# Patient Record
Sex: FEMALE | Race: WHITE | Employment: OTHER | URBAN - METROPOLITAN AREA
[De-identification: names, ages, dates, MRNs, and addresses within clinical notes are randomized per-mention and may not be internally consistent; named-entity substitution may affect disease eponyms.]

---

## 2016-09-01 PROCEDURE — G9678 ONCOLOGY CARE MODEL SERVICE: HCPCS | Performed by: INTERNAL MEDICINE

## 2016-10-01 PROCEDURE — G9678 ONCOLOGY CARE MODEL SERVICE: HCPCS | Performed by: INTERNAL MEDICINE

## 2016-11-01 PROCEDURE — G9678 ONCOLOGY CARE MODEL SERVICE: HCPCS | Performed by: INTERNAL MEDICINE

## 2016-12-01 PROCEDURE — G9678 ONCOLOGY CARE MODEL SERVICE: HCPCS | Performed by: INTERNAL MEDICINE

## 2017-01-16 ENCOUNTER — SNF/IP PROF CHARGE ONLY (OUTPATIENT)
Dept: HEMATOLOGY/ONCOLOGY | Facility: HOSPITAL | Age: 82
End: 2017-01-16

## 2017-01-16 DIAGNOSIS — C50.912 MALIGNANT NEOPLASM OF LEFT FEMALE BREAST, UNSPECIFIED SITE OF BREAST: Primary | ICD-10-CM

## 2017-01-19 ENCOUNTER — TELEPHONE (OUTPATIENT)
Dept: CARDIOLOGY CLINIC | Facility: CLINIC | Age: 82
End: 2017-01-19

## 2017-01-19 ENCOUNTER — ANTI-COAG VISIT (OUTPATIENT)
Dept: INTERNAL MEDICINE CLINIC | Facility: CLINIC | Age: 82
End: 2017-01-19

## 2017-01-19 DIAGNOSIS — I48.91 ATRIAL FIBRILLATION, UNSPECIFIED TYPE (HCC): Primary | ICD-10-CM

## 2017-01-19 LAB — INR: 1.8 (ref 2–3)

## 2017-01-19 PROCEDURE — 36416 COLLJ CAPILLARY BLOOD SPEC: CPT

## 2017-01-19 PROCEDURE — 85610 PROTHROMBIN TIME: CPT

## 2017-01-19 RX ORDER — METOPROLOL SUCCINATE 200 MG/1
TABLET, EXTENDED RELEASE ORAL
Qty: 90 TABLET | Refills: 1 | Status: SHIPPED | OUTPATIENT
Start: 2017-01-19 | End: 2017-05-25

## 2017-01-19 NOTE — TELEPHONE ENCOUNTER
Last seen 11/22/2016  Advised to be seen in 6 months   Future Appointments  Date Time Provider Jose Conklin   2/2/2017 1:30 PM [facility]     rx filled

## 2017-01-19 NOTE — TELEPHONE ENCOUNTER
Metorpolol Succ 200mg, take 1 tab by mouth every day, qty 90    Current outpatient prescriptions:   •  Metoprolol Succinate  MG Oral Tablet 24 Hr, Take 1 tablet by mouth  every day, Disp: 90 tablet, Rfl: 3

## 2017-02-02 ENCOUNTER — ANTI-COAG VISIT (OUTPATIENT)
Dept: INTERNAL MEDICINE CLINIC | Facility: CLINIC | Age: 82
End: 2017-02-02

## 2017-02-02 DIAGNOSIS — I48.91 ATRIAL FIBRILLATION, UNSPECIFIED TYPE (HCC): Primary | ICD-10-CM

## 2017-02-02 LAB — INR: 2 (ref 2–3)

## 2017-02-02 PROCEDURE — 85610 PROTHROMBIN TIME: CPT

## 2017-02-02 PROCEDURE — G0463 HOSPITAL OUTPT CLINIC VISIT: HCPCS

## 2017-02-02 PROCEDURE — 36416 COLLJ CAPILLARY BLOOD SPEC: CPT

## 2017-03-01 ENCOUNTER — ANTI-COAG VISIT (OUTPATIENT)
Dept: INTERNAL MEDICINE CLINIC | Facility: CLINIC | Age: 82
End: 2017-03-01

## 2017-03-01 ENCOUNTER — TELEPHONE (OUTPATIENT)
Dept: CARDIOLOGY CLINIC | Facility: CLINIC | Age: 82
End: 2017-03-01

## 2017-03-01 DIAGNOSIS — I48.91 ATRIAL FIBRILLATION, UNSPECIFIED TYPE (HCC): Primary | ICD-10-CM

## 2017-03-01 LAB — INR: 2.2 (ref 2–3)

## 2017-03-01 PROCEDURE — 85610 PROTHROMBIN TIME: CPT | Performed by: INTERNAL MEDICINE

## 2017-03-01 PROCEDURE — 36416 COLLJ CAPILLARY BLOOD SPEC: CPT | Performed by: INTERNAL MEDICINE

## 2017-03-01 RX ORDER — WARFARIN SODIUM 2.5 MG/1
TABLET ORAL
Qty: 90 TABLET | Refills: 1 | Status: SHIPPED | OUTPATIENT
Start: 2017-03-01 | End: 2017-08-11 | Stop reason: SDUPTHER

## 2017-03-01 NOTE — TELEPHONE ENCOUNTER
Warfarin 2.5mg tabs, take 1 tab by mouth nightly, qty 90    Current outpatient prescriptions:   •  Warfarin Sodium 2.5 MG Oral Tab, Take 1 tablet (2.5 mg total) by mouth nightly.  Take 1 tablet (2.5 mg) po as directed by the [facility] Clinic., Disp: 90 table

## 2017-03-30 ENCOUNTER — ANTI-COAG VISIT (OUTPATIENT)
Dept: INTERNAL MEDICINE CLINIC | Facility: CLINIC | Age: 82
End: 2017-03-30

## 2017-03-30 DIAGNOSIS — I48.91 ATRIAL FIBRILLATION, UNSPECIFIED TYPE (HCC): Primary | ICD-10-CM

## 2017-03-30 LAB — INR: 2.1 (ref 2–3)

## 2017-03-30 PROCEDURE — 36416 COLLJ CAPILLARY BLOOD SPEC: CPT | Performed by: INTERNAL MEDICINE

## 2017-03-30 PROCEDURE — 85610 PROTHROMBIN TIME: CPT | Performed by: INTERNAL MEDICINE

## 2017-04-27 ENCOUNTER — ANTI-COAG VISIT (OUTPATIENT)
Dept: INTERNAL MEDICINE CLINIC | Facility: CLINIC | Age: 82
End: 2017-04-27

## 2017-04-27 DIAGNOSIS — I48.91 ATRIAL FIBRILLATION, UNSPECIFIED TYPE (HCC): Primary | ICD-10-CM

## 2017-04-27 PROCEDURE — G0463 HOSPITAL OUTPT CLINIC VISIT: HCPCS

## 2017-04-27 PROCEDURE — 36416 COLLJ CAPILLARY BLOOD SPEC: CPT

## 2017-04-27 PROCEDURE — 85610 PROTHROMBIN TIME: CPT

## 2017-05-25 ENCOUNTER — OFFICE VISIT (OUTPATIENT)
Dept: CARDIOLOGY CLINIC | Facility: CLINIC | Age: 82
End: 2017-05-25

## 2017-05-25 ENCOUNTER — ANTI-COAG VISIT (OUTPATIENT)
Dept: INTERNAL MEDICINE CLINIC | Facility: CLINIC | Age: 82
End: 2017-05-25

## 2017-05-25 VITALS
BODY MASS INDEX: 22 KG/M2 | RESPIRATION RATE: 18 BRPM | DIASTOLIC BLOOD PRESSURE: 68 MMHG | HEART RATE: 56 BPM | SYSTOLIC BLOOD PRESSURE: 102 MMHG | WEIGHT: 145 LBS

## 2017-05-25 DIAGNOSIS — I48.20 CHRONIC ATRIAL FIBRILLATION (HCC): Primary | ICD-10-CM

## 2017-05-25 DIAGNOSIS — E78.00 HYPERCHOLESTEROLEMIA: ICD-10-CM

## 2017-05-25 DIAGNOSIS — R06.02 SHORTNESS OF BREATH: ICD-10-CM

## 2017-05-25 DIAGNOSIS — I48.91 ATRIAL FIBRILLATION, UNSPECIFIED TYPE (HCC): Primary | ICD-10-CM

## 2017-05-25 PROCEDURE — G0463 HOSPITAL OUTPT CLINIC VISIT: HCPCS | Performed by: INTERNAL MEDICINE

## 2017-05-25 PROCEDURE — 99214 OFFICE O/P EST MOD 30 MIN: CPT | Performed by: INTERNAL MEDICINE

## 2017-05-25 PROCEDURE — 36416 COLLJ CAPILLARY BLOOD SPEC: CPT

## 2017-05-25 PROCEDURE — 85610 PROTHROMBIN TIME: CPT

## 2017-05-25 RX ORDER — LISINOPRIL 40 MG/1
40 TABLET ORAL DAILY
Qty: 90 TABLET | Refills: 3 | Status: SHIPPED | OUTPATIENT
Start: 2017-05-25 | End: 2017-06-22

## 2017-05-25 RX ORDER — ATORVASTATIN CALCIUM 80 MG/1
80 TABLET, FILM COATED ORAL NIGHTLY
Qty: 90 TABLET | Refills: 3 | Status: SHIPPED | OUTPATIENT
Start: 2017-05-25 | End: 2017-05-26

## 2017-05-25 RX ORDER — METOPROLOL SUCCINATE 200 MG/1
TABLET, EXTENDED RELEASE ORAL
Qty: 90 TABLET | Refills: 3 | Status: SHIPPED | OUTPATIENT
Start: 2017-05-25 | End: 2017-05-26

## 2017-05-25 NOTE — PROGRESS NOTES
Xiang Antonio is a 80year old female. Patient presents with:  Atrial Fibrillation  Hypertension  Hyperlipidemia  Dyspnea: on exertion    HPI:   This is a pleasant 80year-old known to us with chronic atrial fibrillation hypertension and elevated choles [facility]) 2002     Per NG: cardioversion attempt   • Mitral regurgitation 2002   • High cholesterol    • Breast cancer ([facility] [address] 11/2011     Per NG: lumpectomy and radiation 11/2011 LT      Social History:    Smoking Status: Never Smoker                      Alcoh last cholesterol was at goal.  No echo is needed at this time she will call if changes  The patient indicates understanding of these issues and agrees to the plan.   The patient is asked to return in 6 months             Madai Troncoso MD  5/25/2017  1:2

## 2017-05-26 ENCOUNTER — TELEPHONE (OUTPATIENT)
Dept: CARDIOLOGY CLINIC | Facility: CLINIC | Age: 82
End: 2017-05-26

## 2017-05-26 RX ORDER — ATORVASTATIN CALCIUM 80 MG/1
80 TABLET, FILM COATED ORAL NIGHTLY
Qty: 90 TABLET | Refills: 3 | Status: SHIPPED | OUTPATIENT
Start: 2017-05-26 | End: 2017-06-12

## 2017-05-26 RX ORDER — METOPROLOL SUCCINATE 200 MG/1
TABLET, EXTENDED RELEASE ORAL
Qty: 90 TABLET | Refills: 3 | Status: SHIPPED | OUTPATIENT
Start: 2017-05-26 | End: 2018-03-30

## 2017-05-26 NOTE — TELEPHONE ENCOUNTER
Pt states she was in yesterday and requested to send following meds to [facility], Pt states all meds need to go to [facility] now for 90 day suppy not [place]. Requesting to change.      Metoprolol Succinate  MG Oral Tablet 24 Hr    atorvastatin (LIPITOR) 8

## 2017-06-12 ENCOUNTER — OFFICE VISIT (OUTPATIENT)
Dept: NEPHROLOGY | Facility: CLINIC | Age: 82
End: 2017-06-12

## 2017-06-12 VITALS
TEMPERATURE: 99 F | WEIGHT: 145.81 LBS | SYSTOLIC BLOOD PRESSURE: 112 MMHG | HEART RATE: 52 BPM | DIASTOLIC BLOOD PRESSURE: 63 MMHG | BODY MASS INDEX: 22.1 KG/M2 | HEIGHT: 68 IN

## 2017-06-12 DIAGNOSIS — E78.00 HYPERCHOLESTEROLEMIA: Primary | ICD-10-CM

## 2017-06-12 DIAGNOSIS — I50.9 CHRONIC CONGESTIVE HEART FAILURE, UNSPECIFIED CONGESTIVE HEART FAILURE TYPE: ICD-10-CM

## 2017-06-12 DIAGNOSIS — N30.00 ACUTE CYSTITIS WITHOUT HEMATURIA: ICD-10-CM

## 2017-06-12 PROCEDURE — G0463 HOSPITAL OUTPT CLINIC VISIT: HCPCS | Performed by: INTERNAL MEDICINE

## 2017-06-12 PROCEDURE — 99215 OFFICE O/P EST HI 40 MIN: CPT | Performed by: INTERNAL MEDICINE

## 2017-06-13 NOTE — PROGRESS NOTES
[facility]  Nephrology Daily Progress Note    Edgar Ndiaye  [id number]  80year old      HPI:   Edgar Ndiaye is a 80year old female.   41-year-old female with a history of chronic atrial fibrillation, moderate to severe mitral reg inspection lungs are clear to auscultation bilaterally normal respiratory effort  Cardiovascular: Irregular irregular rhythm. 2/6 systolic ejection murmur.   Abdomen: soft non-tender non-distended no organomegaly noted no masses  Musculoskeletal: full ROM

## 2017-06-14 ENCOUNTER — LAB ENCOUNTER (OUTPATIENT)
Dept: LAB | Age: 82
End: 2017-06-14
Attending: INTERNAL MEDICINE
Payer: MEDICARE

## 2017-06-14 DIAGNOSIS — E78.00 HYPERCHOLESTEROLEMIA: ICD-10-CM

## 2017-06-14 DIAGNOSIS — I50.9 CHRONIC CONGESTIVE HEART FAILURE, UNSPECIFIED CONGESTIVE HEART FAILURE TYPE: ICD-10-CM

## 2017-06-14 DIAGNOSIS — N30.00 ACUTE CYSTITIS WITHOUT HEMATURIA: ICD-10-CM

## 2017-06-14 PROCEDURE — 36415 COLL VENOUS BLD VENIPUNCTURE: CPT

## 2017-06-14 PROCEDURE — 81001 URINALYSIS AUTO W/SCOPE: CPT

## 2017-06-14 PROCEDURE — 80061 LIPID PANEL: CPT

## 2017-06-14 PROCEDURE — 80053 COMPREHEN METABOLIC PANEL: CPT

## 2017-06-14 PROCEDURE — 87086 URINE CULTURE/COLONY COUNT: CPT

## 2017-06-14 PROCEDURE — 87186 SC STD MICRODIL/AGAR DIL: CPT

## 2017-06-14 PROCEDURE — 87088 URINE BACTERIA CULTURE: CPT

## 2017-06-14 PROCEDURE — 85025 COMPLETE CBC W/AUTO DIFF WBC: CPT

## 2017-06-16 ENCOUNTER — TELEPHONE (OUTPATIENT)
Dept: NEPHROLOGY | Facility: CLINIC | Age: 82
End: 2017-06-16

## 2017-06-16 DIAGNOSIS — N28.9 ABNORMAL KIDNEY FUNCTION: Primary | ICD-10-CM

## 2017-06-16 RX ORDER — AMOXICILLIN 500 MG/1
500 CAPSULE ORAL 2 TIMES DAILY
Qty: 10 CAPSULE | Refills: 0 | Status: SHIPPED | OUTPATIENT
Start: 2017-06-16 | End: 2017-11-16 | Stop reason: ALTCHOICE

## 2017-06-16 NOTE — TELEPHONE ENCOUNTER
Labs are okay except kidney function was abnormal.  Make sure she is drinking enough fluids and avoid any use of nonsteroidals. Do a kidney ultrasound and repeat a renal panel in 2 weeks. Urine culture also was positive.   Start amoxicillin 500 mg twice d

## 2017-06-16 NOTE — TELEPHONE ENCOUNTER
Patient contacted. Results message from Dr. Garcia Camera read. Patient is aware to drink plenty of fluids, and do kidney ultrasound. She will repeat Renal panel in 2 weeks. She is aware of UTI and will start Amoxicillin 500mg 2 times every day for 5 days.  Presc

## 2017-06-22 ENCOUNTER — TELEPHONE (OUTPATIENT)
Dept: CARDIOLOGY CLINIC | Facility: CLINIC | Age: 82
End: 2017-06-22

## 2017-06-22 ENCOUNTER — ANTI-COAG VISIT (OUTPATIENT)
Dept: INTERNAL MEDICINE CLINIC | Facility: CLINIC | Age: 82
End: 2017-06-22

## 2017-06-22 DIAGNOSIS — I48.91 ATRIAL FIBRILLATION, UNSPECIFIED TYPE (HCC): Primary | ICD-10-CM

## 2017-06-22 PROCEDURE — 85610 PROTHROMBIN TIME: CPT

## 2017-06-22 PROCEDURE — 36416 COLLJ CAPILLARY BLOOD SPEC: CPT

## 2017-06-22 PROCEDURE — G0463 HOSPITAL OUTPT CLINIC VISIT: HCPCS

## 2017-06-22 RX ORDER — LISINOPRIL 40 MG/1
40 TABLET ORAL DAILY
Qty: 90 TABLET | Refills: 1 | Status: SHIPPED | OUTPATIENT
Start: 2017-06-22 | End: 2017-08-24

## 2017-06-22 RX ORDER — SPIRONOLACTONE 25 MG/1
12.5 TABLET ORAL DAILY
Qty: 45 TABLET | Refills: 1 | Status: SHIPPED | OUTPATIENT
Start: 2017-06-22 | End: 2017-10-04

## 2017-06-22 RX ORDER — HYDROCHLOROTHIAZIDE 25 MG/1
12.5 TABLET ORAL DAILY
Qty: 45 TABLET | Refills: 1 | Status: SHIPPED | OUTPATIENT
Start: 2017-06-22 | End: 2017-10-04

## 2017-06-22 NOTE — TELEPHONE ENCOUNTER
Lisinporil 40mg tabs; Spironolactone 25mg tabs; Hydrochlorothiazide 25mg---pt requesting all rx be sent to [facility]    Current outpatient prescriptions:   •  lisinopril 40 MG Oral Tab, Take 1 tablet (40 mg total) by mouth daily. , Disp: 90 tablet, Rfl: 3  •

## 2017-06-23 ENCOUNTER — HOSPITAL ENCOUNTER (OUTPATIENT)
Dept: ULTRASOUND IMAGING | Facility: HOSPITAL | Age: 82
Discharge: HOME | End: 2017-06-23
Attending: INTERNAL MEDICINE
Payer: MEDICARE

## 2017-06-23 DIAGNOSIS — N28.9 ABNORMAL KIDNEY FUNCTION: ICD-10-CM

## 2017-06-23 PROCEDURE — 76770 US EXAM ABDO BACK WALL COMP: CPT | Performed by: INTERNAL MEDICINE

## 2017-06-25 ENCOUNTER — TELEPHONE (OUTPATIENT)
Dept: NEPHROLOGY | Facility: CLINIC | Age: 82
End: 2017-06-25

## 2017-06-26 NOTE — TELEPHONE ENCOUNTER
Spoke with [place]. Let her know per [facility] kidney US was ok and she should repeat labs as ordered in early July.

## 2017-06-30 ENCOUNTER — LAB ENCOUNTER (OUTPATIENT)
Dept: LAB | Age: 82
End: 2017-06-30
Attending: INTERNAL MEDICINE
Payer: MEDICARE

## 2017-06-30 DIAGNOSIS — I48.20 CHRONIC ATRIAL FIBRILLATION (HCC): ICD-10-CM

## 2017-06-30 DIAGNOSIS — R06.02 SHORTNESS OF BREATH: ICD-10-CM

## 2017-06-30 DIAGNOSIS — N28.9 ABNORMAL KIDNEY FUNCTION: ICD-10-CM

## 2017-06-30 DIAGNOSIS — E78.00 HYPERCHOLESTEROLEMIA: ICD-10-CM

## 2017-06-30 LAB
ALBUMIN SERPL BCP-MCNC: 3.3 G/DL (ref 3.5–4.8)
ALBUMIN/GLOB SERPL: 1.1 {RATIO} (ref 1–2)
ALP SERPL-CCNC: 96 U/L (ref 32–100)
ALT SERPL-CCNC: 25 U/L (ref 14–54)
ANION GAP SERPL CALC-SCNC: 9 MMOL/L (ref 0–18)
AST SERPL-CCNC: 24 U/L (ref 15–41)
BASOPHILS # BLD: 0.1 K/UL (ref 0–0.2)
BASOPHILS NFR BLD: 1 %
BILIRUB SERPL-MCNC: 0.9 MG/DL (ref 0.3–1.2)
BUN SERPL-MCNC: 17 MG/DL (ref 8–20)
BUN/CREAT SERPL: 14.7 (ref 10–20)
CALCIUM SERPL-MCNC: 9.5 MG/DL (ref 8.5–10.5)
CHLORIDE SERPL-SCNC: 106 MMOL/L (ref 95–110)
CHOLEST SERPL-MCNC: 132 MG/DL (ref 110–200)
CO2 SERPL-SCNC: 25 MMOL/L (ref 22–32)
CREAT SERPL-MCNC: 1.16 MG/DL (ref 0.5–1.5)
EOSINOPHIL # BLD: 0.2 K/UL (ref 0–0.7)
EOSINOPHIL NFR BLD: 2 %
ERYTHROCYTE [DISTWIDTH] IN BLOOD BY AUTOMATED COUNT: 15.1 % (ref 11–15)
GLOBULIN PLAS-MCNC: 3 G/DL (ref 2.5–3.7)
GLUCOSE SERPL-MCNC: 93 MG/DL (ref 70–99)
HCT VFR BLD AUTO: 35.4 % (ref 35–48)
HDLC SERPL-MCNC: 43 MG/DL
HGB BLD-MCNC: 11.9 G/DL (ref 12–16)
LDLC SERPL CALC-MCNC: 77 MG/DL (ref 0–99)
LYMPHOCYTES # BLD: 1.8 K/UL (ref 1–4)
LYMPHOCYTES NFR BLD: 24 %
MCH RBC QN AUTO: 31.2 PG (ref 27–32)
MCHC RBC AUTO-ENTMCNC: 33.5 G/DL (ref 32–37)
MCV RBC AUTO: 93.2 FL (ref 80–100)
MONOCYTES # BLD: 0.6 K/UL (ref 0–1)
MONOCYTES NFR BLD: 8 %
NEUTROPHILS # BLD AUTO: 4.8 K/UL (ref 1.8–7.7)
NEUTROPHILS NFR BLD: 65 %
NONHDLC SERPL-MCNC: 89 MG/DL
OSMOLALITY UR CALC.SUM OF ELEC: 291 MOSM/KG (ref 275–295)
PHOSPHATE SERPL-MCNC: 3.9 MG/DL (ref 2.4–4.7)
PLATELET # BLD AUTO: 167 K/UL (ref 140–400)
PMV BLD AUTO: 9.6 FL (ref 7.4–10.3)
POTASSIUM SERPL-SCNC: 4 MMOL/L (ref 3.3–5.1)
PROT SERPL-MCNC: 6.3 G/DL (ref 5.9–8.4)
RBC # BLD AUTO: 3.8 M/UL (ref 3.7–5.4)
SODIUM SERPL-SCNC: 140 MMOL/L (ref 136–144)
TRIGL SERPL-MCNC: 58 MG/DL (ref 1–149)
WBC # BLD AUTO: 7.4 K/UL (ref 4–11)

## 2017-06-30 PROCEDURE — 84100 ASSAY OF PHOSPHORUS: CPT

## 2017-06-30 PROCEDURE — 80053 COMPREHEN METABOLIC PANEL: CPT

## 2017-06-30 PROCEDURE — 36415 COLL VENOUS BLD VENIPUNCTURE: CPT

## 2017-06-30 PROCEDURE — 80061 LIPID PANEL: CPT

## 2017-06-30 PROCEDURE — 85025 COMPLETE CBC W/AUTO DIFF WBC: CPT

## 2017-07-01 ENCOUNTER — TELEPHONE (OUTPATIENT)
Dept: NEPHROLOGY | Facility: CLINIC | Age: 82
End: 2017-07-01

## 2017-07-01 NOTE — TELEPHONE ENCOUNTER
Kidney function was a little better.   If doing well CPM.  Please see me in December for six-month follow-up

## 2017-07-03 NOTE — TELEPHONE ENCOUNTER
Contacted pt and notified her of [facility]'s message below. She stated understanding and that her UTI symptoms are gone. She will call in November to schedule an appt for December for her 6 month follow up.

## 2017-07-20 ENCOUNTER — ANTI-COAG VISIT (OUTPATIENT)
Dept: INTERNAL MEDICINE CLINIC | Facility: CLINIC | Age: 82
End: 2017-07-20

## 2017-07-20 DIAGNOSIS — I48.91 ATRIAL FIBRILLATION, UNSPECIFIED TYPE (HCC): ICD-10-CM

## 2017-07-20 LAB — INR: 1.9 (ref 2–3)

## 2017-07-20 PROCEDURE — 36416 COLLJ CAPILLARY BLOOD SPEC: CPT

## 2017-07-20 PROCEDURE — G0463 HOSPITAL OUTPT CLINIC VISIT: HCPCS

## 2017-07-20 PROCEDURE — 85610 PROTHROMBIN TIME: CPT

## 2017-08-10 ENCOUNTER — ANTI-COAG VISIT (OUTPATIENT)
Dept: INTERNAL MEDICINE CLINIC | Facility: CLINIC | Age: 82
End: 2017-08-10

## 2017-08-10 DIAGNOSIS — I48.91 ATRIAL FIBRILLATION, UNSPECIFIED TYPE (HCC): ICD-10-CM

## 2017-08-10 LAB — INR: 2.7 (ref 2–3)

## 2017-08-10 PROCEDURE — 85610 PROTHROMBIN TIME: CPT

## 2017-08-10 PROCEDURE — 36416 COLLJ CAPILLARY BLOOD SPEC: CPT

## 2017-08-11 RX ORDER — WARFARIN SODIUM 2.5 MG/1
TABLET ORAL
Qty: 90 TABLET | Refills: 1 | Status: SHIPPED | COMMUNITY
Start: 2017-08-11 | End: 2017-08-24 | Stop reason: SDUPTHER

## 2017-08-24 ENCOUNTER — TELEPHONE (OUTPATIENT)
Dept: CARDIOLOGY CLINIC | Facility: CLINIC | Age: 82
End: 2017-08-24

## 2017-08-24 RX ORDER — LISINOPRIL 40 MG/1
40 TABLET ORAL DAILY
Qty: 90 TABLET | Refills: 0 | Status: SHIPPED | OUTPATIENT
Start: 2017-08-24 | End: 2018-03-22

## 2017-08-24 NOTE — TELEPHONE ENCOUNTER
Lisinopril 40mg tabs, take 1 tab by mouth daily, qty 90    Current Outpatient Prescriptions:   •  lisinopril 40 MG Oral Tab, Take 1 tablet (40 mg total) by mouth daily. , Disp: 90 tablet, Rfl: 1

## 2017-08-24 NOTE — TELEPHONE ENCOUNTER
Warfarin 2.5mg tabs, take 1 tab daily by mouth in the evening as directed by the coumadin clinic, qty 90    Current Outpatient Prescriptions:   •  Warfarin Sodium 2.5 MG Oral Tab, TAKE AS DIRECTED BY ANTICOAGULATION CLINIC:  1 tab PO in Evening., Disp: 90

## 2017-08-30 ENCOUNTER — TELEPHONE (OUTPATIENT)
Dept: INTERNAL MEDICINE CLINIC | Facility: CLINIC | Age: 82
End: 2017-08-30

## 2017-08-30 DIAGNOSIS — I48.20 CHRONIC ATRIAL FIBRILLATION (HCC): Primary | ICD-10-CM

## 2017-09-07 ENCOUNTER — ANTI-COAG VISIT (OUTPATIENT)
Dept: INTERNAL MEDICINE CLINIC | Facility: CLINIC | Age: 82
End: 2017-09-07

## 2017-09-07 DIAGNOSIS — I48.20 CHRONIC ATRIAL FIBRILLATION (HCC): ICD-10-CM

## 2017-09-07 DIAGNOSIS — I48.91 ATRIAL FIBRILLATION, UNSPECIFIED TYPE (HCC): ICD-10-CM

## 2017-09-07 LAB — INR: 2.4 (ref 2–3)

## 2017-09-07 PROCEDURE — 85610 PROTHROMBIN TIME: CPT

## 2017-09-07 PROCEDURE — 36416 COLLJ CAPILLARY BLOOD SPEC: CPT

## 2017-10-04 ENCOUNTER — TELEPHONE (OUTPATIENT)
Dept: CARDIOLOGY CLINIC | Facility: CLINIC | Age: 82
End: 2017-10-04

## 2017-10-04 RX ORDER — SPIRONOLACTONE 25 MG/1
12.5 TABLET ORAL DAILY
Qty: 45 TABLET | Refills: 1 | Status: SHIPPED | OUTPATIENT
Start: 2017-10-04 | End: 2018-06-01

## 2017-10-04 RX ORDER — HYDROCHLOROTHIAZIDE 25 MG/1
12.5 TABLET ORAL DAILY
Qty: 45 TABLET | Refills: 1 | Status: SHIPPED | OUTPATIENT
Start: 2017-10-04 | End: 2018-06-01

## 2017-10-04 NOTE — TELEPHONE ENCOUNTER
Spironolactone tabs; Hydrochlorothiazide 25mg tabs    Current Outpatient Prescriptions:   •  hydrochlorothiazide 25 MG Oral Tab, Take 0.5 tablets (12.5 mg total) by mouth daily. , Disp: 45 tablet, Rfl: 1  •  spironolactone (ALDACTONE) 25 MG Oral Tab, Take 0

## 2017-10-05 ENCOUNTER — ANTI-COAG VISIT (OUTPATIENT)
Dept: INTERNAL MEDICINE CLINIC | Facility: CLINIC | Age: 82
End: 2017-10-05

## 2017-10-05 DIAGNOSIS — I48.91 ATRIAL FIBRILLATION, UNSPECIFIED TYPE (HCC): ICD-10-CM

## 2017-10-05 DIAGNOSIS — I48.20 CHRONIC ATRIAL FIBRILLATION (HCC): ICD-10-CM

## 2017-10-05 PROCEDURE — 85610 PROTHROMBIN TIME: CPT

## 2017-10-05 PROCEDURE — 36416 COLLJ CAPILLARY BLOOD SPEC: CPT

## 2017-10-23 ENCOUNTER — TELEPHONE (OUTPATIENT)
Dept: NEPHROLOGY | Facility: CLINIC | Age: 82
End: 2017-10-23

## 2017-10-23 ENCOUNTER — APPOINTMENT (OUTPATIENT)
Dept: LAB | Age: 82
End: 2017-10-23
Attending: INTERNAL MEDICINE
Payer: MEDICARE

## 2017-10-23 DIAGNOSIS — R35.0 URINARY FREQUENCY: Primary | ICD-10-CM

## 2017-10-23 DIAGNOSIS — R35.0 URINARY FREQUENCY: ICD-10-CM

## 2017-10-23 PROCEDURE — 87186 SC STD MICRODIL/AGAR DIL: CPT

## 2017-10-23 PROCEDURE — 81001 URINALYSIS AUTO W/SCOPE: CPT

## 2017-10-23 PROCEDURE — 87086 URINE CULTURE/COLONY COUNT: CPT

## 2017-10-23 PROCEDURE — 87088 URINE BACTERIA CULTURE: CPT

## 2017-10-23 NOTE — TELEPHONE ENCOUNTER
Patient contacted. Advised to do lab work ordered by Dr. Shasta Hough. Advised when results are received we will call her. She will go to the lab this afternoon. Aware no fasting is needed. Lab orders entered in system.

## 2017-10-23 NOTE — TELEPHONE ENCOUNTER
Pt indicates she is urinating often and suspects she may have a UTI, indicates she had the same issue back in June, requesting to speak to RN to see if she needs to be seen, pls call at:[phone],thanks.

## 2017-10-25 ENCOUNTER — TELEPHONE (OUTPATIENT)
Dept: NEPHROLOGY | Facility: CLINIC | Age: 82
End: 2017-10-25

## 2017-10-25 RX ORDER — NITROFURANTOIN 25; 75 MG/1; MG/1
100 CAPSULE ORAL 2 TIMES DAILY
Qty: 14 CAPSULE | Refills: 0 | Status: SHIPPED | OUTPATIENT
Start: 2017-10-25 | End: 2017-11-01

## 2017-10-25 NOTE — TELEPHONE ENCOUNTER
Spoke to pt. Notified her that her urine culture is positive and [facility] has prescribed her an antibiotic to take twice daily for 7 days. Advised her to call office if her symptoms are not resolved when she finishes antibiotic. She stated understanding.

## 2017-11-03 ENCOUNTER — ANTI-COAG VISIT (OUTPATIENT)
Dept: INTERNAL MEDICINE CLINIC | Facility: CLINIC | Age: 82
End: 2017-11-03

## 2017-11-03 DIAGNOSIS — I48.20 CHRONIC ATRIAL FIBRILLATION (HCC): ICD-10-CM

## 2017-11-03 DIAGNOSIS — I48.91 ATRIAL FIBRILLATION, UNSPECIFIED TYPE (HCC): ICD-10-CM

## 2017-11-03 PROCEDURE — 85610 PROTHROMBIN TIME: CPT

## 2017-11-03 PROCEDURE — 36416 COLLJ CAPILLARY BLOOD SPEC: CPT

## 2017-11-14 ENCOUNTER — LAB ENCOUNTER (OUTPATIENT)
Dept: LAB | Age: 82
End: 2017-11-14
Attending: INTERNAL MEDICINE
Payer: MEDICARE

## 2017-11-14 DIAGNOSIS — C50.912 MALIGNANT NEOPLASM OF LEFT FEMALE BREAST (HCC): ICD-10-CM

## 2017-11-14 PROCEDURE — 85025 COMPLETE CBC W/AUTO DIFF WBC: CPT

## 2017-11-14 PROCEDURE — 80053 COMPREHEN METABOLIC PANEL: CPT

## 2017-11-14 PROCEDURE — 36415 COLL VENOUS BLD VENIPUNCTURE: CPT

## 2017-11-16 ENCOUNTER — OFFICE VISIT (OUTPATIENT)
Dept: HEMATOLOGY/ONCOLOGY | Facility: HOSPITAL | Age: 82
End: 2017-11-16
Attending: INTERNAL MEDICINE
Payer: MEDICARE

## 2017-11-16 VITALS
SYSTOLIC BLOOD PRESSURE: 152 MMHG | TEMPERATURE: 97 F | HEART RATE: 50 BPM | RESPIRATION RATE: 16 BRPM | HEIGHT: 68 IN | DIASTOLIC BLOOD PRESSURE: 63 MMHG | BODY MASS INDEX: 22.73 KG/M2 | WEIGHT: 150 LBS

## 2017-11-16 DIAGNOSIS — Z12.31 SCREENING MAMMOGRAM, ENCOUNTER FOR: ICD-10-CM

## 2017-11-16 DIAGNOSIS — C50.912 MALIGNANT NEOPLASM OF LEFT BREAST IN FEMALE, ESTROGEN RECEPTOR POSITIVE, UNSPECIFIED SITE OF BREAST (HCC): Primary | ICD-10-CM

## 2017-11-16 DIAGNOSIS — Z17.0 MALIGNANT NEOPLASM OF LEFT BREAST IN FEMALE, ESTROGEN RECEPTOR POSITIVE, UNSPECIFIED SITE OF BREAST (HCC): Primary | ICD-10-CM

## 2017-11-16 PROBLEM — Z79.811 ENCOUNTER FOR MONITORING ANASTROZOLE THERAPY: Status: ACTIVE | Noted: 2017-11-16

## 2017-11-16 PROBLEM — Z51.81 ENCOUNTER FOR MONITORING ANASTROZOLE THERAPY: Status: ACTIVE | Noted: 2017-11-16

## 2017-11-16 PROCEDURE — G0463 HOSPITAL OUTPT CLINIC VISIT: HCPCS | Performed by: INTERNAL MEDICINE

## 2017-11-16 PROCEDURE — 99214 OFFICE O/P EST MOD 30 MIN: CPT | Performed by: INTERNAL MEDICINE

## 2017-11-16 NOTE — PROGRESS NOTES
HPI   North Humberto is a 80year old female here for f/u of Malignant neoplasm of left breast in female, estrogen receptor positive, unspecified site of breast (hcc)  (primary encounter diagnosis)     She completed the anastrozole for 5 yrs in March of Psychiatric/Behavioral: Positive for sleep disturbance. The patient is not nervous/anxious. Negative depression. Current Outpatient Prescriptions:  hydrochlorothiazide 25 MG Oral Tab Take 0.5 tablets (12.5 mg total) by mouth daily.  Poly Cr Per NG: Pregnacy 40 week 7 lb(s) 14 oz Male  :       Comment: Per NG:  -breech  OUTCOME IS 40 week 9 lb(s)                Female  2007: OTHER SURGICAL HISTORY Left      Comment: Per NG: Opacified Capsule  --YAG, posterior                capsu present. Cardiovascular: Normal rate and normal heart sounds. Exam reveals no friction rub. No murmur heard. Pulmonary/Chest: Effort normal and breath sounds normal. No respiratory distress. She exhibits no tenderness.  Right breast exhibits inverted encounter.       Results From Past 48 Hours:    Recent Results (from the past 48 hour(s))  -COMP METABOLIC PANEL (14)   Collection Time: 11/14/17 11:12 AM   Result Value Ref Range   Glucose 104 (H) 70 - 99 mg/dL   Sodium 138 136 - 144 mmol/L   Potassium 4.1 32 - 100 U/L   Bilirubin, Total 0.7 0.3 - 1.2 mg/dL   Total Protein 6.4 5.9 - 8.4 g/dL   Albumin 3.5 3.5 - 4.8 g/dL   Globulin 2.9 2.5 - 3.7 g/dL   A/G Ratio 1.2 1.0 - 2.0   Anion Gap 9 0 - 18 mmol/L   BUN/CREA Ratio 20.7 (H) 10.0 - 20.0   Calculated Osmol

## 2017-11-28 ENCOUNTER — TELEPHONE (OUTPATIENT)
Dept: CARDIOLOGY CLINIC | Facility: CLINIC | Age: 82
End: 2017-11-28

## 2017-11-28 ENCOUNTER — OFFICE VISIT (OUTPATIENT)
Dept: CARDIOLOGY CLINIC | Facility: CLINIC | Age: 82
End: 2017-11-28

## 2017-11-28 ENCOUNTER — ANTI-COAG VISIT (OUTPATIENT)
Dept: INTERNAL MEDICINE CLINIC | Facility: CLINIC | Age: 82
End: 2017-11-28

## 2017-11-28 VITALS
RESPIRATION RATE: 22 BRPM | WEIGHT: 148 LBS | SYSTOLIC BLOOD PRESSURE: 120 MMHG | DIASTOLIC BLOOD PRESSURE: 70 MMHG | HEART RATE: 72 BPM | HEIGHT: 68 IN | BODY MASS INDEX: 22.43 KG/M2

## 2017-11-28 DIAGNOSIS — I48.20 CHRONIC ATRIAL FIBRILLATION (HCC): ICD-10-CM

## 2017-11-28 DIAGNOSIS — R06.02 SHORTNESS OF BREATH: ICD-10-CM

## 2017-11-28 DIAGNOSIS — E78.00 HYPERCHOLESTEROLEMIA: ICD-10-CM

## 2017-11-28 DIAGNOSIS — I48.20 CHRONIC ATRIAL FIBRILLATION (HCC): Primary | ICD-10-CM

## 2017-11-28 DIAGNOSIS — I48.91 ATRIAL FIBRILLATION, UNSPECIFIED TYPE (HCC): ICD-10-CM

## 2017-11-28 PROCEDURE — G0463 HOSPITAL OUTPT CLINIC VISIT: HCPCS | Performed by: INTERNAL MEDICINE

## 2017-11-28 PROCEDURE — 36416 COLLJ CAPILLARY BLOOD SPEC: CPT

## 2017-11-28 PROCEDURE — 85610 PROTHROMBIN TIME: CPT

## 2017-11-28 PROCEDURE — 99214 OFFICE O/P EST MOD 30 MIN: CPT | Performed by: INTERNAL MEDICINE

## 2017-11-28 PROCEDURE — G0463 HOSPITAL OUTPT CLINIC VISIT: HCPCS

## 2017-11-28 NOTE — TELEPHONE ENCOUNTER
Hi Dr. Olya Casanova,    Pt had INR 3.7 today, she was on warfarin 17.5 mg weekly dose, I decreased her dose to 15 mg weekly dose, which is 14.3 % decreased, return in 2 weeks.

## 2017-11-28 NOTE — PROGRESS NOTES
Xiang Antonio is a 80year old female. Patient presents with:  Atrial Fibrillation  Hypertension  Hyperlipidemia  Dyspnea: on exertion  Fatigue    HPI:   This is a pleasant 80year-old with chronic atrial fibrillation hypertension elevated cholesterol a Alcohol use:  Yes                REVIEW OF SYSTEMS:   GENERAL HEALTH: feels well otherwise  SKIN: denies any unusual skin lesions or rashes  RESPIRATORY: denies shortness of breath with exertion  CARDIOVASCULA

## 2017-12-06 ENCOUNTER — OFFICE VISIT (OUTPATIENT)
Dept: NEPHROLOGY | Facility: CLINIC | Age: 82
End: 2017-12-06

## 2017-12-06 VITALS
HEIGHT: 64.5 IN | BODY MASS INDEX: 25.07 KG/M2 | HEART RATE: 59 BPM | SYSTOLIC BLOOD PRESSURE: 150 MMHG | WEIGHT: 148.63 LBS | DIASTOLIC BLOOD PRESSURE: 66 MMHG

## 2017-12-06 DIAGNOSIS — N18.30 CKD (CHRONIC KIDNEY DISEASE) STAGE 3, GFR 30-59 ML/MIN (HCC): ICD-10-CM

## 2017-12-06 DIAGNOSIS — R06.02 SHORTNESS OF BREATH: Primary | ICD-10-CM

## 2017-12-06 DIAGNOSIS — I48.20 CHRONIC ATRIAL FIBRILLATION (HCC): ICD-10-CM

## 2017-12-06 PROCEDURE — 99214 OFFICE O/P EST MOD 30 MIN: CPT | Performed by: INTERNAL MEDICINE

## 2017-12-06 PROCEDURE — G0463 HOSPITAL OUTPT CLINIC VISIT: HCPCS | Performed by: INTERNAL MEDICINE

## 2017-12-07 NOTE — PATIENT INSTRUCTIONS
Try and check your blood pressures daily and call me in 1 week. Repeat your kidney blood test in 1 week.

## 2017-12-07 NOTE — PROGRESS NOTES
[facility], [facility]  Nephrology Daily Progress Note    Shayla Cesar  [id number]  80year old  Patient presents with:  UTI: follow up      HPI:   Shayla Cesar is a 80year old female.   44-year-old female with a history of chronic atrial fibrillation, noted  Neck/Thyroid: neck is supple without adenopathy  Lymphatic: no abnormal cervical, supraclavicular or axillary adenopathy is noted  Respiratory: normal to inspection lungs are clear to auscultation bilaterally normal respiratory effort  Cardiovascula Tab, Take 1 tablet (80 mg total) by mouth nightly., Disp: 90 tablet, Rfl: 3  •  Calcium 500 MG Oral Tab, Take 500 mg by mouth 2 (two) times daily.   , Disp: , Rfl:     Allergies:  No Known Allergies         ASSESSMENT/PLAN:   Assessment   Shortness of breat

## 2017-12-14 ENCOUNTER — APPOINTMENT (OUTPATIENT)
Dept: LAB | Age: 82
End: 2017-12-14
Attending: INTERNAL MEDICINE
Payer: MEDICARE

## 2017-12-14 ENCOUNTER — ANTI-COAG VISIT (OUTPATIENT)
Dept: INTERNAL MEDICINE CLINIC | Facility: CLINIC | Age: 82
End: 2017-12-14

## 2017-12-14 ENCOUNTER — TELEPHONE (OUTPATIENT)
Dept: INTERNAL MEDICINE CLINIC | Facility: CLINIC | Age: 82
End: 2017-12-14

## 2017-12-14 DIAGNOSIS — I48.91 ATRIAL FIBRILLATION, UNSPECIFIED TYPE (HCC): ICD-10-CM

## 2017-12-14 DIAGNOSIS — I48.20 CHRONIC ATRIAL FIBRILLATION (HCC): ICD-10-CM

## 2017-12-14 DIAGNOSIS — N18.30 CKD (CHRONIC KIDNEY DISEASE) STAGE 3, GFR 30-59 ML/MIN (HCC): ICD-10-CM

## 2017-12-14 PROCEDURE — 80069 RENAL FUNCTION PANEL: CPT

## 2017-12-14 PROCEDURE — 85610 PROTHROMBIN TIME: CPT

## 2017-12-14 PROCEDURE — G0463 HOSPITAL OUTPT CLINIC VISIT: HCPCS

## 2017-12-14 PROCEDURE — 36416 COLLJ CAPILLARY BLOOD SPEC: CPT

## 2017-12-14 PROCEDURE — 36415 COLL VENOUS BLD VENIPUNCTURE: CPT

## 2017-12-15 ENCOUNTER — TELEPHONE (OUTPATIENT)
Dept: NEPHROLOGY | Facility: CLINIC | Age: 82
End: 2017-12-15

## 2017-12-15 NOTE — TELEPHONE ENCOUNTER
Patient contacted. Results message from Dr. Jorge Alberto Garcia read. She states battery on her blood pressure monitor  and she is replacing it this weekend so has no readings but will call next week Wednesday with blood pressure readings.

## 2017-12-15 NOTE — TELEPHONE ENCOUNTER
Kidney function is better. Continue to drink plenty of fluids. Does she have any blood pressure readings? ?

## 2017-12-19 ENCOUNTER — TELEPHONE (OUTPATIENT)
Dept: NEPHROLOGY | Facility: CLINIC | Age: 82
End: 2017-12-19

## 2017-12-19 NOTE — TELEPHONE ENCOUNTER
Patient contacted and Dr. Charly Bates blood pressure advice relayed that all blood pressures look good and patient is aware to continue present management (no changes in medications)

## 2017-12-19 NOTE — TELEPHONE ENCOUNTER
BP Readings - Left Arm    Date  AM BP  PM BP     12/16/2017 126/63  128/60  12/17/2017 108/53  131/56  12/18/2017 126/60  120/57   12/19/2017 116/58  -    Pls call. Thank you.

## 2017-12-28 ENCOUNTER — ANTI-COAG VISIT (OUTPATIENT)
Dept: INTERNAL MEDICINE CLINIC | Facility: CLINIC | Age: 82
End: 2017-12-28

## 2017-12-28 DIAGNOSIS — I48.20 CHRONIC ATRIAL FIBRILLATION (HCC): Primary | ICD-10-CM

## 2017-12-28 DIAGNOSIS — I48.91 ATRIAL FIBRILLATION, UNSPECIFIED TYPE (HCC): ICD-10-CM

## 2017-12-28 PROCEDURE — 36416 COLLJ CAPILLARY BLOOD SPEC: CPT

## 2017-12-28 PROCEDURE — 85610 PROTHROMBIN TIME: CPT

## 2018-01-04 ENCOUNTER — HOSPITAL ENCOUNTER (OUTPATIENT)
Dept: MAMMOGRAPHY | Age: 83
Discharge: HOME | End: 2018-01-04
Attending: INTERNAL MEDICINE
Payer: MEDICARE

## 2018-01-04 DIAGNOSIS — Z12.31 SCREENING MAMMOGRAM, ENCOUNTER FOR: ICD-10-CM

## 2018-01-04 PROCEDURE — 77067 SCR MAMMO BI INCL CAD: CPT | Performed by: INTERNAL MEDICINE

## 2018-01-25 ENCOUNTER — ANTI-COAG VISIT (OUTPATIENT)
Dept: INTERNAL MEDICINE CLINIC | Facility: CLINIC | Age: 83
End: 2018-01-25

## 2018-01-25 DIAGNOSIS — I48.91 ATRIAL FIBRILLATION, UNSPECIFIED TYPE (HCC): ICD-10-CM

## 2018-01-25 DIAGNOSIS — I48.20 CHRONIC ATRIAL FIBRILLATION (HCC): ICD-10-CM

## 2018-01-25 LAB — INR: 2.1 (ref 2–3)

## 2018-01-25 PROCEDURE — 85610 PROTHROMBIN TIME: CPT

## 2018-01-25 PROCEDURE — 36416 COLLJ CAPILLARY BLOOD SPEC: CPT

## 2018-02-22 ENCOUNTER — ANTI-COAG VISIT (OUTPATIENT)
Dept: INTERNAL MEDICINE CLINIC | Facility: CLINIC | Age: 83
End: 2018-02-22

## 2018-02-22 DIAGNOSIS — I48.20 CHRONIC ATRIAL FIBRILLATION (HCC): ICD-10-CM

## 2018-02-22 DIAGNOSIS — I48.91 ATRIAL FIBRILLATION, UNSPECIFIED TYPE (HCC): ICD-10-CM

## 2018-02-22 LAB — INR: 2.5 (ref 2–3)

## 2018-02-22 PROCEDURE — 36416 COLLJ CAPILLARY BLOOD SPEC: CPT

## 2018-02-22 PROCEDURE — 85610 PROTHROMBIN TIME: CPT

## 2018-02-22 RX ORDER — WARFARIN SODIUM 2.5 MG/1
TABLET ORAL
Qty: 90 TABLET | Refills: 1 | Status: SHIPPED | OUTPATIENT
Start: 2018-02-22 | End: 2018-08-07

## 2018-03-22 ENCOUNTER — ANTI-COAG VISIT (OUTPATIENT)
Dept: INTERNAL MEDICINE CLINIC | Facility: CLINIC | Age: 83
End: 2018-03-22

## 2018-03-22 DIAGNOSIS — I48.91 ATRIAL FIBRILLATION, UNSPECIFIED TYPE (HCC): ICD-10-CM

## 2018-03-22 DIAGNOSIS — I48.20 CHRONIC ATRIAL FIBRILLATION (HCC): ICD-10-CM

## 2018-03-22 LAB — INR: 2.3 (ref 2–3)

## 2018-03-22 PROCEDURE — 36416 COLLJ CAPILLARY BLOOD SPEC: CPT

## 2018-03-22 PROCEDURE — 85610 PROTHROMBIN TIME: CPT

## 2018-03-25 RX ORDER — LISINOPRIL 40 MG/1
40 TABLET ORAL DAILY
Qty: 90 TABLET | Refills: 0 | Status: SHIPPED | OUTPATIENT
Start: 2018-03-25 | End: 2018-05-18

## 2018-03-30 RX ORDER — ATORVASTATIN CALCIUM 80 MG/1
TABLET, FILM COATED ORAL
Qty: 90 TABLET | Refills: 0 | Status: SHIPPED | OUTPATIENT
Start: 2018-03-30 | End: 2018-05-24

## 2018-03-30 RX ORDER — METOPROLOL SUCCINATE 200 MG/1
TABLET, EXTENDED RELEASE ORAL
Qty: 90 TABLET | Refills: 0 | Status: SHIPPED | OUTPATIENT
Start: 2018-03-30 | End: 2018-06-28

## 2018-03-30 NOTE — TELEPHONE ENCOUNTER
Telephone order obtained from [facility] for refill.     Hypertensive Medications  Protocol Criteria:  · Appointment scheduled in the past 6 months or in the next 3 months  · BMP or CMP in the past 12 months  · Creatinine result < 2  Recent Outpatient Visits 12 months  · ALT result < 80  · LDL result <130   Recent Outpatient Visits            3 months ago Shortness of breath    Kelly Santoro MD    Office Visit    4 months ago Chronic atrial fibrillation [facility])    El

## 2018-04-19 ENCOUNTER — ANTI-COAG VISIT (OUTPATIENT)
Dept: INTERNAL MEDICINE CLINIC | Facility: CLINIC | Age: 83
End: 2018-04-19

## 2018-04-19 DIAGNOSIS — I48.91 ATRIAL FIBRILLATION, UNSPECIFIED TYPE (HCC): ICD-10-CM

## 2018-04-19 DIAGNOSIS — I48.20 CHRONIC ATRIAL FIBRILLATION (HCC): ICD-10-CM

## 2018-04-19 PROCEDURE — 36416 COLLJ CAPILLARY BLOOD SPEC: CPT

## 2018-04-19 PROCEDURE — 85610 PROTHROMBIN TIME: CPT

## 2018-05-10 ENCOUNTER — OFFICE VISIT (OUTPATIENT)
Dept: OTOLARYNGOLOGY | Facility: CLINIC | Age: 83
End: 2018-05-10

## 2018-05-10 VITALS
TEMPERATURE: 98 F | BODY MASS INDEX: 22.73 KG/M2 | DIASTOLIC BLOOD PRESSURE: 66 MMHG | WEIGHT: 150 LBS | HEIGHT: 68 IN | SYSTOLIC BLOOD PRESSURE: 130 MMHG

## 2018-05-10 DIAGNOSIS — H61.23 BILATERAL IMPACTED CERUMEN: Primary | ICD-10-CM

## 2018-05-10 PROCEDURE — 69210 REMOVE IMPACTED EAR WAX UNI: CPT | Performed by: OTOLARYNGOLOGY

## 2018-05-10 PROCEDURE — 99202 OFFICE O/P NEW SF 15 MIN: CPT | Performed by: OTOLARYNGOLOGY

## 2018-05-10 NOTE — PROGRESS NOTES
Luis Enrique Zheng is a 80year old female.   Patient presents with:  Ear Problem: Bilateral ear cleaning, no pain      HISTORY OF PRESENT ILLNESS    Patient presents for cerumen removal. No other complaints or concerns at this time    Social History    Marcela Soliz week 3 lb(s) 15 oz Female   [id number]:       Comment: pregnacy  195:       Comment:  Per NG: Pregnacy 40 week 7 lb(s) 14 oz Male  :       Comment: Per NG:  -breech  OUTCOME IS 40 week 9 lb(s)                Female  2007: OTHER SURGICA PROCEDURE:    Removal of cerumen impaction   The cerumen impaction was completely removed using microscopy. Removal was completed by using acurette and/or suction. Comments: Return to clinic as needed.   Avoid q-tips, water precautions and use over

## 2018-05-18 DIAGNOSIS — E78.00 PURE HYPERCHOLESTEROLEMIA: Primary | ICD-10-CM

## 2018-05-18 RX ORDER — LISINOPRIL 40 MG/1
TABLET ORAL
Qty: 90 TABLET | Refills: 0 | Status: SHIPPED | OUTPATIENT
Start: 2018-05-18 | End: 2018-08-07

## 2018-05-18 NOTE — TELEPHONE ENCOUNTER
LM on vm Orders entered in system. Instructed to fast 12 hours for lab work and call to schedule an appointment with Dr. Yeni Sweet.

## 2018-05-18 NOTE — TELEPHONE ENCOUNTER
Patient passes protocol, but please review warning below and advise. Script pended.       Drug-Drug: spironolactone and lisinopril   Hyperkalemia, possibly with cardiac arrhythmias or arrest, may occur with the combination of potassium-sparing diuretics and GFRAA 51 (L) 12/14/2017   CA 9.3 12/14/2017   ALKPHOS 78 05/17/2016   AST 27 11/14/2017   ALT 20 11/14/2017   BILT 0.7 11/14/2017   TP 6.4 11/14/2017   ALB 3.4 (L) 12/14/2017    12/14/2017   K 3.9 12/14/2017    12/14/2017   CO2 28 12/14/2017

## 2018-05-21 NOTE — TELEPHONE ENCOUNTER
Patient returned call. She has an appointment already scheduled with Dr. Crissy Rizo for 6/13/18. Will go to the lab this week (either today or Tuesday).  She will be leaving for [place] next week so would like to be notified before that if she needs any med

## 2018-05-22 ENCOUNTER — LAB ENCOUNTER (OUTPATIENT)
Dept: LAB | Age: 83
End: 2018-05-22
Attending: NURSE PRACTITIONER
Payer: MEDICARE

## 2018-05-22 DIAGNOSIS — E78.00 PURE HYPERCHOLESTEROLEMIA: ICD-10-CM

## 2018-05-22 PROCEDURE — 36415 COLL VENOUS BLD VENIPUNCTURE: CPT

## 2018-05-22 PROCEDURE — 85025 COMPLETE CBC W/AUTO DIFF WBC: CPT

## 2018-05-22 PROCEDURE — 80053 COMPREHEN METABOLIC PANEL: CPT

## 2018-05-22 PROCEDURE — 80061 LIPID PANEL: CPT

## 2018-05-24 ENCOUNTER — ANTI-COAG VISIT (OUTPATIENT)
Dept: INTERNAL MEDICINE CLINIC | Facility: CLINIC | Age: 83
End: 2018-05-24

## 2018-05-24 ENCOUNTER — OFFICE VISIT (OUTPATIENT)
Dept: CARDIOLOGY CLINIC | Facility: CLINIC | Age: 83
End: 2018-05-24

## 2018-05-24 VITALS — WEIGHT: 148 LBS | BODY MASS INDEX: 23 KG/M2 | SYSTOLIC BLOOD PRESSURE: 106 MMHG | DIASTOLIC BLOOD PRESSURE: 60 MMHG

## 2018-05-24 DIAGNOSIS — I48.91 ATRIAL FIBRILLATION, UNSPECIFIED TYPE (HCC): ICD-10-CM

## 2018-05-24 DIAGNOSIS — I48.20 CHRONIC ATRIAL FIBRILLATION (HCC): Primary | ICD-10-CM

## 2018-05-24 DIAGNOSIS — R06.02 SHORTNESS OF BREATH: ICD-10-CM

## 2018-05-24 DIAGNOSIS — I48.20 CHRONIC ATRIAL FIBRILLATION (HCC): ICD-10-CM

## 2018-05-24 DIAGNOSIS — E78.00 HYPERCHOLESTEROLEMIA: ICD-10-CM

## 2018-05-24 PROCEDURE — G0463 HOSPITAL OUTPT CLINIC VISIT: HCPCS | Performed by: INTERNAL MEDICINE

## 2018-05-24 PROCEDURE — 36416 COLLJ CAPILLARY BLOOD SPEC: CPT

## 2018-05-24 PROCEDURE — 85610 PROTHROMBIN TIME: CPT

## 2018-05-24 PROCEDURE — 99214 OFFICE O/P EST MOD 30 MIN: CPT | Performed by: INTERNAL MEDICINE

## 2018-05-24 NOTE — PROGRESS NOTES
Shayla Cesar is a 80year old female. Patient presents with: Follow - Up: Shortness of breath with activity    HPI:   Hypertension elevated cholesterol and elevated pulmonary pressures who presents for follow-up.   This is a pleasant 70-year-old with c Smokeless tobacco: Never Used                      Alcohol use:  Yes                REVIEW OF SYSTEMS:   GENERAL HEALTH: feels well otherwise  SKIN: denies any unusual skin lesions or rashes  RESPIRATORY

## 2018-05-24 NOTE — PATIENT INSTRUCTIONS
Schedule echocardiogram when you get back from [address] same medicines and call if changes

## 2018-06-04 RX ORDER — SPIRONOLACTONE 25 MG/1
TABLET ORAL
Qty: 45 TABLET | Refills: 3 | Status: SHIPPED | OUTPATIENT
Start: 2018-06-04 | End: 2019-07-12

## 2018-06-04 RX ORDER — HYDROCHLOROTHIAZIDE 25 MG/1
TABLET ORAL
Qty: 45 TABLET | Refills: 3 | Status: SHIPPED | OUTPATIENT
Start: 2018-06-04 | End: 2019-07-12

## 2018-06-08 ENCOUNTER — HOSPITAL ENCOUNTER (OUTPATIENT)
Dept: CARDIOLOGY CLINIC | Age: 83
Discharge: HOME | End: 2018-06-08
Attending: INTERNAL MEDICINE
Payer: MEDICARE

## 2018-06-08 DIAGNOSIS — I48.20 CHRONIC ATRIAL FIBRILLATION (HCC): ICD-10-CM

## 2018-06-08 DIAGNOSIS — R06.02 SHORTNESS OF BREATH: ICD-10-CM

## 2018-06-08 PROCEDURE — 93306 TTE W/DOPPLER COMPLETE: CPT | Performed by: INTERNAL MEDICINE

## 2018-06-13 ENCOUNTER — OFFICE VISIT (OUTPATIENT)
Dept: NEPHROLOGY | Facility: CLINIC | Age: 83
End: 2018-06-13

## 2018-06-13 VITALS
DIASTOLIC BLOOD PRESSURE: 67 MMHG | SYSTOLIC BLOOD PRESSURE: 135 MMHG | BODY MASS INDEX: 24.79 KG/M2 | HEART RATE: 51 BPM | WEIGHT: 147 LBS | HEIGHT: 64.5 IN

## 2018-06-13 DIAGNOSIS — I48.20 CHRONIC ATRIAL FIBRILLATION (HCC): Primary | ICD-10-CM

## 2018-06-13 DIAGNOSIS — E78.00 HYPERCHOLESTEROLEMIA: ICD-10-CM

## 2018-06-13 DIAGNOSIS — N18.30 CKD (CHRONIC KIDNEY DISEASE) STAGE 3, GFR 30-59 ML/MIN (HCC): ICD-10-CM

## 2018-06-13 PROCEDURE — 99214 OFFICE O/P EST MOD 30 MIN: CPT | Performed by: INTERNAL MEDICINE

## 2018-06-13 PROCEDURE — G0463 HOSPITAL OUTPT CLINIC VISIT: HCPCS | Performed by: INTERNAL MEDICINE

## 2018-06-14 NOTE — PROGRESS NOTES
[facility]  Nephrology Daily Progress Note    Anurag Arredondo  [id number]  80year old  Patient presents with:  Chronic Kidney Disease      HPI:   Anurag Arredondo is a 80year old female.   69-year-old female with a history of chronic atrial fibril noted  Neck/Thyroid: neck is supple without adenopathy  Lymphatic: no abnormal cervical, supraclavicular or axillary adenopathy is noted  Respiratory: normal to inspection lungs are clear to auscultation bilaterally normal respiratory effort  Cardiovascula tablet, Rfl: 3  •  Calcium 500 MG Oral Tab, Take 500 mg by mouth 2 (two) times daily.   , Disp: , Rfl:     Allergies:  No Known Allergies         ASSESSMENT/PLAN:   Assessment   Chronic atrial fibrillation (hcc)  (primary encounter diagnosis)  Ckd (chronic

## 2018-06-21 ENCOUNTER — ANTI-COAG VISIT (OUTPATIENT)
Dept: INTERNAL MEDICINE CLINIC | Facility: CLINIC | Age: 83
End: 2018-06-21

## 2018-06-21 DIAGNOSIS — I48.20 CHRONIC ATRIAL FIBRILLATION (HCC): ICD-10-CM

## 2018-06-21 DIAGNOSIS — I48.91 ATRIAL FIBRILLATION, UNSPECIFIED TYPE (HCC): ICD-10-CM

## 2018-06-21 PROCEDURE — 85610 PROTHROMBIN TIME: CPT

## 2018-06-21 PROCEDURE — 36416 COLLJ CAPILLARY BLOOD SPEC: CPT

## 2018-06-29 RX ORDER — METOPROLOL SUCCINATE 200 MG/1
TABLET, EXTENDED RELEASE ORAL
Qty: 90 TABLET | Refills: 1 | Status: SHIPPED | OUTPATIENT
Start: 2018-06-29 | End: 2019-01-17

## 2018-06-29 RX ORDER — ATORVASTATIN CALCIUM 80 MG/1
TABLET, FILM COATED ORAL
Qty: 90 TABLET | Refills: 1 | Status: SHIPPED | OUTPATIENT
Start: 2018-06-29 | End: 2019-01-17

## 2018-06-29 NOTE — TELEPHONE ENCOUNTER
Rx request for Metoprolol Succinate  mg PASSED Hypertension Protocol.  Rx filled per protocol    Hypertensive Medications  Protocol Criteria:  · Appointment scheduled with Cardiology in the past 12 months or in the next 3 months  · BMP or CMP in the p Criteria:  · Appointment scheduled with Cardiology in the past 12 months or in the next 3 months  · ALT & LDL on file in the past 12 months  · ALT result less than 80  · LDL result less than 130   Recent Outpatient Visits            2 weeks ago Chronic atr

## 2018-07-16 ENCOUNTER — ANTI-COAG VISIT (OUTPATIENT)
Dept: INTERNAL MEDICINE CLINIC | Facility: CLINIC | Age: 83
End: 2018-07-16

## 2018-07-16 DIAGNOSIS — I48.20 CHRONIC ATRIAL FIBRILLATION (HCC): ICD-10-CM

## 2018-07-16 DIAGNOSIS — I48.91 ATRIAL FIBRILLATION, UNSPECIFIED TYPE (HCC): ICD-10-CM

## 2018-07-16 LAB — INR: 3.9 (ref 2–3)

## 2018-07-30 ENCOUNTER — TELEPHONE (OUTPATIENT)
Dept: NEPHROLOGY | Facility: CLINIC | Age: 83
End: 2018-07-30

## 2018-07-30 RX ORDER — AZITHROMYCIN 250 MG/1
TABLET, FILM COATED ORAL
Qty: 1 PACKAGE | Refills: 0 | Status: SHIPPED | OUTPATIENT
Start: 2018-07-30 | End: 2018-11-13 | Stop reason: ALTCHOICE

## 2018-07-30 NOTE — TELEPHONE ENCOUNTER
Pt states since last Wednesday she has been experiencing a sore throat , lathargic and tired. Pt would like some medication.  States she would not buy anything over the counter because she is on a blood thinner please call thankyou

## 2018-07-30 NOTE — TELEPHONE ENCOUNTER
Patient contacted. Advised of antibiotic sent to [facility]. Patient is aware to be seen if not better.

## 2018-08-02 ENCOUNTER — LAB ENCOUNTER (OUTPATIENT)
Dept: LAB | Age: 83
End: 2018-08-02
Attending: INTERNAL MEDICINE
Payer: MEDICARE

## 2018-08-02 DIAGNOSIS — I48.20 CHRONIC ATRIAL FIBRILLATION (HCC): ICD-10-CM

## 2018-08-02 LAB
INR BLD: 2.4 (ref 0.9–1.2)
PROTHROMBIN TIME: 25.6 SECONDS (ref 11.8–14.5)

## 2018-08-02 PROCEDURE — 36415 COLL VENOUS BLD VENIPUNCTURE: CPT

## 2018-08-02 PROCEDURE — 85610 PROTHROMBIN TIME: CPT

## 2018-08-03 ENCOUNTER — TELEPHONE (OUTPATIENT)
Dept: INTERNAL MEDICINE CLINIC | Facility: CLINIC | Age: 83
End: 2018-08-03

## 2018-08-03 ENCOUNTER — ANTI-COAG VISIT (OUTPATIENT)
Dept: INTERNAL MEDICINE CLINIC | Facility: CLINIC | Age: 83
End: 2018-08-03

## 2018-08-03 DIAGNOSIS — I48.91 ATRIAL FIBRILLATION, UNSPECIFIED TYPE (HCC): ICD-10-CM

## 2018-08-03 DIAGNOSIS — I48.20 CHRONIC ATRIAL FIBRILLATION (HCC): ICD-10-CM

## 2018-08-03 NOTE — TELEPHONE ENCOUNTER
Patient had INR draw completed 08/02/2018 and has not received call with instructions.      Patient has not taken any medication      Please advise

## 2018-08-08 RX ORDER — WARFARIN SODIUM 2.5 MG/1
TABLET ORAL
Qty: 90 TABLET | Refills: 0 | Status: SHIPPED | OUTPATIENT
Start: 2018-08-08 | End: 2019-03-07

## 2018-08-08 RX ORDER — LISINOPRIL 40 MG/1
TABLET ORAL
Qty: 90 TABLET | Refills: 1 | Status: SHIPPED | OUTPATIENT
Start: 2018-08-08 | End: 2019-02-11

## 2018-08-08 NOTE — TELEPHONE ENCOUNTER
LOV 6/13/18. Last coumadin Clinic appointment was on 8/3/18. Refills pended and routed to Dr. Michelle Mcclelland to approve.

## 2018-08-23 ENCOUNTER — ANTI-COAG VISIT (OUTPATIENT)
Dept: INTERNAL MEDICINE CLINIC | Facility: CLINIC | Age: 83
End: 2018-08-23
Payer: MEDICARE

## 2018-08-23 DIAGNOSIS — I48.20 CHRONIC ATRIAL FIBRILLATION (HCC): ICD-10-CM

## 2018-08-23 DIAGNOSIS — I48.91 ATRIAL FIBRILLATION, UNSPECIFIED TYPE (HCC): ICD-10-CM

## 2018-08-23 LAB — INR: 3.3 (ref 2–3)

## 2018-08-23 PROCEDURE — 36416 COLLJ CAPILLARY BLOOD SPEC: CPT

## 2018-08-23 PROCEDURE — 85610 PROTHROMBIN TIME: CPT

## 2018-09-06 ENCOUNTER — OFFICE VISIT (OUTPATIENT)
Dept: OPHTHALMOLOGY | Facility: CLINIC | Age: 83
End: 2018-09-06
Payer: MEDICARE

## 2018-09-06 DIAGNOSIS — H26.491 AFTER CATARACT OF RIGHT EYE NOT OBSCURING VISION: ICD-10-CM

## 2018-09-06 DIAGNOSIS — H04.129 DRY EYE: ICD-10-CM

## 2018-09-06 DIAGNOSIS — H43.393 VITREOUS FLOATERS OF BOTH EYES: Primary | ICD-10-CM

## 2018-09-06 DIAGNOSIS — Z96.1 PSEUDOPHAKIA OF BOTH EYES: ICD-10-CM

## 2018-09-06 PROCEDURE — 92014 COMPRE OPH EXAM EST PT 1/>: CPT | Performed by: OPHTHALMOLOGY

## 2018-09-06 NOTE — ASSESSMENT & PLAN NOTE
Use any brand of over the counter artificial tears up to 4 times a day in both eyes as needed. Information on Systane given.

## 2018-09-06 NOTE — PATIENT INSTRUCTIONS
Vitreous floaters of both eyes  No treatment. Pseudophakia of both eyes  No treatment. After cataract of right eye not obscuring vision  Discussed posterior capsule opacity with patient. Could have YAG laser in the right eye in the near future.   D

## 2018-09-06 NOTE — ASSESSMENT & PLAN NOTE
Discussed posterior capsule opacity with patient. Could have YAG laser in the right eye in the near future. Discussed risks, benefits, treatments and alternatives. Information given and reviewed with patient.    Patient will call if she wants to schedule

## 2018-09-06 NOTE — PROGRESS NOTES
Katrina Marcelo is a 80year old female. HPI:     HPI     Pt denies any blurred vision at distance and is happy with her OTC reading glasses. Pt is not using any eye drops but would like to know what OTC tears she can start using as needed.  ( Informat Smokeless tobacco: Never Used                      Alcohol use:  Yes               Medications:    Current Outpatient Prescriptions:  Warfarin Sodium 2.5 MG Oral Tab Take 1/2 tablet (1.25 mg) 3 days and 1 tablet ( YAG     Vitreous Vitreous floaters Vitreous floaters          Fundus Exam       Right Left    Disc Good rim, Peripapillary atrophy Good rim, Temporal crescent    C/D Ratio 0.45 0.45    Macula Normal Normal    Vessels Normal Normal    Periphery Normal Ada Fresh

## 2018-09-18 ENCOUNTER — TELEPHONE (OUTPATIENT)
Dept: INTERNAL MEDICINE CLINIC | Facility: CLINIC | Age: 83
End: 2018-09-18

## 2018-09-18 DIAGNOSIS — I48.20 CHRONIC ATRIAL FIBRILLATION (HCC): Primary | ICD-10-CM

## 2018-09-19 NOTE — TELEPHONE ENCOUNTER
Judy Rizo is on vacation this week. We will route him thie encounter but he wont sign the order until he is back on 9/24/18.

## 2018-09-20 ENCOUNTER — ANTI-COAG VISIT (OUTPATIENT)
Dept: INTERNAL MEDICINE CLINIC | Facility: CLINIC | Age: 83
End: 2018-09-20
Payer: MEDICARE

## 2018-09-20 DIAGNOSIS — I48.20 CHRONIC ATRIAL FIBRILLATION (HCC): ICD-10-CM

## 2018-09-20 DIAGNOSIS — I48.91 ATRIAL FIBRILLATION, UNSPECIFIED TYPE (HCC): ICD-10-CM

## 2018-09-20 LAB — INR: 2.4 (ref 2–3)

## 2018-09-20 PROCEDURE — 85610 PROTHROMBIN TIME: CPT

## 2018-09-20 PROCEDURE — 36416 COLLJ CAPILLARY BLOOD SPEC: CPT

## 2018-10-18 ENCOUNTER — ANTI-COAG VISIT (OUTPATIENT)
Dept: INTERNAL MEDICINE CLINIC | Facility: CLINIC | Age: 83
End: 2018-10-18
Payer: MEDICARE

## 2018-10-18 DIAGNOSIS — I48.20 CHRONIC ATRIAL FIBRILLATION (HCC): ICD-10-CM

## 2018-10-18 DIAGNOSIS — I48.91 ATRIAL FIBRILLATION, UNSPECIFIED TYPE (HCC): ICD-10-CM

## 2018-10-18 PROCEDURE — 36416 COLLJ CAPILLARY BLOOD SPEC: CPT

## 2018-10-18 PROCEDURE — 85610 PROTHROMBIN TIME: CPT

## 2018-11-13 ENCOUNTER — ANTI-COAG VISIT (OUTPATIENT)
Dept: INTERNAL MEDICINE CLINIC | Facility: CLINIC | Age: 83
End: 2018-11-13
Payer: MEDICARE

## 2018-11-13 ENCOUNTER — OFFICE VISIT (OUTPATIENT)
Dept: CARDIOLOGY CLINIC | Facility: CLINIC | Age: 83
End: 2018-11-13
Payer: MEDICARE

## 2018-11-13 VITALS
RESPIRATION RATE: 18 BRPM | SYSTOLIC BLOOD PRESSURE: 132 MMHG | HEIGHT: 64.5 IN | DIASTOLIC BLOOD PRESSURE: 62 MMHG | WEIGHT: 141.81 LBS | HEART RATE: 54 BPM | BODY MASS INDEX: 23.91 KG/M2

## 2018-11-13 DIAGNOSIS — I48.20 CHRONIC ATRIAL FIBRILLATION (HCC): ICD-10-CM

## 2018-11-13 DIAGNOSIS — Z51.81 ENCOUNTER FOR THERAPEUTIC DRUG MONITORING: Primary | ICD-10-CM

## 2018-11-13 DIAGNOSIS — I48.91 ATRIAL FIBRILLATION, UNSPECIFIED TYPE (HCC): ICD-10-CM

## 2018-11-13 DIAGNOSIS — I48.20 CHRONIC ATRIAL FIBRILLATION (HCC): Primary | ICD-10-CM

## 2018-11-13 DIAGNOSIS — E78.00 HYPERCHOLESTEROLEMIA: ICD-10-CM

## 2018-11-13 DIAGNOSIS — R06.02 SHORTNESS OF BREATH: ICD-10-CM

## 2018-11-13 DIAGNOSIS — Z79.01 LONG TERM (CURRENT) USE OF ANTICOAGULANTS: ICD-10-CM

## 2018-11-13 PROCEDURE — 99214 OFFICE O/P EST MOD 30 MIN: CPT | Performed by: INTERNAL MEDICINE

## 2018-11-13 PROCEDURE — 36416 COLLJ CAPILLARY BLOOD SPEC: CPT

## 2018-11-13 PROCEDURE — G0463 HOSPITAL OUTPT CLINIC VISIT: HCPCS | Performed by: INTERNAL MEDICINE

## 2018-11-13 PROCEDURE — 85610 PROTHROMBIN TIME: CPT

## 2018-11-13 NOTE — PROGRESS NOTES
Coleman Foreman is a 80year old female. Patient presents with:  Atrial Fibrillation: 6 month f/u, SOB on exersion relieved on rest.  Hypercholesterolemia    HPI:   This is a pleasant 80year-old known to us with elevated cholesterol atrial fibrillation, History:  Social History    Tobacco Use      Smoking status: Never Smoker      Smokeless tobacco: Never Used    Alcohol use: Yes    Drug use: No       REVIEW OF SYSTEMS:   GENERAL HEALTH: feels well otherwise  SKIN: denies any unusual skin lesions or rashe understanding of these issues and agrees to the plan. The patient is asked to return in 6 months.              Selena Strickland MD  11/13/2018  11:32 AM

## 2018-11-13 NOTE — PATIENT INSTRUCTIONS
Monitor and record blood pressure for 1 week and call with result   -Call Dr. Lona Vasquez at [phone] for appointment to assess elevated pulmonary pressures

## 2018-11-20 ENCOUNTER — OFFICE VISIT (OUTPATIENT)
Dept: HEMATOLOGY/ONCOLOGY | Facility: HOSPITAL | Age: 83
End: 2018-11-20
Attending: INTERNAL MEDICINE
Payer: MEDICARE

## 2018-11-20 VITALS
BODY MASS INDEX: 23.95 KG/M2 | HEIGHT: 64.5 IN | WEIGHT: 142 LBS | TEMPERATURE: 98 F | SYSTOLIC BLOOD PRESSURE: 136 MMHG | HEART RATE: 52 BPM | RESPIRATION RATE: 18 BRPM | DIASTOLIC BLOOD PRESSURE: 53 MMHG

## 2018-11-20 DIAGNOSIS — Z17.0 MALIGNANT NEOPLASM OF LEFT BREAST IN FEMALE, ESTROGEN RECEPTOR POSITIVE, UNSPECIFIED SITE OF BREAST (HCC): Primary | ICD-10-CM

## 2018-11-20 DIAGNOSIS — C50.912 MALIGNANT NEOPLASM OF LEFT BREAST IN FEMALE, ESTROGEN RECEPTOR POSITIVE, UNSPECIFIED SITE OF BREAST (HCC): Primary | ICD-10-CM

## 2018-11-20 PROCEDURE — 99213 OFFICE O/P EST LOW 20 MIN: CPT | Performed by: INTERNAL MEDICINE

## 2018-11-20 NOTE — PROGRESS NOTES
HPI     [place] H[place]to is a 80year old female here for f/u of Malignant neoplasm of left breast in female, estrogen receptor positive, unspecified site of breast (hcc)  (primary encounter diagnosis)     She completed the anastrozole for 5 yrs in March TABLET BY  MOUTH DAILY Disp: 45 tablet Rfl: 3   Calcium 500 MG Oral Tab Take 500 mg by mouth 2 (two) times daily.    Disp:  Rfl:      Allergies:   No Known Allergies    Past Medical History:   Diagnosis Date   • Atrial fibrillation ([facility] 2002    Per NG: car Never Used    Substance and Sexual Activity      Alcohol use: Yes      Drug use: No      Sexual activity: Not on file    Other Topics      Concerns:         Service: Not Asked        Blood Transfusions: Not Asked        Caffeine Concern:  Yes no skin change and no tenderness. Abdominal: Soft. Bowel sounds are normal. There is no tenderness. Musculoskeletal: She exhibits no edema. Lymphadenopathy:     She has no cervical adenopathy. She has no axillary adenopathy.         Right: No supr

## 2018-11-23 ENCOUNTER — TELEPHONE (OUTPATIENT)
Dept: CARDIOLOGY CLINIC | Facility: CLINIC | Age: 83
End: 2018-11-23

## 2018-11-23 NOTE — TELEPHONE ENCOUNTER
Pt states she was not given any instructions n pt decided to take BP readings twice a day.      11/14  Morning 101/47  Later in day 130/64    11/15  Morning 108/46  Later in the day 119/66    11/16  Morning 111/55  Later in the day 128/65    11/17   Morning

## 2018-11-23 NOTE — TELEPHONE ENCOUNTER
Returned call. Acknowledged BP readings and will review with Dr. Salina Hernandes in office Tuesday. Dr. Oscar Krueger is booked until the end of January but since Dr. Salina Hernandes referred her, she is able to see him on Tuesday.

## 2018-11-23 NOTE — TELEPHONE ENCOUNTER
Pt called back and states she gave the wrong date she is going to see dr Alexis Galindo on Wednesday not Tuesday

## 2018-11-27 ENCOUNTER — ANTI-COAG VISIT (OUTPATIENT)
Dept: INTERNAL MEDICINE CLINIC | Facility: CLINIC | Age: 83
End: 2018-11-27
Payer: MEDICARE

## 2018-11-27 DIAGNOSIS — I48.91 ATRIAL FIBRILLATION, UNSPECIFIED TYPE (HCC): ICD-10-CM

## 2018-11-27 DIAGNOSIS — I48.20 CHRONIC ATRIAL FIBRILLATION (HCC): ICD-10-CM

## 2018-11-27 DIAGNOSIS — Z79.01 LONG TERM (CURRENT) USE OF ANTICOAGULANTS: ICD-10-CM

## 2018-11-27 DIAGNOSIS — Z51.81 ENCOUNTER FOR THERAPEUTIC DRUG MONITORING: ICD-10-CM

## 2018-11-27 PROCEDURE — 85610 PROTHROMBIN TIME: CPT

## 2018-11-27 PROCEDURE — 36416 COLLJ CAPILLARY BLOOD SPEC: CPT
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
## 2018-12-27 ENCOUNTER — ANTI-COAG VISIT (OUTPATIENT)
Dept: INTERNAL MEDICINE CLINIC | Facility: CLINIC | Age: 83
End: 2018-12-27
Payer: MEDICARE

## 2018-12-27 DIAGNOSIS — I48.20 CHRONIC ATRIAL FIBRILLATION (HCC): ICD-10-CM

## 2018-12-27 DIAGNOSIS — Z79.01 LONG TERM (CURRENT) USE OF ANTICOAGULANTS: ICD-10-CM

## 2018-12-27 DIAGNOSIS — I48.91 ATRIAL FIBRILLATION, UNSPECIFIED TYPE (HCC): ICD-10-CM

## 2018-12-27 DIAGNOSIS — Z51.81 ENCOUNTER FOR THERAPEUTIC DRUG MONITORING: ICD-10-CM

## 2018-12-27 PROCEDURE — 36416 COLLJ CAPILLARY BLOOD SPEC: CPT

## 2018-12-27 PROCEDURE — 85610 PROTHROMBIN TIME: CPT

## 2019-01-01 ENCOUNTER — EXTERNAL RECORD (OUTPATIENT)
Dept: HEALTH INFORMATION MANAGEMENT | Facility: OTHER | Age: 84
End: 2019-01-01

## 2019-01-18 RX ORDER — METOPROLOL SUCCINATE 200 MG/1
TABLET, EXTENDED RELEASE ORAL
Qty: 90 TABLET | Refills: 1 | Status: SHIPPED | OUTPATIENT
Start: 2019-01-18 | End: 2019-08-19

## 2019-01-18 RX ORDER — ATORVASTATIN CALCIUM 80 MG/1
TABLET, FILM COATED ORAL
Qty: 90 TABLET | Refills: 1 | Status: SHIPPED | OUTPATIENT
Start: 2019-01-18 | End: 2019-07-16 | Stop reason: ALTCHOICE

## 2019-01-18 NOTE — TELEPHONE ENCOUNTER
Metoprolol and atorvastatin refill. Verified medication dose, Meets protocol , refill order sent.   Hypertensive Medications  Protocol Criteria:  · Appointment scheduled with Cardiology in the past 12 months or in the next 3 months  · BMP or CMP in the past Criteria:  · Appointment scheduled with Cardiology in the past 12 months or in the next 3 months  · ALT & LDL on file in the past 12 months  · ALT result less than 80  · LDL result less than 130   Recent Outpatient Visits            1 month ago Malignant n

## 2019-01-23 ENCOUNTER — ANTI-COAG VISIT (OUTPATIENT)
Dept: INTERNAL MEDICINE CLINIC | Facility: CLINIC | Age: 84
End: 2019-01-23
Payer: MEDICARE

## 2019-01-23 ENCOUNTER — MYAURORA ACCOUNT LINK (OUTPATIENT)
Dept: OTHER | Age: 84
End: 2019-01-23

## 2019-01-23 ENCOUNTER — PRIOR ORIGINAL RECORDS (OUTPATIENT)
Dept: OTHER | Age: 84
End: 2019-01-23

## 2019-01-23 DIAGNOSIS — I48.91 ATRIAL FIBRILLATION, UNSPECIFIED TYPE (HCC): ICD-10-CM

## 2019-01-23 DIAGNOSIS — I48.20 CHRONIC ATRIAL FIBRILLATION (HCC): ICD-10-CM

## 2019-01-23 DIAGNOSIS — Z51.81 ENCOUNTER FOR THERAPEUTIC DRUG MONITORING: ICD-10-CM

## 2019-01-23 DIAGNOSIS — Z79.01 LONG TERM (CURRENT) USE OF ANTICOAGULANTS: ICD-10-CM

## 2019-01-23 LAB — INR: 3.5 (ref 0.8–1.2)

## 2019-01-23 PROCEDURE — 85610 PROTHROMBIN TIME: CPT

## 2019-01-23 PROCEDURE — 36416 COLLJ CAPILLARY BLOOD SPEC: CPT

## 2019-01-31 ENCOUNTER — ORDER TRANSCRIPTION (OUTPATIENT)
Dept: ADMINISTRATIVE | Facility: HOSPITAL | Age: 84
End: 2019-01-31

## 2019-01-31 ENCOUNTER — PRIOR ORIGINAL RECORDS (OUTPATIENT)
Dept: OTHER | Age: 84
End: 2019-01-31

## 2019-01-31 DIAGNOSIS — I27.20 PULMONARY HYPERTENSION (HCC): Primary | ICD-10-CM

## 2019-02-07 ENCOUNTER — ANTI-COAG VISIT (OUTPATIENT)
Dept: INTERNAL MEDICINE CLINIC | Facility: CLINIC | Age: 84
End: 2019-02-07
Payer: MEDICARE

## 2019-02-07 ENCOUNTER — PRIOR ORIGINAL RECORDS (OUTPATIENT)
Dept: OTHER | Age: 84
End: 2019-02-07

## 2019-02-07 ENCOUNTER — LAB ENCOUNTER (OUTPATIENT)
Dept: LAB | Age: 84
End: 2019-02-07
Attending: INTERNAL MEDICINE
Payer: MEDICARE

## 2019-02-07 DIAGNOSIS — I27.0 PRIMARY PULMONARY HYPERTENSION (HCC): ICD-10-CM

## 2019-02-07 DIAGNOSIS — I48.20 CHRONIC ATRIAL FIBRILLATION (HCC): ICD-10-CM

## 2019-02-07 DIAGNOSIS — D64.9 ANEMIA, UNSPECIFIED TYPE: ICD-10-CM

## 2019-02-07 DIAGNOSIS — Z79.01 LONG TERM (CURRENT) USE OF ANTICOAGULANTS: ICD-10-CM

## 2019-02-07 DIAGNOSIS — Z51.81 ENCOUNTER FOR THERAPEUTIC DRUG MONITORING: ICD-10-CM

## 2019-02-07 DIAGNOSIS — I48.91 ATRIAL FIBRILLATION, UNSPECIFIED TYPE (HCC): ICD-10-CM

## 2019-02-07 LAB
ALBUMIN SERPL BCP-MCNC: 3.4 G/DL (ref 3.5–4.8)
ALBUMIN/GLOB SERPL: 1.1 {RATIO} (ref 1–2)
ALP SERPL-CCNC: 114 U/L (ref 32–100)
ALT SERPL-CCNC: 21 U/L (ref 14–54)
ANION GAP SERPL CALC-SCNC: 9 MMOL/L (ref 0–18)
AST SERPL-CCNC: 32 U/L (ref 15–41)
BASOPHILS # BLD AUTO: 0.05 X10(3) UL (ref 0–0.2)
BASOPHILS NFR BLD AUTO: 0.8 %
BILIRUB SERPL-MCNC: 0.8 MG/DL (ref 0.3–1.2)
BUN SERPL-MCNC: 23 MG/DL (ref 8–20)
BUN/CREAT SERPL: 19 (ref 10–20)
CALCIUM SERPL-MCNC: 9.1 MG/DL (ref 8.5–10.5)
CHLORIDE SERPL-SCNC: 106 MMOL/L (ref 95–110)
CO2 SERPL-SCNC: 23 MMOL/L (ref 22–32)
CREAT SERPL-MCNC: 1.21 MG/DL (ref 0.5–1.5)
DEPRECATED RDW RBC AUTO: 54.3 FL (ref 35.1–46.3)
EOSINOPHIL # BLD AUTO: 0.11 X10(3) UL (ref 0–0.7)
EOSINOPHIL NFR BLD AUTO: 1.7 %
ERYTHROCYTE [DISTWIDTH] IN BLOOD BY AUTOMATED COUNT: 15.3 % (ref 11–15)
FOLATE SERPL-MCNC: 15.4 NG/ML
GLOBULIN PLAS-MCNC: 3.2 G/DL (ref 2.5–3.7)
GLUCOSE SERPL-MCNC: 90 MG/DL (ref 70–99)
HCT VFR BLD AUTO: 37.7 % (ref 35–48)
HGB BLD-MCNC: 12.1 G/DL (ref 12–16)
IMM GRANULOCYTES # BLD AUTO: 0.02 X10(3) UL (ref 0–1)
IMM GRANULOCYTES NFR BLD: 0.3 %
INR: 2 (ref 2–3)
IRON SATN MFR SERPL: 29 % (ref 15–50)
IRON SERPL-MCNC: 80 MCG/DL (ref 28–170)
LYMPHOCYTES # BLD AUTO: 2.08 X10(3) UL (ref 1–4)
LYMPHOCYTES NFR BLD AUTO: 32.3 %
MCH RBC QN AUTO: 30.8 PG (ref 26–34)
MCHC RBC AUTO-ENTMCNC: 32.1 G/DL (ref 31–37)
MCV RBC AUTO: 95.9 FL (ref 80–100)
MONOCYTES # BLD AUTO: 0.61 X10(3) UL (ref 0.1–1)
MONOCYTES NFR BLD AUTO: 9.5 %
NEUTROPHILS # BLD AUTO: 3.57 X10 (3) UL (ref 1.5–7.7)
NEUTROPHILS # BLD AUTO: 3.57 X10(3) UL (ref 1.5–7.7)
NEUTROPHILS NFR BLD AUTO: 55.4 %
OSMOLALITY UR CALC.SUM OF ELEC: 289 MOSM/KG (ref 275–295)
PATIENT FASTING: NO
PLATELET # BLD AUTO: 186 10(3)UL (ref 150–450)
POTASSIUM SERPL-SCNC: 4.2 MMOL/L (ref 3.3–5.1)
PROT SERPL-MCNC: 6.6 G/DL (ref 5.9–8.4)
RBC # BLD AUTO: 3.93 X10(6)UL (ref 3.8–5.3)
RHEUMATOID FACT SER QL: <5 IU/ML
SODIUM SERPL-SCNC: 138 MMOL/L (ref 136–144)
TIBC SERPL-MCNC: 273 MCG/DL (ref 228–428)
TRANSFERRIN SERPL-MCNC: 207 MG/DL (ref 192–382)
VIT B12 SERPL-MCNC: 253 PG/ML (ref 181–914)
WBC # BLD AUTO: 6.4 X10(3) UL (ref 4–11)

## 2019-02-07 PROCEDURE — 84466 ASSAY OF TRANSFERRIN: CPT

## 2019-02-07 PROCEDURE — 36415 COLL VENOUS BLD VENIPUNCTURE: CPT

## 2019-02-07 PROCEDURE — 86039 ANTINUCLEAR ANTIBODIES (ANA): CPT

## 2019-02-07 PROCEDURE — 86235 NUCLEAR ANTIGEN ANTIBODY: CPT

## 2019-02-07 PROCEDURE — 83540 ASSAY OF IRON: CPT

## 2019-02-07 PROCEDURE — 86225 DNA ANTIBODY NATIVE: CPT

## 2019-02-07 PROCEDURE — 36416 COLLJ CAPILLARY BLOOD SPEC: CPT

## 2019-02-07 PROCEDURE — 86431 RHEUMATOID FACTOR QUANT: CPT

## 2019-02-07 PROCEDURE — 80053 COMPREHEN METABOLIC PANEL: CPT

## 2019-02-07 PROCEDURE — 82607 VITAMIN B-12: CPT

## 2019-02-07 PROCEDURE — 85025 COMPLETE CBC W/AUTO DIFF WBC: CPT

## 2019-02-07 PROCEDURE — 85610 PROTHROMBIN TIME: CPT

## 2019-02-07 PROCEDURE — 86038 ANTINUCLEAR ANTIBODIES: CPT

## 2019-02-07 PROCEDURE — 82746 ASSAY OF FOLIC ACID SERUM: CPT

## 2019-02-08 LAB
ALBUMIN: 3.4 G/DL
ALKALINE PHOSPHATATE(ALK PHOS): 114 IU/L
ANA NUCLEOLAR TITR SER IF: 320 {TITER}
BILIRUBIN TOTAL: 0.8 MG/DL
BUN: 23 MG/DL
CALCIUM: 9.1 MG/DL
CHLORIDE: 106 MEQ/L
CREATININE, SERUM: 1.21 MG/DL
DSDNA AB TITR SER: <10 {TITER}
GLOBULIN: 3.2 G/DL
GLUCOSE: 90 MG/DL
HEMATOCRIT: 37.7 %
HEMOGLOBIN: 12.1 G/DL
NUCLEAR IGG TITR SER IF: POSITIVE {TITER}
PLATELETS: 186 K/UL
POTASSIUM, SERUM: 4.2 MEQ/L
PROTEIN, TOTAL: 6.6 G/DL
RED BLOOD COUNT: 3.93 X 10-6/U
SGOT (AST): 32 IU/L
SGPT (ALT): 21 IU/L
SODIUM: 138 MEQ/L
WHITE BLOOD COUNT: 6.4 X 10-3/U

## 2019-02-11 DIAGNOSIS — E78.00 PURE HYPERCHOLESTEROLEMIA: Primary | ICD-10-CM

## 2019-02-12 LAB
ENA SM IGG SER QL: NEGATIVE
ENA SM+RNP AB SER QL: NEGATIVE
ENA SS-A AB SER QL IA: NEGATIVE
ENA SS-B AB SER QL IA: NEGATIVE

## 2019-02-12 RX ORDER — LISINOPRIL 40 MG/1
TABLET ORAL
Qty: 90 TABLET | Refills: 0 | Status: SHIPPED | OUTPATIENT
Start: 2019-02-12 | End: 2019-04-02

## 2019-02-12 NOTE — TELEPHONE ENCOUNTER
Lisinopril refilled. Orders entered in [address] Patient contacted. Advised of lab orders. Instructed to fast 12 hours.  Appointment with Dr. Jacqueline Weiner booked for Tuesday 2/19/19 at 2:40 pm.

## 2019-02-14 ENCOUNTER — APPOINTMENT (OUTPATIENT)
Dept: LAB | Age: 84
End: 2019-02-14
Attending: INTERNAL MEDICINE
Payer: MEDICARE

## 2019-02-14 DIAGNOSIS — E78.00 PURE HYPERCHOLESTEROLEMIA: ICD-10-CM

## 2019-02-14 LAB
BILIRUB UR QL: NEGATIVE
CHOLEST SMN-MCNC: 131 MG/DL (ref ?–200)
COLOR UR: YELLOW
GLUCOSE UR-MCNC: NEGATIVE MG/DL
HDLC SERPL-MCNC: 45 MG/DL (ref 40–59)
KETONES UR-MCNC: NEGATIVE MG/DL
LDLC SERPL CALC-MCNC: 72 MG/DL (ref ?–100)
NITRITE UR QL STRIP.AUTO: POSITIVE
NONHDLC SERPL-MCNC: 86 MG/DL (ref ?–130)
PH UR: 5 [PH] (ref 5–8)
PROT UR-MCNC: 30 MG/DL
RBC #/AREA URNS AUTO: 5 /HPF
SP GR UR STRIP: 1.02 (ref 1–1.03)
TRIGL SERPL-MCNC: 69 MG/DL (ref 30–149)
UROBILINOGEN UR STRIP-ACNC: <2
VIT C UR-MCNC: NEGATIVE MG/DL
WBC #/AREA URNS AUTO: 367 /HPF

## 2019-02-14 PROCEDURE — 80061 LIPID PANEL: CPT

## 2019-02-14 PROCEDURE — 81001 URINALYSIS AUTO W/SCOPE: CPT

## 2019-02-14 PROCEDURE — 36415 COLL VENOUS BLD VENIPUNCTURE: CPT

## 2019-02-16 ENCOUNTER — TELEPHONE (OUTPATIENT)
Dept: NEPHROLOGY | Facility: CLINIC | Age: 84
End: 2019-02-16

## 2019-02-16 DIAGNOSIS — R82.90 ABNORMAL URINALYSIS: ICD-10-CM

## 2019-02-16 DIAGNOSIS — R31.9 HEMATURIA, UNSPECIFIED TYPE: Primary | ICD-10-CM

## 2019-02-16 NOTE — TELEPHONE ENCOUNTER
Cholesterol good but urinalysis consistent with UTI. Any symptoms? .  Do urine culture and sensitivity. Follow-up as scheduled.

## 2019-02-18 NOTE — TELEPHONE ENCOUNTER
Contacted pt. Notified her of ONEIL's results message below from 2/16/19. She states the only symptoms she has been having is getting up about 3 times in the middle of the night to urinate. Denies pain. She cannot go to lab today but will go tomorrow prior to appt with ONEIL. Also, she is scheduled for a cardiac cath on 2/26/19.

## 2019-02-19 ENCOUNTER — APPOINTMENT (OUTPATIENT)
Dept: LAB | Age: 84
End: 2019-02-19
Attending: INTERNAL MEDICINE
Payer: MEDICARE

## 2019-02-19 ENCOUNTER — OFFICE VISIT (OUTPATIENT)
Dept: NEPHROLOGY | Facility: CLINIC | Age: 84
End: 2019-02-19
Payer: MEDICARE

## 2019-02-19 VITALS
HEART RATE: 59 BPM | SYSTOLIC BLOOD PRESSURE: 141 MMHG | BODY MASS INDEX: 23.76 KG/M2 | WEIGHT: 139.19 LBS | DIASTOLIC BLOOD PRESSURE: 73 MMHG | HEIGHT: 64 IN

## 2019-02-19 DIAGNOSIS — R82.90 ABNORMAL URINALYSIS: ICD-10-CM

## 2019-02-19 DIAGNOSIS — I10 ESSENTIAL HYPERTENSION: ICD-10-CM

## 2019-02-19 DIAGNOSIS — N18.30 CKD (CHRONIC KIDNEY DISEASE) STAGE 3, GFR 30-59 ML/MIN (HCC): Primary | ICD-10-CM

## 2019-02-19 DIAGNOSIS — R31.9 HEMATURIA, UNSPECIFIED TYPE: ICD-10-CM

## 2019-02-19 DIAGNOSIS — I48.20 CHRONIC ATRIAL FIBRILLATION (HCC): ICD-10-CM

## 2019-02-19 PROCEDURE — 99214 OFFICE O/P EST MOD 30 MIN: CPT | Performed by: INTERNAL MEDICINE

## 2019-02-19 PROCEDURE — G0463 HOSPITAL OUTPT CLINIC VISIT: HCPCS | Performed by: INTERNAL MEDICINE

## 2019-02-19 PROCEDURE — 87186 SC STD MICRODIL/AGAR DIL: CPT

## 2019-02-19 PROCEDURE — 87086 URINE CULTURE/COLONY COUNT: CPT

## 2019-02-19 PROCEDURE — 87088 URINE BACTERIA CULTURE: CPT

## 2019-02-21 ENCOUNTER — TELEPHONE (OUTPATIENT)
Dept: NEPHROLOGY | Facility: CLINIC | Age: 84
End: 2019-02-21

## 2019-02-21 RX ORDER — AMOXICILLIN 500 MG/1
500 TABLET, FILM COATED ORAL 2 TIMES DAILY
Qty: 14 TABLET | Refills: 0 | Status: SHIPPED | OUTPATIENT
Start: 2019-02-21 | End: 2019-02-28

## 2019-02-21 NOTE — TELEPHONE ENCOUNTER
Contacted pt. Notified her of [facility]'s results message below from 2/21/19. Advised to start amoxicillin 500 mg twice daily for 7 days. Rx sent. Patient stated understanding.

## 2019-02-22 ENCOUNTER — HOSPITAL ENCOUNTER (OUTPATIENT)
Dept: NUCLEAR MEDICINE | Facility: HOSPITAL | Age: 84
Discharge: HOME | End: 2019-02-22
Attending: INTERNAL MEDICINE
Payer: MEDICARE

## 2019-02-22 ENCOUNTER — HOSPITAL ENCOUNTER (OUTPATIENT)
Dept: GENERAL RADIOLOGY | Facility: HOSPITAL | Age: 84
Discharge: HOME | End: 2019-02-22
Attending: INTERNAL MEDICINE
Payer: MEDICARE

## 2019-02-22 ENCOUNTER — HOSPITAL ENCOUNTER (OUTPATIENT)
Dept: RESPIRATORY THERAPY | Facility: HOSPITAL | Age: 84
Discharge: HOME | End: 2019-02-22
Attending: INTERNAL MEDICINE
Payer: MEDICARE

## 2019-02-22 DIAGNOSIS — I27.20 PULMONARY HYPERTENSION (HCC): ICD-10-CM

## 2019-02-22 DIAGNOSIS — I27.0 PRIMARY PULMONARY HYPERTENSION (HCC): ICD-10-CM

## 2019-02-22 PROCEDURE — 94726 PLETHYSMOGRAPHY LUNG VOLUMES: CPT

## 2019-02-22 PROCEDURE — 94729 DIFFUSING CAPACITY: CPT

## 2019-02-22 PROCEDURE — 71046 X-RAY EXAM CHEST 2 VIEWS: CPT | Performed by: INTERNAL MEDICINE

## 2019-02-22 PROCEDURE — 78582 LUNG VENTILAT&PERFUS IMAGING: CPT | Performed by: INTERNAL MEDICINE

## 2019-02-22 PROCEDURE — 94060 EVALUATION OF WHEEZING: CPT

## 2019-02-22 NOTE — PROCEDURES
Pulmonary Function Test Results     Impression: Possible mild obstructive ventilatory defect based on flow-volume loop and positive bronchodilator response. There is air trapping and hyperinflation (% of predicted, % of predicted).  The DLCO

## 2019-02-26 ENCOUNTER — HOSPITAL ENCOUNTER (OUTPATIENT)
Dept: INTERVENTIONAL RADIOLOGY/VASCULAR | Facility: HOSPITAL | Age: 84
Discharge: HOME | End: 2019-02-26
Attending: INTERNAL MEDICINE | Admitting: INTERNAL MEDICINE
Payer: MEDICARE

## 2019-02-26 VITALS
WEIGHT: 140 LBS | HEART RATE: 62 BPM | RESPIRATION RATE: 12 BRPM | BODY MASS INDEX: 24 KG/M2 | DIASTOLIC BLOOD PRESSURE: 86 MMHG | OXYGEN SATURATION: 96 % | SYSTOLIC BLOOD PRESSURE: 146 MMHG

## 2019-02-26 DIAGNOSIS — I27.20 PULMONARY HTN (HCC): ICD-10-CM

## 2019-02-26 LAB
ANION GAP SERPL CALC-SCNC: 8 MMOL/L (ref 0–18)
BUN BLD-MCNC: 29 MG/DL (ref 7–18)
BUN/CREAT SERPL: 23.2 (ref 10–20)
CALCIUM BLD-MCNC: 8.9 MG/DL (ref 8.5–10.1)
CHLORIDE SERPL-SCNC: 111 MMOL/L (ref 98–107)
CO2 SERPL-SCNC: 22 MMOL/L (ref 21–32)
CREAT BLD-MCNC: 1.25 MG/DL (ref 0.55–1.02)
DEPRECATED RDW RBC AUTO: 54.8 FL (ref 35.1–46.3)
ERYTHROCYTE [DISTWIDTH] IN BLOOD BY AUTOMATED COUNT: 15.9 % (ref 11–15)
GLUCOSE BLD-MCNC: 100 MG/DL (ref 70–99)
HCT VFR BLD AUTO: 36.9 % (ref 35–48)
HGB BLD-MCNC: 12.2 G/DL (ref 12–16)
INR BLD: 2.4 (ref 0.9–1.2)
MCH RBC QN AUTO: 31.3 PG (ref 26–34)
MCHC RBC AUTO-ENTMCNC: 33.1 G/DL (ref 31–37)
MCV RBC AUTO: 94.6 FL (ref 80–100)
OSMOLALITY SERPL CALC.SUM OF ELEC: 298 MOSM/KG (ref 275–295)
PLATELET # BLD AUTO: 230 10(3)UL (ref 150–450)
POTASSIUM SERPL-SCNC: 4.3 MMOL/L (ref 3.5–5.1)
PROTHROMBIN TIME: 25.3 SECONDS (ref 11.8–14.5)
RBC # BLD AUTO: 3.9 X10(6)UL (ref 3.8–5.3)
SODIUM SERPL-SCNC: 141 MMOL/L (ref 136–145)
WBC # BLD AUTO: 6.7 X10(3) UL (ref 4–11)

## 2019-02-26 PROCEDURE — 85610 PROTHROMBIN TIME: CPT | Performed by: INTERNAL MEDICINE

## 2019-02-26 PROCEDURE — 93451 RIGHT HEART CATH: CPT

## 2019-02-26 PROCEDURE — 36415 COLL VENOUS BLD VENIPUNCTURE: CPT

## 2019-02-26 PROCEDURE — 93463 DRUG ADMIN & HEMODYNMIC MEAS: CPT

## 2019-02-26 PROCEDURE — 96365 THER/PROPH/DIAG IV INF INIT: CPT

## 2019-02-26 PROCEDURE — 85027 COMPLETE CBC AUTOMATED: CPT | Performed by: INTERNAL MEDICINE

## 2019-02-26 PROCEDURE — 4A023N6 MEASUREMENT OF CARDIAC SAMPLING AND PRESSURE, RIGHT HEART, PERCUTANEOUS APPROACH: ICD-10-PCS | Performed by: INTERNAL MEDICINE

## 2019-02-26 PROCEDURE — 80048 BASIC METABOLIC PNL TOTAL CA: CPT | Performed by: INTERNAL MEDICINE

## 2019-02-26 RX ORDER — LIDOCAINE HYDROCHLORIDE 20 MG/ML
INJECTION, SOLUTION EPIDURAL; INFILTRATION; INTRACAUDAL; PERINEURAL
Status: COMPLETED
Start: 2019-02-26 | End: 2019-02-26

## 2019-02-26 RX ORDER — SODIUM CHLORIDE 9 MG/ML
INJECTION, SOLUTION INTRAVENOUS CONTINUOUS
Status: DISCONTINUED | OUTPATIENT
Start: 2019-02-26 | End: 2019-02-26

## 2019-02-26 RX ORDER — SODIUM CHLORIDE 9 MG/ML
INJECTION, SOLUTION INTRAVENOUS
Status: DISCONTINUED
Start: 2019-02-26 | End: 2019-02-26

## 2019-02-26 NOTE — PROGRESS NOTES
[facility]  [id number]  2/26/2019    Pt is able to sit up and ambulate without difficulty. Pt tolerated fluids/food. Pt's vital signs are stable. Procedural site remains dry and intact with no signs and symptoms of bleeding noted.  Instructions provi

## 2019-02-26 NOTE — PROCEDURES
Kimberly Machuca  2/7/1929    Procedure:  1) Right heart Catheterization          2) Vasodilator Challenge    Reason for Procedure:  JIMENEZ, NYHA class III    Procedure Summary:  1. Risks and Benefits were explained to patient.  Informed Consent was obtained Little change  2. Borderline cardiomegaly. 3. Atherosclerosis. 4. Scarring/atelectasis. 5. Hyperinflation. Impression:   1. Stable RH filling pressure  2. Normal LH filling pressure  3.  Severe Pulmonary arterial hypertension, with markedly elevated

## 2019-02-26 NOTE — H&P
Katrina Marcelo  2/7/1929      HPI:   80year old female with h/o of AF, HTN, radiation for breast cancer, with worsening SOB. Found by echo to have high elevated RVSP, suggested of severe PHTN,. Here for RHC evaluation.            Past Medical History: Stress: Not on file    Relationships      Social connections:        Talks on phone: Not on file        Gets together: Not on file        Attends Church service: Not on file        Active member of club or organization: Not on file        Attends meetin 140 lb (63.5 kg)  02/19/19 : 139 lb 3.2 oz (63.1 kg)  11/20/18 : 142 lb (64.4 kg)  11/13/18 : 141 lb 12.8 oz (64.3 kg)  06/13/18 : 147 lb (66.7 kg)  05/24/18 : 148 lb (67.1 kg)      Gen: NAD    Neuro: Alert and oriented x 3  HEENT: No JVD  CV: RRR s1, s2, [facility]  Heart Failure Transplant / Pulmonary HTN

## 2019-02-27 ENCOUNTER — ANTI-COAG VISIT (OUTPATIENT)
Dept: INTERNAL MEDICINE CLINIC | Facility: CLINIC | Age: 84
End: 2019-02-27

## 2019-02-27 ENCOUNTER — PRIOR ORIGINAL RECORDS (OUTPATIENT)
Dept: OTHER | Age: 84
End: 2019-02-27

## 2019-02-27 ENCOUNTER — TELEPHONE (OUTPATIENT)
Dept: INTERNAL MEDICINE CLINIC | Facility: CLINIC | Age: 84
End: 2019-02-27

## 2019-02-27 DIAGNOSIS — I48.91 ATRIAL FIBRILLATION, UNSPECIFIED TYPE (HCC): ICD-10-CM

## 2019-02-27 DIAGNOSIS — I48.20 CHRONIC ATRIAL FIBRILLATION (HCC): ICD-10-CM

## 2019-02-27 NOTE — TELEPHONE ENCOUNTER
Pt called said she has cardiac procedure and she was advised to let coumadin clinic know and she has been on antibiotic for UTI.      Want to know if Shaun Regan going to reschedule the coumadin appt    Please advise [phone] is home #    Pt said she is at

## 2019-02-28 ENCOUNTER — PRIOR ORIGINAL RECORDS (OUTPATIENT)
Dept: OTHER | Age: 84
End: 2019-02-28

## 2019-02-28 VITALS
HEART RATE: 73 BPM | HEIGHT: 65 IN | OXYGEN SATURATION: 96 % | DIASTOLIC BLOOD PRESSURE: 72 MMHG | SYSTOLIC BLOOD PRESSURE: 142 MMHG | WEIGHT: 138 LBS | BODY MASS INDEX: 22.99 KG/M2

## 2019-03-01 ENCOUNTER — PRIOR ORIGINAL RECORDS (OUTPATIENT)
Dept: OTHER | Age: 84
End: 2019-03-01

## 2019-03-05 RX ORDER — LISINOPRIL 40 MG/1
40 TABLET ORAL DAILY
COMMUNITY
Start: 2019-01-23 | End: 2020-11-02 | Stop reason: SDUPTHER

## 2019-03-05 RX ORDER — WARFARIN SODIUM 2.5 MG/1
2.5 TABLET ORAL DAILY
COMMUNITY
Start: 2019-01-23

## 2019-03-05 RX ORDER — ATORVASTATIN CALCIUM 80 MG/1
80 TABLET, FILM COATED ORAL DAILY
COMMUNITY
Start: 2019-01-23 | End: 2019-09-11 | Stop reason: ALTCHOICE

## 2019-03-05 RX ORDER — METOPROLOL SUCCINATE 200 MG/1
1 TABLET, EXTENDED RELEASE ORAL DAILY
COMMUNITY
Start: 2019-01-23 | End: 2020-06-01 | Stop reason: DRUGHIGH

## 2019-03-05 RX ORDER — HYDROCHLOROTHIAZIDE 25 MG/1
TABLET ORAL
COMMUNITY
Start: 2019-01-23 | End: 2019-07-10 | Stop reason: DRUGHIGH

## 2019-03-05 RX ORDER — SPIRONOLACTONE 25 MG/1
12.5 TABLET ORAL DAILY
COMMUNITY
Start: 2019-01-23 | End: 2019-11-22 | Stop reason: SDUPTHER

## 2019-03-05 RX ORDER — SILDENAFIL CITRATE 20 MG/1
TABLET ORAL
COMMUNITY
End: 2019-03-18 | Stop reason: SDUPTHER

## 2019-03-05 RX ORDER — CALCIUM CARBONATE 500(1250)
1 TABLET ORAL 2 TIMES DAILY
COMMUNITY
Start: 2019-01-23 | End: 2020-09-14 | Stop reason: CLARIF

## 2019-03-06 ENCOUNTER — TELEPHONE (OUTPATIENT)
Dept: CARDIOLOGY CLINIC | Facility: CLINIC | Age: 84
End: 2019-03-06

## 2019-03-06 NOTE — TELEPHONE ENCOUNTER
Current Outpatient Medications:   •  Warfarin Sodium 2.5 MG Oral Tab, Take 1/2 tablet (1.25 mg) 3 days and 1 tablet (2.5 mg ) 4 days, Disp: 90 tablet, Rfl: 0

## 2019-03-07 RX ORDER — WARFARIN SODIUM 2.5 MG/1
TABLET ORAL
Qty: 66 TABLET | Refills: 1 | Status: SHIPPED | OUTPATIENT
Start: 2019-03-07 | End: 2019-06-05

## 2019-03-07 NOTE — TELEPHONE ENCOUNTER
LOV 2/19/19. RTC 6 months. Last coumadin clinic visit 2/27/19. Currently taking 2.5 mg 4 days/week and 1.25 mg 3 days/week.  90 day supply pended and routed to [address] for approval.

## 2019-03-08 NOTE — TELEPHONE ENCOUNTER
Prescription was sent yesterday 3/7/19 to [facility]. It might not be in their system yet. We were told it can take 24-48 hours for them to get the e- fax.  Spoke to pharmacy and they confirmed that prescription for Warfarin was shipped out today 3/8/19 to pat

## 2019-03-11 ENCOUNTER — OFFICE VISIT (OUTPATIENT)
Dept: CARDIOLOGY | Age: 84
End: 2019-03-11

## 2019-03-11 ENCOUNTER — HOSPITAL ENCOUNTER (OUTPATIENT)
Dept: CT IMAGING | Facility: HOSPITAL | Age: 84
Discharge: HOME | End: 2019-03-11
Attending: INTERNAL MEDICINE
Payer: MEDICARE

## 2019-03-11 VITALS
HEIGHT: 65 IN | SYSTOLIC BLOOD PRESSURE: 122 MMHG | BODY MASS INDEX: 22.82 KG/M2 | WEIGHT: 137 LBS | RESPIRATION RATE: 20 BRPM | DIASTOLIC BLOOD PRESSURE: 70 MMHG

## 2019-03-11 DIAGNOSIS — I10 ESSENTIAL HYPERTENSION: ICD-10-CM

## 2019-03-11 DIAGNOSIS — I48.20 CHRONIC ATRIAL FIBRILLATION (CMD): ICD-10-CM

## 2019-03-11 DIAGNOSIS — I27.20 PULMONARY HYPERTENSION (CMD): Primary | ICD-10-CM

## 2019-03-11 DIAGNOSIS — I27.0 PRIMARY PULMONARY HYPERTENSION (HCC): ICD-10-CM

## 2019-03-11 PROCEDURE — 99215 OFFICE O/P EST HI 40 MIN: CPT | Performed by: INTERNAL MEDICINE

## 2019-03-11 PROCEDURE — 71250 CT THORAX DX C-: CPT | Performed by: INTERNAL MEDICINE

## 2019-03-11 SDOH — HEALTH STABILITY: MENTAL HEALTH: HOW OFTEN DO YOU HAVE A DRINK CONTAINING ALCOHOL?: NEVER

## 2019-03-11 ASSESSMENT — ENCOUNTER SYMPTOMS
WEIGHT LOSS: 1
DIARRHEA: 1

## 2019-03-11 ASSESSMENT — NEW YORK HEART ASSOCIATION (NYHA) CLASSIFICATION: NYHA FUNCTIONAL CLASS: II

## 2019-03-13 ENCOUNTER — TELEPHONE (OUTPATIENT)
Dept: NEPHROLOGY | Facility: CLINIC | Age: 84
End: 2019-03-13

## 2019-03-13 DIAGNOSIS — N39.0 URINARY TRACT INFECTION WITHOUT HEMATURIA, SITE UNSPECIFIED: Primary | ICD-10-CM

## 2019-03-13 NOTE — TELEPHONE ENCOUNTER
Pt states that she is still having some urgency and pressure to urinate and thinks UTI is not cleared up. Pt finished antibiotic. She is waking up in middle of night again due to symptoms. Please call.

## 2019-03-14 ENCOUNTER — APPOINTMENT (OUTPATIENT)
Dept: LAB | Age: 84
End: 2019-03-14
Attending: INTERNAL MEDICINE
Payer: MEDICARE

## 2019-03-14 DIAGNOSIS — N39.0 URINARY TRACT INFECTION WITHOUT HEMATURIA, SITE UNSPECIFIED: ICD-10-CM

## 2019-03-14 LAB
BILIRUB UR QL: NEGATIVE
COLOR UR: YELLOW
GLUCOSE UR-MCNC: NEGATIVE MG/DL
NITRITE UR QL STRIP.AUTO: NEGATIVE
PH UR: 5 [PH] (ref 5–8)
PROT UR-MCNC: NEGATIVE MG/DL
RBC #/AREA URNS AUTO: 7 /HPF
SP GR UR STRIP: 1.02 (ref 1–1.03)
UROBILINOGEN UR STRIP-ACNC: <2
VIT C UR-MCNC: NEGATIVE MG/DL
WBC #/AREA URNS AUTO: 422 /HPF

## 2019-03-14 PROCEDURE — 87186 SC STD MICRODIL/AGAR DIL: CPT

## 2019-03-14 PROCEDURE — 87088 URINE BACTERIA CULTURE: CPT

## 2019-03-14 PROCEDURE — 87086 URINE CULTURE/COLONY COUNT: CPT

## 2019-03-14 PROCEDURE — 81001 URINALYSIS AUTO W/SCOPE: CPT

## 2019-03-15 ENCOUNTER — TELEPHONE (OUTPATIENT)
Dept: NEPHROLOGY | Facility: CLINIC | Age: 84
End: 2019-03-15

## 2019-03-15 RX ORDER — NITROFURANTOIN 25; 75 MG/1; MG/1
100 CAPSULE ORAL 2 TIMES DAILY
Qty: 14 CAPSULE | Refills: 0 | Status: ON HOLD | OUTPATIENT
Start: 2019-03-15 | End: 2019-06-29

## 2019-03-15 NOTE — TELEPHONE ENCOUNTER
Urine culture is positive. Start Macrobid 100 mg twice daily times 7 days. Final sensitivities are still pending. Will call on Monday if we need to switch antibiotics.

## 2019-03-15 NOTE — TELEPHONE ENCOUNTER
Patient contacted. Results message from Dr. Jorge A Khan read to patient. She is aware to start antibiotic Macrobid and if that changes when sensitivities are back we will call her.

## 2019-03-16 ENCOUNTER — TELEPHONE (OUTPATIENT)
Dept: NEPHROLOGY | Facility: CLINIC | Age: 84
End: 2019-03-16

## 2019-03-16 NOTE — TELEPHONE ENCOUNTER
Urine culture was positive and antibiotic we prescribed should take care of it. Let me know if symptoms do not resolve.

## 2019-03-18 ENCOUNTER — TELEPHONE (OUTPATIENT)
Dept: CARDIOLOGY | Age: 84
End: 2019-03-18

## 2019-03-18 RX ORDER — SILDENAFIL CITRATE 20 MG/1
20 TABLET ORAL 3 TIMES DAILY
Qty: 90 TABLET | Refills: 3 | Status: SHIPPED | OUTPATIENT
Start: 2019-03-18 | End: 2019-06-21 | Stop reason: SDUPTHER

## 2019-03-18 NOTE — TELEPHONE ENCOUNTER
Patient contacted. Results message from Dr. Russel Mcdowell read to patient. She states her symptoms are improving but she knows to contact Dr. Russel Mcdowell if her symptoms do not completely resolve.

## 2019-03-27 ENCOUNTER — ANTI-COAG VISIT (OUTPATIENT)
Dept: INTERNAL MEDICINE CLINIC | Facility: CLINIC | Age: 84
End: 2019-03-27
Payer: MEDICARE

## 2019-03-27 DIAGNOSIS — I48.91 ATRIAL FIBRILLATION, UNSPECIFIED TYPE (HCC): ICD-10-CM

## 2019-03-27 DIAGNOSIS — I48.20 CHRONIC ATRIAL FIBRILLATION (HCC): ICD-10-CM

## 2019-03-27 DIAGNOSIS — Z79.01 LONG TERM (CURRENT) USE OF ANTICOAGULANTS: ICD-10-CM

## 2019-03-27 DIAGNOSIS — Z51.81 ENCOUNTER FOR THERAPEUTIC DRUG MONITORING: ICD-10-CM

## 2019-03-27 LAB — INR: 2.2 (ref 2–3)

## 2019-03-27 PROCEDURE — 85610 PROTHROMBIN TIME: CPT

## 2019-03-27 PROCEDURE — 36416 COLLJ CAPILLARY BLOOD SPEC: CPT

## 2019-04-02 RX ORDER — LISINOPRIL 40 MG/1
TABLET ORAL
Qty: 90 TABLET | Refills: 0 | Status: SHIPPED | OUTPATIENT
Start: 2019-04-02 | End: 2019-07-12

## 2019-04-02 NOTE — TELEPHONE ENCOUNTER
Lisinopril 40 mg rx request. Please review and sign off if appropriate. Thank you.     LOV: 2/19/19  Last Refill:2/12/19

## 2019-04-09 ENCOUNTER — APPOINTMENT (OUTPATIENT)
Dept: LAB | Age: 84
End: 2019-04-09
Attending: INTERNAL MEDICINE
Payer: MEDICARE

## 2019-04-09 ENCOUNTER — TELEPHONE (OUTPATIENT)
Dept: NEPHROLOGY | Facility: CLINIC | Age: 84
End: 2019-04-09

## 2019-04-09 DIAGNOSIS — R39.15 URINARY URGENCY: ICD-10-CM

## 2019-04-09 DIAGNOSIS — R35.0 URINARY FREQUENCY: ICD-10-CM

## 2019-04-09 DIAGNOSIS — R35.0 URINARY FREQUENCY: Primary | ICD-10-CM

## 2019-04-09 PROCEDURE — 81001 URINALYSIS AUTO W/SCOPE: CPT

## 2019-04-09 PROCEDURE — 87086 URINE CULTURE/COLONY COUNT: CPT

## 2019-04-09 PROCEDURE — 87186 SC STD MICRODIL/AGAR DIL: CPT

## 2019-04-09 PROCEDURE — 87088 URINE BACTERIA CULTURE: CPT

## 2019-04-09 NOTE — TELEPHONE ENCOUNTER
Contacted pt. States she has recently been treated for UTI. Recalls being on amoxicillin for the first round of antibiotics and then was put on nitrofurantoin. She finished nitrofurantoin about 2 weeks ago. She was feeling better but wasn't 100%.  Now for t

## 2019-04-10 ENCOUNTER — TELEPHONE (OUTPATIENT)
Dept: NEPHROLOGY | Facility: CLINIC | Age: 84
End: 2019-04-10

## 2019-04-10 NOTE — TELEPHONE ENCOUNTER
Urine culture is positive but I do not have the sensitivities yet. If symptomatic can start Macrobid 100 mg twice daily x10 days.   Will call if sensitivities show that this bacteria is resistant to

## 2019-04-10 NOTE — TELEPHONE ENCOUNTER
Dr. Garcia Camera, sppoke to pt, pt states is only having urgency that has improved. Pt is declining macrobid to be sent to pharmacy, states would like to see if any sensitivities are shown and will then consider taking an abx.  Denies any hematuria, dysuria, fev

## 2019-04-11 RX ORDER — NITROFURANTOIN 25; 75 MG/1; MG/1
100 CAPSULE ORAL 2 TIMES DAILY
Qty: 20 CAPSULE | Refills: 0 | Status: ON HOLD | OUTPATIENT
Start: 2019-04-11 | End: 2019-06-29

## 2019-04-19 RX ORDER — SPIRONOLACTONE 25 MG/1
TABLET ORAL
Qty: 45 TABLET | Refills: 0 | OUTPATIENT
Start: 2019-04-19

## 2019-04-19 RX ORDER — HYDROCHLOROTHIAZIDE 25 MG/1
TABLET ORAL
Qty: 45 TABLET | Refills: 0 | OUTPATIENT
Start: 2019-04-19

## 2019-04-24 ENCOUNTER — ANTI-COAG VISIT (OUTPATIENT)
Dept: INTERNAL MEDICINE CLINIC | Facility: CLINIC | Age: 84
End: 2019-04-24
Payer: MEDICARE

## 2019-04-24 DIAGNOSIS — Z79.01 LONG TERM (CURRENT) USE OF ANTICOAGULANTS: ICD-10-CM

## 2019-04-24 DIAGNOSIS — I48.91 ATRIAL FIBRILLATION, UNSPECIFIED TYPE (HCC): ICD-10-CM

## 2019-04-24 DIAGNOSIS — Z51.81 ENCOUNTER FOR THERAPEUTIC DRUG MONITORING: ICD-10-CM

## 2019-04-24 DIAGNOSIS — I48.20 CHRONIC ATRIAL FIBRILLATION (HCC): ICD-10-CM

## 2019-04-24 PROCEDURE — 85610 PROTHROMBIN TIME: CPT

## 2019-04-24 PROCEDURE — 36416 COLLJ CAPILLARY BLOOD SPEC: CPT

## 2019-04-26 ENCOUNTER — APPOINTMENT (OUTPATIENT)
Dept: CARDIOLOGY | Age: 84
End: 2019-04-26

## 2019-05-07 ENCOUNTER — OFFICE VISIT (OUTPATIENT)
Dept: CARDIOLOGY CLINIC | Facility: CLINIC | Age: 84
End: 2019-05-07
Payer: MEDICARE

## 2019-05-07 VITALS
BODY MASS INDEX: 24 KG/M2 | HEART RATE: 62 BPM | SYSTOLIC BLOOD PRESSURE: 144 MMHG | OXYGEN SATURATION: 95 % | DIASTOLIC BLOOD PRESSURE: 62 MMHG | WEIGHT: 142 LBS | RESPIRATION RATE: 20 BRPM

## 2019-05-07 DIAGNOSIS — R06.02 SHORTNESS OF BREATH: ICD-10-CM

## 2019-05-07 DIAGNOSIS — E78.00 HYPERCHOLESTEROLEMIA: ICD-10-CM

## 2019-05-07 DIAGNOSIS — I27.20 PULMONARY HYPERTENSION (HCC): Primary | ICD-10-CM

## 2019-05-07 DIAGNOSIS — I48.20 CHRONIC ATRIAL FIBRILLATION (HCC): ICD-10-CM

## 2019-05-07 PROCEDURE — 99214 OFFICE O/P EST MOD 30 MIN: CPT | Performed by: INTERNAL MEDICINE

## 2019-05-07 PROCEDURE — G0463 HOSPITAL OUTPT CLINIC VISIT: HCPCS | Performed by: INTERNAL MEDICINE

## 2019-05-07 RX ORDER — SILDENAFIL CITRATE 20 MG/1
20 TABLET ORAL 3 TIMES DAILY
COMMUNITY
End: 2020-08-17

## 2019-05-07 NOTE — PATIENT INSTRUCTIONS
When seeing Dr. Aster Holm let him know that you feel your heart races at times to see if he thinks you should get an event monitor

## 2019-05-07 NOTE — PROGRESS NOTES
[address] [place] [address] NOTE    Corie Adorno is a 80year old female. Patient presents with: Follow - Up: 6 month; Right heart cath: 2/26/19  Atrial Fibrillation  Hypercholesterolemia  Dyspnea:  On exertion   Palpitations: Occasionally    HPI:   This MOUTH DAILY Disp: 45 tablet Rfl: 3   Calcium 500 MG Oral Tab Take 500 mg by mouth 2 (two) times daily.    Disp:  Rfl:       Past Medical History:   Diagnosis Date   • Atrial fibrillation ([facility] 2002    Per NG: cardioversion attempt   • Breast cancer ([facility] 11 rate and rhythm,nl Y3,V3, 2/6 systolic ejection murmur  GI: good BS's,no masses, HSM or tenderness  EXTREMITIES: no cyanosis, clubbing or edema  NEURO: no focal deficits  PSYCH:alert and oriented x3      Assessment   ASSESSMENT AND PLAN:     Problem List I

## 2019-05-08 ENCOUNTER — TELEPHONE (OUTPATIENT)
Dept: CARDIOLOGY | Age: 84
End: 2019-05-08

## 2019-05-08 ENCOUNTER — OFFICE VISIT (OUTPATIENT)
Dept: CARDIOLOGY | Age: 84
End: 2019-05-08

## 2019-05-08 VITALS
BODY MASS INDEX: 23.66 KG/M2 | DIASTOLIC BLOOD PRESSURE: 60 MMHG | HEIGHT: 65 IN | OXYGEN SATURATION: 97 % | HEART RATE: 59 BPM | WEIGHT: 142 LBS | SYSTOLIC BLOOD PRESSURE: 120 MMHG | RESPIRATION RATE: 20 BRPM

## 2019-05-08 DIAGNOSIS — I10 ESSENTIAL HYPERTENSION: ICD-10-CM

## 2019-05-08 DIAGNOSIS — I27.20 PULMONARY HYPERTENSION (CMD): Primary | ICD-10-CM

## 2019-05-08 DIAGNOSIS — I48.20 CHRONIC ATRIAL FIBRILLATION (CMD): ICD-10-CM

## 2019-05-08 DIAGNOSIS — E78.00 HYPERCHOLESTEREMIA: ICD-10-CM

## 2019-05-08 DIAGNOSIS — R00.2 PALPITATIONS: ICD-10-CM

## 2019-05-08 DIAGNOSIS — I34.0 NON-RHEUMATIC MITRAL REGURGITATION: ICD-10-CM

## 2019-05-08 DIAGNOSIS — E55.9 VITAMIN D DEFICIENCY: ICD-10-CM

## 2019-05-08 PROCEDURE — 99215 OFFICE O/P EST HI 40 MIN: CPT | Performed by: INTERNAL MEDICINE

## 2019-05-08 SDOH — HEALTH STABILITY: MENTAL HEALTH: HOW OFTEN DO YOU HAVE A DRINK CONTAINING ALCOHOL?: NEVER

## 2019-05-08 ASSESSMENT — ENCOUNTER SYMPTOMS
COUGH: 0
SUSPICIOUS LESIONS: 0
WEIGHT GAIN: 0
BRUISES/BLEEDS EASILY: 0
HEMATOCHEZIA: 0
WEIGHT LOSS: 0
HEMOPTYSIS: 0
CHILLS: 0
FEVER: 0
ALLERGIC/IMMUNOLOGIC COMMENTS: NO NEW FOOD ALLERGIES

## 2019-05-08 ASSESSMENT — PATIENT HEALTH QUESTIONNAIRE - PHQ9
SUM OF ALL RESPONSES TO PHQ9 QUESTIONS 1 AND 2: 0
2. FEELING DOWN, DEPRESSED OR HOPELESS: NOT AT ALL
SUM OF ALL RESPONSES TO PHQ9 QUESTIONS 1 AND 2: 0
1. LITTLE INTEREST OR PLEASURE IN DOING THINGS: NOT AT ALL

## 2019-05-08 ASSESSMENT — NEW YORK HEART ASSOCIATION (NYHA) CLASSIFICATION: NYHA FUNCTIONAL CLASS: II

## 2019-05-13 ENCOUNTER — TELEPHONE (OUTPATIENT)
Dept: CARDIOLOGY | Age: 84
End: 2019-05-13

## 2019-05-14 DIAGNOSIS — R94.39 ABNORMAL STRESS TEST: ICD-10-CM

## 2019-05-16 ENCOUNTER — ANCILLARY PROCEDURE (OUTPATIENT)
Dept: CARDIOLOGY | Age: 84
End: 2019-05-16
Attending: INTERNAL MEDICINE

## 2019-05-16 DIAGNOSIS — R00.2 PALPITATIONS: ICD-10-CM

## 2019-05-16 DIAGNOSIS — I48.20 CHRONIC ATRIAL FIBRILLATION (CMD): ICD-10-CM

## 2019-05-16 DIAGNOSIS — I27.20 PULMONARY HYPERTENSION (CMD): ICD-10-CM

## 2019-05-16 DIAGNOSIS — I10 ESSENTIAL HYPERTENSION: ICD-10-CM

## 2019-05-21 ENCOUNTER — TELEPHONE (OUTPATIENT)
Dept: CARDIOLOGY | Age: 84
End: 2019-05-21

## 2019-05-21 ENCOUNTER — HOSPITAL ENCOUNTER (OUTPATIENT)
Dept: CV DIAGNOSTICS | Age: 84
Discharge: HOME | End: 2019-05-21
Attending: INTERNAL MEDICINE
Payer: MEDICARE

## 2019-05-21 ENCOUNTER — LAB ENCOUNTER (OUTPATIENT)
Dept: LAB | Age: 84
End: 2019-05-21
Attending: INTERNAL MEDICINE
Payer: MEDICARE

## 2019-05-21 DIAGNOSIS — I48.20 CHRONIC ATRIAL FIBRILLATION (HCC): ICD-10-CM

## 2019-05-21 DIAGNOSIS — I48.91 ATRIAL FIBRILLATION, UNSPECIFIED TYPE (HCC): ICD-10-CM

## 2019-05-21 DIAGNOSIS — I10 ESSENTIAL HYPERTENSION: ICD-10-CM

## 2019-05-21 DIAGNOSIS — I27.20 PULMONARY HTN (HCC): ICD-10-CM

## 2019-05-21 DIAGNOSIS — Z51.81 ENCOUNTER FOR THERAPEUTIC DRUG MONITORING: ICD-10-CM

## 2019-05-21 DIAGNOSIS — I10 HTN (HYPERTENSION): ICD-10-CM

## 2019-05-21 DIAGNOSIS — Z79.01 LONG TERM (CURRENT) USE OF ANTICOAGULANTS: ICD-10-CM

## 2019-05-21 DIAGNOSIS — I27.20 PULMONARY HYPERTENSION (HCC): ICD-10-CM

## 2019-05-21 DIAGNOSIS — E55.9 VITAMIN D DEFICIENCY: Primary | ICD-10-CM

## 2019-05-21 LAB — TSH SERPL-ACNC: 1.61 M[IU]/L

## 2019-05-21 PROCEDURE — 93306 TTE W/DOPPLER COMPLETE: CPT | Performed by: INTERNAL MEDICINE

## 2019-05-21 PROCEDURE — 84443 ASSAY THYROID STIM HORMONE: CPT

## 2019-05-21 PROCEDURE — 85610 PROTHROMBIN TIME: CPT

## 2019-05-21 PROCEDURE — 93227 XTRNL ECG REC<48 HR R&I: CPT | Performed by: INTERNAL MEDICINE

## 2019-05-21 PROCEDURE — 36415 COLL VENOUS BLD VENIPUNCTURE: CPT

## 2019-05-21 PROCEDURE — 82306 VITAMIN D 25 HYDROXY: CPT

## 2019-05-22 ENCOUNTER — CLINICAL ABSTRACT (OUTPATIENT)
Dept: CARDIOLOGY | Age: 84
End: 2019-05-22

## 2019-05-22 ENCOUNTER — TELEPHONE (OUTPATIENT)
Dept: INTERNAL MEDICINE CLINIC | Facility: CLINIC | Age: 84
End: 2019-05-22

## 2019-05-22 ENCOUNTER — ANTI-COAG VISIT (OUTPATIENT)
Dept: INTERNAL MEDICINE CLINIC | Facility: CLINIC | Age: 84
End: 2019-05-22

## 2019-05-22 DIAGNOSIS — I48.20 CHRONIC ATRIAL FIBRILLATION (HCC): ICD-10-CM

## 2019-05-22 DIAGNOSIS — I48.91 ATRIAL FIBRILLATION, UNSPECIFIED TYPE (HCC): ICD-10-CM

## 2019-05-22 LAB — 25(OH)D3+25(OH)D2 SERPL-MCNC: 36.9 NG/ML

## 2019-05-22 NOTE — TELEPHONE ENCOUNTER
Pt is calling states she had her coumadin drawn yesterday and would like to know if results are in?  Please advise

## 2019-05-23 ENCOUNTER — CLINICAL ABSTRACT (OUTPATIENT)
Dept: CARDIOLOGY | Age: 84
End: 2019-05-23

## 2019-05-25 ENCOUNTER — TELEPHONE (OUTPATIENT)
Dept: NEPHROLOGY | Facility: CLINIC | Age: 84
End: 2019-05-25

## 2019-05-31 ENCOUNTER — TELEPHONE (OUTPATIENT)
Dept: CARDIOLOGY | Age: 84
End: 2019-05-31

## 2019-06-03 ENCOUNTER — TELEPHONE (OUTPATIENT)
Dept: CARDIOLOGY | Age: 84
End: 2019-06-03

## 2019-06-05 ENCOUNTER — TELEPHONE (OUTPATIENT)
Dept: INTERNAL MEDICINE CLINIC | Facility: CLINIC | Age: 84
End: 2019-06-05

## 2019-06-05 ENCOUNTER — ANTI-COAG VISIT (OUTPATIENT)
Dept: INTERNAL MEDICINE CLINIC | Facility: CLINIC | Age: 84
End: 2019-06-05
Payer: MEDICARE

## 2019-06-05 DIAGNOSIS — I48.91 ATRIAL FIBRILLATION, UNSPECIFIED TYPE (HCC): ICD-10-CM

## 2019-06-05 DIAGNOSIS — Z51.81 ENCOUNTER FOR THERAPEUTIC DRUG MONITORING: ICD-10-CM

## 2019-06-05 DIAGNOSIS — Z79.01 LONG TERM (CURRENT) USE OF ANTICOAGULANTS: ICD-10-CM

## 2019-06-05 DIAGNOSIS — I48.20 CHRONIC ATRIAL FIBRILLATION (HCC): ICD-10-CM

## 2019-06-05 PROCEDURE — 36416 COLLJ CAPILLARY BLOOD SPEC: CPT

## 2019-06-05 PROCEDURE — 85610 PROTHROMBIN TIME: CPT

## 2019-06-05 RX ORDER — WARFARIN SODIUM 2.5 MG/1
TABLET ORAL
Qty: 90 TABLET | Refills: 1 | Status: SHIPPED | OUTPATIENT
Start: 2019-06-05 | End: 2019-11-12

## 2019-06-05 NOTE — TELEPHONE ENCOUNTER
Warfarin change. Pt is using 2.5 mg tab, new dose is 1/2 tab on Tuesdays and 1 tablet the other 6 days. Pt will be needing a refill. [facility] is not able to re-order at this time.   mejia

## 2019-06-10 ENCOUNTER — TELEPHONE (OUTPATIENT)
Dept: CARDIOLOGY | Age: 84
End: 2019-06-10

## 2019-06-19 ENCOUNTER — ANTI-COAG VISIT (OUTPATIENT)
Dept: INTERNAL MEDICINE CLINIC | Facility: CLINIC | Age: 84
End: 2019-06-19
Payer: MEDICARE

## 2019-06-19 DIAGNOSIS — I48.20 CHRONIC ATRIAL FIBRILLATION (HCC): ICD-10-CM

## 2019-06-19 DIAGNOSIS — Z51.81 ENCOUNTER FOR THERAPEUTIC DRUG MONITORING: ICD-10-CM

## 2019-06-19 DIAGNOSIS — I48.91 ATRIAL FIBRILLATION, UNSPECIFIED TYPE (HCC): ICD-10-CM

## 2019-06-19 DIAGNOSIS — Z79.01 LONG TERM (CURRENT) USE OF ANTICOAGULANTS: ICD-10-CM

## 2019-06-19 PROCEDURE — 36416 COLLJ CAPILLARY BLOOD SPEC: CPT

## 2019-06-19 PROCEDURE — 85610 PROTHROMBIN TIME: CPT

## 2019-06-21 ENCOUNTER — TELEPHONE (OUTPATIENT)
Dept: CARDIOLOGY | Age: 84
End: 2019-06-21

## 2019-06-21 DIAGNOSIS — I27.20 PULMONARY HYPERTENSION (CMD): Primary | ICD-10-CM

## 2019-06-21 RX ORDER — SILDENAFIL CITRATE 20 MG/1
20 TABLET ORAL 3 TIMES DAILY
Qty: 90 TABLET | Refills: 11 | Status: SHIPPED | OUTPATIENT
Start: 2019-06-21 | End: 2020-02-24 | Stop reason: SDUPTHER

## 2019-06-29 ENCOUNTER — HOSPITAL ENCOUNTER (INPATIENT)
Facility: HOSPITAL | Age: 84
LOS: 1 days | Discharge: HOME | DRG: 291 | End: 2019-06-30
Attending: EMERGENCY MEDICINE | Admitting: HOSPITALIST
Payer: MEDICARE

## 2019-06-29 ENCOUNTER — APPOINTMENT (OUTPATIENT)
Dept: GENERAL RADIOLOGY | Facility: HOSPITAL | Age: 84
DRG: 291 | End: 2019-06-29
Attending: EMERGENCY MEDICINE
Payer: MEDICARE

## 2019-06-29 DIAGNOSIS — I50.9 CONGESTIVE HEART FAILURE, UNSPECIFIED HF CHRONICITY, UNSPECIFIED HEART FAILURE TYPE (HCC): Primary | ICD-10-CM

## 2019-06-29 LAB
ALBUMIN SERPL-MCNC: 3.2 G/DL (ref 3.4–5)
ALBUMIN/GLOB SERPL: 0.8 {RATIO} (ref 1–2)
ALP LIVER SERPL-CCNC: 99 U/L (ref 55–142)
ALT SERPL-CCNC: 10 U/L (ref 13–56)
ANION GAP SERPL CALC-SCNC: 10 MMOL/L (ref 0–18)
ANION GAP SERPL CALC-SCNC: 9 MMOL/L (ref 0–18)
AST SERPL-CCNC: 21 U/L (ref 15–37)
BASOPHILS # BLD AUTO: 0.05 X10(3) UL (ref 0–0.2)
BASOPHILS NFR BLD AUTO: 0.9 %
BILIRUB SERPL-MCNC: 1.3 MG/DL (ref 0.1–2)
BUN BLD-MCNC: 38 MG/DL (ref 7–18)
BUN BLD-MCNC: 39 MG/DL (ref 7–18)
BUN/CREAT SERPL: 29.2 (ref 10–20)
BUN/CREAT SERPL: 29.5 (ref 10–20)
CALCIUM BLD-MCNC: 9 MG/DL (ref 8.5–10.1)
CALCIUM BLD-MCNC: 9.2 MG/DL (ref 8.5–10.1)
CHLORIDE SERPL-SCNC: 108 MMOL/L (ref 98–112)
CHLORIDE SERPL-SCNC: 108 MMOL/L (ref 98–112)
CO2 SERPL-SCNC: 23 MMOL/L (ref 21–32)
CO2 SERPL-SCNC: 23 MMOL/L (ref 21–32)
CREAT BLD-MCNC: 1.3 MG/DL (ref 0.55–1.02)
CREAT BLD-MCNC: 1.32 MG/DL (ref 0.55–1.02)
D DIMER PPP FEU-MCNC: 0.72 UG/ML FEU (ref ?–0.9)
DEPRECATED RDW RBC AUTO: 50.4 FL (ref 35.1–46.3)
EOSINOPHIL # BLD AUTO: 0.04 X10(3) UL (ref 0–0.7)
EOSINOPHIL NFR BLD AUTO: 0.7 %
ERYTHROCYTE [DISTWIDTH] IN BLOOD BY AUTOMATED COUNT: 14.6 % (ref 11–15)
GLOBULIN PLAS-MCNC: 3.8 G/DL (ref 2.8–4.4)
GLUCOSE BLD-MCNC: 84 MG/DL (ref 70–99)
GLUCOSE BLD-MCNC: 84 MG/DL (ref 70–99)
HCT VFR BLD AUTO: 39.3 % (ref 35–48)
HGB BLD-MCNC: 13.1 G/DL (ref 12–16)
IMM GRANULOCYTES # BLD AUTO: 0.01 X10(3) UL (ref 0–1)
IMM GRANULOCYTES NFR BLD: 0.2 %
INR BLD: 1.75 (ref 0.9–1.2)
LYMPHOCYTES # BLD AUTO: 1.33 X10(3) UL (ref 1–4)
LYMPHOCYTES NFR BLD AUTO: 23.5 %
M PROTEIN MFR SERPL ELPH: 7 G/DL (ref 6.4–8.2)
MCH RBC QN AUTO: 31.2 PG (ref 26–34)
MCHC RBC AUTO-ENTMCNC: 33.3 G/DL (ref 31–37)
MCV RBC AUTO: 93.6 FL (ref 80–100)
MONOCYTES # BLD AUTO: 0.49 X10(3) UL (ref 0.1–1)
MONOCYTES NFR BLD AUTO: 8.7 %
NEUTROPHILS # BLD AUTO: 3.73 X10 (3) UL (ref 1.5–7.7)
NEUTROPHILS # BLD AUTO: 3.73 X10(3) UL (ref 1.5–7.7)
NEUTROPHILS NFR BLD AUTO: 66 %
NT-PROBNP SERPL-MCNC: ABNORMAL PG/ML (ref ?–450)
OSMOLALITY SERPL CALC.SUM OF ELEC: 298 MOSM/KG (ref 275–295)
OSMOLALITY SERPL CALC.SUM OF ELEC: 301 MOSM/KG (ref 275–295)
PLATELET # BLD AUTO: 194 10(3)UL (ref 150–450)
POTASSIUM SERPL-SCNC: 4.1 MMOL/L (ref 3.5–5.1)
POTASSIUM SERPL-SCNC: 4.1 MMOL/L (ref 3.5–5.1)
PROTHROMBIN TIME: 20.5 SECONDS (ref 11.8–14.5)
RBC # BLD AUTO: 4.2 X10(6)UL (ref 3.8–5.3)
SODIUM SERPL-SCNC: 140 MMOL/L (ref 136–145)
SODIUM SERPL-SCNC: 141 MMOL/L (ref 136–145)
TROPONIN I SERPL-MCNC: <0.045 NG/ML (ref ?–0.04)
WBC # BLD AUTO: 5.7 X10(3) UL (ref 4–11)

## 2019-06-29 PROCEDURE — 99222 1ST HOSP IP/OBS MODERATE 55: CPT | Performed by: HOSPITALIST

## 2019-06-29 PROCEDURE — 71045 X-RAY EXAM CHEST 1 VIEW: CPT | Performed by: EMERGENCY MEDICINE

## 2019-06-29 RX ORDER — ATORVASTATIN CALCIUM 40 MG/1
80 TABLET, FILM COATED ORAL NIGHTLY
Status: DISCONTINUED | OUTPATIENT
Start: 2019-06-29 | End: 2019-06-30

## 2019-06-29 RX ORDER — WARFARIN SODIUM 2.5 MG/1
2.5 TABLET ORAL NIGHTLY
Status: DISCONTINUED | OUTPATIENT
Start: 2019-06-30 | End: 2019-06-30

## 2019-06-29 RX ORDER — SODIUM CHLORIDE 0.9 % (FLUSH) 0.9 %
10 SYRINGE (ML) INJECTION AS NEEDED
Status: DISCONTINUED | OUTPATIENT
Start: 2019-06-29 | End: 2019-06-30

## 2019-06-29 RX ORDER — WARFARIN SODIUM 2.5 MG/1
2.5 TABLET ORAL NIGHTLY
Status: DISCONTINUED | OUTPATIENT
Start: 2019-06-29 | End: 2019-06-29

## 2019-06-29 RX ORDER — HYDROCHLOROTHIAZIDE 25 MG/1
25 TABLET ORAL DAILY
Status: DISCONTINUED | OUTPATIENT
Start: 2019-06-30 | End: 2019-06-29

## 2019-06-29 RX ORDER — AMBRISENTAN 5 MG/1
5 TABLET, FILM COATED ORAL DAILY
Status: ON HOLD | COMMUNITY
End: 2019-06-30

## 2019-06-29 RX ORDER — NITROGLYCERIN 0.4 MG/1
0.4 TABLET SUBLINGUAL EVERY 5 MIN PRN
Status: DISCONTINUED | OUTPATIENT
Start: 2019-06-29 | End: 2019-06-30

## 2019-06-29 RX ORDER — FUROSEMIDE 10 MG/ML
40 INJECTION INTRAMUSCULAR; INTRAVENOUS ONCE
Status: COMPLETED | OUTPATIENT
Start: 2019-06-29 | End: 2019-06-29

## 2019-06-29 RX ORDER — METOPROLOL SUCCINATE 100 MG/1
200 TABLET, EXTENDED RELEASE ORAL
Status: DISCONTINUED | OUTPATIENT
Start: 2019-06-30 | End: 2019-06-30

## 2019-06-29 RX ORDER — WARFARIN SODIUM 3 MG/1
1.5 TABLET ORAL ONCE
Status: COMPLETED | OUTPATIENT
Start: 2019-06-29 | End: 2019-06-29

## 2019-06-29 RX ORDER — WARFARIN SODIUM 4 MG/1
4 TABLET ORAL
Status: DISCONTINUED | OUTPATIENT
Start: 2019-06-29 | End: 2019-06-29

## 2019-06-29 NOTE — ED INITIAL ASSESSMENT (HPI)
Patient states has been SOB since Thursday and gotten worst today. Hx of A-fib.  Denies pain or pressure to chest.

## 2019-06-29 NOTE — ED PROVIDER NOTES
Patient Seen in: [facility] AND [facility] Emergency Department    History   Patient presents with:  Dyspnea LEYDI SOB (respiratory)    Stated Complaint: sob    HPI    49-year-old female with history of atrial fibrillation, hypertension, hypercholesterolemia, and Smoker      Smokeless tobacco: Never Used    Alcohol use: Yes    Drug use: No      Review of Systems    Positive for stated complaint: sob  Other systems are as noted in HPI. Constitutional and vital signs reviewed.       All other systems reviewed and neg Pro-Beta Natriuretic Peptide 10,472 (*)     All other components within normal limits   CBC W/ DIFFERENTIAL - Abnormal; Notable for the following components:    RDW-SD 50.4 (*)     All other components within normal limits   TROPONIN I - Normal   D-DIMER -

## 2019-06-30 VITALS
HEART RATE: 66 BPM | OXYGEN SATURATION: 94 % | DIASTOLIC BLOOD PRESSURE: 76 MMHG | SYSTOLIC BLOOD PRESSURE: 107 MMHG | RESPIRATION RATE: 18 BRPM | BODY MASS INDEX: 22 KG/M2 | WEIGHT: 129.5 LBS | TEMPERATURE: 98 F

## 2019-06-30 LAB
ANION GAP SERPL CALC-SCNC: 6 MMOL/L (ref 0–18)
BUN BLD-MCNC: 37 MG/DL (ref 7–18)
BUN/CREAT SERPL: 26.6 (ref 10–20)
CALCIUM BLD-MCNC: 8.8 MG/DL (ref 8.5–10.1)
CHLORIDE SERPL-SCNC: 106 MMOL/L (ref 98–112)
CHOLEST SMN-MCNC: 160 MG/DL (ref ?–200)
CO2 SERPL-SCNC: 29 MMOL/L (ref 21–32)
CREAT BLD-MCNC: 1.39 MG/DL (ref 0.55–1.02)
DEPRECATED RDW RBC AUTO: 50.3 FL (ref 35.1–46.3)
ERYTHROCYTE [DISTWIDTH] IN BLOOD BY AUTOMATED COUNT: 14.6 % (ref 11–15)
GLUCOSE BLD-MCNC: 80 MG/DL (ref 70–99)
HAV IGM SER QL: 2.1 MG/DL (ref 1.6–2.6)
HCT VFR BLD AUTO: 37.9 % (ref 35–48)
HDLC SERPL-MCNC: 42 MG/DL (ref 40–59)
HGB BLD-MCNC: 12.7 G/DL (ref 12–16)
INR BLD: 2.14 (ref 0.9–1.2)
LDLC SERPL CALC-MCNC: 106 MG/DL (ref ?–100)
MCH RBC QN AUTO: 31.4 PG (ref 26–34)
MCHC RBC AUTO-ENTMCNC: 33.5 G/DL (ref 31–37)
MCV RBC AUTO: 93.6 FL (ref 80–100)
NONHDLC SERPL-MCNC: 118 MG/DL (ref ?–130)
OSMOLALITY SERPL CALC.SUM OF ELEC: 300 MOSM/KG (ref 275–295)
PLATELET # BLD AUTO: 177 10(3)UL (ref 150–450)
POTASSIUM SERPL-SCNC: 4.5 MMOL/L (ref 3.5–5.1)
PROTHROMBIN TIME: 24.1 SECONDS (ref 11.8–14.5)
RBC # BLD AUTO: 4.05 X10(6)UL (ref 3.8–5.3)
SODIUM SERPL-SCNC: 141 MMOL/L (ref 136–145)
TRIGL SERPL-MCNC: 59 MG/DL (ref 30–149)
VLDLC SERPL CALC-MCNC: 12 MG/DL (ref 0–30)
WBC # BLD AUTO: 5.7 X10(3) UL (ref 4–11)

## 2019-06-30 PROCEDURE — 99239 HOSP IP/OBS DSCHRG MGMT >30: CPT | Performed by: HOSPITALIST

## 2019-06-30 PROCEDURE — 99221 1ST HOSP IP/OBS SF/LOW 40: CPT | Performed by: INTERNAL MEDICINE

## 2019-06-30 NOTE — CONSULTS
Neris Ortiz  2/7/1929        HPI:   80year old female with PMH of Afib on coumadin, pulm htn on vadodilator tx, breast CA, HTN, HL presents JIMENEZ. Pt claims her symptoms have been progressively getting worse for the past week.  Of note pt was started o Lifestyle      Physical activity:        Days per week: Not on file        Minutes per session: Not on file      Stress: Not on file    Relationships      Social connections:        Talks on phone: Not on file        Gets together: Not on file        Atten 107/76   BP Location: Left arm  Right arm Right arm   Pulse: 66      Resp: 19  20 18   Temp: 97.4 °F (36.3 °C)  97.6 °F (36.4 °C) 97.8 °F (36.6 °C)   TempSrc: Oral  Oral Oral   SpO2: 92%  94% 94%   Weight:  129 lb 8 oz (58.7 kg)           Intake/Output Sum opacity which could represent atelectasis or pneumonia.   Dictated by (CST): Beverly Restrepo MD on 6/29/2019 at 17:22     Approved by (CST): Beverly Restrepo MD on 6/29/2019 at 17:24              Patient Active Problem List:     Atrial fibrillation [facility])     Afib

## 2019-06-30 NOTE — H&P
[address] Patient Status:  Inpatient    1929 MRN [id number]   Location [facility] 3W/SW Attending Shon Rossi MD   Hosp Day # 0 PCP Mary Georges MD     Date:  2019  D   1950    Pregnancy - 36 week 3 lb(s) 15 oz Female    •   [id number]    pregnacy   •        Per NG: Pregnacy 40 week 7 lb(s) 14 oz Male   •       Per NG:  -breech  OUTCOME IS 40 week 9 lb(s) Female   • RADIATION LEFT  2011   • YA Readings from Last 3 Encounters:  05/07/19 : 142 lb (64.4 kg)  02/25/19 : 140 lb (63.5 kg)  02/19/19 : 139 lb 3.2 oz (63.1 kg)    Telemetry: Afib, rate controlled  General: Alert and oriented in no apparent distress. HEENT: No focal deficits.   Neck: No JV elevated, CXR showing cardiomegaly and small L pleural effusion  Trop negative x 1  Pt was given IV lasix in ER  Will hold sildenafil and letairis for now, await cards eval  Cont strict IsOs  Daily weights    Other Issues  Afib - rate controlled, cont AC

## 2019-06-30 NOTE — DISCHARGE SUMMARY
[facility]    Discharge Summary    Yasmin Riding Patient Status:  Inpatient    1929 MRN [id number]   Location [facility] 3W/SW Attending Lela Everett MD   Hosp Day # 1 PCP Abdirashid Mcgraw MD     Date of Admission: oriented, normal affect. Skin: Warm and dry.        History of Present Illness:   Per admission HPI  [place] H[place]to is a(n) 80year old female with PMH of Afib on coumadin, pulm htn on vadodilator tx, breast CA, HTN, HL presents with palpitations and DO tablet  Refills:  0     Metoprolol Succinate  MG Tb24  Commonly known as:  TOPROL-XL      TAKE 1 TABLET BY MOUTH  EVERY DAY   Quantity:  90 tablet  Refills:  1     Sildenafil Citrate 20 MG Tabs  Commonly known as:  REVATIO      Take 20 mg by mouth 3

## 2019-06-30 NOTE — PLAN OF CARE
Problem: Patient Centered Care  Goal: Patient preferences are identified and integrated in the patient's plan of care  Description  Interventions:  - What would you like us to know as we care for you?  Lives alone, still drives, [place] with hearing aids  - P obtain 12 lead EKG if indicated  - Evaluate effectiveness of antiarrhythmic and heart rate control medications as ordered  - Initiate emergency measures for life threatening arrhythmias  - Monitor electrolytes and administer replacement therapy as ordered

## 2019-07-01 ENCOUNTER — TELEPHONE (OUTPATIENT)
Dept: NEPHROLOGY | Facility: CLINIC | Age: 84
End: 2019-07-01

## 2019-07-01 ENCOUNTER — PATIENT OUTREACH (OUTPATIENT)
Dept: CASE MANAGEMENT | Age: 84
End: 2019-07-01

## 2019-07-01 NOTE — TELEPHONE ENCOUNTER
S/w Dtr and last INR was 6/3- at 2.14. Pt was in hospital and send home on current coumadin dosing. Confirmed dosing and f/u appt on the 6/17. dtr states understanding.

## 2019-07-01 NOTE — TELEPHONE ENCOUNTER
Pt daughter called in would like to know if Pt should come in for testing Pt was in  Hospital for 1 night and they check blood and changed warfarin dosage because blood was thin and reading was 1.7    Daughter stated concerned does not want blood to get th

## 2019-07-10 ENCOUNTER — OFFICE VISIT (OUTPATIENT)
Dept: CARDIOLOGY | Age: 84
End: 2019-07-10

## 2019-07-10 VITALS
SYSTOLIC BLOOD PRESSURE: 118 MMHG | HEART RATE: 58 BPM | RESPIRATION RATE: 18 BRPM | WEIGHT: 136 LBS | BODY MASS INDEX: 22.66 KG/M2 | HEIGHT: 65 IN | DIASTOLIC BLOOD PRESSURE: 50 MMHG

## 2019-07-10 DIAGNOSIS — I50.812 CHRONIC RIGHT-SIDED HEART FAILURE (CMD): Primary | ICD-10-CM

## 2019-07-10 DIAGNOSIS — I27.0 PRIMARY PULMONARY HYPERTENSION (CMD): ICD-10-CM

## 2019-07-10 PROBLEM — R06.00 DYSPNEA: Status: ACTIVE | Noted: 2019-01-23

## 2019-07-10 PROCEDURE — 99215 OFFICE O/P EST HI 40 MIN: CPT | Performed by: INTERNAL MEDICINE

## 2019-07-10 RX ORDER — TORSEMIDE 10 MG/1
10 TABLET ORAL DAILY
Qty: 90 TABLET | Refills: 3 | Status: SHIPPED | OUTPATIENT
Start: 2019-07-10 | End: 2019-08-05 | Stop reason: DRUGHIGH

## 2019-07-10 RX ORDER — TORSEMIDE 10 MG/1
10 TABLET ORAL DAILY
Qty: 90 TABLET | Refills: 3 | Status: SHIPPED | OUTPATIENT
Start: 2019-07-10 | End: 2019-07-10 | Stop reason: SDUPTHER

## 2019-07-10 SDOH — HEALTH STABILITY: MENTAL HEALTH: HOW OFTEN DO YOU HAVE A DRINK CONTAINING ALCOHOL?: NEVER

## 2019-07-10 ASSESSMENT — ENCOUNTER SYMPTOMS
BRUISES/BLEEDS EASILY: 0
CHILLS: 0
FEVER: 0
ALLERGIC/IMMUNOLOGIC COMMENTS: NO NEW FOOD ALLERGIES
WEIGHT LOSS: 0
HEMOPTYSIS: 0
SUSPICIOUS LESIONS: 0
HEMATOCHEZIA: 0
COUGH: 0
WEIGHT GAIN: 0

## 2019-07-12 ENCOUNTER — TELEPHONE (OUTPATIENT)
Dept: INTERNAL MEDICINE CLINIC | Facility: CLINIC | Age: 84
End: 2019-07-12

## 2019-07-12 RX ORDER — HYDROCHLOROTHIAZIDE 25 MG/1
TABLET ORAL
Qty: 45 TABLET | Refills: 0 | Status: SHIPPED | OUTPATIENT
Start: 2019-07-12 | End: 2019-07-12

## 2019-07-12 RX ORDER — SPIRONOLACTONE 25 MG/1
TABLET ORAL
Qty: 45 TABLET | Refills: 0 | Status: SHIPPED | OUTPATIENT
Start: 2019-07-12 | End: 2019-08-12

## 2019-07-12 RX ORDER — LISINOPRIL 40 MG/1
40 TABLET ORAL
Qty: 90 TABLET | Refills: 0 | Status: SHIPPED | OUTPATIENT
Start: 2019-07-12 | End: 2019-08-12

## 2019-07-12 NOTE — TELEPHONE ENCOUNTER
Pt requesting to remove prescription for medication. Pt states medication was discontinued by Dr. Lennox Grow.         Current Outpatient Medications:   •  hydrochlorothiazide 25 MG Oral Tab, TAKE ONE-HALF TABLET BY  MOUTH DAILY, Disp: 45 tablet, Rfl: 0

## 2019-07-12 NOTE — TELEPHONE ENCOUNTER
LOV 2/19/19. RTC in 6 mos (8/2019) F/U scheduled for 7/16/19. Refills pended and routed to Dr. Jacqueline Weiner.

## 2019-07-12 NOTE — TELEPHONE ENCOUNTER
Current Outpatient Medications:                                   HYDROCHLOROTHIAZIDE 25 MG Oral Tab TAKE ONE-HALF TABLET BY  MOUTH DAILY Disp: 45 tablet Rfl: 3   SPIRONOLACTONE 25 MG Oral Tab TAKE ONE-HALF TABLET BY  MOUTH DAILY Disp: 45 tablet Rfl: 3

## 2019-07-16 ENCOUNTER — OFFICE VISIT (OUTPATIENT)
Dept: NEPHROLOGY | Facility: CLINIC | Age: 84
End: 2019-07-16
Payer: MEDICARE

## 2019-07-16 VITALS
WEIGHT: 131.63 LBS | HEART RATE: 51 BPM | SYSTOLIC BLOOD PRESSURE: 117 MMHG | HEIGHT: 64.5 IN | DIASTOLIC BLOOD PRESSURE: 69 MMHG | BODY MASS INDEX: 22.2 KG/M2

## 2019-07-16 DIAGNOSIS — I48.20 CHRONIC ATRIAL FIBRILLATION (HCC): ICD-10-CM

## 2019-07-16 DIAGNOSIS — I10 ESSENTIAL HYPERTENSION: Primary | ICD-10-CM

## 2019-07-16 DIAGNOSIS — E78.00 HYPERCHOLESTEROLEMIA: ICD-10-CM

## 2019-07-16 DIAGNOSIS — N18.30 CKD (CHRONIC KIDNEY DISEASE) STAGE 3, GFR 30-59 ML/MIN (HCC): ICD-10-CM

## 2019-07-16 PROCEDURE — G0463 HOSPITAL OUTPT CLINIC VISIT: HCPCS | Performed by: INTERNAL MEDICINE

## 2019-07-16 PROCEDURE — 1111F DSCHRG MED/CURRENT MED MERGE: CPT | Performed by: INTERNAL MEDICINE

## 2019-07-16 PROCEDURE — 99214 OFFICE O/P EST MOD 30 MIN: CPT | Performed by: INTERNAL MEDICINE

## 2019-07-16 RX ORDER — ROSUVASTATIN CALCIUM 5 MG/1
5 TABLET, COATED ORAL NIGHTLY
Qty: 30 TABLET | Refills: 5 | Status: SHIPPED | OUTPATIENT
Start: 2019-07-16 | End: 2019-08-12

## 2019-07-16 RX ORDER — TORSEMIDE 10 MG/1
10 TABLET ORAL DAILY
COMMUNITY
Start: 2019-07-10 | End: 2020-07-22

## 2019-07-17 ENCOUNTER — ANTI-COAG VISIT (OUTPATIENT)
Dept: INTERNAL MEDICINE CLINIC | Facility: CLINIC | Age: 84
End: 2019-07-17
Payer: MEDICARE

## 2019-07-17 ENCOUNTER — LAB ENCOUNTER (OUTPATIENT)
Dept: LAB | Age: 84
End: 2019-07-17
Attending: INTERNAL MEDICINE
Payer: MEDICARE

## 2019-07-17 DIAGNOSIS — I48.91 ATRIAL FIBRILLATION, UNSPECIFIED TYPE (HCC): ICD-10-CM

## 2019-07-17 DIAGNOSIS — I48.20 CHRONIC ATRIAL FIBRILLATION (HCC): ICD-10-CM

## 2019-07-17 DIAGNOSIS — Z79.01 LONG TERM (CURRENT) USE OF ANTICOAGULANTS: ICD-10-CM

## 2019-07-17 DIAGNOSIS — I50.812 CHRONIC RIGHT-SIDED HEART FAILURE (HCC): ICD-10-CM

## 2019-07-17 DIAGNOSIS — Z51.81 ENCOUNTER FOR THERAPEUTIC DRUG MONITORING: ICD-10-CM

## 2019-07-17 DIAGNOSIS — I27.0 PRIMARY PULMONARY HYPERTENSION (HCC): Primary | ICD-10-CM

## 2019-07-17 LAB
ANION GAP SERPL CALC-SCNC: 6 MMOL/L (ref 0–18)
ANION GAP SERPL CALC-SCNC: NORMAL MMOL/L
BUN BLD-MCNC: 49 MG/DL (ref 7–18)
BUN SERPL-MCNC: 49 MG/DL
BUN/CREAT SERPL: 27.8 (ref 10–20)
BUN/CREAT SERPL: NORMAL
CALCIUM BLD-MCNC: 9.3 MG/DL (ref 8.5–10.1)
CALCIUM SERPL-MCNC: 9.3 MG/DL
CHLORIDE SERPL-SCNC: 108 MMOL/L
CHLORIDE SERPL-SCNC: 108 MMOL/L (ref 98–112)
CO2 SERPL-SCNC: 28 MMOL/L (ref 21–32)
CO2 SERPL-SCNC: NORMAL MMOL/L
CREAT BLD-MCNC: 1.76 MG/DL (ref 0.55–1.02)
CREAT SERPL-MCNC: 1.76 MG/DL
GLUCOSE BLD-MCNC: 95 MG/DL (ref 70–99)
GLUCOSE SERPL-MCNC: 95 MG/DL
INR: 2.1 (ref 2–3)
LENGTH OF FAST TIME PATIENT: NORMAL H
OSMOLALITY SERPL CALC.SUM OF ELEC: 307 MOSM/KG (ref 275–295)
PATIENT FASTING: NO
POTASSIUM SERPL-SCNC: 4.2 MMOL/L
POTASSIUM SERPL-SCNC: 4.2 MMOL/L (ref 3.5–5.1)
SODIUM SERPL-SCNC: 142 MMOL/L
SODIUM SERPL-SCNC: 142 MMOL/L (ref 136–145)

## 2019-07-17 PROCEDURE — 36416 COLLJ CAPILLARY BLOOD SPEC: CPT

## 2019-07-17 PROCEDURE — 85610 PROTHROMBIN TIME: CPT

## 2019-07-17 PROCEDURE — 36415 COLL VENOUS BLD VENIPUNCTURE: CPT

## 2019-07-17 PROCEDURE — 80048 BASIC METABOLIC PNL TOTAL CA: CPT

## 2019-07-17 NOTE — PROGRESS NOTES
[facility], [facility]  Nephrology Daily Progress Note    Yasmin Riding  [id number]  80year old  Patient presents with:  Hospital F/U      HPI:   Yasmin Riding is a 80year old female.   80-year-old female with a history of chronic atrial fibrillation, mi SpO2 94 94 -   Weight - - 131 lbs 10 oz   Height - - 64.500   BODY MASS INDEX - - 22.24       VITALS: WEIGHT ONLY 5/7/2019 6/30/2019 7/16/2019   Weight 142 lbs 129 lbs 8 oz 131 lbs 10 oz       Constitutional: appears well hydrated alert and responsive no total) by mouth nightly., Disp: 30 tablet, Rfl: 5  •  spironolactone 25 MG Oral Tab, TAKE ONE-HALF TABLET BY  MOUTH DAILY, Disp: 45 tablet, Rfl: 0  •  lisinopril 40 MG Oral Tab, Take 1 tablet (40 mg total) by mouth once daily. , Disp: 90 tablet, Rfl: 0  • try to wear support hose. Does not add salt but tends to eat a lot of prepared foods. As Lipitor was too large will switch to Crestor 5 mg daily. Repeat liver and lipid panel in 1 month. Her creatinine at time of discharge on June 30 was 1.39.   Will be

## 2019-07-18 ENCOUNTER — TELEPHONE (OUTPATIENT)
Dept: CARDIOLOGY | Age: 84
End: 2019-07-18

## 2019-07-22 ENCOUNTER — TELEPHONE (OUTPATIENT)
Dept: CARDIOLOGY | Age: 84
End: 2019-07-22

## 2019-07-23 ENCOUNTER — CLINICAL ABSTRACT (OUTPATIENT)
Dept: CARDIOLOGY | Age: 84
End: 2019-07-23

## 2019-07-29 ENCOUNTER — TELEPHONE (OUTPATIENT)
Dept: CARDIOLOGY | Age: 84
End: 2019-07-29

## 2019-07-31 ENCOUNTER — TELEPHONE (OUTPATIENT)
Dept: CARDIOLOGY | Age: 84
End: 2019-07-31

## 2019-07-31 DIAGNOSIS — I50.812 CHRONIC RIGHT-SIDED HEART FAILURE (CMD): ICD-10-CM

## 2019-07-31 DIAGNOSIS — I27.0 PRIMARY PULMONARY HYPERTENSION (CMD): ICD-10-CM

## 2019-08-05 RX ORDER — TORSEMIDE 10 MG/1
10 TABLET ORAL EVERY OTHER DAY
Qty: 90 TABLET | Refills: 3 | Status: SHIPPED | OUTPATIENT
Start: 2019-08-05 | End: 2020-02-24 | Stop reason: DRUGHIGH

## 2019-08-12 RX ORDER — ROSUVASTATIN CALCIUM 5 MG/1
5 TABLET, COATED ORAL NIGHTLY
Qty: 30 TABLET | Refills: 5 | Status: SHIPPED | OUTPATIENT
Start: 2019-08-12 | End: 2019-11-12

## 2019-08-12 NOTE — TELEPHONE ENCOUNTER
Current Outpatient Medications:  osuvastatin Calcium (CRESTOR) 5 MG Oral Tab Take 1 tablet (5 mg total) by mouth nightly.  Disp: 30 tablet Rfl: 5

## 2019-08-12 NOTE — TELEPHONE ENCOUNTER
LOV 7/16/19. Last lipid panel was done on 6/30/19. Refill pended and routed to Dr. Yeni Sweet to approve.

## 2019-08-14 ENCOUNTER — APPOINTMENT (OUTPATIENT)
Dept: LAB | Age: 84
End: 2019-08-14
Attending: INTERNAL MEDICINE
Payer: MEDICARE

## 2019-08-14 ENCOUNTER — NURSE ONLY (OUTPATIENT)
Dept: NEPHROLOGY | Facility: CLINIC | Age: 84
End: 2019-08-14
Payer: MEDICARE

## 2019-08-14 ENCOUNTER — ANTI-COAG VISIT (OUTPATIENT)
Dept: INTERNAL MEDICINE CLINIC | Facility: CLINIC | Age: 84
End: 2019-08-14
Payer: MEDICARE

## 2019-08-14 DIAGNOSIS — E78.00 HYPERCHOLESTEROLEMIA: ICD-10-CM

## 2019-08-14 DIAGNOSIS — N18.30 CKD (CHRONIC KIDNEY DISEASE) STAGE 3, GFR 30-59 ML/MIN (HCC): ICD-10-CM

## 2019-08-14 DIAGNOSIS — Z79.01 LONG TERM (CURRENT) USE OF ANTICOAGULANTS: ICD-10-CM

## 2019-08-14 DIAGNOSIS — I48.20 CHRONIC ATRIAL FIBRILLATION (HCC): ICD-10-CM

## 2019-08-14 DIAGNOSIS — Z51.81 ENCOUNTER FOR THERAPEUTIC DRUG MONITORING: ICD-10-CM

## 2019-08-14 DIAGNOSIS — I48.91 ATRIAL FIBRILLATION, UNSPECIFIED TYPE (HCC): ICD-10-CM

## 2019-08-14 DIAGNOSIS — Z92.29 IMMUNIZATION SERIES COMPLETE: Primary | ICD-10-CM

## 2019-08-14 LAB
ALBUMIN SERPL-MCNC: 3.4 G/DL (ref 3.4–5)
ALP LIVER SERPL-CCNC: 103 U/L (ref 55–142)
ALT SERPL-CCNC: 13 U/L (ref 13–56)
ANION GAP SERPL CALC-SCNC: 9 MMOL/L (ref 0–18)
ANION GAP SERPL CALC-SCNC: NORMAL MMOL/L
AST SERPL-CCNC: 20 U/L (ref 15–37)
BILIRUB DIRECT SERPL-MCNC: 0.2 MG/DL (ref 0–0.2)
BILIRUB SERPL-MCNC: 0.9 MG/DL (ref 0.1–2)
BUN BLD-MCNC: 33 MG/DL (ref 7–18)
BUN SERPL-MCNC: 33 MG/DL
BUN/CREAT SERPL: 22.9 (ref 10–20)
BUN/CREAT SERPL: NORMAL
CALCIUM BLD-MCNC: 9.4 MG/DL (ref 8.5–10.1)
CALCIUM SERPL-MCNC: 9.4 MG/DL
CHLORIDE SERPL-SCNC: 110 MMOL/L
CHLORIDE SERPL-SCNC: 110 MMOL/L (ref 98–112)
CHOLEST SMN-MCNC: 149 MG/DL (ref ?–200)
CO2 SERPL-SCNC: 25 MMOL/L (ref 21–32)
CO2 SERPL-SCNC: NORMAL MMOL/L
CREAT BLD-MCNC: 1.44 MG/DL (ref 0.55–1.02)
CREAT SERPL-MCNC: 1.44 MG/DL
GLUCOSE BLD-MCNC: 85 MG/DL (ref 70–99)
GLUCOSE SERPL-MCNC: 85 MG/DL
HDLC SERPL-MCNC: 48 MG/DL (ref 40–59)
INR: 2 (ref 2–3)
LDLC SERPL CALC-MCNC: 88 MG/DL (ref ?–100)
LENGTH OF FAST TIME PATIENT: NORMAL H
M PROTEIN MFR SERPL ELPH: 7.2 G/DL (ref 6.4–8.2)
NONHDLC SERPL-MCNC: 101 MG/DL (ref ?–130)
OSMOLALITY SERPL CALC.SUM OF ELEC: 305 MOSM/KG (ref 275–295)
PATIENT FASTING: YES
PATIENT FASTING: YES
POTASSIUM SERPL-SCNC: 4.5 MMOL/L
POTASSIUM SERPL-SCNC: 4.5 MMOL/L (ref 3.5–5.1)
SODIUM SERPL-SCNC: 144 MMOL/L
SODIUM SERPL-SCNC: 144 MMOL/L (ref 136–145)
TRIGL SERPL-MCNC: 64 MG/DL (ref 30–149)
VLDLC SERPL CALC-MCNC: 13 MG/DL (ref 0–30)

## 2019-08-14 PROCEDURE — 80048 BASIC METABOLIC PNL TOTAL CA: CPT

## 2019-08-14 PROCEDURE — 80076 HEPATIC FUNCTION PANEL: CPT

## 2019-08-14 PROCEDURE — 90670 PCV13 VACCINE IM: CPT | Performed by: INTERNAL MEDICINE

## 2019-08-14 PROCEDURE — 80061 LIPID PANEL: CPT

## 2019-08-14 PROCEDURE — 36416 COLLJ CAPILLARY BLOOD SPEC: CPT

## 2019-08-14 PROCEDURE — 36415 COLL VENOUS BLD VENIPUNCTURE: CPT

## 2019-08-14 PROCEDURE — G0009 ADMIN PNEUMOCOCCAL VACCINE: HCPCS | Performed by: INTERNAL MEDICINE

## 2019-08-14 PROCEDURE — 85610 PROTHROMBIN TIME: CPT

## 2019-08-14 RX ORDER — LISINOPRIL 40 MG/1
TABLET ORAL
Qty: 90 TABLET | Refills: 1 | Status: SHIPPED | OUTPATIENT
Start: 2019-08-14 | End: 2020-04-21

## 2019-08-14 RX ORDER — SPIRONOLACTONE 25 MG/1
TABLET ORAL
Qty: 45 TABLET | Refills: 1 | Status: ON HOLD | OUTPATIENT
Start: 2019-08-14 | End: 2020-06-16

## 2019-08-14 RX ORDER — HYDROCHLOROTHIAZIDE 25 MG/1
TABLET ORAL
Qty: 45 TABLET | Refills: 1 | Status: SHIPPED | OUTPATIENT
Start: 2019-08-14 | End: 2019-11-07 | Stop reason: ALTCHOICE

## 2019-08-14 NOTE — TELEPHONE ENCOUNTER
RN pls call pt and verify rx refill for hctz,  per med list pt on torsemide,  thanks. LR Spironolactone and Lisinopril by Coleen Meyers MD, also too soon for refill.     Hypertensive Medications  Protocol Criteria:  · Appointment scheduled in the p 07/17/2019

## 2019-08-14 NOTE — PROGRESS NOTES
See 7/27/19 My Chart message. Patient identified by full name and date of birth. Prevnar 13 0.5 ml given im in left deltoid. Tolerated injection without problems and released after 10 minutes observation in stable condition.

## 2019-08-14 NOTE — TELEPHONE ENCOUNTER
This was sent to IM in error, pt sees Dr. Saira Briggs, sent to nephrology and Dr. Saira Birggs, thank you.

## 2019-08-15 ENCOUNTER — CLINICAL ABSTRACT (OUTPATIENT)
Dept: CARDIOLOGY | Age: 84
End: 2019-08-15

## 2019-08-19 RX ORDER — HYDROCHLOROTHIAZIDE 25 MG/1
TABLET ORAL
Qty: 45 TABLET | Refills: 1 | OUTPATIENT
Start: 2019-08-19

## 2019-08-19 RX ORDER — METOPROLOL SUCCINATE 200 MG/1
200 TABLET, EXTENDED RELEASE ORAL
Qty: 90 TABLET | Refills: 1 | Status: SHIPPED | OUTPATIENT
Start: 2019-08-19 | End: 2019-11-12

## 2019-08-19 NOTE — TELEPHONE ENCOUNTER
Pt states she is now under the care of Dr. Fox Jordan for cardio and pulmonary. Pt states refill for medication is needed but she is no longer under Cheli Mcgill care. Pt states Dr. Fox Jordan does not have access to [facility].  Pt inquiring if Matthias whaely

## 2019-08-19 NOTE — TELEPHONE ENCOUNTER
Spoke with pharmacist, Dequan Melendez, regarding refill request for Hydrochlorothiazide. Per pharmacist please disregard refill as they have received the prescription on 8-14-19.

## 2019-09-04 ENCOUNTER — LAB ENCOUNTER (OUTPATIENT)
Dept: LAB | Age: 84
End: 2019-09-04
Attending: INTERNAL MEDICINE
Payer: MEDICARE

## 2019-09-04 DIAGNOSIS — I27.0 PRIMARY PULMONARY HYPERTENSION (HCC): Primary | ICD-10-CM

## 2019-09-04 DIAGNOSIS — I50.812 CHRONIC RIGHT-SIDED HEART FAILURE (HCC): ICD-10-CM

## 2019-09-04 LAB
ABSOLUTE IMMATURE GRANULOCYTES (OFFPRE24): NORMAL
ALBUMIN SERPL-MCNC: 3.2 G/DL
ALBUMIN SERPL-MCNC: 3.2 G/DL (ref 3.4–5)
ALBUMIN/GLOB SERPL: 0.9 {RATIO} (ref 1–2)
ALBUMIN/GLOB SERPL: NORMAL {RATIO}
ALP LIVER SERPL-CCNC: 94 U/L (ref 55–142)
ALP SERPL-CCNC: 94 U/L
ALT SERPL-CCNC: 15 U/L
ALT SERPL-CCNC: 15 U/L (ref 13–56)
ANION GAP SERPL CALC-SCNC: 8 MMOL/L (ref 0–18)
ANION GAP SERPL CALC-SCNC: NORMAL MMOL/L
AST SERPL-CCNC: 24 U/L
AST SERPL-CCNC: 24 U/L (ref 15–37)
BASO+EOS+MONOS # BLD: NORMAL 10*3/UL
BASO+EOS+MONOS NFR BLD: NORMAL %
BASOPHILS # BLD AUTO: 0.06 X10(3) UL (ref 0–0.2)
BASOPHILS # BLD: NORMAL 10*3/UL
BASOPHILS NFR BLD AUTO: 1 %
BASOPHILS NFR BLD: NORMAL %
BILIRUB SERPL-MCNC: 0.8 MG/DL
BILIRUB SERPL-MCNC: 0.8 MG/DL (ref 0.1–2)
BUN BLD-MCNC: 30 MG/DL (ref 7–18)
BUN SERPL-MCNC: 30 MG/DL
BUN/CREAT SERPL: 21.4 (ref 10–20)
BUN/CREAT SERPL: NORMAL
CALCIUM BLD-MCNC: 9 MG/DL (ref 8.5–10.1)
CALCIUM SERPL-MCNC: 9 MG/DL
CHLORIDE SERPL-SCNC: 111 MMOL/L
CHLORIDE SERPL-SCNC: 111 MMOL/L (ref 98–112)
CO2 SERPL-SCNC: 26 MMOL/L (ref 21–32)
CO2 SERPL-SCNC: NORMAL MMOL/L
CREAT BLD-MCNC: 1.4 MG/DL (ref 0.55–1.02)
CREAT SERPL-MCNC: 1.4 MG/DL
DEPRECATED RDW RBC AUTO: 60.9 FL (ref 35.1–46.3)
DIFFERENTIAL METHOD BLD: NORMAL
EOSINOPHIL # BLD AUTO: 0.06 X10(3) UL (ref 0–0.7)
EOSINOPHIL # BLD: NORMAL 10*3/UL
EOSINOPHIL NFR BLD AUTO: 1 %
EOSINOPHIL NFR BLD: NORMAL %
ERYTHROCYTE [DISTWIDTH] IN BLOOD BY AUTOMATED COUNT: 17.3 % (ref 11–15)
ERYTHROCYTE [DISTWIDTH] IN BLOOD: NORMAL %
GLOBULIN PLAS-MCNC: 3.7 G/DL (ref 2.8–4.4)
GLOBULIN SER-MCNC: 3.7 G/DL
GLUCOSE BLD-MCNC: 111 MG/DL (ref 70–99)
GLUCOSE SERPL-MCNC: 111 MG/DL
HCT VFR BLD AUTO: 37.6 % (ref 35–48)
HCT VFR BLD CALC: 37.6 %
HGB BLD-MCNC: 12.4 G/DL
HGB BLD-MCNC: 12.4 G/DL (ref 12–16)
IMM GRANULOCYTES # BLD AUTO: 0.02 X10(3) UL (ref 0–1)
IMM GRANULOCYTES NFR BLD: 0.3 %
IMMATURE GRANULOCYTES (OFFPRE25): NORMAL
LENGTH OF FAST TIME PATIENT: NORMAL H
LYMPHOCYTES # BLD AUTO: 1.77 X10(3) UL (ref 1–4)
LYMPHOCYTES # BLD: NORMAL 10*3/UL
LYMPHOCYTES NFR BLD AUTO: 28.1 %
LYMPHOCYTES NFR BLD: NORMAL %
M PROTEIN MFR SERPL ELPH: 6.9 G/DL (ref 6.4–8.2)
MCH RBC QN AUTO: 31.7 PG (ref 26–34)
MCH RBC QN AUTO: NORMAL PG
MCHC RBC AUTO-ENTMCNC: 33 G/DL (ref 31–37)
MCHC RBC AUTO-ENTMCNC: NORMAL G/DL
MCV RBC AUTO: 96.2 FL (ref 80–100)
MCV RBC AUTO: NORMAL FL
MONOCYTES # BLD AUTO: 0.51 X10(3) UL (ref 0.1–1)
MONOCYTES # BLD: NORMAL 10*3/UL
MONOCYTES NFR BLD AUTO: 8.1 %
MONOCYTES NFR BLD: NORMAL %
MPV (OFFPRE2): NORMAL
NEUTROPHILS # BLD AUTO: 3.89 X10 (3) UL (ref 1.5–7.7)
NEUTROPHILS # BLD AUTO: 3.89 X10(3) UL (ref 1.5–7.7)
NEUTROPHILS # BLD: NORMAL 10*3/UL
NEUTROPHILS NFR BLD AUTO: 61.5 %
NEUTROPHILS NFR BLD: NORMAL %
NRBC BLD MANUAL-RTO: NORMAL %
NT-PROBNP SERPL-MCNC: ABNORMAL PG/ML (ref ?–450)
NT-PROBNP SERPL-MCNC: NORMAL PG/ML
OSMOLALITY SERPL CALC.SUM OF ELEC: 307 MOSM/KG (ref 275–295)
PATIENT FASTING: NO
PLAT MORPH BLD: NORMAL
PLATELET # BLD AUTO: 179 10(3)UL (ref 150–450)
PLATELET # BLD: 179 10*3/UL
POTASSIUM SERPL-SCNC: 3.8 MMOL/L
POTASSIUM SERPL-SCNC: 3.8 MMOL/L (ref 3.5–5.1)
PROT SERPL-MCNC: 6.9 G/DL
RBC # BLD AUTO: 3.91 X10(6)UL (ref 3.8–5.3)
RBC # BLD: 3.91 10*6/UL
RBC MORPH BLD: NORMAL
SODIUM SERPL-SCNC: 145 MMOL/L
SODIUM SERPL-SCNC: 145 MMOL/L (ref 136–145)
WBC # BLD AUTO: 6.3 X10(3) UL (ref 4–11)
WBC # BLD: 6.3 10*3/UL
WBC MORPH BLD: NORMAL

## 2019-09-04 PROCEDURE — 80053 COMPREHEN METABOLIC PANEL: CPT

## 2019-09-04 PROCEDURE — 83880 ASSAY OF NATRIURETIC PEPTIDE: CPT

## 2019-09-04 PROCEDURE — 85025 COMPLETE CBC W/AUTO DIFF WBC: CPT

## 2019-09-04 PROCEDURE — 36415 COLL VENOUS BLD VENIPUNCTURE: CPT

## 2019-09-05 ENCOUNTER — CLINICAL ABSTRACT (OUTPATIENT)
Dept: CARDIOLOGY | Age: 84
End: 2019-09-05

## 2019-09-06 ENCOUNTER — TELEPHONE (OUTPATIENT)
Dept: CARDIOLOGY | Age: 84
End: 2019-09-06

## 2019-09-11 ENCOUNTER — OFFICE VISIT (OUTPATIENT)
Dept: CARDIOLOGY | Age: 84
End: 2019-09-11

## 2019-09-11 ENCOUNTER — ANTI-COAG VISIT (OUTPATIENT)
Dept: INTERNAL MEDICINE CLINIC | Facility: CLINIC | Age: 84
End: 2019-09-11
Payer: MEDICARE

## 2019-09-11 VITALS
WEIGHT: 138 LBS | SYSTOLIC BLOOD PRESSURE: 132 MMHG | RESPIRATION RATE: 18 BRPM | HEART RATE: 58 BPM | HEIGHT: 65 IN | OXYGEN SATURATION: 96 % | DIASTOLIC BLOOD PRESSURE: 60 MMHG | BODY MASS INDEX: 22.99 KG/M2

## 2019-09-11 DIAGNOSIS — I48.91 ATRIAL FIBRILLATION, UNSPECIFIED TYPE (HCC): ICD-10-CM

## 2019-09-11 DIAGNOSIS — I48.20 CHRONIC ATRIAL FIBRILLATION (HCC): ICD-10-CM

## 2019-09-11 DIAGNOSIS — E78.5 HYPERLIPIDEMIA, UNSPECIFIED HYPERLIPIDEMIA TYPE: ICD-10-CM

## 2019-09-11 DIAGNOSIS — Z51.81 ENCOUNTER FOR THERAPEUTIC DRUG MONITORING: ICD-10-CM

## 2019-09-11 DIAGNOSIS — Z79.01 LONG TERM (CURRENT) USE OF ANTICOAGULANTS: ICD-10-CM

## 2019-09-11 DIAGNOSIS — I50.812 CHRONIC RIGHT-SIDED HEART FAILURE (CMD): ICD-10-CM

## 2019-09-11 DIAGNOSIS — I27.0 PRIMARY PULMONARY HYPERTENSION (CMD): Primary | ICD-10-CM

## 2019-09-11 DIAGNOSIS — E55.9 VITAMIN D DEFICIENCY: ICD-10-CM

## 2019-09-11 DIAGNOSIS — R53.83 FATIGUE, UNSPECIFIED TYPE: ICD-10-CM

## 2019-09-11 LAB — INR: 2.5 (ref 0.8–1.2)

## 2019-09-11 PROCEDURE — 36416 COLLJ CAPILLARY BLOOD SPEC: CPT

## 2019-09-11 PROCEDURE — 85610 PROTHROMBIN TIME: CPT

## 2019-09-11 PROCEDURE — 99213 OFFICE O/P EST LOW 20 MIN: CPT | Performed by: INTERNAL MEDICINE

## 2019-09-11 RX ORDER — ROSUVASTATIN CALCIUM 5 MG/1
5 TABLET, COATED ORAL AT BEDTIME
COMMUNITY
Start: 2019-08-12

## 2019-09-11 ASSESSMENT — ENCOUNTER SYMPTOMS
ALLERGIC/IMMUNOLOGIC COMMENTS: NO NEW FOOD ALLERGIES
BRUISES/BLEEDS EASILY: 0
CHILLS: 0
HEMOPTYSIS: 0
FEVER: 0
WEIGHT LOSS: 0
WEIGHT GAIN: 0
SHORTNESS OF BREATH: 1
HEMATOCHEZIA: 0
COUGH: 0
SUSPICIOUS LESIONS: 0

## 2019-09-11 ASSESSMENT — PATIENT HEALTH QUESTIONNAIRE - PHQ9
1. LITTLE INTEREST OR PLEASURE IN DOING THINGS: NOT AT ALL
SUM OF ALL RESPONSES TO PHQ9 QUESTIONS 1 AND 2: 0
2. FEELING DOWN, DEPRESSED OR HOPELESS: NOT AT ALL
SUM OF ALL RESPONSES TO PHQ9 QUESTIONS 1 AND 2: 0

## 2019-09-12 ENCOUNTER — TELEPHONE (OUTPATIENT)
Dept: CARDIOLOGY | Age: 84
End: 2019-09-12

## 2019-09-12 PROBLEM — Z79.01 LONG TERM (CURRENT) USE OF ANTICOAGULANTS: Status: ACTIVE | Noted: 2019-09-12

## 2019-09-12 PROBLEM — Z51.81 ENCOUNTER FOR THERAPEUTIC DRUG MONITORING: Status: ACTIVE | Noted: 2019-09-12

## 2019-09-18 ENCOUNTER — TELEPHONE (OUTPATIENT)
Dept: CARDIOLOGY | Age: 84
End: 2019-09-18

## 2019-09-19 ENCOUNTER — DOCUMENTATION (OUTPATIENT)
Dept: CARDIOLOGY | Age: 84
End: 2019-09-19

## 2019-09-20 ENCOUNTER — TELEPHONE (OUTPATIENT)
Dept: CARDIOLOGY | Age: 84
End: 2019-09-20

## 2019-10-01 ENCOUNTER — TELEPHONE (OUTPATIENT)
Dept: CARDIOLOGY | Age: 84
End: 2019-10-01

## 2019-10-15 ENCOUNTER — ANTI-COAG VISIT (OUTPATIENT)
Dept: INTERNAL MEDICINE CLINIC | Facility: CLINIC | Age: 84
End: 2019-10-15
Payer: MEDICARE

## 2019-10-15 DIAGNOSIS — I48.20 CHRONIC ATRIAL FIBRILLATION (HCC): ICD-10-CM

## 2019-10-15 DIAGNOSIS — I48.91 ATRIAL FIBRILLATION, UNSPECIFIED TYPE (HCC): ICD-10-CM

## 2019-10-15 DIAGNOSIS — Z79.01 LONG TERM (CURRENT) USE OF ANTICOAGULANTS: ICD-10-CM

## 2019-10-15 DIAGNOSIS — Z51.81 ENCOUNTER FOR THERAPEUTIC DRUG MONITORING: ICD-10-CM

## 2019-10-15 PROCEDURE — 85610 PROTHROMBIN TIME: CPT

## 2019-10-15 PROCEDURE — 36416 COLLJ CAPILLARY BLOOD SPEC: CPT

## 2019-11-07 ENCOUNTER — OFFICE VISIT (OUTPATIENT)
Dept: HEMATOLOGY/ONCOLOGY | Facility: HOSPITAL | Age: 84
End: 2019-11-07
Attending: INTERNAL MEDICINE
Payer: MEDICARE

## 2019-11-07 VITALS
WEIGHT: 136 LBS | DIASTOLIC BLOOD PRESSURE: 60 MMHG | RESPIRATION RATE: 20 BRPM | SYSTOLIC BLOOD PRESSURE: 151 MMHG | BODY MASS INDEX: 22.94 KG/M2 | TEMPERATURE: 98 F | HEART RATE: 55 BPM | OXYGEN SATURATION: 94 % | HEIGHT: 64.5 IN

## 2019-11-07 DIAGNOSIS — Z17.0 MALIGNANT NEOPLASM OF LEFT BREAST IN FEMALE, ESTROGEN RECEPTOR POSITIVE, UNSPECIFIED SITE OF BREAST (HCC): Primary | ICD-10-CM

## 2019-11-07 DIAGNOSIS — C50.912 MALIGNANT NEOPLASM OF LEFT BREAST IN FEMALE, ESTROGEN RECEPTOR POSITIVE, UNSPECIFIED SITE OF BREAST (HCC): Primary | ICD-10-CM

## 2019-11-07 PROCEDURE — 99213 OFFICE O/P EST LOW 20 MIN: CPT | Performed by: INTERNAL MEDICINE

## 2019-11-07 NOTE — PROGRESS NOTES
HPI   [place] H[place]to is a 80year old female here for f/u of Malignant neoplasm of left breast in female, estrogen receptor positive, unspecified site of breast (hcc)  (primary encounter diagnosis)     She completed the anastrozole for 5 yrs in March of Prior to discharge. 1 Syringe 0   • Rosuvastatin Calcium (CRESTOR) 5 MG Oral Tab Take 1 tablet (5 mg total) by mouth nightly. 30 tablet 5   • torsemide 10 MG Oral Tab Take 10 mg by mouth.      • Warfarin Sodium 2.5 MG Oral Tab Take 1/2 tablet (1.25 mg) on T • Prostate Cancer Father    • Heart Disorder Mother         Per NG: Aortic Regurgitation   • Prostate Cancer Brother    • Breast Cancer Paternal Aunt         [de-identified]   • Breast Cancer Self 80   • Diabetes Neg    • Glaucoma Neg    • Macular degeneration Neg placed or performed in visit on 10/15/19   PROTHROMBIN TIME   Result Value Ref Range    INR 2.4 (A) 2.0 - 3.0    Test Strip Lot # [id number] Numeric    Test Strip Expiration Date 8/31/20 Date         Imaging & Referrals:  None   No orders of the defined ty

## 2019-11-12 RX ORDER — WARFARIN SODIUM 2.5 MG/1
TABLET ORAL
Qty: 30 TABLET | Refills: 1 | Status: SHIPPED | OUTPATIENT
Start: 2019-11-12 | End: 2019-12-26

## 2019-11-12 RX ORDER — ROSUVASTATIN CALCIUM 5 MG/1
TABLET, COATED ORAL
Qty: 90 TABLET | Refills: 0 | Status: SHIPPED | OUTPATIENT
Start: 2019-11-12 | End: 2019-12-26

## 2019-11-12 RX ORDER — METOPROLOL SUCCINATE 200 MG/1
TABLET, EXTENDED RELEASE ORAL
Qty: 90 TABLET | Refills: 0 | Status: SHIPPED | OUTPATIENT
Start: 2019-11-12 | End: 2020-04-21

## 2019-11-12 NOTE — TELEPHONE ENCOUNTER
LOV 7/16/19. Last lipid panel was done on 8/14/19. RTC in 6 mos (1/2020) Last Coumadin Clinic visit was done on 10/15/19.  Refills pended and routed to Dr. Shasta Hough for approval.

## 2019-11-14 ENCOUNTER — LAB ENCOUNTER (OUTPATIENT)
Dept: LAB | Age: 84
End: 2019-11-14
Attending: INTERNAL MEDICINE
Payer: MEDICARE

## 2019-11-14 ENCOUNTER — HOSPITAL ENCOUNTER (OUTPATIENT)
Dept: CV DIAGNOSTICS | Age: 84
Discharge: HOME | End: 2019-11-14
Attending: INTERNAL MEDICINE
Payer: MEDICARE

## 2019-11-14 ENCOUNTER — ANTI-COAG VISIT (OUTPATIENT)
Dept: INTERNAL MEDICINE CLINIC | Facility: CLINIC | Age: 84
End: 2019-11-14
Payer: MEDICARE

## 2019-11-14 DIAGNOSIS — Z51.81 ENCOUNTER FOR THERAPEUTIC DRUG MONITORING: ICD-10-CM

## 2019-11-14 DIAGNOSIS — I27.0 PRIMARY PULMONARY HYPERTENSION (HCC): ICD-10-CM

## 2019-11-14 DIAGNOSIS — E78.5 HYPERLIPEMIA: ICD-10-CM

## 2019-11-14 DIAGNOSIS — I48.20 CHRONIC ATRIAL FIBRILLATION (HCC): ICD-10-CM

## 2019-11-14 DIAGNOSIS — I27.0 PRIMARY PULMONARY HYPERTENSION (HCC): Primary | ICD-10-CM

## 2019-11-14 DIAGNOSIS — Z79.01 LONG TERM (CURRENT) USE OF ANTICOAGULANTS: ICD-10-CM

## 2019-11-14 DIAGNOSIS — I48.91 ATRIAL FIBRILLATION, UNSPECIFIED TYPE (HCC): ICD-10-CM

## 2019-11-14 DIAGNOSIS — E55.9 AVITAMINOSIS D: ICD-10-CM

## 2019-11-14 DIAGNOSIS — I50.812 CHRONIC RIGHT-SIDED HEART FAILURE (HCC): ICD-10-CM

## 2019-11-14 LAB
ABSOLUTE IMMATURE GRANULOCYTES (OFFPRE24): NORMAL
ALBUMIN SERPL-MCNC: 3.3 G/DL
ALBUMIN/GLOB SERPL: NORMAL {RATIO}
ALP SERPL-CCNC: 89 U/L
ALT SERPL-CCNC: 15 U/L
ANION GAP SERPL CALC-SCNC: NORMAL MMOL/L
AST SERPL-CCNC: 26 U/L
BASO+EOS+MONOS # BLD: NORMAL 10*3/UL
BASO+EOS+MONOS NFR BLD: NORMAL %
BASOPHILS # BLD: NORMAL 10*3/UL
BASOPHILS NFR BLD: NORMAL %
BILIRUB SERPL-MCNC: 0.7 MG/DL
BUN SERPL-MCNC: 31 MG/DL
BUN/CREAT SERPL: NORMAL
CALCIUM SERPL-MCNC: 9.2 MG/DL
CHLORIDE SERPL-SCNC: 111 MMOL/L
CHOLEST SERPL-MCNC: 139 MG/DL
CHOLEST/HDLC SERPL: NORMAL {RATIO}
CO2 SERPL-SCNC: NORMAL MMOL/L
CREAT SERPL-MCNC: 1.37 MG/DL
DIFFERENTIAL METHOD BLD: NORMAL
EOSINOPHIL # BLD: NORMAL 10*3/UL
EOSINOPHIL NFR BLD: NORMAL %
ERYTHROCYTE [DISTWIDTH] IN BLOOD: NORMAL %
GLOBULIN SER-MCNC: 3.7 G/DL
GLUCOSE SERPL-MCNC: 65 MG/DL
HCT VFR BLD CALC: 38.7 %
HDLC SERPL-MCNC: 47 MG/DL
HGB BLD-MCNC: 12.5 G/DL
IMMATURE GRANULOCYTES (OFFPRE25): NORMAL
LDLC SERPL CALC-MCNC: 79 MG/DL
LENGTH OF FAST TIME PATIENT: NORMAL H
LENGTH OF FAST TIME PATIENT: NORMAL H
LYMPHOCYTES # BLD: NORMAL 10*3/UL
LYMPHOCYTES NFR BLD: NORMAL %
MAGNESIUM SERPL-MCNC: 2.5 MG/DL
MCH RBC QN AUTO: NORMAL PG
MCHC RBC AUTO-ENTMCNC: NORMAL G/DL
MCV RBC AUTO: NORMAL FL
MONOCYTES # BLD: NORMAL 10*3/UL
MONOCYTES NFR BLD: NORMAL %
MPV (OFFPRE2): NORMAL
NEUTROPHILS # BLD: NORMAL 10*3/UL
NEUTROPHILS NFR BLD: NORMAL %
NONHDLC SERPL-MCNC: 92 MG/DL
NRBC BLD MANUAL-RTO: NORMAL %
NT-PROBNP SERPL-MCNC: NORMAL PG/ML
PLAT MORPH BLD: NORMAL
PLATELET # BLD: 152 10*3/UL
POTASSIUM SERPL-SCNC: 3.8 MMOL/L
PROT SERPL-MCNC: 7 G/DL
RBC # BLD: 3.92 10*6/UL
RBC MORPH BLD: NORMAL
SODIUM SERPL-SCNC: 144 MMOL/L
TRIGL SERPL-MCNC: 63 MG/DL
VLDLC SERPL CALC-MCNC: NORMAL MG/DL
WBC # BLD: 5.8 10*3/UL
WBC MORPH BLD: NORMAL

## 2019-11-14 PROCEDURE — 84443 ASSAY THYROID STIM HORMONE: CPT

## 2019-11-14 PROCEDURE — 83880 ASSAY OF NATRIURETIC PEPTIDE: CPT

## 2019-11-14 PROCEDURE — 80061 LIPID PANEL: CPT

## 2019-11-14 PROCEDURE — 83735 ASSAY OF MAGNESIUM: CPT

## 2019-11-14 PROCEDURE — 85025 COMPLETE CBC W/AUTO DIFF WBC: CPT

## 2019-11-14 PROCEDURE — 85610 PROTHROMBIN TIME: CPT

## 2019-11-14 PROCEDURE — 82306 VITAMIN D 25 HYDROXY: CPT

## 2019-11-14 PROCEDURE — 93306 TTE W/DOPPLER COMPLETE: CPT | Performed by: INTERNAL MEDICINE

## 2019-11-14 PROCEDURE — 36416 COLLJ CAPILLARY BLOOD SPEC: CPT

## 2019-11-14 PROCEDURE — 36415 COLL VENOUS BLD VENIPUNCTURE: CPT

## 2019-11-14 PROCEDURE — 80053 COMPREHEN METABOLIC PANEL: CPT

## 2019-11-15 ENCOUNTER — CLINICAL ABSTRACT (OUTPATIENT)
Dept: CARDIOLOGY | Age: 84
End: 2019-11-15

## 2019-11-15 LAB — 25(OH)D3+25(OH)D2 SERPL-MCNC: 31.1 NG/ML

## 2019-11-18 ENCOUNTER — CLINICAL ABSTRACT (OUTPATIENT)
Dept: CARDIOLOGY | Age: 84
End: 2019-11-18

## 2019-11-21 DIAGNOSIS — I27.20 PULMONARY HYPERTENSION (CMD): Primary | ICD-10-CM

## 2019-11-22 ENCOUNTER — OFFICE VISIT (OUTPATIENT)
Dept: CARDIOLOGY | Age: 84
End: 2019-11-22

## 2019-11-22 VITALS
RESPIRATION RATE: 18 BRPM | WEIGHT: 137 LBS | HEART RATE: 58 BPM | HEIGHT: 65 IN | BODY MASS INDEX: 22.82 KG/M2 | SYSTOLIC BLOOD PRESSURE: 146 MMHG | DIASTOLIC BLOOD PRESSURE: 70 MMHG

## 2019-11-22 DIAGNOSIS — I48.91 ATRIAL FIBRILLATION, UNSPECIFIED TYPE (CMD): ICD-10-CM

## 2019-11-22 DIAGNOSIS — I27.20 PULMONARY HYPERTENSION (CMD): Primary | ICD-10-CM

## 2019-11-22 PROCEDURE — 99214 OFFICE O/P EST MOD 30 MIN: CPT | Performed by: INTERNAL MEDICINE

## 2019-11-22 RX ORDER — SPIRONOLACTONE 25 MG/1
12.5 TABLET ORAL DAILY
Qty: 45 TABLET | Refills: 3 | Status: SHIPPED | OUTPATIENT
Start: 2019-11-22 | End: 2020-06-01 | Stop reason: DRUGHIGH

## 2019-11-22 SDOH — HEALTH STABILITY: MENTAL HEALTH: HOW OFTEN DO YOU HAVE A DRINK CONTAINING ALCOHOL?: NEVER

## 2019-11-22 ASSESSMENT — ENCOUNTER SYMPTOMS
WEIGHT LOSS: 0
SUSPICIOUS LESIONS: 0
COUGH: 0
FEVER: 0
HEMATOCHEZIA: 0
CHILLS: 0
HEMOPTYSIS: 0
BRUISES/BLEEDS EASILY: 0
ALLERGIC/IMMUNOLOGIC COMMENTS: NO NEW FOOD ALLERGIES
WEIGHT GAIN: 0

## 2019-11-26 ENCOUNTER — CLINICAL ABSTRACT (OUTPATIENT)
Dept: CARDIOLOGY | Age: 84
End: 2019-11-26

## 2019-12-11 ENCOUNTER — TELEPHONE (OUTPATIENT)
Dept: CARDIOLOGY | Age: 84
End: 2019-12-11

## 2019-12-11 ENCOUNTER — ANTI-COAG VISIT (OUTPATIENT)
Dept: INTERNAL MEDICINE CLINIC | Facility: CLINIC | Age: 84
End: 2019-12-11
Payer: MEDICARE

## 2019-12-11 DIAGNOSIS — I48.91 ATRIAL FIBRILLATION, UNSPECIFIED TYPE (HCC): ICD-10-CM

## 2019-12-11 DIAGNOSIS — I48.20 CHRONIC ATRIAL FIBRILLATION (HCC): ICD-10-CM

## 2019-12-11 DIAGNOSIS — Z79.01 LONG TERM (CURRENT) USE OF ANTICOAGULANTS: ICD-10-CM

## 2019-12-11 DIAGNOSIS — Z51.81 ENCOUNTER FOR THERAPEUTIC DRUG MONITORING: ICD-10-CM

## 2019-12-11 PROCEDURE — 36416 COLLJ CAPILLARY BLOOD SPEC: CPT

## 2019-12-11 PROCEDURE — 85610 PROTHROMBIN TIME: CPT

## 2019-12-16 ENCOUNTER — TELEPHONE (OUTPATIENT)
Dept: CARDIOLOGY | Age: 84
End: 2019-12-16

## 2019-12-26 ENCOUNTER — TELEPHONE (OUTPATIENT)
Dept: ENDOCRINOLOGY CLINIC | Facility: CLINIC | Age: 84
End: 2019-12-26

## 2019-12-26 RX ORDER — ROSUVASTATIN CALCIUM 5 MG/1
TABLET, COATED ORAL
Qty: 90 TABLET | Refills: 1 | Status: SHIPPED | OUTPATIENT
Start: 2019-12-26 | End: 2020-05-21

## 2019-12-26 RX ORDER — WARFARIN SODIUM 2.5 MG/1
TABLET ORAL
Qty: 90 TABLET | Refills: 1 | Status: SHIPPED | OUTPATIENT
Start: 2019-12-26 | End: 2020-08-05

## 2019-12-26 NOTE — TELEPHONE ENCOUNTER
Current Outpatient Medications   Medication Sig Dispense Refill   • Warfarin Sodium 2.5 MG Oral Tab TAKE ONE-HALF TABLET BY  MOUTH ON TUESDAYS AND 1  TABLET 6 DAYS PER WEEK AS  DIRECTED 30 tablet 1   • ROSUVASTATIN CALCIUM 5 MG Oral Tab TAKE 1 TABLET BY MO

## 2020-01-07 ENCOUNTER — OFFICE VISIT (OUTPATIENT)
Dept: RHEUMATOLOGY | Facility: CLINIC | Age: 85
End: 2020-01-07
Payer: MEDICARE

## 2020-01-07 ENCOUNTER — ANTI-COAG VISIT (OUTPATIENT)
Dept: INTERNAL MEDICINE CLINIC | Facility: CLINIC | Age: 85
End: 2020-01-07
Payer: MEDICARE

## 2020-01-07 VITALS
HEIGHT: 64.5 IN | DIASTOLIC BLOOD PRESSURE: 63 MMHG | WEIGHT: 127 LBS | BODY MASS INDEX: 21.42 KG/M2 | SYSTOLIC BLOOD PRESSURE: 148 MMHG | HEART RATE: 56 BPM

## 2020-01-07 DIAGNOSIS — I48.91 ATRIAL FIBRILLATION, UNSPECIFIED TYPE (HCC): ICD-10-CM

## 2020-01-07 DIAGNOSIS — R76.8 POSITIVE ANA (ANTINUCLEAR ANTIBODY): Primary | ICD-10-CM

## 2020-01-07 DIAGNOSIS — I27.21 SEVERE PULMONARY ARTERIAL SYSTOLIC HYPERTENSION (HCC): ICD-10-CM

## 2020-01-07 DIAGNOSIS — Z51.81 ENCOUNTER FOR THERAPEUTIC DRUG MONITORING: ICD-10-CM

## 2020-01-07 DIAGNOSIS — I48.20 CHRONIC ATRIAL FIBRILLATION (HCC): ICD-10-CM

## 2020-01-07 DIAGNOSIS — M79.10 MYALGIA: ICD-10-CM

## 2020-01-07 DIAGNOSIS — Z79.01 LONG TERM (CURRENT) USE OF ANTICOAGULANTS: ICD-10-CM

## 2020-01-07 LAB
INR: 2.7 (ref 2–3)
TEST STRIP EXPIRATION DATE: NORMAL DATE

## 2020-01-07 PROCEDURE — 99204 OFFICE O/P NEW MOD 45 MIN: CPT | Performed by: INTERNAL MEDICINE

## 2020-01-07 PROCEDURE — G0463 HOSPITAL OUTPT CLINIC VISIT: HCPCS | Performed by: INTERNAL MEDICINE

## 2020-01-07 PROCEDURE — 36416 COLLJ CAPILLARY BLOOD SPEC: CPT

## 2020-01-07 PROCEDURE — 85610 PROTHROMBIN TIME: CPT

## 2020-01-07 NOTE — PROGRESS NOTES
Gustavo Serna is a 80year old female who presents for Patient presents with:  Consult  Back Pain  Hip Pain: Pt states theres arthirtis in R hip  . HPI:     I had the pleasure of seeing Gustavo Serna on 1/7/2020 for evaluation.  She wasto  referre Tab TAKE ONE-HALF TABLET BY  MOUTH DAILY 45 tablet 1   • LISINOPRIL 40 MG Oral Tab TAKE 1 TABLET BY MOUTH ONCE DAILY 90 tablet 1   • pneumococcal 13-Gabbie conj vacc (PREVNAR 13) Intramuscular Suspension Inject 0.5 mL into the muscle Prior to discharge.  1 Syr Breast Cancer Self 82   • Diabetes Neg    • Glaucoma Neg    • Macular degeneration Neg       Social History:  Social History    Tobacco Use      Smoking status: Never Smoker      Smokeless tobacco: Never Used    Alcohol use: Yes    Drug use: No     Living strength    Component      Latest Ref Rng & Units 11/14/2019 11/14/2019          11:18 AM 11:18 AM   WBC      4.0 - 11.0 x10(3) uL  5.8   RBC      3.80 - 5.30 x10(6)uL  3.92   Hemoglobin      12.0 - 16.0 g/dL  12.5   Hematocrit      35.0 - 48.0 %  38.7   M Total      <200 mg/dL 139    HDL Cholesterol      40 - 59 mg/dL 47    Triglycerides      30 - 149 mg/dL 63    LDL Cholesterol Calc      <100 mg/dL 79    VLDL      0 - 30 mg/dL 13    NON HDL CHOL      <130 mg/dL 92    Magnesium, Serum      1.6 - 2.6 mg/dL moderately reduced. 5. Right atrium: The atrium was mildly to moderately dilated. 6. Tricuspid valve: Mild-moderate regurgitation. 7. Pulmonic valve: Moderate regurgitation. 8. Pulmonary arteries: Systolic pressure was severely increased.   There has be CANDIS SCREEN WITH REFLEX (S)      Negative Positive (A)   RHEUMATOID FACTOR      <11 IU/mL <5   Anti Double Strand DNA      <10 <10     CANDIS is homogenous pattern.    ASSESSMENT AND PLAN:   Magdalena Bach is a [address] old female who presents for Patient p

## 2020-01-08 ENCOUNTER — LAB ENCOUNTER (OUTPATIENT)
Dept: LAB | Age: 85
End: 2020-01-08
Attending: INTERNAL MEDICINE
Payer: MEDICARE

## 2020-01-08 DIAGNOSIS — R76.8 POSITIVE ANA (ANTINUCLEAR ANTIBODY): ICD-10-CM

## 2020-01-08 DIAGNOSIS — M79.10 MYALGIA: ICD-10-CM

## 2020-01-08 LAB
CK SERPL-CCNC: 40 U/L (ref 26–192)
CRP SERPL-MCNC: 0.34 MG/DL (ref ?–0.3)
ERYTHROCYTE [SEDIMENTATION RATE] IN BLOOD: 43 MM/HR (ref 0–42)

## 2020-01-08 PROCEDURE — 86038 ANTINUCLEAR ANTIBODIES: CPT

## 2020-01-08 PROCEDURE — 82550 ASSAY OF CK (CPK): CPT

## 2020-01-08 PROCEDURE — 86140 C-REACTIVE PROTEIN: CPT

## 2020-01-08 PROCEDURE — 82085 ASSAY OF ALDOLASE: CPT

## 2020-01-08 PROCEDURE — 86235 NUCLEAR ANTIGEN ANTIBODY: CPT

## 2020-01-08 PROCEDURE — 85652 RBC SED RATE AUTOMATED: CPT

## 2020-01-08 PROCEDURE — 36415 COLL VENOUS BLD VENIPUNCTURE: CPT

## 2020-01-08 PROCEDURE — 86039 ANTINUCLEAR ANTIBODIES (ANA): CPT

## 2020-01-08 PROCEDURE — 86225 DNA ANTIBODY NATIVE: CPT

## 2020-01-09 LAB — NUCLEAR IGG TITR SER IF: POSITIVE {TITER}

## 2020-01-10 LAB — DSDNA AB TITR SER: <10 {TITER}

## 2020-01-11 LAB
ANA NUCLEOLAR TITR SER IF: 640 {TITER}
JO-1 (HISTIDY1-TRNA SYNTHETASE) AB, IGG: 1 AU/ML
SCLERODERMA (SCL-70) (ENA) AB, IGG: 3 AU/ML

## 2020-01-12 LAB — ALDOLASE, SERUM: 2.6 U/L

## 2020-01-28 ENCOUNTER — OFFICE VISIT (OUTPATIENT)
Dept: NEPHROLOGY | Facility: CLINIC | Age: 85
End: 2020-01-28
Payer: MEDICARE

## 2020-01-28 VITALS
HEART RATE: 62 BPM | DIASTOLIC BLOOD PRESSURE: 53 MMHG | BODY MASS INDEX: 21.89 KG/M2 | HEIGHT: 64 IN | WEIGHT: 128.19 LBS | SYSTOLIC BLOOD PRESSURE: 153 MMHG

## 2020-01-28 DIAGNOSIS — N18.30 CKD (CHRONIC KIDNEY DISEASE) STAGE 3, GFR 30-59 ML/MIN (HCC): Primary | ICD-10-CM

## 2020-01-28 DIAGNOSIS — I27.20 PULMONARY HYPERTENSION (HCC): ICD-10-CM

## 2020-01-28 DIAGNOSIS — I10 ESSENTIAL HYPERTENSION: ICD-10-CM

## 2020-01-28 PROCEDURE — 99214 OFFICE O/P EST MOD 30 MIN: CPT | Performed by: INTERNAL MEDICINE

## 2020-01-28 PROCEDURE — G0463 HOSPITAL OUTPT CLINIC VISIT: HCPCS | Performed by: INTERNAL MEDICINE

## 2020-01-28 RX ORDER — SELEXIPAG 1600 UG/1
1600 TABLET, COATED ORAL EVERY 12 HOURS
COMMUNITY
Start: 2019-12-16

## 2020-01-28 NOTE — PROGRESS NOTES
[facility], [facility]  Nephrology Daily Progress Note    Gustavo Serna  [id number]  80year old  Patient presents with:  Chronic Kidney Disease      HPI:   Gustavo Serna is a 80year old female.   80-year-old female with a history of chronic atrial fibril hydrated alert and responsive no acute distress noted  Neck/Thyroid: neck is supple without adenopathy  Lymphatic: no abnormal cervical, supraclavicular or axillary adenopathy is noted  Respiratory: normal to inspection lungs are clear to auscultation bila •  Sildenafil Citrate 20 MG Oral Tab, Take 20 mg by mouth 3 (three) times daily. , Disp: , Rfl:   •  Calcium 500 MG Oral Tab, Take 500 mg by mouth 2 (two) times daily.   , Disp: , Rfl:   •  pneumococcal 13-Gabbie conj vacc (PREVNAR 13) Intramuscular Suspensio Encouraged her to increase caloric intake. Watch salt in the frozen dinners. Add Ensure or boost.  Otherwise return in 6 months. Sees cardiology every 3 months. She saw a rheumatology for some low back pain.   Number connective tissue studies were orde

## 2020-01-31 ENCOUNTER — DOCUMENTATION (OUTPATIENT)
Dept: CARDIOLOGY | Age: 85
End: 2020-01-31

## 2020-02-04 ENCOUNTER — ANTI-COAG VISIT (OUTPATIENT)
Dept: INTERNAL MEDICINE CLINIC | Facility: CLINIC | Age: 85
End: 2020-02-04
Payer: MEDICARE

## 2020-02-04 DIAGNOSIS — I48.91 ATRIAL FIBRILLATION, UNSPECIFIED TYPE (HCC): ICD-10-CM

## 2020-02-04 DIAGNOSIS — Z51.81 ENCOUNTER FOR THERAPEUTIC DRUG MONITORING: ICD-10-CM

## 2020-02-04 DIAGNOSIS — I48.20 CHRONIC ATRIAL FIBRILLATION (HCC): ICD-10-CM

## 2020-02-04 DIAGNOSIS — Z79.01 LONG TERM (CURRENT) USE OF ANTICOAGULANTS: ICD-10-CM

## 2020-02-04 LAB
INR: 2.1 (ref 0.8–1.2)
TEST STRIP EXPIRATION DATE: ABNORMAL DATE

## 2020-02-04 PROCEDURE — 85610 PROTHROMBIN TIME: CPT

## 2020-02-04 PROCEDURE — 36416 COLLJ CAPILLARY BLOOD SPEC: CPT

## 2020-02-18 ENCOUNTER — LAB ENCOUNTER (OUTPATIENT)
Dept: LAB | Age: 85
End: 2020-02-18
Attending: INTERNAL MEDICINE
Payer: MEDICARE

## 2020-02-18 DIAGNOSIS — I27.20 PULMONARY HYPERTENSION (HCC): Primary | ICD-10-CM

## 2020-02-18 DIAGNOSIS — I48.91 ATRIAL FIBRILLATION (HCC): ICD-10-CM

## 2020-02-18 LAB
ABSOLUTE IMMATURE GRANULOCYTES (OFFPRE24): NORMAL
ABSOLUTE NRBC (AUTO): NORMAL
ANION GAP SERPL CALC-SCNC: 5 MMOL/L (ref 0–18)
ANION GAP SERPL CALC-SCNC: NORMAL MMOL/L
BASO+EOS+MONOS # BLD: NORMAL 10*3/UL
BASO+EOS+MONOS NFR BLD: NORMAL %
BASOPHILS # BLD AUTO: 0.04 X10(3) UL (ref 0–0.2)
BASOPHILS # BLD: NORMAL 10*3/UL
BASOPHILS NFR BLD AUTO: 0.7 %
BASOPHILS NFR BLD: NORMAL %
BUN BLD-MCNC: 38 MG/DL (ref 7–18)
BUN SERPL-MCNC: 38 MG/DL
BUN/CREAT SERPL: 21.3 (ref 10–20)
BUN/CREAT SERPL: NORMAL
CALCIUM BLD-MCNC: 9 MG/DL (ref 8.5–10.1)
CALCIUM SERPL-MCNC: 9 MG/DL
CALCIUM, CORRECTED: NORMAL
CHLORIDE SERPL-SCNC: 105 MMOL/L
CHLORIDE SERPL-SCNC: 105 MMOL/L (ref 98–112)
CO2 SERPL-SCNC: 28 MMOL/L (ref 21–32)
CO2 SERPL-SCNC: NORMAL MMOL/L
CREAT BLD-MCNC: 1.78 MG/DL (ref 0.55–1.02)
CREAT SERPL-MCNC: 1.78 MG/DL
DEPRECATED RDW RBC AUTO: 52.4 FL (ref 35.1–46.3)
DIFFERENTIAL METHOD BLD: NORMAL
EOSINOPHIL # BLD AUTO: 0.11 X10(3) UL (ref 0–0.7)
EOSINOPHIL # BLD: NORMAL 10*3/UL
EOSINOPHIL NFR BLD AUTO: 1.8 %
EOSINOPHIL NFR BLD: NORMAL %
ERYTHROCYTE [DISTWIDTH] IN BLOOD BY AUTOMATED COUNT: 14.6 % (ref 11–15)
ERYTHROCYTE [DISTWIDTH] IN BLOOD BY AUTOMATED COUNT: NORMAL %
ERYTHROCYTE [DISTWIDTH] IN BLOOD: NORMAL %
GLUCOSE BLD-MCNC: 89 MG/DL (ref 70–99)
GLUCOSE SERPL-MCNC: 89 MG/DL
HCT CALC (HGB X3) (OFFPRE23): NORMAL
HCT VFR BLD AUTO: 38.3 % (ref 35–48)
HCT VFR BLD CALC: 38.3 %
HGB BLD-MCNC: 12.7 G/DL
HGB BLD-MCNC: 12.7 G/DL (ref 12–16)
IMM GRANULOCYTES # BLD AUTO: 0.01 X10(3) UL (ref 0–1)
IMM GRANULOCYTES NFR BLD: 0.2 %
IMMATURE GRANULOCYTES (OFFPRE25): NORMAL
LENGTH OF FAST TIME PATIENT: NORMAL H
LYMPHOCYTES # BLD AUTO: 1.61 X10(3) UL (ref 1–4)
LYMPHOCYTES # BLD: NORMAL 10*3/UL
LYMPHOCYTES NFR BLD AUTO: 26.8 %
LYMPHOCYTES NFR BLD: NORMAL %
MCH RBC QN AUTO: 32.2 PG (ref 26–34)
MCH RBC QN AUTO: NORMAL PG
MCHC RBC AUTO-ENTMCNC: 33.2 G/DL (ref 31–37)
MCHC RBC AUTO-ENTMCNC: NORMAL G/DL
MCV RBC AUTO: 97.2 FL (ref 80–100)
MCV RBC AUTO: NORMAL FL
MONOCYTES # BLD AUTO: 0.51 X10(3) UL (ref 0.1–1)
MONOCYTES # BLD: NORMAL 10*3/UL
MONOCYTES NFR BLD AUTO: 8.5 %
MONOCYTES NFR BLD: NORMAL %
MPV (OFFPRE2): NORMAL
NEUTROPHILS # BLD AUTO: 3.72 X10 (3) UL (ref 1.5–7.7)
NEUTROPHILS # BLD AUTO: 3.72 X10(3) UL (ref 1.5–7.7)
NEUTROPHILS # BLD: NORMAL 10*3/UL
NEUTROPHILS NFR BLD AUTO: 62 %
NEUTROPHILS NFR BLD: NORMAL %
NRBC BLD MANUAL-RTO: NORMAL %
OSMOLALITY SERPL CALC.SUM OF ELEC: 295 MOSM/KG (ref 275–295)
PATIENT FASTING Y/N/NP: NO
PLAT MORPH BLD: NORMAL
PLATELET # BLD AUTO: 182 10(3)UL (ref 150–450)
PLATELET # BLD: 182 10*3/UL
POTASSIUM SERPL-SCNC: 4.4 MMOL/L
POTASSIUM SERPL-SCNC: 4.4 MMOL/L (ref 3.5–5.1)
RBC # BLD AUTO: 3.94 X10(6)UL (ref 3.8–5.3)
RBC # BLD: 3.94 10*6/UL
RBC MORPH BLD: NORMAL
SODIUM SERPL-SCNC: 138 MMOL/L
SODIUM SERPL-SCNC: 138 MMOL/L (ref 136–145)
WBC # BLD AUTO: 6 X10(3) UL (ref 4–11)
WBC # BLD: 6 10*3/UL
WBC MORPH BLD: NORMAL

## 2020-02-18 PROCEDURE — 36415 COLL VENOUS BLD VENIPUNCTURE: CPT

## 2020-02-18 PROCEDURE — 85025 COMPLETE CBC W/AUTO DIFF WBC: CPT

## 2020-02-18 PROCEDURE — 80048 BASIC METABOLIC PNL TOTAL CA: CPT

## 2020-02-19 ENCOUNTER — CLINICAL ABSTRACT (OUTPATIENT)
Dept: CARDIOLOGY | Age: 85
End: 2020-02-19

## 2020-02-21 ENCOUNTER — DOCUMENTATION (OUTPATIENT)
Dept: CARDIOLOGY | Age: 85
End: 2020-02-21

## 2020-02-24 ENCOUNTER — OFFICE VISIT (OUTPATIENT)
Dept: CARDIOLOGY | Age: 85
End: 2020-02-24

## 2020-02-24 ENCOUNTER — TELEPHONE (OUTPATIENT)
Dept: CARDIOLOGY | Age: 85
End: 2020-02-24

## 2020-02-24 VITALS
DIASTOLIC BLOOD PRESSURE: 52 MMHG | WEIGHT: 124 LBS | RESPIRATION RATE: 18 BRPM | HEART RATE: 51 BPM | BODY MASS INDEX: 19.93 KG/M2 | HEIGHT: 66 IN | SYSTOLIC BLOOD PRESSURE: 130 MMHG | OXYGEN SATURATION: 96 %

## 2020-02-24 DIAGNOSIS — I50.812 CHRONIC RIGHT-SIDED HEART FAILURE (CMD): ICD-10-CM

## 2020-02-24 DIAGNOSIS — I27.20 PULMONARY HYPERTENSION (CMD): Primary | ICD-10-CM

## 2020-02-24 DIAGNOSIS — I27.0 PRIMARY PULMONARY HYPERTENSION (CMD): ICD-10-CM

## 2020-02-24 PROCEDURE — 94618 PULMONARY STRESS TESTING: CPT | Performed by: INTERNAL MEDICINE

## 2020-02-24 PROCEDURE — 99214 OFFICE O/P EST MOD 30 MIN: CPT | Performed by: INTERNAL MEDICINE

## 2020-02-24 RX ORDER — TORSEMIDE 10 MG/1
TABLET ORAL
Qty: 90 TABLET | Refills: 3 | Status: SHIPPED | COMMUNITY
Start: 2020-02-24 | End: 2020-07-13

## 2020-02-24 RX ORDER — SILDENAFIL CITRATE 20 MG/1
20 TABLET ORAL 3 TIMES DAILY
Qty: 90 TABLET | Refills: 3 | Status: SHIPPED | OUTPATIENT
Start: 2020-02-24 | End: 2020-09-14 | Stop reason: SDUPTHER

## 2020-02-24 SDOH — HEALTH STABILITY: MENTAL HEALTH: HOW OFTEN DO YOU HAVE A DRINK CONTAINING ALCOHOL?: NEVER

## 2020-02-24 ASSESSMENT — ENCOUNTER SYMPTOMS
ALLERGIC/IMMUNOLOGIC COMMENTS: NO NEW FOOD ALLERGIES
WEIGHT GAIN: 0
FEVER: 0
SUSPICIOUS LESIONS: 0
HEMOPTYSIS: 0
COUGH: 0
BRUISES/BLEEDS EASILY: 0
WEIGHT LOSS: 1
CHILLS: 0
HEMATOCHEZIA: 0
DIARRHEA: 1

## 2020-02-24 ASSESSMENT — PATIENT HEALTH QUESTIONNAIRE - PHQ9
2. FEELING DOWN, DEPRESSED OR HOPELESS: NOT AT ALL
1. LITTLE INTEREST OR PLEASURE IN DOING THINGS: NOT AT ALL
SUM OF ALL RESPONSES TO PHQ9 QUESTIONS 1 AND 2: 0
SUM OF ALL RESPONSES TO PHQ9 QUESTIONS 1 AND 2: 0

## 2020-03-04 ENCOUNTER — ANTI-COAG VISIT (OUTPATIENT)
Dept: INTERNAL MEDICINE CLINIC | Facility: CLINIC | Age: 85
End: 2020-03-04
Payer: MEDICARE

## 2020-03-04 DIAGNOSIS — Z79.01 LONG TERM (CURRENT) USE OF ANTICOAGULANTS: ICD-10-CM

## 2020-03-04 DIAGNOSIS — I48.20 CHRONIC ATRIAL FIBRILLATION (HCC): ICD-10-CM

## 2020-03-04 DIAGNOSIS — I48.91 ATRIAL FIBRILLATION, UNSPECIFIED TYPE (HCC): ICD-10-CM

## 2020-03-04 DIAGNOSIS — Z51.81 ENCOUNTER FOR THERAPEUTIC DRUG MONITORING: ICD-10-CM

## 2020-03-04 LAB
INR: 2.2 (ref 0.8–1.2)
TEST STRIP EXPIRATION DATE: ABNORMAL DATE

## 2020-03-04 PROCEDURE — 36416 COLLJ CAPILLARY BLOOD SPEC: CPT

## 2020-03-04 PROCEDURE — 85610 PROTHROMBIN TIME: CPT

## 2020-03-20 ENCOUNTER — APPOINTMENT (OUTPATIENT)
Dept: CARDIOLOGY | Age: 85
End: 2020-03-20

## 2020-03-23 ENCOUNTER — TELEPHONE (OUTPATIENT)
Dept: CARDIOLOGY | Age: 85
End: 2020-03-23

## 2020-03-31 ENCOUNTER — TELEPHONE (OUTPATIENT)
Dept: INTERNAL MEDICINE CLINIC | Facility: CLINIC | Age: 85
End: 2020-03-31

## 2020-04-01 ENCOUNTER — ANTI-COAG VISIT (OUTPATIENT)
Dept: INTERNAL MEDICINE CLINIC | Facility: CLINIC | Age: 85
End: 2020-04-01
Payer: MEDICARE

## 2020-04-01 DIAGNOSIS — Z79.01 LONG TERM (CURRENT) USE OF ANTICOAGULANTS: ICD-10-CM

## 2020-04-01 DIAGNOSIS — I48.91 ATRIAL FIBRILLATION, UNSPECIFIED TYPE (HCC): ICD-10-CM

## 2020-04-01 DIAGNOSIS — I48.20 CHRONIC ATRIAL FIBRILLATION (HCC): ICD-10-CM

## 2020-04-01 DIAGNOSIS — Z51.81 ENCOUNTER FOR THERAPEUTIC DRUG MONITORING: ICD-10-CM

## 2020-04-01 PROCEDURE — 85610 PROTHROMBIN TIME: CPT

## 2020-04-01 PROCEDURE — 36416 COLLJ CAPILLARY BLOOD SPEC: CPT

## 2020-04-21 RX ORDER — LISINOPRIL 40 MG/1
TABLET ORAL
Qty: 90 TABLET | Refills: 0 | Status: ON HOLD | OUTPATIENT
Start: 2020-04-21 | End: 2020-06-16

## 2020-04-21 RX ORDER — METOPROLOL SUCCINATE 200 MG/1
TABLET, EXTENDED RELEASE ORAL
Qty: 90 TABLET | Refills: 0 | Status: ON HOLD | OUTPATIENT
Start: 2020-04-21 | End: 2020-06-16

## 2020-04-29 ENCOUNTER — ANTI-COAG VISIT (OUTPATIENT)
Dept: INTERNAL MEDICINE CLINIC | Facility: CLINIC | Age: 85
End: 2020-04-29
Payer: MEDICARE

## 2020-04-29 DIAGNOSIS — Z79.01 LONG TERM (CURRENT) USE OF ANTICOAGULANTS: ICD-10-CM

## 2020-04-29 DIAGNOSIS — Z51.81 ENCOUNTER FOR THERAPEUTIC DRUG MONITORING: ICD-10-CM

## 2020-04-29 DIAGNOSIS — I48.91 ATRIAL FIBRILLATION, UNSPECIFIED TYPE (HCC): ICD-10-CM

## 2020-04-29 DIAGNOSIS — I48.20 CHRONIC ATRIAL FIBRILLATION (HCC): ICD-10-CM

## 2020-04-29 PROCEDURE — 85610 PROTHROMBIN TIME: CPT

## 2020-04-29 PROCEDURE — 36416 COLLJ CAPILLARY BLOOD SPEC: CPT

## 2020-05-19 ENCOUNTER — HOSPITAL ENCOUNTER (OUTPATIENT)
Dept: CV DIAGNOSTICS | Facility: HOSPITAL | Age: 85
Discharge: HOME | End: 2020-05-19
Attending: INTERNAL MEDICINE
Payer: MEDICARE

## 2020-05-19 DIAGNOSIS — I27.20 PULMONARY HYPERTENSION (HCC): ICD-10-CM

## 2020-05-19 DIAGNOSIS — I50.812 CHRONIC RIGHT-SIDED HEART FAILURE (HCC): ICD-10-CM

## 2020-05-19 PROCEDURE — 93306 TTE W/DOPPLER COMPLETE: CPT | Performed by: INTERNAL MEDICINE

## 2020-05-21 NOTE — TELEPHONE ENCOUNTER
Last seen 1/28/2020. RTC in 6 mos (7/2020) Last lipid panel was done on 11/14/19. Refill pended and routed to Dr. Marsha Cedeño.

## 2020-05-22 RX ORDER — ROSUVASTATIN CALCIUM 5 MG/1
TABLET, COATED ORAL
Qty: 90 TABLET | Refills: 0 | Status: SHIPPED | OUTPATIENT
Start: 2020-05-22 | End: 2020-09-21

## 2020-05-27 ENCOUNTER — LAB ENCOUNTER (OUTPATIENT)
Dept: LAB | Age: 85
End: 2020-05-27
Attending: INTERNAL MEDICINE
Payer: MEDICARE

## 2020-05-27 ENCOUNTER — ANTI-COAG VISIT (OUTPATIENT)
Dept: INTERNAL MEDICINE CLINIC | Facility: CLINIC | Age: 85
End: 2020-05-27
Payer: MEDICARE

## 2020-05-27 DIAGNOSIS — Z79.01 LONG TERM (CURRENT) USE OF ANTICOAGULANTS: ICD-10-CM

## 2020-05-27 DIAGNOSIS — I50.812 CHRONIC RIGHT-SIDED HEART FAILURE (HCC): ICD-10-CM

## 2020-05-27 DIAGNOSIS — Z51.81 ENCOUNTER FOR THERAPEUTIC DRUG MONITORING: ICD-10-CM

## 2020-05-27 DIAGNOSIS — I48.20 CHRONIC ATRIAL FIBRILLATION (HCC): ICD-10-CM

## 2020-05-27 DIAGNOSIS — I48.91 ATRIAL FIBRILLATION, UNSPECIFIED TYPE (HCC): ICD-10-CM

## 2020-05-27 DIAGNOSIS — I27.20 PULMONARY HYPERTENSION (HCC): Primary | ICD-10-CM

## 2020-05-27 PROCEDURE — 36416 COLLJ CAPILLARY BLOOD SPEC: CPT

## 2020-05-27 PROCEDURE — 85610 PROTHROMBIN TIME: CPT

## 2020-05-27 PROCEDURE — 36415 COLL VENOUS BLD VENIPUNCTURE: CPT

## 2020-05-27 PROCEDURE — 80048 BASIC METABOLIC PNL TOTAL CA: CPT

## 2020-05-27 PROCEDURE — 83880 ASSAY OF NATRIURETIC PEPTIDE: CPT

## 2020-05-27 PROCEDURE — 85025 COMPLETE CBC W/AUTO DIFF WBC: CPT

## 2020-06-01 ENCOUNTER — OFFICE VISIT (OUTPATIENT)
Dept: CARDIOLOGY | Age: 85
End: 2020-06-01

## 2020-06-01 VITALS
WEIGHT: 124 LBS | SYSTOLIC BLOOD PRESSURE: 140 MMHG | HEIGHT: 66 IN | DIASTOLIC BLOOD PRESSURE: 60 MMHG | OXYGEN SATURATION: 98 % | RESPIRATION RATE: 18 BRPM | HEART RATE: 46 BPM | BODY MASS INDEX: 19.93 KG/M2

## 2020-06-01 DIAGNOSIS — I50.812 CHRONIC RIGHT-SIDED HEART FAILURE (CMD): ICD-10-CM

## 2020-06-01 DIAGNOSIS — I27.20 PULMONARY HYPERTENSION (CMD): Primary | ICD-10-CM

## 2020-06-01 PROCEDURE — 99215 OFFICE O/P EST HI 40 MIN: CPT | Performed by: INTERNAL MEDICINE

## 2020-06-01 RX ORDER — SPIRONOLACTONE 25 MG/1
12.5 TABLET ORAL EVERY OTHER DAY
Qty: 45 TABLET | Refills: 3 | Status: SHIPPED | COMMUNITY
Start: 2020-06-01 | End: 2020-09-14 | Stop reason: DRUGHIGH

## 2020-06-01 RX ORDER — METOPROLOL SUCCINATE 50 MG/1
TABLET, EXTENDED RELEASE ORAL
Qty: 360 TABLET | Refills: 3 | Status: SHIPPED | COMMUNITY
Start: 2020-06-01 | End: 2020-09-14 | Stop reason: SDUPTHER

## 2020-06-01 SDOH — HEALTH STABILITY: MENTAL HEALTH: HOW OFTEN DO YOU HAVE A DRINK CONTAINING ALCOHOL?: NEVER

## 2020-06-01 ASSESSMENT — ENCOUNTER SYMPTOMS
FEVER: 0
HEMATOCHEZIA: 0
HEMOPTYSIS: 0
ALLERGIC/IMMUNOLOGIC COMMENTS: NO NEW FOOD ALLERGIES
BRUISES/BLEEDS EASILY: 0
WEIGHT GAIN: 0
CHILLS: 0
SUSPICIOUS LESIONS: 0
COUGH: 0
DIARRHEA: 1

## 2020-06-11 ENCOUNTER — HOSPITAL ENCOUNTER (INPATIENT)
Facility: HOSPITAL | Age: 85
LOS: 5 days | Discharge: NURSING, REHAB OR LONG-TERM CARE | DRG: 470 | End: 2020-06-16
Attending: EMERGENCY MEDICINE | Admitting: HOSPITALIST
Payer: MEDICARE

## 2020-06-11 ENCOUNTER — APPOINTMENT (OUTPATIENT)
Dept: GENERAL RADIOLOGY | Facility: HOSPITAL | Age: 85
DRG: 470 | End: 2020-06-11
Attending: EMERGENCY MEDICINE
Payer: MEDICARE

## 2020-06-11 DIAGNOSIS — Z79.01 ANTICOAGULATED: ICD-10-CM

## 2020-06-11 DIAGNOSIS — S72.001A CLOSED FRACTURE OF NECK OF RIGHT FEMUR, INITIAL ENCOUNTER (HCC): Primary | ICD-10-CM

## 2020-06-11 PROCEDURE — 99223 1ST HOSP IP/OBS HIGH 75: CPT | Performed by: HOSPITALIST

## 2020-06-11 PROCEDURE — 99223 1ST HOSP IP/OBS HIGH 75: CPT | Performed by: ORTHOPAEDIC SURGERY

## 2020-06-11 PROCEDURE — 99222 1ST HOSP IP/OBS MODERATE 55: CPT | Performed by: INTERNAL MEDICINE

## 2020-06-11 PROCEDURE — 73502 X-RAY EXAM HIP UNI 2-3 VIEWS: CPT | Performed by: EMERGENCY MEDICINE

## 2020-06-11 RX ORDER — ACETAMINOPHEN 325 MG/1
650 TABLET ORAL EVERY 6 HOURS PRN
Status: DISCONTINUED | OUTPATIENT
Start: 2020-06-11 | End: 2020-06-12

## 2020-06-11 RX ORDER — SODIUM CHLORIDE 0.9 % (FLUSH) 0.9 %
3 SYRINGE (ML) INJECTION AS NEEDED
Status: DISCONTINUED | OUTPATIENT
Start: 2020-06-11 | End: 2020-06-16

## 2020-06-11 RX ORDER — ACETAMINOPHEN 500 MG
1000 TABLET ORAL ONCE
Status: COMPLETED | OUTPATIENT
Start: 2020-06-11 | End: 2020-06-11

## 2020-06-11 RX ORDER — MORPHINE SULFATE 2 MG/ML
1 INJECTION, SOLUTION INTRAMUSCULAR; INTRAVENOUS EVERY 2 HOUR PRN
Status: DISCONTINUED | OUTPATIENT
Start: 2020-06-11 | End: 2020-06-12

## 2020-06-11 RX ORDER — MORPHINE SULFATE 2 MG/ML
2 INJECTION, SOLUTION INTRAMUSCULAR; INTRAVENOUS EVERY 2 HOUR PRN
Status: DISCONTINUED | OUTPATIENT
Start: 2020-06-11 | End: 2020-06-12

## 2020-06-11 RX ORDER — TRANEXAMIC ACID 10 MG/ML
1000 INJECTION, SOLUTION INTRAVENOUS ONCE
Status: DISCONTINUED | OUTPATIENT
Start: 2020-06-11 | End: 2020-06-12 | Stop reason: HOSPADM

## 2020-06-11 RX ORDER — METOPROLOL SUCCINATE 100 MG/1
200 TABLET, EXTENDED RELEASE ORAL DAILY
Status: DISCONTINUED | OUTPATIENT
Start: 2020-06-12 | End: 2020-06-12

## 2020-06-11 RX ORDER — DEXTROSE AND SODIUM CHLORIDE 5; .45 G/100ML; G/100ML
INJECTION, SOLUTION INTRAVENOUS CONTINUOUS
Status: ACTIVE | OUTPATIENT
Start: 2020-06-11 | End: 2020-06-11

## 2020-06-11 RX ORDER — ROSUVASTATIN CALCIUM 5 MG/1
5 TABLET, COATED ORAL NIGHTLY
Status: DISCONTINUED | OUTPATIENT
Start: 2020-06-11 | End: 2020-06-16

## 2020-06-11 RX ORDER — SPIRONOLACTONE 25 MG/1
12.5 TABLET ORAL DAILY
Status: DISCONTINUED | OUTPATIENT
Start: 2020-06-11 | End: 2020-06-13

## 2020-06-11 RX ORDER — MORPHINE SULFATE 4 MG/ML
4 INJECTION, SOLUTION INTRAMUSCULAR; INTRAVENOUS EVERY 2 HOUR PRN
Status: DISCONTINUED | OUTPATIENT
Start: 2020-06-11 | End: 2020-06-12

## 2020-06-11 RX ORDER — TRAMADOL HYDROCHLORIDE 50 MG/1
50 TABLET ORAL ONCE
Status: COMPLETED | OUTPATIENT
Start: 2020-06-11 | End: 2020-06-11

## 2020-06-11 RX ORDER — CEFAZOLIN SODIUM/WATER 2 G/20 ML
2 SYRINGE (ML) INTRAVENOUS
Status: COMPLETED | OUTPATIENT
Start: 2020-06-12 | End: 2020-06-12

## 2020-06-11 RX ORDER — METOCLOPRAMIDE HYDROCHLORIDE 5 MG/ML
5 INJECTION INTRAMUSCULAR; INTRAVENOUS EVERY 8 HOURS PRN
Status: DISCONTINUED | OUTPATIENT
Start: 2020-06-11 | End: 2020-06-12

## 2020-06-11 RX ORDER — SILDENAFIL CITRATE 20 MG/1
20 TABLET ORAL 3 TIMES DAILY
Status: DISCONTINUED | OUTPATIENT
Start: 2020-06-11 | End: 2020-06-16

## 2020-06-11 RX ORDER — ONDANSETRON 2 MG/ML
4 INJECTION INTRAMUSCULAR; INTRAVENOUS EVERY 6 HOURS PRN
Status: DISCONTINUED | OUTPATIENT
Start: 2020-06-11 | End: 2020-06-12

## 2020-06-11 NOTE — CONSULTS
[facility]    Cardiology Consultation    Yasmin Riding Patient Status:  Emergency    1929 MRN [id number]   Location [address] Attending Rain Yi MD   [facility] Day # 0 PCP Abdirashid Mcgraw MD      Laterality Date   • CATARACT EXTRACTION W/  INTRAOCULAR LENS IMPLANT Right 06/11/2012    RJM   • CATARACT EXTRACTION W/  INTRAOCULAR LENS IMPLANT Left 01/10/2006    RJM   • HYSTERECTOMY      age 76   • LUMPECTOMY LEFT  November 2011   • MONET LOCALIZATION WI kg)      Physical Exam:   General: Alert and oriented x 3. No apparent distress. No respiratory or constitutional distress. Pleasant elderly female. HEENT: Normocephalic, anicteric sclera, neck supple. Neck: No JVD, carotids 2+, no bruits.   Cardiac: IRR ([facility]     Congestive heart failure, unspecified HF chronicity, unspecified heart failure type ([facility]     Long term (current) use of anticoagulants     Encounter for therapeutic drug monitoring          Recommendations:  Problem #1 preop evaluation  Patient

## 2020-06-11 NOTE — ED PROVIDER NOTES
Patient Seen in: [facility] Emergency Department    History   Patient presents with:  Fall    Stated Complaint: Fall    HPI    55-year-old female with past medical history of atrial fibrillation on warfarin, hypertension, dyslipidemia, CAD presentin ROSUVASTATIN CALCIUM 5 MG Oral Tab,  TAKE 1 TABLET BY MOUTH  NIGHTLY   LISINOPRIL 40 MG Oral Tab,  TAKE 1 TABLET BY MOUTH ONCE DAILY   METOPROLOL SUCCINATE  MG Oral Tablet 24 Hr,  TAKE 1 TABLET BY MOUTH ONCE DAILY   UPTRAVI 200 MCG Oral Tab,     Warf 20.63 kg/m²         Physical Exam   Constitutional: No distress. HEENT: MMM. Head: Normocephalic. Atraumatic. Eyes: No injection. Pupils midrange likely reactive. Neck: No midline C-spine tenderness/step-off/deformity.   Cardiovascular: Irregularly i Please view results for these tests on the individual orders. URINALYSIS WITH CULTURE REFLEX   TYPE AND SCREEN    Narrative: The following orders were created for panel order TYPE AND SCREEN.   Procedure                               Abnormality Encounters:  06/11/20 : 63  , atrial fibrillation,      Evaluation for mechanical fall with complaints of right-sided hip pain in neurovascularly intact patient without other traumatic complaints and deferring analgesia on ED arrival.  Radiography notable

## 2020-06-11 NOTE — ED INITIAL ASSESSMENT (HPI)
Pt to ED via EMS c/o right hip pain s/p fall. Pt was trying to answer the phone when she tripped over a blanket and fell. Pt denies head injury. +Coumadin. Pt was able to stand with assistance on scene.

## 2020-06-11 NOTE — H&P
[facility]    PATIENT'S NAME: Christin VELA   ATTENDING PHYSICIAN: Delmi Gaytan MD   PATIENT ACCOUNT#:   [id number]    LOCATION:  [place] [address]  MEDICAL RECORD #:   [id number]       YOB: 1929  ADMISSION DATE:       06/11 right hip. She could not stand up after the fall because of pain in her right groin and right hip area. No loss of consciousness. No dizziness. Other 12-point review of systems is negative.        PHYSICAL EXAMINATION:    GENERAL:  Alert and oriented to recommendations to follow.      Dictated By Kael Galicia MD  d: 06/11/2020 15:36:20  t: 06/11/2020 16:31:04  [facility] [id number]  FB/

## 2020-06-11 NOTE — CONSULTS
[facility] HOSP - [facility]    Report of Consultation    [place] Patient Status:  Emergency    1929 MRN [id number]   Location [address] Attending Osvaldo Morgan MD   Hosp Day # 0 PCP Juliet Ruth MD     D   1950    Pregnancy - 36 week 3 lb(s) 15 oz Female    •   [id number]    pregnacy   •        Per NG: Pregnacy 40 week 7 lb(s) 14 oz Male   •       Per NG:  -breech  OUTCOME IS 40 week 9 lb(s) Female   • RADIATION LEFT  2011   • YA 3.3 (L) 11/14/2019    ALKPHO 89 11/14/2019    TP 7.0 11/14/2019    AST 26 11/14/2019    ALT 15 11/14/2019    INR 2.38 (H) 06/11/2020    PTP 25.6 (H) 06/11/2020    TSH 1.330 11/14/2019    DDIMER 0.72 06/29/2019    ESRML 43 (H) 01/08/2020    CRP 0.34 (H) 01/

## 2020-06-12 ENCOUNTER — ANESTHESIA EVENT (OUTPATIENT)
Dept: SURGERY | Facility: HOSPITAL | Age: 85
DRG: 470 | End: 2020-06-12
Payer: MEDICARE

## 2020-06-12 ENCOUNTER — APPOINTMENT (OUTPATIENT)
Dept: GENERAL RADIOLOGY | Facility: HOSPITAL | Age: 85
DRG: 470 | End: 2020-06-12
Attending: PHYSICIAN ASSISTANT
Payer: MEDICARE

## 2020-06-12 ENCOUNTER — ANESTHESIA (OUTPATIENT)
Dept: SURGERY | Facility: HOSPITAL | Age: 85
DRG: 470 | End: 2020-06-12
Payer: MEDICARE

## 2020-06-12 PROCEDURE — 0SRR019 REPLACEMENT OF RIGHT HIP JOINT, FEMORAL SURFACE WITH METAL SYNTHETIC SUBSTITUTE, CEMENTED, OPEN APPROACH: ICD-10-PCS | Performed by: ORTHOPAEDIC SURGERY

## 2020-06-12 PROCEDURE — 27236 TREAT THIGH FRACTURE: CPT | Performed by: ORTHOPAEDIC SURGERY

## 2020-06-12 PROCEDURE — 27236 TREAT THIGH FRACTURE: CPT | Performed by: PHYSICIAN ASSISTANT

## 2020-06-12 PROCEDURE — 99232 SBSQ HOSP IP/OBS MODERATE 35: CPT | Performed by: INTERNAL MEDICINE

## 2020-06-12 PROCEDURE — 73502 X-RAY EXAM HIP UNI 2-3 VIEWS: CPT | Performed by: PHYSICIAN ASSISTANT

## 2020-06-12 PROCEDURE — 99233 SBSQ HOSP IP/OBS HIGH 50: CPT | Performed by: HOSPITALIST

## 2020-06-12 DEVICE — FEMORAL HEAD Ø 28 SIZE M
Type: IMPLANTABLE DEVICE | Site: HIP | Status: FUNCTIONAL
Brand: COCR FEMORAL BALL HEAD

## 2020-06-12 DEVICE — REFOBACIN BC R 1X40 US: Type: IMPLANTABLE DEVICE | Site: HIP | Status: FUNCTIONAL

## 2020-06-12 RX ORDER — METOCLOPRAMIDE HYDROCHLORIDE 5 MG/ML
5 INJECTION INTRAMUSCULAR; INTRAVENOUS EVERY 6 HOURS PRN
Status: ACTIVE | OUTPATIENT
Start: 2020-06-12 | End: 2020-06-14

## 2020-06-12 RX ORDER — HYDROCODONE BITARTRATE AND ACETAMINOPHEN 5; 325 MG/1; MG/1
2 TABLET ORAL EVERY 4 HOURS PRN
Status: DISCONTINUED | OUTPATIENT
Start: 2020-06-12 | End: 2020-06-13

## 2020-06-12 RX ORDER — ASPIRIN 81 MG/1
81 TABLET, CHEWABLE ORAL 2 TIMES DAILY
Status: DISCONTINUED | OUTPATIENT
Start: 2020-06-12 | End: 2020-06-14

## 2020-06-12 RX ORDER — FAMOTIDINE 10 MG/ML
20 INJECTION, SOLUTION INTRAVENOUS DAILY
Status: DISCONTINUED | OUTPATIENT
Start: 2020-06-13 | End: 2020-06-16

## 2020-06-12 RX ORDER — DIPHENHYDRAMINE HYDROCHLORIDE 50 MG/ML
12.5 INJECTION INTRAMUSCULAR; INTRAVENOUS EVERY 4 HOURS PRN
Status: DISCONTINUED | OUTPATIENT
Start: 2020-06-12 | End: 2020-06-12

## 2020-06-12 RX ORDER — SENNOSIDES 8.6 MG
17.2 TABLET ORAL NIGHTLY
Status: DISCONTINUED | OUTPATIENT
Start: 2020-06-12 | End: 2020-06-16

## 2020-06-12 RX ORDER — ONDANSETRON 2 MG/ML
4 INJECTION INTRAMUSCULAR; INTRAVENOUS ONCE AS NEEDED
Status: DISCONTINUED | OUTPATIENT
Start: 2020-06-12 | End: 2020-06-12 | Stop reason: HOSPADM

## 2020-06-12 RX ORDER — MORPHINE SULFATE 4 MG/ML
2 INJECTION, SOLUTION INTRAMUSCULAR; INTRAVENOUS EVERY 10 MIN PRN
Status: DISCONTINUED | OUTPATIENT
Start: 2020-06-12 | End: 2020-06-12 | Stop reason: HOSPADM

## 2020-06-12 RX ORDER — ACETAMINOPHEN 325 MG/1
650 TABLET ORAL EVERY 4 HOURS PRN
Status: DISCONTINUED | OUTPATIENT
Start: 2020-06-12 | End: 2020-06-16

## 2020-06-12 RX ORDER — MORPHINE SULFATE 2 MG/ML
2 INJECTION, SOLUTION INTRAMUSCULAR; INTRAVENOUS EVERY 2 HOUR PRN
Status: DISCONTINUED | OUTPATIENT
Start: 2020-06-12 | End: 2020-06-13

## 2020-06-12 RX ORDER — MORPHINE SULFATE 2 MG/ML
1 INJECTION, SOLUTION INTRAMUSCULAR; INTRAVENOUS EVERY 2 HOUR PRN
Status: DISCONTINUED | OUTPATIENT
Start: 2020-06-12 | End: 2020-06-13

## 2020-06-12 RX ORDER — MELATONIN
325
Status: DISCONTINUED | OUTPATIENT
Start: 2020-06-13 | End: 2020-06-16

## 2020-06-12 RX ORDER — DIPHENHYDRAMINE HCL 25 MG
25 CAPSULE ORAL EVERY 4 HOURS PRN
Status: DISCONTINUED | OUTPATIENT
Start: 2020-06-12 | End: 2020-06-12

## 2020-06-12 RX ORDER — ONDANSETRON 2 MG/ML
4 INJECTION INTRAMUSCULAR; INTRAVENOUS EVERY 4 HOURS PRN
Status: ACTIVE | OUTPATIENT
Start: 2020-06-12 | End: 2020-06-14

## 2020-06-12 RX ORDER — EPHEDRINE SULFATE 50 MG/ML
INJECTION, SOLUTION INTRAVENOUS AS NEEDED
Status: DISCONTINUED | OUTPATIENT
Start: 2020-06-12 | End: 2020-06-12 | Stop reason: SURG

## 2020-06-12 RX ORDER — LIDOCAINE HYDROCHLORIDE 10 MG/ML
INJECTION, SOLUTION EPIDURAL; INFILTRATION; INTRACAUDAL; PERINEURAL AS NEEDED
Status: DISCONTINUED | OUTPATIENT
Start: 2020-06-12 | End: 2020-06-12 | Stop reason: SURG

## 2020-06-12 RX ORDER — FAMOTIDINE 10 MG/ML
20 INJECTION, SOLUTION INTRAVENOUS 2 TIMES DAILY
Status: DISCONTINUED | OUTPATIENT
Start: 2020-06-12 | End: 2020-06-12

## 2020-06-12 RX ORDER — FAMOTIDINE 20 MG/1
20 TABLET ORAL DAILY
Status: DISCONTINUED | OUTPATIENT
Start: 2020-06-13 | End: 2020-06-16

## 2020-06-12 RX ORDER — FAMOTIDINE 20 MG/1
20 TABLET ORAL 2 TIMES DAILY
Status: DISCONTINUED | OUTPATIENT
Start: 2020-06-12 | End: 2020-06-12

## 2020-06-12 RX ORDER — PHENYLEPHRINE HCL 10 MG/ML
VIAL (ML) INJECTION AS NEEDED
Status: DISCONTINUED | OUTPATIENT
Start: 2020-06-12 | End: 2020-06-12 | Stop reason: SURG

## 2020-06-12 RX ORDER — SODIUM CHLORIDE, SODIUM LACTATE, POTASSIUM CHLORIDE, CALCIUM CHLORIDE 600; 310; 30; 20 MG/100ML; MG/100ML; MG/100ML; MG/100ML
INJECTION, SOLUTION INTRAVENOUS CONTINUOUS PRN
Status: DISCONTINUED | OUTPATIENT
Start: 2020-06-12 | End: 2020-06-12 | Stop reason: SURG

## 2020-06-12 RX ORDER — HYDROCODONE BITARTRATE AND ACETAMINOPHEN 5; 325 MG/1; MG/1
1 TABLET ORAL EVERY 4 HOURS PRN
Status: DISCONTINUED | OUTPATIENT
Start: 2020-06-12 | End: 2020-06-16

## 2020-06-12 RX ORDER — WARFARIN SODIUM 5 MG/1
5 TABLET ORAL ONCE
Status: COMPLETED | OUTPATIENT
Start: 2020-06-12 | End: 2020-06-12

## 2020-06-12 RX ORDER — WARFARIN SODIUM 2.5 MG/1
2.5 TABLET ORAL NIGHTLY
Status: DISCONTINUED | OUTPATIENT
Start: 2020-06-13 | End: 2020-06-14

## 2020-06-12 RX ORDER — MORPHINE SULFATE 4 MG/ML
4 INJECTION, SOLUTION INTRAMUSCULAR; INTRAVENOUS EVERY 2 HOUR PRN
Status: DISCONTINUED | OUTPATIENT
Start: 2020-06-12 | End: 2020-06-13

## 2020-06-12 RX ORDER — PROCHLORPERAZINE EDISYLATE 5 MG/ML
10 INJECTION INTRAMUSCULAR; INTRAVENOUS EVERY 6 HOURS PRN
Status: ACTIVE | OUTPATIENT
Start: 2020-06-12 | End: 2020-06-14

## 2020-06-12 RX ORDER — DIPHENHYDRAMINE HYDROCHLORIDE 50 MG/ML
25 INJECTION INTRAMUSCULAR; INTRAVENOUS ONCE AS NEEDED
Status: DISCONTINUED | OUTPATIENT
Start: 2020-06-12 | End: 2020-06-12

## 2020-06-12 RX ORDER — NALOXONE HYDROCHLORIDE 0.4 MG/ML
80 INJECTION, SOLUTION INTRAMUSCULAR; INTRAVENOUS; SUBCUTANEOUS AS NEEDED
Status: DISCONTINUED | OUTPATIENT
Start: 2020-06-12 | End: 2020-06-12 | Stop reason: HOSPADM

## 2020-06-12 RX ORDER — SODIUM CHLORIDE, SODIUM LACTATE, POTASSIUM CHLORIDE, CALCIUM CHLORIDE 600; 310; 30; 20 MG/100ML; MG/100ML; MG/100ML; MG/100ML
INJECTION, SOLUTION INTRAVENOUS CONTINUOUS
Status: DISCONTINUED | OUTPATIENT
Start: 2020-06-12 | End: 2020-06-12 | Stop reason: HOSPADM

## 2020-06-12 RX ORDER — METOPROLOL TARTRATE 5 MG/5ML
2.5 INJECTION INTRAVENOUS ONCE
Status: DISCONTINUED | OUTPATIENT
Start: 2020-06-12 | End: 2020-06-12 | Stop reason: HOSPADM

## 2020-06-12 RX ORDER — HYDROMORPHONE HYDROCHLORIDE 1 MG/ML
0.2 INJECTION, SOLUTION INTRAMUSCULAR; INTRAVENOUS; SUBCUTANEOUS EVERY 5 MIN PRN
Status: DISCONTINUED | OUTPATIENT
Start: 2020-06-12 | End: 2020-06-12 | Stop reason: HOSPADM

## 2020-06-12 RX ADMIN — EPHEDRINE SULFATE 10 MG: 50 INJECTION, SOLUTION INTRAVENOUS at 14:08:00

## 2020-06-12 RX ADMIN — PHENYLEPHRINE HCL 50 MCG: 10 MG/ML VIAL (ML) INJECTION at 14:12:00

## 2020-06-12 RX ADMIN — EPHEDRINE SULFATE 5 MG: 50 INJECTION, SOLUTION INTRAVENOUS at 13:35:00

## 2020-06-12 RX ADMIN — EPHEDRINE SULFATE 10 MG: 50 INJECTION, SOLUTION INTRAVENOUS at 13:39:00

## 2020-06-12 RX ADMIN — SODIUM CHLORIDE, SODIUM LACTATE, POTASSIUM CHLORIDE, CALCIUM CHLORIDE: 600; 310; 30; 20 INJECTION, SOLUTION INTRAVENOUS at 14:25:00

## 2020-06-12 RX ADMIN — EPHEDRINE SULFATE 10 MG: 50 INJECTION, SOLUTION INTRAVENOUS at 13:17:00

## 2020-06-12 RX ADMIN — PHENYLEPHRINE HCL 50 MCG: 10 MG/ML VIAL (ML) INJECTION at 14:19:00

## 2020-06-12 RX ADMIN — LIDOCAINE HYDROCHLORIDE 30 MG: 10 INJECTION, SOLUTION EPIDURAL; INFILTRATION; INTRACAUDAL; PERINEURAL at 13:14:00

## 2020-06-12 RX ADMIN — EPHEDRINE SULFATE 10 MG: 50 INJECTION, SOLUTION INTRAVENOUS at 14:12:00

## 2020-06-12 RX ADMIN — SODIUM CHLORIDE, SODIUM LACTATE, POTASSIUM CHLORIDE, CALCIUM CHLORIDE: 600; 310; 30; 20 INJECTION, SOLUTION INTRAVENOUS at 13:12:00

## 2020-06-12 RX ADMIN — SODIUM CHLORIDE, SODIUM LACTATE, POTASSIUM CHLORIDE, CALCIUM CHLORIDE: 600; 310; 30; 20 INJECTION, SOLUTION INTRAVENOUS at 13:52:00

## 2020-06-12 RX ADMIN — CEFAZOLIN SODIUM/WATER 2 G: 2 G/20 ML SYRINGE (ML) INTRAVENOUS at 13:30:00

## 2020-06-12 NOTE — DIETARY NOTE
ADULT NUTRITION INITIAL ASSESSMENT    RECOMMENDATIONS TO MD:  See Nutrition Intervention   Pt was on cardiac diet Pre-op. Recommend general diet due to weight loss history and poor intake. Pt is at moderate nutrition risk.   Pt meets moderate malnutritio BID. Rational/use of oral supplements discussed  - Vitamin and mineral supplements: none  - Feeding assistance: meal set up  - Nutrition education: assess education needs  - Coordination of nutrition care: collaboration with other providers    MEDICATIONS: at risk.     - Gastrointestinal: GI intact    FOOD/NUTRITION INTAKE:  Appetite: Decreased  Intake: none due recent surgery  Percent Meals Eaten (last 3 days)     None         Intake Meeting Needs: No, but supplements to maximize   Food Allergies: No  Cultu

## 2020-06-12 NOTE — PLAN OF CARE
Patient went to surgery today, AOx4 from PACU, vomiting upon arrival. Zofran given. Started on full liquids for dinner at request from Nutrition services. Logan catheter inserted in surgery. Provena Wound Vac in place, C/D/I.  Declined tylenol, declined ice Verbalizes/displays adequate comfort level or patient's stated pain goal  Description  INTERVENTIONS:  - Encourage pt to monitor pain and request assistance  - Assess pain using appropriate pain scale  - Administer analgesics based on type and severity of

## 2020-06-12 NOTE — PROGRESS NOTES
[facility]  Cardiology Progress Note    Pilar Bailon Patient Status:  Inpatient    1929 MRN [id number]   Location [address] [facility] Attending Marilyn Posey MD   Hosp Day # 1 PCP Julius Raymundo MD     A/P:  Mechanical Results   Component Value Date    PT 21.1 (H) 06/09/2014    PT 20.0 (H) 01/13/2014    PT 24.7 (H) 01/06/2012    INR 1.37 (H) 06/12/2020    INR 2.38 (H) 06/11/2020    INR 2.3 (A) 05/27/2020       Medications:  [MAR Hold] Normal Saline Flush 0.9 % injection ([facility]     Dry eye     Essential hypertension     Pulmonary hypertension (HCC)     Congestive heart failure (HCC)     Congestive heart failure, unspecified HF chronicity, unspecified heart failure type ([facility]     Long term (current) use of anticoagulants

## 2020-06-12 NOTE — PROGRESS NOTES
[facility] - [facility]    Progress Note    Lisa Going Patient Status:  Inpatient    1929 MRN [id number]   Location [facility] 4W/SW/SE Attending Job Guadalupe MD   Hosp Day # 1 PCP Pavel Hawthorne MD       SUBJECTIVE:    Pain injection 1 mg, 1 mg, Intravenous, Q2H PRN    Or  morphINE sulfate (PF) 2 MG/ML injection 2 mg, 2 mg, Intravenous, Q2H PRN    Or  morphINE sulfate (PF) 4 MG/ML injection 4 mg, 4 mg, Intravenous, Q2H PRN  ondansetron HCl (ZOFRAN) injection 4 mg, 4 mg, Intra encounter for closed fracture [facility])      Plan:     Right femur fracture  - seen by ortho  - cleared by cards for surgery today  - post op anticoagulation with coumadin (she takes it at home for afib)  - pt/ot post op and further recs for d/c planning pendi

## 2020-06-12 NOTE — BRIEF OP NOTE
Pre-Operative Diagnosis: Closed fracture of neck of right femur, initial encounter ([facility] [S72.001A]     Post-Operative Diagnosis: Closed fracture of neck of right femur, initial encounter ([facility] [S72.001A]      Procedure Performed:   Procedure(s):  RIGHT HI

## 2020-06-12 NOTE — ANESTHESIA PROCEDURE NOTES
Airway  Urgency: Elective      General Information and Staff    Patient location during procedure: OR  Anesthesiologist: Marcela Ritter DO  Performed: anesthesiologist     Indications and Patient Condition  Indications for airway management: anesthesia  Se

## 2020-06-12 NOTE — PLAN OF CARE
Pt with asx bradycardia on 200 mg of toprol xl- will hold dose for now and resume at lower dose when able

## 2020-06-12 NOTE — PROGRESS NOTES
[facility] Pharmacy Note:  Renal Dose Adjustment for Metoclopramide (REGLAN)    Buck Catalan has been prescribed Metoclopramide (REGLAN) 10 mg every 8 hours as needed for n/c. Estimated Creatinine Clearance: 21 mL/min (A) (based on SCr of 1.55 mg/dL (H)).

## 2020-06-12 NOTE — ANESTHESIA POSTPROCEDURE EVALUATION
Patient: Leobardo Mccarthy    Procedure Summary     Date:  06/12/20 Room / Location:  [facility] OR  / [facility] OR    Anesthesia Start:  1310 Anesthesia Stop:  5375    Procedure:  HIP HEMIARTHROPLASTY/ BIPOLAR (Right ) Diagnosis:       Closed fracture of n

## 2020-06-12 NOTE — ANESTHESIA PREPROCEDURE EVALUATION
Anesthesia PreOp Note    HPI:     [place] H[place]to is a 80year old female who presents for preoperative consultation requested by: Ana Nathan MD    Date of Surgery: 6/11/2020 - 6/12/2020    Procedure(s):  HIP HEMIARTHROPLASTY/ BIPOLAR  Indic Pseudophakia of both eyes         Date Noted: 07/22/2015      Vitreous floaters of both eyes         Date Noted: 07/22/2015      Breast lump         Date Noted: 10/28/2014      Afib [facility])         Date Noted: 09/27/2014      Atrial fibrillation ([facility] [address] LISINOPRIL 40 MG Oral Tab, TAKE 1 TABLET BY MOUTH ONCE DAILY, Disp: 90 tablet, Rfl: 0  METOPROLOL SUCCINATE  MG Oral Tablet 24 Hr, TAKE 1 TABLET BY MOUTH ONCE DAILY, Disp: 90 tablet, Rfl: 0  UPTRAVI 1600 MCG Oral Tab, 1,600 mcg Q12H.  , Disp: , Rfl: tranexamic acid (CYKLOKAPRON) IVPB premix 1,000 mg, 1,000 mg, Intravenous, Once, MD Andreia Nielson Hold] Rosuvastatin Calcium (CRESTOR) tab 5 mg, 5 mg, Oral, Nightly, Donna Holliday MD, 5 mg at 06/11/20 2039  Andreia Gamez Hold] Sildenafil Citrate Attends Christianity service: Not on file        Active member of club or organization: Not on file        Attends meetings of clubs or organizations: Not on file        Relationship status: Not on file      Intimate partner violence:        Fear of cur Pulse: 56 52 62 57   Resp:  18 16 20   Temp:  97.8 °F (36.6 °C) 97.5 °F (36.4 °C) 98.7 °F (37.1 °C)   TempSrc:  Oral Oral Oral   SpO2:  94% 92% 95%   Weight:       Height:            Anesthesia Evaluation      No history of anesthetic complications   Kyrie

## 2020-06-13 PROCEDURE — 99233 SBSQ HOSP IP/OBS HIGH 50: CPT | Performed by: HOSPITALIST

## 2020-06-13 PROCEDURE — 99232 SBSQ HOSP IP/OBS MODERATE 35: CPT | Performed by: INTERNAL MEDICINE

## 2020-06-13 NOTE — OCCUPATIONAL THERAPY NOTE
OCCUPATIONAL THERAPY EVALUATION - INPATIENT      Room Number: 417/417-A  Evaluation Date: 6/13/2020  Type of Evaluation: Initial  Presenting Problem: (s/p R hip hemiarthroplasty due to fall )    Physician Order: IP Consult to Occupational Therapy  Reason f rehabilitation;24 hour care/supervision  OT Device Recommendations: TBD    PLAN  OT Treatment Plan: Balance activities; Energy conservation/work simplification techniques;ADL training;Functional transfer training;UE strengthening/ROM; Endurance training; [place]on • YAG CAPSULOTOMY - OS - LEFT EYE Left 09/19/2007    RJM       HOME SITUATION  Type of Home: House  Home Layout: One level  Lives With: Alone    Toilet and Equipment: Standard height toilet;Grab bar  Shower/Tub and Equipment: Grab bar;Walk-in shower Taking care of personal grooming such as brushing teeth?: A Little  -   Eating meals?: None    AM-PAC Score:  Score: 16  Approx Degree of Impairment: 53.32%  Standardized Score (AM-PAC Scale): 35.96  CMS Modifier (G-Code): IRENA    FUNCTIONAL TRANSFER ASSESSM

## 2020-06-13 NOTE — OCCUPATIONAL THERAPY NOTE
Attempted to see pt for OT evaluation, however pt with low BP and to hold at this time per KARINE Weinstein. Will cont to follow.

## 2020-06-13 NOTE — CM/SW NOTE
MDO:  Assessment:    Postoperative day #1   S/p R hip hemiarthroplasty    Per Dr. Tao Cohn note of D/C planning:  Anticipate need for subacute rehab. This writer initiated [facility] referrals via aidin. DON Screen req'd to 43677/DCSS.     CM/SW to remain av

## 2020-06-13 NOTE — PROGRESS NOTES
Subjective: No complaints. Pain controlled. Confused about whereabouts.     Objective:    Patient Vitals for the past 24 hrs:   BP Temp Temp src Pulse Resp SpO2   06/13/20 1230 95/46 97.9 °F (36.6 °C) Axillary 74 18 97 %   06/13/20 0951 (!) 88/38 98.4 °F (

## 2020-06-13 NOTE — PROGRESS NOTES
[facility] Pharmacy Note:  Renal Dose Adjustment    Luis Enrique Zheng has been prescribed famotidine (PEPCID) 20 mg intravenously/orally every 12 hours. Estimated Creatinine Clearance: 21 mL/min (A) (based on SCr of 1.55 mg/dL (H)).     Her calculated creatinin

## 2020-06-13 NOTE — PLAN OF CARE
Problem: Patient Centered Care  Goal: Patient preferences are identified and integrated in the patient's plan of care  Description  Interventions:  - What would you like us to know as we care for you?  \"I live at home by myself\"  - Provide timely, compl

## 2020-06-13 NOTE — OPERATIVE REPORT
[facility]    PATIENT'S NAME: Brian Gardner   ATTENDING PHYSICIAN: Faith Patterson MD   OPERATING PHYSICIAN: Jarrod Newsome MD   PATIENT ACCOUNT#:   [id number]    LOCATION:  [facility] [address] #:   [id number]       DATE OF BI off of the top of the lesser trochanter. Retractors were placed about the acetabulum and a size 46 bipolar cup gave an excellent fit. The acetabulum was intact. The femoral canal was opened and broached to accept a [employer] size 2 broach.   Trial red

## 2020-06-13 NOTE — PROGRESS NOTES
[facility] [place] [facility]  Anesthesiology Pain Management Progress Note      Patient name: Edgar Ndiaye 80year old female  : 1929  MRN: [id number]    Diagnosis: [unfilled]    Reason for Consult: pain    Current hospital day: Hospital Day: 3    Pa

## 2020-06-13 NOTE — PROGRESS NOTES
[facility]P - [facility]    Cardiology Progress Note    Willaim Cleverly Patient Status:  Inpatient    1929 MRN [id number]   Location [facility] 4W/SW/SE Attending Rodolfo Vogel MD   Hosp Day # 2 PCP Rebecca Galvan MD     Subjective: Date    WBC 6.8 06/13/2020    HGB 8.8 (L) 06/13/2020    HCT 26.6 (L) 06/13/2020    .0 (L) 06/13/2020    CREATSERUM 1.58 (H) 06/13/2020    BUN 39 (H) 06/13/2020     06/13/2020    K 5.6 (H) 06/13/2020     06/13/2020    CO2 21.0 06/13/2020

## 2020-06-13 NOTE — PLAN OF CARE
Pt alert and oriented, s/p right hip hemiarthroplasty with prevena wound vac dressing clean and intact, tele 67 in place, afib rhythm, abductor pillow with blue boots, sacral mepilex with shear injury to buttock CDI, pain controlled with PO norco 5, agarwal Verbalizes/displays adequate comfort level or patient's stated pain goal  Description  INTERVENTIONS:  - Encourage pt to monitor pain and request assistance  - Assess pain using appropriate pain scale  - Administer analgesics based on type and severity of

## 2020-06-13 NOTE — PHYSICAL THERAPY NOTE
PHYSICAL THERAPY HIP EVALUATION - INPATIENT     Room Number: 417/417-A  Evaluation Date: 6/13/2020  Type of Evaluation: Initial  Physician Order: PT Eval and Treat    Presenting Problem: right hip arthroplasty following fall with fracture  Reason for [facility]RIY LYN[facility] rehabilitation;Home with home health PT;24 hour care/supervision(pending pt's progress)    PLAN  PT Treatment Plan: Bed mobility; Patient education; Family education;Gait training;Range of motion;Strengthening;Transfer training;Balance training;Stoop trainin YAG CAPSULOTOMY - OS - LEFT EYE Left 09/19/2007    RJM       HOME SITUATION  Type of Home: House   Home Layout: One level  Stairs to Enter : 1     Stairs to [employer]: 0       Lives With: Alone  Drives: No  Patient Owned Equipment: Cane  Patient Regularly Us bedside commode, etc.): A Little   -   Moving from lying on back to sitting on the side of the bed?: A Lot   How much help from another person does the patient currently need. ..   -   Moving to and from a bed to a chair (including a wheelchair)?: A Lot   - preparation for discharge.    Goal #6  Current Status

## 2020-06-14 PROCEDURE — 99232 SBSQ HOSP IP/OBS MODERATE 35: CPT | Performed by: NURSE PRACTITIONER

## 2020-06-14 PROCEDURE — 99233 SBSQ HOSP IP/OBS HIGH 50: CPT | Performed by: HOSPITALIST

## 2020-06-14 RX ORDER — METOPROLOL SUCCINATE 25 MG/1
25 TABLET, EXTENDED RELEASE ORAL
Status: DISCONTINUED | OUTPATIENT
Start: 2020-06-14 | End: 2020-06-16

## 2020-06-14 RX ORDER — TORSEMIDE 5 MG/1
10 TABLET ORAL DAILY
Status: DISCONTINUED | OUTPATIENT
Start: 2020-06-14 | End: 2020-06-16

## 2020-06-14 NOTE — PROGRESS NOTES
Subjective: Confused, talking about a computer, in and out of sleep    Objective:    Patient Vitals for the past 24 hrs:   BP Temp Temp src Pulse Resp SpO2   06/14/20 0757 134/69 97.7 °F (36.5 °C) Oral 86 18 97 %   06/14/20 0405 95/52 98.8 °F (37.1 °C) Axi

## 2020-06-14 NOTE — PROGRESS NOTES
[facility]    Progress Note    Corie Depercy Patient Status:  Inpatient    1929 MRN [id number]   Location [facility] 4W/SW/SE Attending Saira Jordan MD   Hosp Day # 2 PCP Valeriy Quesada MD       SUBJECTIVE:    Jazmyne Manuel by (CST): Quan Perry MD on 6/11/2020 at 3:23 PM     Finalized by (CST): Quan Perry MD on 6/11/2020 at 3:24 PM            Meds:   sodium chloride 0.9% IV bolus 500 mL, 500 mL, Intravenous, Once PRN  Senna (SENOKOT) tab 17.2 mg, 17.2 mg, Oral, Nightl unspecified heart failure type ([facility] [address]     Long term (current) use of anticoagulants     Encounter for therapeutic drug monitoring     Closed fracture of neck of right femur, initial encounter ([facility] [address]     Anticoagulated     Closed right hip fracture, initial e

## 2020-06-14 NOTE — PROGRESS NOTES
[facility] - [facility]    Progress Note    Jorge A Saels Patient Status:  Inpatient    1929 MRN [id number]   Location [facility] 4W/SW/SE Attending Arcadio Couch MD   Hosp Day # 3 PCP Pauline Banuelos MD       SUBJECTIVE:    Inter Right (cpt=73502)    Result Date: 6/11/2020  CONCLUSION:  1. Mildly displaced slightly impacted subcapital fracture right femoral neck as discussed above.     Dictated by (CST): Wallace Jeans, MD on 6/11/2020 at 3:23 PM     Finalized by (CST): Wallace Jeans Pulmonary hypertension (HCC)     Congestive heart failure (HCC)     Congestive heart failure, unspecified HF chronicity, unspecified heart failure type ([facility]     Long term (current) use of anticoagulants     Encounter for therapeutic drug monitoring     Cl

## 2020-06-14 NOTE — PROGRESS NOTES
[facility]    Cardiology Progress Note    Lisa [facility] Patient Status:  Inpatient    1929 MRN [id number]   Location [facility] 4W/SW/SE Attending Job Guadalupe MD   Hosp Day # 3 PCP Pavel Hawthorne MD     [facility] compensated from volume standpoint at this point, will resume home torsemide today   - monitor daily weights, I/O, BMP  - plan of care d/w RN        Franko Liu MD  [address] [facility] Floor Cardiology. Interventional Cardiology.   [phone]      Result

## 2020-06-14 NOTE — PLAN OF CARE
Problem: MUSCULOSKELETAL - ADULT  Goal: Return mobility to safest level of function  Description  INTERVENTIONS:  - Assess patient stability and activity tolerance for standing, transferring and ambulating w/ or w/o assistive devices  - Assist with trans safety including physical limitations  - Instruct pt to call for assistance with activity based on assessment  - Modify environment to reduce risk of injury  - Provide assistive devices as appropriate  - Consider OT/PT consult to assist with strengthening/

## 2020-06-14 NOTE — PLAN OF CARE
Pt alert and oriented x2-3 and can be forgetful. [place] - hearing aids at bedside. Room air. Tele in place. SCD's. Heels elevated. [place] - plan to leave in place until therapy. Abductor pillow. Provena wound vac- dressing C/D/I.  Pt hypotensive overnight- precautions)  Outcome: Progressing     Problem: PAIN - ADULT  Goal: Verbalizes/displays adequate comfort level or patient's stated pain goal  Description  INTERVENTIONS:  - Encourage pt to monitor pain and request assistance  - Assess pain using appropriat

## 2020-06-14 NOTE — PHYSICAL THERAPY NOTE
PHYSICAL THERAPY HIP TREATMENT NOTE - INPATIENT    Room Number: 571/679-Q            Presenting Problem: right hip arthroplasty following fall with fracture    Problem List  Principal Problem:    Closed fracture of neck of right femur, initial encounter (H Tolerated       PAIN ASSESSMENT   Rating: (did not rate)  Location: R hip  Management Techniques: Activity promotion; Body mechanics; Relaxation;Repositioning    BALANCE  Static Sitting: Fair  Dynamic Sitting: Fair -  Static Standing: Poor  Dynamic Standing: device at assistance level: sba   Goal #3   Current Status Steps to the chair with the RW mod A   Goal #4 Stair goal to be determined   Goal #4   Current Status NT   Goal #5 Patient verbalizes and/or demonstrates all precautions and safety concerns indepen

## 2020-06-15 PROCEDURE — 99233 SBSQ HOSP IP/OBS HIGH 50: CPT | Performed by: HOSPITALIST

## 2020-06-15 PROCEDURE — 99232 SBSQ HOSP IP/OBS MODERATE 35: CPT | Performed by: NURSE PRACTITIONER

## 2020-06-15 RX ORDER — WARFARIN SODIUM 2 MG/1
1 TABLET ORAL NIGHTLY
Status: DISCONTINUED | OUTPATIENT
Start: 2020-06-15 | End: 2020-06-16

## 2020-06-15 RX ORDER — LOPERAMIDE HYDROCHLORIDE 2 MG/1
2 CAPSULE ORAL 4 TIMES DAILY PRN
Status: DISCONTINUED | OUTPATIENT
Start: 2020-06-15 | End: 2020-06-16

## 2020-06-15 NOTE — PROGRESS NOTES
[facility]    Progress Note    Noe Nicholas Patient Status:  Inpatient    1929 MRN [id number]   Location [facility] 4W/SW/SE Attending Kandy Coto MD   Hosp Day # 4 PCP Sima Landry MD       SUBJECTIVE:    Comfo Sodium (COUMADIN) tab 1 mg, 1 mg, Oral, Nightly  torsemide (DEMADEX) tab 10 mg, 10 mg, Oral, Daily  Metoprolol Succinate ER (Toprol XL) 24 hr tab 25 mg, 25 mg, Oral, Daily Beta Blocker  Senna (SENOKOT) tab 17.2 mg, 17.2 mg, Oral, Nightly  acetaminophen (TY fracture ([address]      Plan:     Right femur fracture  - seen by ortho  - cleared by cards for surgery  - s/p right hemiarthroplasty  - post op anticoagulation with coumadin (she takes it at home for afib) -   - pt/ot post op and further recs for d/c planning

## 2020-06-15 NOTE — PROGRESS NOTES
[facility] HOSP - [facility]    Cardiology Progress Note    Susana Perera Patient Status:  Inpatient    1929 MRN [id number]   Location [facility] 4W/SW/SE Attending Fidel Tran MD   Hosp Day # 4 PCP Shakeel Amado MD     Subjective: cardiology. - She is compensated from volume standpoint at this point, on home torsemide    - monitor daily weights, I/O, BMP  - stable for d/c/ home when okay with all other services.   Follow-up with [facility] in 1-2 weeks.         Results:     Lab Results

## 2020-06-15 NOTE — OCCUPATIONAL THERAPY NOTE
OCCUPATIONAL THERAPY TREATMENT NOTE - INPATIENT        Room Number: 417/417-A           Presenting Problem: (s/p R hip hemiarthroplasty due to fall )    Problem List  Principal Problem:    Closed fracture of neck of right femur, initial encounter ([facility] [address]  Ac Bearing as Tolerated       PAIN ASSESSMENT  Ratin  Location: (Pt had received pain medication earlier )  Management Techniques: Repositioning     ACTIVITIES OF DAILY LIVING ASSESSMENT  AM-PAC ‘6-Clicks’ Inpatient Daily Activity Short Form  How much hel

## 2020-06-15 NOTE — PHYSICAL THERAPY NOTE
PHYSICAL THERAPY HIP TREATMENT NOTE - INPATIENT    Room Number: 176/254-A            Presenting Problem: right hip arthroplasty following fall with fracture    Problem List  Principal Problem:    Closed fracture of neck of right femur, initial encounter (H WARREN - posterior;Hip abduction pillow;Limb alert - right;Bed/chair alarm;Hard of hearing    WEIGHT BEARING RESTRICTION  Weight Bearing Restriction: R lower extremity        R Lower Extremity: Weight Bearing as Tolerated       PAIN ASSESSMENT   Rating: (did #2 Patient is able to demonstrate transfers Sit to/from Stand at assistance level: sba     Goal #2  Current Status Mod A with the RW   Goal #3 Patient is able to ambulate 100 feet with assistive device at assistance level: sba   Goal #3   Current Status [address]

## 2020-06-15 NOTE — PLAN OF CARE
Pt alert and oriented x3 but can be confused/forgetful at times. [place] - hearing aids at bedside. Room air. Tele in place - no calls. Vitals have been stable overnight with no drops in blood pressure so far. Coumadin on hold due to elevated INR. Pt voiding. provider's orders  - Instruct and reinforce with patient and family use of appropriate assistive device and precautions (e.g. spinal or hip dislocation precautions)  Outcome: Progressing     Problem: PAIN - ADULT  Goal: Verbalizes/displays adequate comfort

## 2020-06-15 NOTE — CM/SW NOTE
MICHAEL followed up on referrals that were sent for SNF. SW presented SNF list to the pt and pt's dtr who was in the room. Per pt's dtr, she would like referrals sent out to facilities in [place] since she lives out there.      SW sent referrals to multip

## 2020-06-16 ENCOUNTER — INITIAL APN SNF VISIT (OUTPATIENT)
Dept: INTERNAL MEDICINE CLINIC | Facility: SKILLED NURSING FACILITY | Age: 85
End: 2020-06-16

## 2020-06-16 VITALS
HEIGHT: 65 IN | DIASTOLIC BLOOD PRESSURE: 48 MMHG | TEMPERATURE: 98 F | HEART RATE: 77 BPM | WEIGHT: 124 LBS | OXYGEN SATURATION: 98 % | BODY MASS INDEX: 20.66 KG/M2 | SYSTOLIC BLOOD PRESSURE: 144 MMHG | RESPIRATION RATE: 20 BRPM

## 2020-06-16 DIAGNOSIS — I10 HYPERTENSION, UNSPECIFIED TYPE: ICD-10-CM

## 2020-06-16 DIAGNOSIS — E78.00 PURE HYPERCHOLESTEROLEMIA: ICD-10-CM

## 2020-06-16 DIAGNOSIS — I48.20 CHRONIC ATRIAL FIBRILLATION (HCC): ICD-10-CM

## 2020-06-16 DIAGNOSIS — I50.22 CHRONIC SYSTOLIC CONGESTIVE HEART FAILURE (HCC): ICD-10-CM

## 2020-06-16 DIAGNOSIS — K52.9 CHRONIC DIARRHEA: ICD-10-CM

## 2020-06-16 DIAGNOSIS — S72.001D CLOSED FRACTURE OF NECK OF RIGHT FEMUR WITH ROUTINE HEALING, SUBSEQUENT ENCOUNTER: ICD-10-CM

## 2020-06-16 DIAGNOSIS — Z47.1 AFTERCARE FOLLOWING RIGHT HIP JOINT REPLACEMENT SURGERY: ICD-10-CM

## 2020-06-16 DIAGNOSIS — Z96.641 AFTERCARE FOLLOWING RIGHT HIP JOINT REPLACEMENT SURGERY: ICD-10-CM

## 2020-06-16 DIAGNOSIS — J81.1 CHRONIC PULMONARY EDEMA: ICD-10-CM

## 2020-06-16 DIAGNOSIS — N18.9 CHRONIC KIDNEY DISEASE, UNSPECIFIED CKD STAGE: ICD-10-CM

## 2020-06-16 DIAGNOSIS — Z98.890 STATUS POST HIP SURGERY: ICD-10-CM

## 2020-06-16 DIAGNOSIS — Z79.01 LONG TERM (CURRENT) USE OF ANTICOAGULANTS: Primary | ICD-10-CM

## 2020-06-16 DIAGNOSIS — I48.91 ATRIAL FIBRILLATION, UNSPECIFIED TYPE (HCC): ICD-10-CM
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
## 2020-06-16 PROCEDURE — 99309 SBSQ NF CARE MODERATE MDM 30: CPT | Performed by: NURSE PRACTITIONER

## 2020-06-16 PROCEDURE — 99239 HOSP IP/OBS DSCHRG MGMT >30: CPT | Performed by: HOSPITALIST

## 2020-06-16 RX ORDER — METOPROLOL SUCCINATE 25 MG/1
25 TABLET, EXTENDED RELEASE ORAL
Qty: 30 TABLET | Refills: 2 | Status: SHIPPED | OUTPATIENT
Start: 2020-06-17 | End: 2020-07-10

## 2020-06-16 RX ORDER — MELATONIN
325
Qty: 30 TABLET | Refills: 0 | Status: SHIPPED | OUTPATIENT
Start: 2020-06-17 | End: 2020-07-02

## 2020-06-16 NOTE — PLAN OF CARE
Patient cleared for discharge today to [facility], leaving at [place] [address]. VOiding, patient continent, BM today. Takes Immodium following dose of home med Uptavi.  Provena wound Vac in place, to be removed 6/19/20, Tylenol for PRN, needs encouragement for pain reli Encourage pt to monitor pain and request assistance  - Assess pain using appropriate pain scale  - Administer analgesics based on type and severity of pain and evaluate response  - Implement non-pharmacological measures as appropriate and evaluate response

## 2020-06-16 NOTE — DISCHARGE SUMMARY
[facility]    Discharge Summary    Yasmin Riding Patient Status:  Inpatient    1929 MRN [id number]   Location [facility] 4W/SW/SE Attending Cari Swan MD   Hosp Day # 5 PCP Abdirashid Mcgraw MD     Date of Admission: Heart with regular rate and rhythm  Abd: Abdomen soft, nontender, nondistended, bowel sounds present  Neuro: No acute focal deficits  MSK: Full range of motion in extremities  Skin: Warm and dry  Psych: Normal affect  Ext: no c/c/e      History of Present pneumococcal 13-Gabbie conj vacc      Inject 0.5 mL into the muscle Prior to discharge.    Quantity:  1 Syringe  Refills:  0     Rosuvastatin Calcium 5 MG Tabs  Commonly known as:  CRESTOR      TAKE 1 TABLET BY MOUTH  NIGHTLY   Quantity:  90 tablet  Refills:

## 2020-06-16 NOTE — DISCHARGE PLANNING
This RN spoke with Jose Luis at [facility], gave incoming report. Reviewed surgical status, fall, prior medical history, medications and ambulation/skin status.  Patient set to depart via [facility] at approx 4pm, running late

## 2020-06-16 NOTE — CM/SW NOTE
SW received call from pt's dtr who states she would like  to reserve [facility] as the chosen facility for her mother to DC to.      Per Petr Navarro from [facility] -- pt would need to supply own Uptravi medication since the facility is unable to provide this

## 2020-06-16 NOTE — PLAN OF CARE
Problem: Patient Centered Care  Goal: Patient preferences are identified and integrated in the patient's plan of care  Description  Interventions:  - What would you like us to know as we care for you?  \"I live at home by myself\"  - Provide timely, compl management  - Manage/alleviate anxiety  - Utilize distraction and/or relaxation techniques  - Monitor for opioid side effects  - Notify MD/LIP if interventions unsuccessful or patient reports new pain  - Anticipate increased pain with activity and pre-medi

## 2020-06-16 NOTE — PHYSICAL THERAPY NOTE
PHYSICAL THERAPY HIP TREATMENT NOTE - INPATIENT     Room Number: 607/382-I       Presenting Problem: right hip arthroplasty following fall with fracture    Problem List  Principal Problem:    Closed fracture of neck of right femur, [facility] AM-PAC '6-Clicks' INPATIENT SHORT FORM - BASIC MOBILITY  How much difficulty does the patient currently have. ..  -   Turning over in bed (including adjusting bedclothes, sheets and blankets)?: A Lot   -   Sitting down on and standing up from a chair Goal #4   Current Status NT   Goal #5 Patient verbalizes and/or demonstrates all precautions and safety concerns independently   Goal #5   Current Status IN PROGRESS   Goal #6 Patient independently performs home exercise program for ROM/strengthening per

## 2020-06-17 ENCOUNTER — EXTERNAL FACILITY (OUTPATIENT)
Dept: INTERNAL MEDICINE CLINIC | Facility: CLINIC | Age: 85
End: 2020-06-17

## 2020-06-17 ENCOUNTER — INITIAL APN SNF VISIT (OUTPATIENT)
Dept: INTERNAL MEDICINE CLINIC | Facility: SKILLED NURSING FACILITY | Age: 85
End: 2020-06-17

## 2020-06-17 VITALS
SYSTOLIC BLOOD PRESSURE: 101 MMHG | RESPIRATION RATE: 18 BRPM | WEIGHT: 126.63 LBS | TEMPERATURE: 97 F | HEART RATE: 81 BPM | OXYGEN SATURATION: 97 % | DIASTOLIC BLOOD PRESSURE: 59 MMHG | BODY MASS INDEX: 21 KG/M2

## 2020-06-17 DIAGNOSIS — I48.11 LONGSTANDING PERSISTENT ATRIAL FIBRILLATION (HCC): ICD-10-CM

## 2020-06-17 DIAGNOSIS — N18.30 CKD (CHRONIC KIDNEY DISEASE) STAGE 3, GFR 30-59 ML/MIN (HCC): ICD-10-CM

## 2020-06-17 DIAGNOSIS — R26.2 DIFFICULTY WALKING: ICD-10-CM

## 2020-06-17 DIAGNOSIS — I50.9 CONGESTIVE HEART FAILURE, UNSPECIFIED HF CHRONICITY, UNSPECIFIED HEART FAILURE TYPE (HCC): ICD-10-CM

## 2020-06-17 DIAGNOSIS — I10 ESSENTIAL HYPERTENSION: ICD-10-CM

## 2020-06-17 DIAGNOSIS — I48.20 CHRONIC ATRIAL FIBRILLATION (HCC): ICD-10-CM

## 2020-06-17 DIAGNOSIS — S72.001A CLOSED FRACTURE OF NECK OF RIGHT FEMUR, INITIAL ENCOUNTER (HCC): ICD-10-CM

## 2020-06-17 DIAGNOSIS — R53.1 GENERALIZED WEAKNESS: ICD-10-CM

## 2020-06-17 DIAGNOSIS — I27.20 PULMONARY HYPERTENSION (HCC): ICD-10-CM

## 2020-06-17 DIAGNOSIS — S72.001A CLOSED FRACTURE OF NECK OF RIGHT FEMUR, INITIAL ENCOUNTER (HCC): Primary | ICD-10-CM

## 2020-06-17 PROCEDURE — 1111F DSCHRG MED/CURRENT MED MERGE: CPT | Performed by: INTERNAL MEDICINE

## 2020-06-17 PROCEDURE — 99306 1ST NF CARE HIGH MDM 50: CPT | Performed by: INTERNAL MEDICINE

## 2020-06-17 NOTE — PROGRESS NOTES
Shayla Baughs  : 1929  Age 80year old  female patient is admitted to Facility: [facility] for [facility] and Medical Management.     [facility] Admit date:  20  Discharge date to [facility]:  20  ELOS:  17-19 days  Anticipated discharg HYSTERECTOMY      age 76   • LUMPECTOMY LEFT  2011   • MONET LOCALIZATION WIRE 1 SITE RIGHT (QMQ=[id number])     •       Pregnancy - 36 week 3 lb(s) 15 oz Female    •   ,35    pregnacy   •        Per NG: Pregnacy 40 week 7 l kg)   SpO2 97%   BMI 21.07 kg/m²      REVIEW OF SYSTEMS:  GENERAL HEALTH:feels well otherwise  SKIN: denies any unusual skin lesions or rashes  WOUNDS: +right hip incision-wound vac present   EYES:no visual complaints or deficits  HENT: denies nasal conges lower extremity.   Weakness R/T recent hospitalization/diagnoses/sequelae; will undergo therapies to rehab and improve strength, endurance and independence w/ ADLs  EXTREMITIES/VASCULAR:no cyanosis, clubbing or edema, radial pulses 2+ and dorsalis pedal pul RDW-SD Latest Ref Range: 35.1 - 46.3 fL 49.3 (H)    Prelim Neutrophil Abs Latest Ref Range: 1.50 - 7.70 x10 (3) uL 5.89    Neutrophils Absolute Latest Ref Range: 1.50 - 7.70 x10(3) uL 5.89    Lymphocytes Absolute Latest Ref Range: 1.00 - 4.00 x10(3) uL Rogelio Clark, PCP in 7 to 10 days after discharge from [facility]  -Dr. Gabino Waggoner, Orthopedic Surgery in two weeks      *Greater than 65 minutes spent w/ patient and family, reviewing medical records, labs, completing medication reconciliation and entering orders

## 2020-06-17 NOTE — PROGRESS NOTES
pt seen at [facility] through live 2 way video visit 6/17/2020    seen in room, doing ok no distress , all questions answered     History of Present Illness:       The patient is a 20-year-old  female who fell at her apartment after tripping, landing on home for afib) -   - pt/ot post op -       Afib  - rate controlled  - - cont lower dose of bblocker  - coumadin , will follow inr     Pulmonary HTN  - cont home meds and f/u as outpt     Protein calorie malnutrition  - seen by dietician     CKD stage 3  -

## 2020-06-18 ENCOUNTER — SNF VISIT (OUTPATIENT)
Dept: INTERNAL MEDICINE CLINIC | Facility: SKILLED NURSING FACILITY | Age: 85
End: 2020-06-18

## 2020-06-18 VITALS
HEART RATE: 72 BPM | TEMPERATURE: 97 F | RESPIRATION RATE: 19 BRPM | SYSTOLIC BLOOD PRESSURE: 114 MMHG | OXYGEN SATURATION: 98 % | DIASTOLIC BLOOD PRESSURE: 50 MMHG

## 2020-06-18 DIAGNOSIS — N18.30 CKD (CHRONIC KIDNEY DISEASE) STAGE 3, GFR 30-59 ML/MIN (HCC): ICD-10-CM

## 2020-06-18 DIAGNOSIS — I50.9 CONGESTIVE HEART FAILURE, UNSPECIFIED HF CHRONICITY, UNSPECIFIED HEART FAILURE TYPE (HCC): ICD-10-CM

## 2020-06-18 DIAGNOSIS — Z79.01 ANTICOAGULATED ON COUMADIN: ICD-10-CM

## 2020-06-18 DIAGNOSIS — S72.001A CLOSED FRACTURE OF NECK OF RIGHT FEMUR, INITIAL ENCOUNTER (HCC): Primary | ICD-10-CM

## 2020-06-18 DIAGNOSIS — I48.11 LONGSTANDING PERSISTENT ATRIAL FIBRILLATION (HCC): ICD-10-CM

## 2020-06-18 DIAGNOSIS — R26.2 DIFFICULTY WALKING: ICD-10-CM

## 2020-06-18 PROCEDURE — 99308 SBSQ NF CARE LOW MDM 20: CPT | Performed by: NURSE PRACTITIONER

## 2020-06-18 NOTE — PROGRESS NOTES
Edgar Ndiaye, 37/1929, 80year old, female    Chief Complaint:  Patient presents with:   Follow - Up: s/p right hip hemiarthroplasty       Subjective:  81 y/o female with PMHx of A Fib, Malignant Neoplasm of left breast s/p lumpectomy/radiation, HTN, lymphedema  MUSCULOSKELETAL: no acute synovitis upper or lower extremity.   Weakness R/T recent hospitalization/diagnoses/sequelae; will undergo therapies to rehab and improve strength, endurance and independence w/ ADLs  EXTREMITIES/VASCULAR:no cyanosis, c days  -Anticipated Discharge date:  7/4/20-SW to assist with discharge planning     Afib/HTN/CHF/Pulm HTN/HLD  -Vitals q shift  -Daily wts. , notify MD/NP for overnight wt gain of >2lb or >5lb wt gain in one week  -Coumadin 2.5 mg qd  -PT/INR 6/19  -ChangeT

## 2020-06-19 ENCOUNTER — EXTERNAL FACILITY (OUTPATIENT)
Dept: INTERNAL MEDICINE CLINIC | Facility: CLINIC | Age: 85
End: 2020-06-19

## 2020-06-19 DIAGNOSIS — R53.1 GENERALIZED WEAKNESS: ICD-10-CM

## 2020-06-19 DIAGNOSIS — I48.91 ATRIAL FIBRILLATION, UNSPECIFIED TYPE (HCC): ICD-10-CM

## 2020-06-19 DIAGNOSIS — S72.001A CLOSED FRACTURE OF NECK OF RIGHT FEMUR, INITIAL ENCOUNTER (HCC): ICD-10-CM

## 2020-06-19 DIAGNOSIS — I11.9 BENIGN HYPERTENSIVE HEART DISEASE WITHOUT CONGESTIVE HEART FAILURE: ICD-10-CM

## 2020-06-19 DIAGNOSIS — I10 ESSENTIAL HYPERTENSION: ICD-10-CM

## 2020-06-19 DIAGNOSIS — R26.2 DIFFICULTY WALKING: ICD-10-CM

## 2020-06-19 DIAGNOSIS — I27.20 PULMONARY HYPERTENSION (HCC): ICD-10-CM

## 2020-06-19 DIAGNOSIS — N18.30 CKD (CHRONIC KIDNEY DISEASE) STAGE 3, GFR 30-59 ML/MIN (HCC): ICD-10-CM

## 2020-06-19 DIAGNOSIS — I50.9 CONGESTIVE HEART FAILURE, UNSPECIFIED HF CHRONICITY, UNSPECIFIED HEART FAILURE TYPE (HCC): ICD-10-CM

## 2020-06-19 DIAGNOSIS — I48.11 LONGSTANDING PERSISTENT ATRIAL FIBRILLATION (HCC): ICD-10-CM

## 2020-06-19 PROCEDURE — 99309 SBSQ NF CARE MODERATE MDM 30: CPT | Performed by: INTERNAL MEDICINE

## 2020-06-19 NOTE — PROGRESS NOTES
pt seen by live 2 way video visit at [facility] on 6/19/2020    seen in room, doing ok, no complaints    labs noted today , will monitor, will repeat next week, drink more fluids       subj:  no cp  no sob  no ab pain  some hip pain but controlled     obj:  no

## 2020-06-22 ENCOUNTER — EXTERNAL FACILITY (OUTPATIENT)
Dept: INTERNAL MEDICINE CLINIC | Facility: CLINIC | Age: 85
End: 2020-06-22

## 2020-06-22 ENCOUNTER — APPOINTMENT (OUTPATIENT)
Dept: CARDIOLOGY | Age: 85
End: 2020-06-22

## 2020-06-22 DIAGNOSIS — I27.20 PULMONARY HYPERTENSION (HCC): ICD-10-CM

## 2020-06-22 DIAGNOSIS — I11.9 BENIGN HYPERTENSIVE HEART DISEASE WITHOUT CONGESTIVE HEART FAILURE: ICD-10-CM

## 2020-06-22 DIAGNOSIS — I48.11 LONGSTANDING PERSISTENT ATRIAL FIBRILLATION (HCC): ICD-10-CM

## 2020-06-22 DIAGNOSIS — Z79.01 ANTICOAGULATED: ICD-10-CM

## 2020-06-22 DIAGNOSIS — I50.9 CONGESTIVE HEART FAILURE, UNSPECIFIED HF CHRONICITY, UNSPECIFIED HEART FAILURE TYPE (HCC): ICD-10-CM

## 2020-06-22 DIAGNOSIS — N18.30 CKD (CHRONIC KIDNEY DISEASE) STAGE 3, GFR 30-59 ML/MIN (HCC): ICD-10-CM

## 2020-06-22 DIAGNOSIS — I10 ESSENTIAL HYPERTENSION: ICD-10-CM

## 2020-06-22 DIAGNOSIS — E78.00 HYPERCHOLESTEROLEMIA: ICD-10-CM

## 2020-06-22 DIAGNOSIS — S72.001A CLOSED RIGHT HIP FRACTURE, INITIAL ENCOUNTER (HCC): ICD-10-CM

## 2020-06-22 DIAGNOSIS — I48.20 CHRONIC ATRIAL FIBRILLATION (HCC): ICD-10-CM

## 2020-06-22 PROCEDURE — 99308 SBSQ NF CARE LOW MDM 20: CPT | Performed by: INTERNAL MEDICINE

## 2020-06-23 NOTE — PROGRESS NOTES
pt seen  at [facility] on 6/22/2020    seen in room, doing ok, no complaints    labs today noted, will hold Lasix for 2 days will retest bmp in few days     subj:  no cp  no sob  no ab pain  some hip pain but controlled     obj:  no distress noted  talking in f

## 2020-06-24 ENCOUNTER — TELEPHONE (OUTPATIENT)
Dept: INTERNAL MEDICINE CLINIC | Facility: CLINIC | Age: 85
End: 2020-06-24

## 2020-06-24 NOTE — TELEPHONE ENCOUNTER
Patient missed [facility] visit today. Chart reviewed- patient had a fall in June and went to rehab at [facility] on 6/17/20. Appears that INR/warfarin is being managed by Jeromy FRASER. Will watch for patient discharge from rehab and return to home.

## 2020-06-25 ENCOUNTER — EXTERNAL FACILITY (OUTPATIENT)
Dept: INTERNAL MEDICINE CLINIC | Facility: CLINIC | Age: 85
End: 2020-06-25

## 2020-06-25 ENCOUNTER — SNF VISIT (OUTPATIENT)
Dept: INTERNAL MEDICINE CLINIC | Facility: SKILLED NURSING FACILITY | Age: 85
End: 2020-06-25

## 2020-06-25 DIAGNOSIS — N18.30 CKD (CHRONIC KIDNEY DISEASE) STAGE 3, GFR 30-59 ML/MIN (HCC): ICD-10-CM

## 2020-06-25 DIAGNOSIS — R26.2 WALKING DIFFICULTY DUE TO ANKLE AND FOOT: ICD-10-CM

## 2020-06-25 DIAGNOSIS — I10 ESSENTIAL HYPERTENSION: ICD-10-CM

## 2020-06-25 DIAGNOSIS — S72.001A CLOSED FRACTURE OF NECK OF RIGHT FEMUR, INITIAL ENCOUNTER (HCC): ICD-10-CM

## 2020-06-25 DIAGNOSIS — I27.20 PULMONARY HYPERTENSION (HCC): ICD-10-CM

## 2020-06-25 DIAGNOSIS — Z79.01 LONG TERM (CURRENT) USE OF ANTICOAGULANTS: ICD-10-CM

## 2020-06-25 DIAGNOSIS — I11.9 BENIGN HYPERTENSIVE HEART DISEASE WITHOUT CONGESTIVE HEART FAILURE: ICD-10-CM

## 2020-06-25 DIAGNOSIS — Z79.01 ANTICOAGULATED: ICD-10-CM

## 2020-06-25 DIAGNOSIS — I48.11 LONGSTANDING PERSISTENT ATRIAL FIBRILLATION (HCC): ICD-10-CM

## 2020-06-25 DIAGNOSIS — N18.30 CKD (CHRONIC KIDNEY DISEASE) STAGE 3, GFR 30-59 ML/MIN (HCC): Primary | ICD-10-CM

## 2020-06-25 DIAGNOSIS — Z79.01 ANTICOAGULATED ON COUMADIN: ICD-10-CM

## 2020-06-25 DIAGNOSIS — R53.1 GENERALIZED WEAKNESS: ICD-10-CM

## 2020-06-25 PROCEDURE — 99308 SBSQ NF CARE LOW MDM 20: CPT | Performed by: INTERNAL MEDICINE

## 2020-06-25 PROCEDURE — 99307 SBSQ NF CARE SF MDM 10: CPT | Performed by: NURSE PRACTITIONER

## 2020-06-25 NOTE — PROGRESS NOTES
pt seen  at [facility] on 6/24/2020    seen in room, doing ok, no complaints    labs today noted, renal function better, will restart the Lasix, cards on board        subj:  no cp  no sob  no ab pain  some hip pain but controlled     obj:  no distress noted  ta

## 2020-06-26 ENCOUNTER — SNF VISIT (OUTPATIENT)
Dept: INTERNAL MEDICINE CLINIC | Facility: SKILLED NURSING FACILITY | Age: 85
End: 2020-06-26

## 2020-06-26 VITALS
RESPIRATION RATE: 19 BRPM | SYSTOLIC BLOOD PRESSURE: 101 MMHG | OXYGEN SATURATION: 98 % | DIASTOLIC BLOOD PRESSURE: 55 MMHG | HEART RATE: 65 BPM | TEMPERATURE: 97 F

## 2020-06-26 DIAGNOSIS — S72.001A CLOSED RIGHT HIP FRACTURE, INITIAL ENCOUNTER (HCC): Primary | ICD-10-CM

## 2020-06-26 PROCEDURE — 99308 SBSQ NF CARE LOW MDM 20: CPT | Performed by: INTERNAL MEDICINE

## 2020-06-26 NOTE — PROGRESS NOTES
Felecia Saint, 37/1929, 80year old, female    Chief Complaint:  Patient presents with:   Follow - Up: s/p right hip hemiarthroplasty       Subjective:  81 y/o female with PMHx of A Fib, Malignant Neoplasm of left breast s/p lumpectomy/radiation, HTN, ADLs  EXTREMITIES/VASCULAR:no cyanosis, clubbing or edema, radial pulses 2+ and dorsalis pedal pulses 2+  NEUROLOGIC: intact; no sensorimotor deficit, reflexes normal, cranial nerves intact II-XII, follows commands  PSYCHIATRIC: alert and oriented x 3; aff Tuesdays only and 2.5 mg 6 days per week  -PT/INR 6/26  -ContinueTorsemide 10 qod  -Push fluids  -Spironolactone 25 mg qd  -Uptravi 1600 mcq q12h  -Lisinopril 40 mg qd, hold for sbp<110 or hr <60  -Metoprolol 25 mg qd, hold for sbp<110 or hr <60     Malign

## 2020-06-28 ENCOUNTER — EXTERNAL FACILITY (OUTPATIENT)
Dept: INTERNAL MEDICINE CLINIC | Facility: CLINIC | Age: 85
End: 2020-06-28

## 2020-06-28 DIAGNOSIS — I11.9 BENIGN HYPERTENSIVE HEART DISEASE WITHOUT CONGESTIVE HEART FAILURE: ICD-10-CM

## 2020-06-28 DIAGNOSIS — C50.912 MALIGNANT NEOPLASM OF LEFT BREAST IN FEMALE, ESTROGEN RECEPTOR POSITIVE, UNSPECIFIED SITE OF BREAST (HCC): ICD-10-CM

## 2020-06-28 DIAGNOSIS — I48.20 CHRONIC ATRIAL FIBRILLATION (HCC): ICD-10-CM

## 2020-06-28 DIAGNOSIS — Z17.0 MALIGNANT NEOPLASM OF LEFT BREAST IN FEMALE, ESTROGEN RECEPTOR POSITIVE, UNSPECIFIED SITE OF BREAST (HCC): ICD-10-CM

## 2020-06-28 DIAGNOSIS — I50.9 CONGESTIVE HEART FAILURE, UNSPECIFIED HF CHRONICITY, UNSPECIFIED HEART FAILURE TYPE (HCC): ICD-10-CM

## 2020-06-28 DIAGNOSIS — I48.11 LONGSTANDING PERSISTENT ATRIAL FIBRILLATION (HCC): ICD-10-CM

## 2020-06-28 DIAGNOSIS — I48.91 ATRIAL FIBRILLATION, UNSPECIFIED TYPE (HCC): ICD-10-CM

## 2020-06-28 DIAGNOSIS — I10 ESSENTIAL HYPERTENSION: ICD-10-CM

## 2020-06-28 DIAGNOSIS — Z12.31 SCREENING MAMMOGRAM, ENCOUNTER FOR: ICD-10-CM

## 2020-06-28 DIAGNOSIS — S72.001A CLOSED FRACTURE OF NECK OF RIGHT FEMUR, INITIAL ENCOUNTER (HCC): ICD-10-CM

## 2020-06-28 NOTE — PATIENT INSTRUCTIONS
pt seen  at [facility] on 6/26/2020    seen in room, doing ok, no complaints, pain tolerated , working with therapy        subj:  no cp  no sob  no ab pain  some hip pain but controlled     obj:  no distress noted  talking in full sentences  AAO x 3         A/p

## 2020-06-28 NOTE — PROGRESS NOTES
pt seen  at [facility] on 6/26/2020    seen in room, doing ok, no complaints, pain tolerated , working with therapy        subj:  no cp  no sob  no ab pain  some hip pain but controlled     obj:  no distress noted  talking in full sentences  AAO x 3         A/p

## 2020-06-29 ENCOUNTER — EXTERNAL FACILITY (OUTPATIENT)
Dept: INTERNAL MEDICINE CLINIC | Facility: CLINIC | Age: 85
End: 2020-06-29

## 2020-06-29 DIAGNOSIS — I27.20 PULMONARY HYPERTENSION (HCC): ICD-10-CM

## 2020-06-29 DIAGNOSIS — Z79.01 ANTICOAGULATED: ICD-10-CM

## 2020-06-29 DIAGNOSIS — I10 ESSENTIAL HYPERTENSION: ICD-10-CM

## 2020-06-29 DIAGNOSIS — S72.001A CLOSED FRACTURE OF NECK OF RIGHT FEMUR, INITIAL ENCOUNTER (HCC): ICD-10-CM

## 2020-06-29 DIAGNOSIS — I48.11 LONGSTANDING PERSISTENT ATRIAL FIBRILLATION (HCC): ICD-10-CM

## 2020-06-29 DIAGNOSIS — G81.94 LEFT HEMIPARESIS (HCC): ICD-10-CM

## 2020-06-29 DIAGNOSIS — I48.91 ATRIAL FIBRILLATION, UNSPECIFIED TYPE (HCC): ICD-10-CM

## 2020-06-29 DIAGNOSIS — N18.30 CKD (CHRONIC KIDNEY DISEASE) STAGE 3, GFR 30-59 ML/MIN (HCC): ICD-10-CM

## 2020-06-29 PROCEDURE — 99309 SBSQ NF CARE MODERATE MDM 30: CPT | Performed by: INTERNAL MEDICINE

## 2020-06-30 NOTE — PROGRESS NOTES
pt seen  at [facility] on 6/29/2020    seen in room, doing ok,  working with therapy            subj:  no cp  no sob  no ab pain  some hip pain but controlled     obj:  no distress noted  talking in full sentences  AAO x 3         A/p    continue with current c

## 2020-07-01 PROCEDURE — 99308 SBSQ NF CARE LOW MDM 20: CPT | Performed by: INTERNAL MEDICINE

## 2020-07-02 ENCOUNTER — SNF DISCHARGE (OUTPATIENT)
Dept: INTERNAL MEDICINE CLINIC | Facility: SKILLED NURSING FACILITY | Age: 85
End: 2020-07-02

## 2020-07-02 VITALS
OXYGEN SATURATION: 98 % | HEART RATE: 67 BPM | DIASTOLIC BLOOD PRESSURE: 60 MMHG | SYSTOLIC BLOOD PRESSURE: 126 MMHG | RESPIRATION RATE: 19 BRPM | TEMPERATURE: 98 F

## 2020-07-02 DIAGNOSIS — I10 ESSENTIAL HYPERTENSION: ICD-10-CM

## 2020-07-02 DIAGNOSIS — Z96.641 AFTERCARE FOLLOWING RIGHT HIP JOINT REPLACEMENT SURGERY: ICD-10-CM

## 2020-07-02 DIAGNOSIS — I48.11 LONGSTANDING PERSISTENT ATRIAL FIBRILLATION (HCC): ICD-10-CM

## 2020-07-02 DIAGNOSIS — Z79.01 ANTICOAGULATED ON COUMADIN: ICD-10-CM

## 2020-07-02 DIAGNOSIS — Z79.01 ANTICOAGULATED: Primary | ICD-10-CM

## 2020-07-02 DIAGNOSIS — S72.001A CLOSED FRACTURE OF NECK OF RIGHT FEMUR, INITIAL ENCOUNTER (HCC): ICD-10-CM

## 2020-07-02 DIAGNOSIS — Z47.1 AFTERCARE FOLLOWING RIGHT HIP JOINT REPLACEMENT SURGERY: ICD-10-CM

## 2020-07-02 DIAGNOSIS — I27.20 PULMONARY HYPERTENSION (HCC): ICD-10-CM

## 2020-07-02 DIAGNOSIS — N18.30 CKD (CHRONIC KIDNEY DISEASE) STAGE 3, GFR 30-59 ML/MIN (HCC): ICD-10-CM

## 2020-07-02 PROCEDURE — 99316 NF DSCHRG MGMT 30 MIN+: CPT | Performed by: NURSE PRACTITIONER

## 2020-07-02 NOTE — PROGRESS NOTES
Sloan Ceballos, 37/1929, 80year old, female is being discharged from Facility: [facility]    Date of Admission:6/16/20    Date of Anticipated Discharge:  7/3/20                            Admitting Diagnoses:s/p right hip hemiar cerumen.   NECK: supple; FROM; no JVD, no TMG, no carotid bruits  BREAST: ---deferred  RESPIRATORY:clear to percussion and auscultation  CARDIOVASCULAR: S1, S2 normal, RRR; no S3, no S4; , no click, no murmur  ABDOMEN:  normal active BS+, soft, nondistended 07/01/2020    CO2 26.0 07/01/2020    Yahiro 496 293 07/01/2020       Discharge Diagnoses w/ current management:  Closed Fracture of neck of right femur/s/p right hip arthroplasty  -PT/OT to eval and treat with modalities  -Add Tylenol 650 mg q6h prn for feve

## 2020-07-03 PROCEDURE — 99308 SBSQ NF CARE LOW MDM 20: CPT | Performed by: INTERNAL MEDICINE

## 2020-07-05 ENCOUNTER — EXTERNAL FACILITY (OUTPATIENT)
Dept: INTERNAL MEDICINE CLINIC | Facility: CLINIC | Age: 85
End: 2020-07-05

## 2020-07-05 DIAGNOSIS — I11.9 BENIGN HYPERTENSIVE HEART DISEASE WITHOUT CONGESTIVE HEART FAILURE: ICD-10-CM

## 2020-07-05 DIAGNOSIS — Z12.31 SCREENING MAMMOGRAM, ENCOUNTER FOR: ICD-10-CM

## 2020-07-05 DIAGNOSIS — Z17.0 MALIGNANT NEOPLASM OF LEFT BREAST IN FEMALE, ESTROGEN RECEPTOR POSITIVE, UNSPECIFIED SITE OF BREAST (HCC): ICD-10-CM

## 2020-07-05 DIAGNOSIS — S72.001A CLOSED RIGHT HIP FRACTURE, INITIAL ENCOUNTER (HCC): ICD-10-CM

## 2020-07-05 DIAGNOSIS — I10 ESSENTIAL HYPERTENSION: ICD-10-CM

## 2020-07-05 DIAGNOSIS — I50.22 CHRONIC SYSTOLIC CONGESTIVE HEART FAILURE (HCC): ICD-10-CM

## 2020-07-05 DIAGNOSIS — C50.912 MALIGNANT NEOPLASM OF LEFT BREAST IN FEMALE, ESTROGEN RECEPTOR POSITIVE, UNSPECIFIED SITE OF BREAST (HCC): ICD-10-CM

## 2020-07-05 DIAGNOSIS — N18.30 CKD (CHRONIC KIDNEY DISEASE) STAGE 3, GFR 30-59 ML/MIN (HCC): ICD-10-CM

## 2020-07-05 DIAGNOSIS — H26.491 AFTER CATARACT OF RIGHT EYE NOT OBSCURING VISION: ICD-10-CM

## 2020-07-05 DIAGNOSIS — I48.91 ATRIAL FIBRILLATION, UNSPECIFIED TYPE (HCC): ICD-10-CM

## 2020-07-05 DIAGNOSIS — I27.20 PULMONARY HYPERTENSION (HCC): ICD-10-CM

## 2020-07-05 DIAGNOSIS — I50.9 CONGESTIVE HEART FAILURE, UNSPECIFIED HF CHRONICITY, UNSPECIFIED HEART FAILURE TYPE (HCC): ICD-10-CM

## 2020-07-05 DIAGNOSIS — S72.001A CLOSED FRACTURE OF NECK OF RIGHT FEMUR, INITIAL ENCOUNTER (HCC): ICD-10-CM

## 2020-07-05 DIAGNOSIS — I48.11 LONGSTANDING PERSISTENT ATRIAL FIBRILLATION (HCC): ICD-10-CM

## 2020-07-05 DIAGNOSIS — R06.02 SHORTNESS OF BREATH: ICD-10-CM

## 2020-07-05 DIAGNOSIS — E78.00 HYPERCHOLESTEROLEMIA: ICD-10-CM

## 2020-07-05 DIAGNOSIS — I48.20 CHRONIC ATRIAL FIBRILLATION (HCC): ICD-10-CM

## 2020-07-05 NOTE — PROGRESS NOTES
7/1/2020    seen in room, doing ok,  working with therapy    improving overall             subj:  no cp  no sob  no ab pain  some hip pain but controlled     obj:  no distress noted  talking in full sentences  AAO x 3         A/p    continue with current c

## 2020-07-05 NOTE — PROGRESS NOTES
7/3/2020    seen in room, doing ok,  working with therapy    improving overall             subj:  no cp  no sob  no ab pain  some hip pain but controlled     obj:  no distress noted  talking in full sentences  AAO x 3         A/p    continue with current c

## 2020-07-06 ENCOUNTER — TELEPHONE (OUTPATIENT)
Dept: INTERNAL MEDICINE CLINIC | Facility: CLINIC | Age: 85
End: 2020-07-06

## 2020-07-06 ENCOUNTER — ANTI-COAG VISIT (OUTPATIENT)
Dept: INTERNAL MEDICINE CLINIC | Facility: CLINIC | Age: 85
End: 2020-07-06

## 2020-07-06 ENCOUNTER — TELEPHONE (OUTPATIENT)
Dept: CARDIOLOGY | Age: 85
End: 2020-07-06

## 2020-07-06 DIAGNOSIS — Z79.01 LONG TERM (CURRENT) USE OF ANTICOAGULANTS: ICD-10-CM

## 2020-07-06 DIAGNOSIS — I48.91 ATRIAL FIBRILLATION, UNSPECIFIED TYPE (HCC): ICD-10-CM

## 2020-07-06 DIAGNOSIS — I48.20 CHRONIC ATRIAL FIBRILLATION (HCC): ICD-10-CM

## 2020-07-06 DIAGNOSIS — Z51.81 ENCOUNTER FOR THERAPEUTIC DRUG MONITORING: ICD-10-CM

## 2020-07-06 LAB — INR: 3.4 (ref 0.8–1.2)

## 2020-07-06 NOTE — TELEPHONE ENCOUNTER
Fummi with [facility] is calling to report critical INR results as follows:     INR: 3.4  PT: 40.4

## 2020-07-10 ENCOUNTER — OFFICE VISIT (OUTPATIENT)
Dept: CARDIOLOGY CLINIC | Facility: HOSPITAL | Age: 85
End: 2020-07-10
Attending: NURSE PRACTITIONER
Payer: MEDICARE

## 2020-07-10 VITALS
WEIGHT: 132 LBS | HEART RATE: 60 BPM | BODY MASS INDEX: 22 KG/M2 | DIASTOLIC BLOOD PRESSURE: 45 MMHG | SYSTOLIC BLOOD PRESSURE: 135 MMHG | OXYGEN SATURATION: 97 %

## 2020-07-10 DIAGNOSIS — I50.82 BIVENTRICULAR HEART FAILURE (HCC): ICD-10-CM

## 2020-07-10 DIAGNOSIS — I50.33 ACUTE ON CHRONIC HEART FAILURE WITH PRESERVED EJECTION FRACTION (HFPEF) (HCC): Primary | ICD-10-CM

## 2020-07-10 DIAGNOSIS — N18.30 CKD (CHRONIC KIDNEY DISEASE) STAGE 3, GFR 30-59 ML/MIN (HCC): ICD-10-CM

## 2020-07-10 DIAGNOSIS — I10 ESSENTIAL HYPERTENSION: ICD-10-CM

## 2020-07-10 DIAGNOSIS — I48.21 PERMANENT ATRIAL FIBRILLATION (HCC): ICD-10-CM

## 2020-07-10 DIAGNOSIS — R00.1 BRADYCARDIA: ICD-10-CM

## 2020-07-10 DIAGNOSIS — I27.21 PAH (PULMONARY ARTERY HYPERTENSION) (HCC): ICD-10-CM

## 2020-07-10 DIAGNOSIS — I50.9 HEART FAILURE, UNSPECIFIED (HCC): ICD-10-CM

## 2020-07-10 PROBLEM — I48.20 CHRONIC ATRIAL FIBRILLATION (HCC): Status: RESOLVED | Noted: 2017-08-30 | Resolved: 2020-07-10

## 2020-07-10 LAB
ANION GAP SERPL CALC-SCNC: 7 MMOL/L (ref 0–18)
BUN BLD-MCNC: 57 MG/DL (ref 7–18)
BUN/CREAT SERPL: 32 (ref 10–20)
CALCIUM BLD-MCNC: 8.5 MG/DL (ref 8.5–10.1)
CHLORIDE SERPL-SCNC: 108 MMOL/L (ref 98–112)
CO2 SERPL-SCNC: 24 MMOL/L (ref 21–32)
CREAT BLD-MCNC: 1.78 MG/DL (ref 0.55–1.02)
GLUCOSE BLD-MCNC: 126 MG/DL (ref 70–99)
NT-PROBNP SERPL-MCNC: 6618 PG/ML (ref ?–450)
OSMOLALITY SERPL CALC.SUM OF ELEC: 305 MOSM/KG (ref 275–295)
PATIENT FASTING Y/N/NP: NO
POTASSIUM SERPL-SCNC: 4.2 MMOL/L (ref 3.5–5.1)
SODIUM SERPL-SCNC: 139 MMOL/L (ref 136–145)

## 2020-07-10 PROCEDURE — 80048 BASIC METABOLIC PNL TOTAL CA: CPT | Performed by: NURSE PRACTITIONER

## 2020-07-10 PROCEDURE — 83880 ASSAY OF NATRIURETIC PEPTIDE: CPT | Performed by: NURSE PRACTITIONER

## 2020-07-10 PROCEDURE — 99212 OFFICE O/P EST SF 10 MIN: CPT | Performed by: NURSE PRACTITIONER

## 2020-07-10 PROCEDURE — 36415 COLL VENOUS BLD VENIPUNCTURE: CPT | Performed by: NURSE PRACTITIONER

## 2020-07-10 PROCEDURE — 99214 OFFICE O/P EST MOD 30 MIN: CPT | Performed by: NURSE PRACTITIONER

## 2020-07-10 RX ORDER — METOPROLOL SUCCINATE 25 MG/1
25 TABLET, EXTENDED RELEASE ORAL
Qty: 30 TABLET | Refills: 1 | Status: SHIPPED | OUTPATIENT
Start: 2020-07-10 | End: 2020-07-30

## 2020-07-10 NOTE — PATIENT INSTRUCTIONS
Increase torsemide 10 mg tab to one tablet daily for the next week     Continue all your same medications    Stop taking lisinopril for now and do not take spironolactone     Call if having any dizziness, lightheadedness, heart racing, palpitations, chest

## 2020-07-10 NOTE — PROGRESS NOTES
[address] [facility] Patient Status:  No patient class for patient encounter    1929 MRN [id number]   Location [facility] Kerney Bence, MD Dr. Marleta Clubs is a 80 year 06:50 AM    CREATSERUM 1.78 (H) 07/10/2020 02:40 PM    BUN 57 (H) 07/10/2020 02:40 PM     07/10/2020 02:40 PM    K 4.2 07/10/2020 02:40 PM     07/10/2020 02:40 PM    CO2 24.0 07/10/2020 02:40 PM     (H) 07/10/2020 02:40 PM    CA 8.5 07/1 WP:  13    TP    DPG: 15    1. Thermodilution C.O. / C. I.:  2.9 / 1.7    2. Thermodilution PVR / PVRI:  14 CARRERA / 23.4 CARRERA/m2  3. Thermo TEQ:  4075 dynes  4. Thermo PVR/SVR ratio: 0.48      3. Renea C.O. / C. I. :  4 / 2.4   4.  Renea PVR / Lucia Gilchrist /  BMP and proBNP    CKD stage III  -renal function worsened since discharge, above baseline, bun 57, creatinine 1.78, K normal    Permanent atrial fib with recent bradycardia  - toprol 200 mg dc'd in -pt  - rate controlled - 50-60's on toprol 25 mg daily (co tablet, Rfl: 1  •  LOPERAMIDE HCL OR, Take 30 mL by mouth., Disp: , Rfl:   •  ROSUVASTATIN CALCIUM 5 MG Oral Tab, TAKE 1 TABLET BY MOUTH  NIGHTLY, Disp: 90 tablet, Rfl: 0  •  UPTRAVI 1600 MCG Oral Tab, 1,600 mcg Q12H.  , Disp: , Rfl:   •  Warfarin Sodium 2

## 2020-07-12 DIAGNOSIS — I50.812 CHRONIC RIGHT-SIDED HEART FAILURE (CMD): ICD-10-CM

## 2020-07-12 DIAGNOSIS — I27.0 PRIMARY PULMONARY HYPERTENSION (CMD): ICD-10-CM

## 2020-07-13 ENCOUNTER — ANTI-COAG VISIT (OUTPATIENT)
Dept: INTERNAL MEDICINE CLINIC | Facility: CLINIC | Age: 85
End: 2020-07-13

## 2020-07-13 ENCOUNTER — TELEPHONE (OUTPATIENT)
Dept: INTERNAL MEDICINE CLINIC | Facility: CLINIC | Age: 85
End: 2020-07-13

## 2020-07-13 ENCOUNTER — OFFICE VISIT (OUTPATIENT)
Dept: ORTHOPEDICS CLINIC | Facility: CLINIC | Age: 85
End: 2020-07-13
Payer: MEDICARE

## 2020-07-13 ENCOUNTER — HOSPITAL ENCOUNTER (OUTPATIENT)
Dept: GENERAL RADIOLOGY | Facility: HOSPITAL | Age: 85
Discharge: HOME | End: 2020-07-13
Attending: ORTHOPAEDIC SURGERY | Admitting: PHYSICIAN ASSISTANT
Payer: MEDICARE

## 2020-07-13 VITALS — BODY MASS INDEX: 21.99 KG/M2 | WEIGHT: 132 LBS | HEIGHT: 65 IN

## 2020-07-13 DIAGNOSIS — Z79.01 LONG TERM (CURRENT) USE OF ANTICOAGULANTS: ICD-10-CM

## 2020-07-13 DIAGNOSIS — Z47.89 ORTHOPEDIC AFTERCARE: ICD-10-CM

## 2020-07-13 DIAGNOSIS — Z47.89 ORTHOPEDIC AFTERCARE: Primary | ICD-10-CM

## 2020-07-13 DIAGNOSIS — Z51.81 ENCOUNTER FOR THERAPEUTIC DRUG MONITORING: ICD-10-CM

## 2020-07-13 LAB — INR: 2.3 (ref 0.8–1.2)

## 2020-07-13 PROCEDURE — 73502 X-RAY EXAM HIP UNI 2-3 VIEWS: CPT | Performed by: ORTHOPAEDIC SURGERY

## 2020-07-13 PROCEDURE — G0463 HOSPITAL OUTPT CLINIC VISIT: HCPCS | Performed by: ORTHOPAEDIC SURGERY

## 2020-07-13 PROCEDURE — 99024 POSTOP FOLLOW-UP VISIT: CPT | Performed by: ORTHOPAEDIC SURGERY

## 2020-07-13 RX ORDER — TORSEMIDE 10 MG/1
TABLET ORAL
Qty: 90 TABLET | Refills: 3 | OUTPATIENT
Start: 2020-07-13

## 2020-07-13 RX ORDER — TORSEMIDE 10 MG/1
TABLET ORAL
Qty: 90 TABLET | Refills: 3 | Status: SHIPPED | OUTPATIENT
Start: 2020-07-13 | End: 2020-09-14 | Stop reason: DRUGHIGH

## 2020-07-13 NOTE — PROGRESS NOTES
NURSING INTAKE COMMENTS: Patient presents with:  Procedure Follow Up: Right hip surgery 6/12, here for follow up, doing well. C/o hip pain when supine/sleeping.   Bilateral lower extremity edema      HPI: This 80year old female presents today for follow-u Outpatient Medications   Medication Sig Dispense Refill   • Metoprolol Succinate ER 25 MG Oral Tablet 24 Hr Take 1 tablet (25 mg total) by mouth Daily Beta Blocker. 30 tablet 1   • LOPERAMIDE HCL OR Take 30 mL by mouth.      • ROSUVASTATIN CALCIUM 5 MG Oral no hx of blood dyscrasia  ENDOCRINE: no thyroid or diabetes issues  ALL/ASTHMA: no new hx of severe allergy or asthma    Physical Examination:    Ht 5' 5\" (1.651 m)   Wt 132 lb (59.9 kg)   BMI 21.97 kg/m²   Constitutional: appears well hydrated, alert and

## 2020-07-13 NOTE — TELEPHONE ENCOUNTER
Fummi/ Nurse of [facility] is calling to relay patient's PT 28.0 and INR 2.3 to the [facility]madin [facility]. Nurse states results are not critical and is requesting call back to schedule patient an appointment.

## 2020-07-16 ENCOUNTER — OFFICE VISIT (OUTPATIENT)
Dept: CARDIOLOGY CLINIC | Facility: HOSPITAL | Age: 85
End: 2020-07-16
Attending: NURSE PRACTITIONER
Payer: MEDICARE

## 2020-07-16 VITALS
HEART RATE: 61 BPM | OXYGEN SATURATION: 95 % | BODY MASS INDEX: 22 KG/M2 | SYSTOLIC BLOOD PRESSURE: 138 MMHG | WEIGHT: 131 LBS | DIASTOLIC BLOOD PRESSURE: 41 MMHG

## 2020-07-16 DIAGNOSIS — R00.1 BRADYCARDIA: ICD-10-CM

## 2020-07-16 DIAGNOSIS — I50.33 ACUTE ON CHRONIC HEART FAILURE WITH PRESERVED EJECTION FRACTION (HFPEF) (HCC): Primary | ICD-10-CM

## 2020-07-16 DIAGNOSIS — I50.82 BIVENTRICULAR HEART FAILURE (HCC): ICD-10-CM

## 2020-07-16 DIAGNOSIS — N18.30 CKD (CHRONIC KIDNEY DISEASE) STAGE 3, GFR 30-59 ML/MIN (HCC): ICD-10-CM

## 2020-07-16 DIAGNOSIS — I50.9 HEART FAILURE, UNSPECIFIED (HCC): ICD-10-CM

## 2020-07-16 DIAGNOSIS — I27.21 PAH (PULMONARY ARTERY HYPERTENSION) (HCC): ICD-10-CM

## 2020-07-16 LAB
ANION GAP SERPL CALC-SCNC: 5 MMOL/L (ref 0–18)
BUN BLD-MCNC: 34 MG/DL (ref 7–18)
BUN/CREAT SERPL: 24.5 (ref 10–20)
CALCIUM BLD-MCNC: 8.6 MG/DL (ref 8.5–10.1)
CHLORIDE SERPL-SCNC: 106 MMOL/L (ref 98–112)
CO2 SERPL-SCNC: 28 MMOL/L (ref 21–32)
CREAT BLD-MCNC: 1.39 MG/DL (ref 0.55–1.02)
GLUCOSE BLD-MCNC: 101 MG/DL (ref 70–99)
NT-PROBNP SERPL-MCNC: 5274 PG/ML (ref ?–450)
OSMOLALITY SERPL CALC.SUM OF ELEC: 296 MOSM/KG (ref 275–295)
PATIENT FASTING Y/N/NP: NO
POTASSIUM SERPL-SCNC: 4 MMOL/L (ref 3.5–5.1)
SODIUM SERPL-SCNC: 139 MMOL/L (ref 136–145)

## 2020-07-16 PROCEDURE — 83880 ASSAY OF NATRIURETIC PEPTIDE: CPT | Performed by: NURSE PRACTITIONER

## 2020-07-16 PROCEDURE — 80048 BASIC METABOLIC PNL TOTAL CA: CPT | Performed by: NURSE PRACTITIONER

## 2020-07-16 PROCEDURE — 99215 OFFICE O/P EST HI 40 MIN: CPT | Performed by: NURSE PRACTITIONER

## 2020-07-16 PROCEDURE — 36415 COLL VENOUS BLD VENIPUNCTURE: CPT | Performed by: NURSE PRACTITIONER

## 2020-07-16 PROCEDURE — 99213 OFFICE O/P EST LOW 20 MIN: CPT | Performed by: NURSE PRACTITIONER

## 2020-07-16 PROCEDURE — 96374 THER/PROPH/DIAG INJ IV PUSH: CPT | Performed by: NURSE PRACTITIONER

## 2020-07-16 RX ORDER — FUROSEMIDE 10 MG/ML
INJECTION INTRAMUSCULAR; INTRAVENOUS
Status: COMPLETED
Start: 2020-07-16 | End: 2020-07-16

## 2020-07-16 RX ADMIN — FUROSEMIDE 40 MG: 10 INJECTION INTRAMUSCULAR; INTRAVENOUS at 13:30:00

## 2020-07-16 NOTE — PROGRESS NOTES
[address] [facility] Patient Status:  No patient class for patient encounter    1929 MRN [id number]   Location [facility] MD Dr. Viral Morales is a 80 year 07/16/2020 12:40 PM    K 4.0 07/16/2020 12:40 PM     07/16/2020 12:40 PM    CO2 28.0 07/16/2020 12:40 PM     (H) 07/16/2020 12:40 PM    CA 8.6 07/16/2020 12:40 PM    ALB 2.1 (L) 06/17/2020 06:15 AM    ALKPHO 66 06/17/2020 06:15 AM    BILT 0.7 / Kirill Porter CARRERA / 23.4 CARRERA/m2  3. Thermo RMT:  8060 dynes  4. Thermo PVR/SVR ratio: 0.48      3. Renea C.O. / C. I. :  4 / 2.4   4. Renea PVR / PVRI:  10 CARRERA / 17 CARRERA/m2  5. Renea ABEL:  0345 dynes   6. Renea PVR/SVR ratio:  0.48     Saturations Study:  1.  Ao sat:   ounces per day  -follow up in the specialty care clinic on 7/22 with repeat BMP and proBNP    CKD stage III  -renal function improved, near baseline, bun 34, creatinine 1.39, K normal    Permanent atrial fib with recent bradycardia  - rate controlled - 50- Blocker. , Disp: 30 tablet, Rfl: 1  •  LOPERAMIDE HCL OR, Take 30 mL by mouth., Disp: , Rfl:   •  ROSUVASTATIN CALCIUM 5 MG Oral Tab, TAKE 1 TABLET BY MOUTH  NIGHTLY, Disp: 90 tablet, Rfl: 0  •  UPTRAVI 1600 MCG Oral Tab, 1,600 mcg Q12H.  , Disp: , Rfl:   •

## 2020-07-16 NOTE — PATIENT INSTRUCTIONS
Increase torsemide to 10 mg twice daily for 3 days ( first thing in the morning before breakfast and again about 3 pm)  then resume 10 mg one tab daily     Continue all your same medications    Call if having any dizziness, lightheadedness, heart racing, p

## 2020-07-20 ENCOUNTER — TELEPHONE (OUTPATIENT)
Dept: ORTHOPEDICS CLINIC | Facility: CLINIC | Age: 85
End: 2020-07-20

## 2020-07-20 ENCOUNTER — OFFICE VISIT (OUTPATIENT)
Dept: PHYSICAL THERAPY | Age: 85
End: 2020-07-20
Attending: PHYSICIAN ASSISTANT
Payer: MEDICARE

## 2020-07-20 DIAGNOSIS — Z47.89 ORTHOPEDIC AFTERCARE: ICD-10-CM

## 2020-07-20 PROCEDURE — 97163 PT EVAL HIGH COMPLEX 45 MIN: CPT

## 2020-07-20 NOTE — PROGRESS NOTES
PHYSICAL THERAPY EVALUATION:   Referring Physician: Dr. Alejandro Fajardo  Diagnosis:  Orthopedic aftercare (Z47.89)   S/P Hip arthroplasty  R hip  Date of Onset: 6/12/20 Date of Service: 7/20/2020     PATIENT Pepito Edmonds is a 80year old f stairs and did not use AD. She was able and drive before fall    Equipment Currently Using: Other RW    Pt goals include to be abl et o get back to the limited life and be able to live alone. Past medical history was reviewed with [place].  paxton Martínez reports functional deficits include but are not limited to .    things, pt was not using AD when she was walking, sleeping, sleeps on a recliner at this time at daughter's house,  Stairs with PT only, showering needs assistance : walk in shower and IR  Stairs:NT  Palpation: hard swelling on posterior area of incision, extending  To posterolateral  Sensation: not tested this day    AROM: (* denotes performed with pain)   RLE(ROM): LLE (ROM) RLE (MMT) LLE (MMT)   Hip flexion 80 110 NT/5 5/5   Hip exten demonstrate increased BLE SLS to improve gait on various surfaces and static/dynamic balance.:5-10 secs with 1 arm support  5.  Pt will demonstrate ability to ambulate with least resistive device to no device 150'x 1 or better with modified I if safe to be

## 2020-07-20 NOTE — TELEPHONE ENCOUNTER
Pt's daughter called. Pt has surgery on right hip 6-12-20. Pt now has a bulge on the right hip and is painful.   Please call to advise

## 2020-07-20 NOTE — TELEPHONE ENCOUNTER
Pt should see Dr. Herson Hall or partner this week to visualize the mass.   If no infectious symptoms, any time this week is probably ok

## 2020-07-20 NOTE — TELEPHONE ENCOUNTER
S/w vida and she states last wk she noticed a  gulf ball sized bulge on the side of the incision and its hard to touch. She denies any tenderness, redness, wounds, fever or chills. She denies any injury.  She states pt developed pain radiating down to belkys

## 2020-07-21 ENCOUNTER — OFFICE VISIT (OUTPATIENT)
Dept: ORTHOPEDICS CLINIC | Facility: CLINIC | Age: 85
End: 2020-07-21
Payer: MEDICARE

## 2020-07-21 DIAGNOSIS — T14.8XXA HEMATOMA: ICD-10-CM

## 2020-07-21 DIAGNOSIS — Z47.89 ORTHOPEDIC AFTERCARE: Primary | ICD-10-CM

## 2020-07-21 PROCEDURE — G0463 HOSPITAL OUTPT CLINIC VISIT: HCPCS | Performed by: ORTHOPAEDIC SURGERY

## 2020-07-21 PROCEDURE — 99024 POSTOP FOLLOW-UP VISIT: CPT | Performed by: ORTHOPAEDIC SURGERY

## 2020-07-21 NOTE — PATIENT INSTRUCTIONS
Hematoma  A hematoma is a collection of blood trapped outside of a blood vessel. It is what we think of as a bruise or a contusion. It is usually seen under the skin as a black and blue spot on your arm or leg, or a bump on your head after an injury.  It of the following occur:  · Redness around the hematoma  · Increase in pain or warmth in the hematoma  · Increase in size of the hematoma  · Fever of 100.4ºF (38ºC) or higher, or as directed by your healthcare provider  · If the hematoma is on the arm or le

## 2020-07-21 NOTE — PROGRESS NOTES
NURSING INTAKE COMMENTS: Patient presents with:  Post-Op: right hip has a large lump ,with shooting pain and numbness radiating to right foot  x one week       HPI: This 80year old female presents today with complaints of right hip mass.   Patient is appro Nandini Perez MD at [facility] MAIN OR   • HYSTERECTOMY      age 76   • LUMPECTOMY LEFT  2011   • MONET LOCALIZATION WIRE 1 SITE RIGHT (WKW=[id number])     •   1950    Pregnancy - 36 week 3 lb(s) 15 oz Female    •   [phone]    pregnacy worrisome skin lesions  EYES: denies blurred vision or double vision  HEENT: denies new nasal congestion  PULM: denies shortness of breath or cough  CARDIOVASCULAR: denies chest pain  GI: no emesis, no diarrhea  : no dysuria, no blood in urine, no diffic acute fracture or dislocation. Mild degenerative changes partially seen within the bilateral sacroiliac joints and left hip. Degenerative changes are seen at the pubic symphysis. There is osteopenia.  SOFT TISSUES: There is a small osseous fragment seen al

## 2020-07-22 ENCOUNTER — OFFICE VISIT (OUTPATIENT)
Dept: CARDIOLOGY CLINIC | Facility: HOSPITAL | Age: 85
End: 2020-07-22
Attending: NURSE PRACTITIONER
Payer: MEDICARE

## 2020-07-22 ENCOUNTER — OFFICE VISIT (OUTPATIENT)
Dept: PHYSICAL THERAPY | Age: 85
End: 2020-07-22
Attending: PHYSICIAN ASSISTANT
Payer: MEDICARE

## 2020-07-22 VITALS
OXYGEN SATURATION: 93 % | WEIGHT: 126 LBS | HEART RATE: 59 BPM | BODY MASS INDEX: 21 KG/M2 | DIASTOLIC BLOOD PRESSURE: 33 MMHG | SYSTOLIC BLOOD PRESSURE: 127 MMHG

## 2020-07-22 DIAGNOSIS — N18.30 CKD (CHRONIC KIDNEY DISEASE) STAGE 3, GFR 30-59 ML/MIN (HCC): ICD-10-CM

## 2020-07-22 DIAGNOSIS — I50.9 HEART FAILURE, UNSPECIFIED (HCC): ICD-10-CM

## 2020-07-22 DIAGNOSIS — R60.0 BILATERAL LEG EDEMA: ICD-10-CM

## 2020-07-22 DIAGNOSIS — I48.21 PERMANENT ATRIAL FIBRILLATION (HCC): ICD-10-CM

## 2020-07-22 DIAGNOSIS — I27.21 PAH (PULMONARY ARTERY HYPERTENSION) (HCC): ICD-10-CM

## 2020-07-22 DIAGNOSIS — I50.33 ACUTE ON CHRONIC HEART FAILURE WITH PRESERVED EJECTION FRACTION (HFPEF) (HCC): Primary | ICD-10-CM

## 2020-07-22 DIAGNOSIS — I50.82 BIVENTRICULAR HEART FAILURE (HCC): ICD-10-CM

## 2020-07-22 LAB
ANION GAP SERPL CALC-SCNC: 9 MMOL/L (ref 0–18)
BUN BLD-MCNC: 30 MG/DL (ref 7–18)
BUN/CREAT SERPL: 20.7 (ref 10–20)
CALCIUM BLD-MCNC: 8.8 MG/DL (ref 8.5–10.1)
CHLORIDE SERPL-SCNC: 105 MMOL/L (ref 98–112)
CO2 SERPL-SCNC: 28 MMOL/L (ref 21–32)
CREAT BLD-MCNC: 1.45 MG/DL (ref 0.55–1.02)
GLUCOSE BLD-MCNC: 132 MG/DL (ref 70–99)
NT-PROBNP SERPL-MCNC: 4633 PG/ML (ref ?–450)
OSMOLALITY SERPL CALC.SUM OF ELEC: 302 MOSM/KG (ref 275–295)
PATIENT FASTING Y/N/NP: NO
POTASSIUM SERPL-SCNC: 3.7 MMOL/L (ref 3.5–5.1)
SODIUM SERPL-SCNC: 142 MMOL/L (ref 136–145)

## 2020-07-22 PROCEDURE — 36415 COLL VENOUS BLD VENIPUNCTURE: CPT | Performed by: NURSE PRACTITIONER

## 2020-07-22 PROCEDURE — 99212 OFFICE O/P EST SF 10 MIN: CPT | Performed by: NURSE PRACTITIONER

## 2020-07-22 PROCEDURE — 83880 ASSAY OF NATRIURETIC PEPTIDE: CPT | Performed by: NURSE PRACTITIONER

## 2020-07-22 PROCEDURE — 97110 THERAPEUTIC EXERCISES: CPT

## 2020-07-22 PROCEDURE — 99214 OFFICE O/P EST MOD 30 MIN: CPT | Performed by: NURSE PRACTITIONER

## 2020-07-22 PROCEDURE — 80048 BASIC METABOLIC PNL TOTAL CA: CPT | Performed by: NURSE PRACTITIONER

## 2020-07-22 RX ORDER — TORSEMIDE 10 MG/1
10 TABLET ORAL DAILY
Qty: 90 TABLET | Refills: 0 | Status: SHIPPED | OUTPATIENT
Start: 2020-07-22 | End: 2020-07-27

## 2020-07-22 NOTE — PROGRESS NOTES
Dx:  Orthopedic aftercare (Z47.89)   S/P Hip arthroplasty  R hip  Date of Onset: 6/12/20        Insurance (Authorized # of Visits): 12 visits per 1923 DAEN Morales Physician: Dr. Deyvi Gutierres MD visit: 08/24/2020  Fall Risk: standard balance.:5-10 secs with 1 arm support  5. Pt will demonstrate ability to ambulate with least resistive device to no device 150'x 1 or better with modified I if safe to be an efficient household Ambulator  6.  Pt will report decreased in pain 50% or better t

## 2020-07-22 NOTE — PROGRESS NOTES
1263 [facility] Patient Status:  No patient class for patient encounter    1929 MRN [id number]   Location [facility] MD Dr. Viral Morales is a 80 year 07/01/2020 06:50 AM    .0 07/01/2020 06:50 AM    CREATSERUM 1.39 (H) 07/16/2020 12:40 PM    BUN 34 (H) 07/16/2020 12:40 PM     07/16/2020 12:40 PM    K 4.0 07/16/2020 12:40 PM     07/16/2020 12:40 PM    CO2 28.0 07/16/2020 12:40 PM    GL cath:  Hemodynamics:   1. BP:  136/71 m: 93   2. RA:  10   3. RV:  97/10  4. PA:   90/28 m: 53    5. WP:  13    TP    DPG: 15    1. Thermodilution C.O. / C. I.:  2.9 / 1.7    2. Thermodilution PVR / PVRI:  14 CARRERA / 23.4 CARRERA/m2  3.  Thermo IZR:  5128 dynes stable  -follow up in the specialty care clinic on 7/29 with repeat BMP and proBNP    CKD stage III   -renal function stable /near baseline , bun/cr 30/1.45 ( baseline cr 1.5-1.6,)    Permanent atrial fib with recent bradycardia  -intermittent palpitations Succinate ER 25 MG Oral Tablet 24 Hr, Take 1 tablet (25 mg total) by mouth Daily Beta Blocker. , Disp: 30 tablet, Rfl: 1  •  LOPERAMIDE HCL OR, Take 30 mL by mouth., Disp: , Rfl:   •  ROSUVASTATIN CALCIUM 5 MG Oral Tab, TAKE 1 TABLET BY MOUTH  NIGHTLY, Disp

## 2020-07-22 NOTE — PATIENT INSTRUCTIONS
Continue torsemide 10 mg twice daily for another 5 days then resume torsemide 10 mg daily     Continue all your same medications    Call if having any dizziness, lightheadedness, heart racing, palpitations, chest pain, shortness of breath, coughing,  wheez

## 2020-07-27 ENCOUNTER — OFFICE VISIT (OUTPATIENT)
Dept: PHYSICAL THERAPY | Age: 85
End: 2020-07-27
Attending: PHYSICIAN ASSISTANT
Payer: MEDICARE

## 2020-07-27 PROCEDURE — 97110 THERAPEUTIC EXERCISES: CPT

## 2020-07-27 RX ORDER — TORSEMIDE 10 MG/1
10 TABLET ORAL DAILY
Qty: 105 TABLET | Refills: 0 | Status: SHIPPED | OUTPATIENT
Start: 2020-07-27 | End: 2020-07-30

## 2020-07-27 NOTE — PROGRESS NOTES
Dx:  Orthopedic aftercare (Z47.89)   S/P Hip arthroplasty  R hip  Date of Onset: 6/12/20        Insurance (Authorized # of Visits): 12 visits per 1923 DANE Morales Physician: Dr. Ligia Gutierres MD visit: 08/24/2020  Fall Risk: standard management of symptoms to  continue with gains in therapy. 2. Pt will demonstrate improved strength on RLE muscles graded below 5/5 to half a grade or better to be able to return to living I safely  3. Pt will demonstrate improved  AROM on R hip to be at l

## 2020-07-28 NOTE — TELEPHONE ENCOUNTER
Chart reviewed. [facility] visit done on 7/13- INR results provided by Richy Hodges. Patient will return to coming to the [facility]- her daughter will bring her- [facility] appointment made for 8/10.

## 2020-07-29 ENCOUNTER — APPOINTMENT (OUTPATIENT)
Dept: PHYSICAL THERAPY | Age: 85
End: 2020-07-29
Attending: PHYSICIAN ASSISTANT
Payer: MEDICARE

## 2020-07-30 ENCOUNTER — OFFICE VISIT (OUTPATIENT)
Dept: CARDIOLOGY CLINIC | Facility: HOSPITAL | Age: 85
End: 2020-07-30
Attending: NURSE PRACTITIONER
Payer: MEDICARE

## 2020-07-30 ENCOUNTER — OFFICE VISIT (OUTPATIENT)
Dept: PHYSICAL THERAPY | Age: 85
End: 2020-07-30
Attending: PHYSICIAN ASSISTANT
Payer: MEDICARE

## 2020-07-30 VITALS
WEIGHT: 125.31 LBS | SYSTOLIC BLOOD PRESSURE: 154 MMHG | DIASTOLIC BLOOD PRESSURE: 61 MMHG | OXYGEN SATURATION: 95 % | BODY MASS INDEX: 21 KG/M2 | HEART RATE: 58 BPM

## 2020-07-30 DIAGNOSIS — I50.82 BIVENTRICULAR HEART FAILURE (HCC): ICD-10-CM

## 2020-07-30 DIAGNOSIS — R60.0 BILATERAL LEG EDEMA: ICD-10-CM

## 2020-07-30 DIAGNOSIS — I48.11 LONGSTANDING PERSISTENT ATRIAL FIBRILLATION (HCC): ICD-10-CM

## 2020-07-30 DIAGNOSIS — D64.9 ANEMIA, UNSPECIFIED TYPE: ICD-10-CM

## 2020-07-30 DIAGNOSIS — I10 ESSENTIAL HYPERTENSION: ICD-10-CM

## 2020-07-30 DIAGNOSIS — E78.00 PURE HYPERCHOLESTEROLEMIA: ICD-10-CM

## 2020-07-30 DIAGNOSIS — N18.30 CKD (CHRONIC KIDNEY DISEASE) STAGE 3, GFR 30-59 ML/MIN (HCC): ICD-10-CM

## 2020-07-30 DIAGNOSIS — N18.30 CHRONIC KIDNEY DISEASE, STAGE III (MODERATE) (HCC): Primary | ICD-10-CM

## 2020-07-30 DIAGNOSIS — I27.21 PAH (PULMONARY ARTERY HYPERTENSION) (HCC): ICD-10-CM

## 2020-07-30 DIAGNOSIS — I50.9 HEART FAILURE, UNSPECIFIED (HCC): ICD-10-CM

## 2020-07-30 DIAGNOSIS — I50.33 ACUTE ON CHRONIC HEART FAILURE WITH PRESERVED EJECTION FRACTION (HFPEF) (HCC): Primary | ICD-10-CM

## 2020-07-30 LAB
ANION GAP SERPL CALC-SCNC: 5 MMOL/L (ref 0–18)
BUN BLD-MCNC: 24 MG/DL (ref 7–18)
BUN/CREAT SERPL: 20.7 (ref 10–20)
CALCIUM BLD-MCNC: 8.9 MG/DL (ref 8.5–10.1)
CHLORIDE SERPL-SCNC: 108 MMOL/L (ref 98–112)
CO2 SERPL-SCNC: 25 MMOL/L (ref 21–32)
CREAT BLD-MCNC: 1.16 MG/DL (ref 0.55–1.02)
GLUCOSE BLD-MCNC: 79 MG/DL (ref 70–99)
INR BLD: 1.7 (ref 0.9–1.2)
NT-PROBNP SERPL-MCNC: 3719 PG/ML (ref ?–450)
OSMOLALITY SERPL CALC.SUM OF ELEC: 289 MOSM/KG (ref 275–295)
PATIENT FASTING Y/N/NP: NO
POTASSIUM SERPL-SCNC: 3.9 MMOL/L (ref 3.5–5.1)
PROTHROMBIN TIME: 19.7 SECONDS (ref 11.8–14.5)
SODIUM SERPL-SCNC: 138 MMOL/L (ref 136–145)

## 2020-07-30 PROCEDURE — 36415 COLL VENOUS BLD VENIPUNCTURE: CPT | Performed by: NURSE PRACTITIONER

## 2020-07-30 PROCEDURE — 80048 BASIC METABOLIC PNL TOTAL CA: CPT | Performed by: NURSE PRACTITIONER

## 2020-07-30 PROCEDURE — 83880 ASSAY OF NATRIURETIC PEPTIDE: CPT | Performed by: NURSE PRACTITIONER

## 2020-07-30 PROCEDURE — 99214 OFFICE O/P EST MOD 30 MIN: CPT | Performed by: NURSE PRACTITIONER

## 2020-07-30 PROCEDURE — 85610 PROTHROMBIN TIME: CPT | Performed by: NURSE PRACTITIONER

## 2020-07-30 PROCEDURE — 97110 THERAPEUTIC EXERCISES: CPT

## 2020-07-30 PROCEDURE — 99212 OFFICE O/P EST SF 10 MIN: CPT | Performed by: NURSE PRACTITIONER

## 2020-07-30 RX ORDER — TORSEMIDE 10 MG/1
10 TABLET ORAL DAILY
COMMUNITY
Start: 2020-07-30

## 2020-07-30 RX ORDER — METOPROLOL SUCCINATE 25 MG/1
25 TABLET, EXTENDED RELEASE ORAL 2 TIMES DAILY
Qty: 60 TABLET | Refills: 1 | Status: SHIPPED | OUTPATIENT
Start: 2020-07-30 | End: 2020-08-17

## 2020-07-30 NOTE — PROGRESS NOTES
Dx:  Orthopedic aftercare (Z47.89)  S/P Hip arthroplasty  R hip,  Date of Onset: 6/12/20        Insurance (Authorized # of Visits): 12 visits per 1923 DANE Morales Physician: Dr. Ethan Gutierres MD visit: 08/24/2020  Fall Risk: standard need constant cue's to avoid R foot IR     HEP:  Review previous HEP    HEP: see below HEP:     HEP:  Supine/sitting hip abd with TB, supine/sitting hip add with squeeze ball, issued YTB    Assessment: Patient c/o slight tired and fatigued with exercise.  A

## 2020-07-30 NOTE — PROGRESS NOTES
1263 [facility] Patient Status:  No patient class for patient encounter    1929 MRN [id number]   Location [address] [facility] MD Dr. Yazmin Estrada is a 80 year negative  Musculoskeletal: right hip/ leg pain and weakness , using rolling walker.      Objective:  Lab Results   Component Value Date/Time    WBC 5.8 07/01/2020 06:50 AM    HGB 9.2 (L) 07/01/2020 06:50 AM    HCT 28.6 (L) 07/01/2020 06:50 AM    .0 0 baseball sized distal hip hematoma, no ecchymosis , tender to palpation,     Diagnostics:  EC2020 atrial fib  Echocardiogram: 20 EF 55-60%, no wma, mod MR, severe LA dilation, RV dilated, decreased systolic function, RA dilation, mod Pulm regu 1.45 <--1.39 <--1.78, K 3.7, sodium 142  -pro bnp  Improved to 3719  <-- 4633  <--5274  <--6618 <-- 3903/ evolemic  - BP elevated  -continue torsemide 10 mg mg daily   -increase toprol to 25 mg bid with increased palpitations at bedtime, HR 58-68 today in ROSUVASTATIN CALCIUM 5 MG Oral Tab, TAKE 1 TABLET BY MOUTH  NIGHTLY, Disp: 90 tablet, Rfl: 0  •  UPTRAVI 1600 MCG Oral Tab, 1,600 mcg Q12H.  , Disp: , Rfl:   •  Warfarin Sodium 2.5 MG Oral Tab, TAKE ONE-HALF TABLET BY  MOUTH ON TUESDAYS AND 1  TABLET 6 DAY

## 2020-07-30 NOTE — PATIENT INSTRUCTIONS
Increase metoprolol succinate to 25 mg on tablet twice daily     Continue all your same medications including torsemide 10 mg daily     Call if having any dizziness, lightheadedness, heart racing, palpitations, chest pain, shortness of breath, coughing,  w

## 2020-08-03 ENCOUNTER — OFFICE VISIT (OUTPATIENT)
Dept: PHYSICAL THERAPY | Age: 85
End: 2020-08-03
Attending: PHYSICIAN ASSISTANT
Payer: MEDICARE

## 2020-08-03 PROCEDURE — 97110 THERAPEUTIC EXERCISES: CPT

## 2020-08-03 NOTE — PROGRESS NOTES
Dx:  Orthopedic aftercare (Z47.89)  S/P Hip arthroplasty  R hip,  Date of Onset: 6/12/20        Insurance (Authorized # of Visits): 12 visits per 1923 DANE Morales Physician: Dr. Lollie Closs Next MD visit: 08/24/2020  Fall Risk: standard hold  -Seated hip add with squeeze ball 2 x10  -Seated hip abd with YTB 2 x10  -Supine hip add with squeeze ball 10 x5 sec hold  -Supine hip abd with YTB x10  -Supine heel slide x10 -Therx:  -Nu Step L5 x 6 min with with BUE's    -Standing heel/toe raises 50% or better to be able to progress well in therapy from unloaded exercises to loaded exercises-      Plan: Continued to right hip ROM, stretching and gentle hip strengthening exercises per patient tolerance.      Charges: Ex x3       Total Timed Treatment

## 2020-08-05 ENCOUNTER — OFFICE VISIT (OUTPATIENT)
Dept: PHYSICAL THERAPY | Age: 85
End: 2020-08-05
Attending: PHYSICIAN ASSISTANT
Payer: MEDICARE

## 2020-08-05 PROCEDURE — 97110 THERAPEUTIC EXERCISES: CPT

## 2020-08-05 RX ORDER — WARFARIN SODIUM 2.5 MG/1
TABLET ORAL
Qty: 90 TABLET | Refills: 0 | Status: SHIPPED | OUTPATIENT
Start: 2020-08-05 | End: 2020-10-21

## 2020-08-05 NOTE — TELEPHONE ENCOUNTER
Last seen 1/28/2020. Last [facility] appt was on 7/30/2020.  Refill(s) pended and routed to Dr. Jose Hutson

## 2020-08-05 NOTE — PROGRESS NOTES
Dx:  Orthopedic aftercare (Z47.89)  S/P Hip arthroplasty  R hip,  Date of Onset: 6/12/20        Insurance (Authorized # of Visits): 12 visits per 1923 S Candida Morales Physician: Dr. Deven Gutierres MD visit: 08/24/2020  Fall Risk: standard hip abd with YTB 2 x10  -Supine hip add with squeeze ball 10 x5 sec hold  -Supine hip abd with YTB x10  -Supine heel slide x10 -Therx:  -Nu Step L5 x 6 min with with BUE's    -Standing heel/toe raises x15  -Standing hip abd/flexion x15 ea  -Standing marchi Progressed  2. Pt will demonstrate improved strength on RLE muscles graded below 5/5 to half a grade or better to be able to return to living I safely -In Progressed  3. Pt will demonstrate improved  AROM on R hip to be at least 90 degrees flexion and 30 ab

## 2020-08-10 ENCOUNTER — TELEPHONE (OUTPATIENT)
Dept: INTERNAL MEDICINE CLINIC | Facility: CLINIC | Age: 85
End: 2020-08-10

## 2020-08-10 ENCOUNTER — ANTI-COAG VISIT (OUTPATIENT)
Dept: INTERNAL MEDICINE CLINIC | Facility: CLINIC | Age: 85
End: 2020-08-10
Payer: MEDICARE

## 2020-08-10 ENCOUNTER — TELEPHONE (OUTPATIENT)
Dept: ORTHOPEDICS CLINIC | Facility: CLINIC | Age: 85
End: 2020-08-10

## 2020-08-10 ENCOUNTER — OFFICE VISIT (OUTPATIENT)
Dept: PHYSICAL THERAPY | Age: 85
End: 2020-08-10
Attending: PHYSICIAN ASSISTANT
Payer: MEDICARE

## 2020-08-10 DIAGNOSIS — Z51.81 ENCOUNTER FOR THERAPEUTIC DRUG MONITORING: ICD-10-CM

## 2020-08-10 DIAGNOSIS — Z79.01 LONG TERM (CURRENT) USE OF ANTICOAGULANTS: ICD-10-CM

## 2020-08-10 DIAGNOSIS — I48.20 CHRONIC ATRIAL FIBRILLATION (HCC): ICD-10-CM

## 2020-08-10 DIAGNOSIS — I48.91 ATRIAL FIBRILLATION, UNSPECIFIED TYPE (HCC): ICD-10-CM

## 2020-08-10 LAB
INR: 1.8 (ref 0.8–1.2)
TEST STRIP EXPIRATION DATE: ABNORMAL DATE

## 2020-08-10 PROCEDURE — 36416 COLLJ CAPILLARY BLOOD SPEC: CPT

## 2020-08-10 PROCEDURE — 85610 PROTHROMBIN TIME: CPT

## 2020-08-10 NOTE — TELEPHONE ENCOUNTER
Today's INR 1.8  Goal 2.0-3.0    Per protocol, weekly dose increase by 10%, actual increase 7.7% and repeat INR in 2 weeks.

## 2020-08-10 NOTE — TELEPHONE ENCOUNTER
Per Gardenia Latif states Dr. Raymundo Dee is signing home health orders, but has no record of dr seeing pt. She needs to clarify who the patient saw and who should be signing the order.  Please advise  Fax: [phone]

## 2020-08-10 NOTE — PROGRESS NOTES
PT started treatment in [facility] x3 mins but upon initiation of treament there was a [place] warning and department had to take shelter.  Pt waited until clear and was okay to reschedule this appt

## 2020-08-10 NOTE — TELEPHONE ENCOUNTER
Dr. Piyush Mccracken, noted patient was seen by you on 7/21/2020. Please advise if you will be signing home health orders? Thank you.

## 2020-08-11 ENCOUNTER — TELEPHONE (OUTPATIENT)
Dept: PHYSICAL THERAPY | Age: 85
End: 2020-08-11

## 2020-08-11 ENCOUNTER — APPOINTMENT (OUTPATIENT)
Dept: PHYSICAL THERAPY | Age: 85
End: 2020-08-11
Attending: INTERNAL MEDICINE
Payer: MEDICARE

## 2020-08-11 NOTE — TELEPHONE ENCOUNTER
Called [facility] and spoke to Santo Mccormick. Call transferred to Belkys Peña and she states that maybe another Santo Mccormick from [facility] had called. Tried connecting me to the other Santo Mccormick but line was busy. Advised her to fax order with attn to me to ortho if they do not have what they need and we will have Danay sign order. Gave ortho fax #. Belkys Peña verbalized understanding.

## 2020-08-12 ENCOUNTER — OFFICE VISIT (OUTPATIENT)
Dept: PHYSICAL THERAPY | Age: 85
End: 2020-08-12
Attending: INTERNAL MEDICINE
Payer: MEDICARE

## 2020-08-12 ENCOUNTER — APPOINTMENT (OUTPATIENT)
Dept: PHYSICAL THERAPY | Age: 85
End: 2020-08-12
Attending: PHYSICIAN ASSISTANT
Payer: MEDICARE

## 2020-08-12 PROCEDURE — 97110 THERAPEUTIC EXERCISES: CPT

## 2020-08-12 NOTE — PROGRESS NOTES
Dx:  Orthopedic aftercare (Z47.89)  S/P Hip arthroplasty  R hip,  Date of Onset: 6/12/20        Insurance (Authorized # of Visits): 12 visits per 1923 S Candida Morales Physician: Dr. Jemma Gutierres MD visit: 08/24/2020  Fall Risk: standard resistive device to no device 150'x 1 or better with modified I if safe to be an efficient household Ambulator-Partially MET (catalino to walk around house with RW)  6. Pt will report decreased in pain 50% or better to be able to progress well in therapy from u

## 2020-08-13 ENCOUNTER — APPOINTMENT (OUTPATIENT)
Dept: PHYSICAL THERAPY | Age: 85
End: 2020-08-13
Attending: PHYSICIAN ASSISTANT
Payer: MEDICARE

## 2020-08-14 ENCOUNTER — OFFICE VISIT (OUTPATIENT)
Dept: PHYSICAL THERAPY | Age: 85
End: 2020-08-14
Attending: INTERNAL MEDICINE
Payer: MEDICARE

## 2020-08-14 PROCEDURE — 97110 THERAPEUTIC EXERCISES: CPT

## 2020-08-14 NOTE — PROGRESS NOTES
Dx:  Orthopedic aftercare (Z47.89)  S/P Hip arthroplasty  R hip,  Date of Onset: 6/12/20        Insurance (Authorized # of Visits): 12 visits per 1923 S Candida Morales Physician: Dr. Radha Gutierres MD visit: 08/24/2020  Fall Risk: standard management of symptoms to  continue with gains in therapy.-In Progressed  2. Pt will demonstrate improved strength on RLE muscles graded below 5/5 to half a grade or better to be able to return to living I safely -In Progressed  3. Pt will demonstrate impro

## 2020-08-17 ENCOUNTER — LAB ENCOUNTER (OUTPATIENT)
Dept: LAB | Facility: HOSPITAL | Age: 85
End: 2020-08-17
Attending: NURSE PRACTITIONER
Payer: MEDICARE

## 2020-08-17 ENCOUNTER — OFFICE VISIT (OUTPATIENT)
Dept: CARDIOLOGY CLINIC | Facility: HOSPITAL | Age: 85
End: 2020-08-17
Attending: NURSE PRACTITIONER
Payer: MEDICARE

## 2020-08-17 ENCOUNTER — TELEPHONE (OUTPATIENT)
Dept: ORTHOPEDICS CLINIC | Facility: CLINIC | Age: 85
End: 2020-08-17

## 2020-08-17 ENCOUNTER — APPOINTMENT (OUTPATIENT)
Dept: PHYSICAL THERAPY | Age: 85
End: 2020-08-17
Attending: PHYSICIAN ASSISTANT
Payer: MEDICARE

## 2020-08-17 VITALS
SYSTOLIC BLOOD PRESSURE: 164 MMHG | BODY MASS INDEX: 21 KG/M2 | DIASTOLIC BLOOD PRESSURE: 50 MMHG | WEIGHT: 126 LBS | HEART RATE: 63 BPM | OXYGEN SATURATION: 98 %

## 2020-08-17 DIAGNOSIS — E78.00 PURE HYPERCHOLESTEROLEMIA: ICD-10-CM

## 2020-08-17 DIAGNOSIS — I50.33 ACUTE ON CHRONIC HEART FAILURE WITH PRESERVED EJECTION FRACTION (HFPEF) (HCC): Primary | ICD-10-CM

## 2020-08-17 DIAGNOSIS — I50.33 ACUTE ON CHRONIC HEART FAILURE WITH PRESERVED EJECTION FRACTION (HFPEF) (HCC): ICD-10-CM

## 2020-08-17 DIAGNOSIS — I27.21 PAH (PULMONARY ARTERY HYPERTENSION) (HCC): ICD-10-CM

## 2020-08-17 DIAGNOSIS — I11.0 BENIGN HYPERTENSIVE HEART DISEASE WITH HEART FAILURE (HCC): ICD-10-CM

## 2020-08-17 DIAGNOSIS — I50.82 BIVENTRICULAR HEART FAILURE (HCC): ICD-10-CM

## 2020-08-17 DIAGNOSIS — R60.0 BILATERAL LEG EDEMA: ICD-10-CM

## 2020-08-17 DIAGNOSIS — N18.30 CHRONIC KIDNEY DISEASE, STAGE III (MODERATE) (HCC): ICD-10-CM

## 2020-08-17 DIAGNOSIS — N18.30 CKD (CHRONIC KIDNEY DISEASE) STAGE 3, GFR 30-59 ML/MIN (HCC): ICD-10-CM

## 2020-08-17 DIAGNOSIS — D64.9 ANEMIA, UNSPECIFIED TYPE: ICD-10-CM

## 2020-08-17 LAB
ABSOLUTE IMMATURE GRANULOCYTES (OFFPRE24): NORMAL
ALBUMIN SERPL-MCNC: 3.2 G/DL (ref 3.4–5)
ALBUMIN/GLOB SERPL: 0.8 {RATIO} (ref 1–2)
ALP LIVER SERPL-CCNC: 102 U/L (ref 55–142)
ALT SERPL-CCNC: 11 U/L (ref 13–56)
ANION GAP SERPL CALC-SCNC: 7 MMOL/L (ref 0–18)
ANION GAP SERPL CALC-SCNC: NORMAL MMOL/L
AST SERPL-CCNC: 17 U/L (ref 15–37)
BASO+EOS+MONOS # BLD: NORMAL 10*3/UL
BASO+EOS+MONOS NFR BLD: NORMAL %
BASOPHILS # BLD AUTO: 0.07 X10(3) UL (ref 0–0.2)
BASOPHILS # BLD: NORMAL 10*3/UL
BASOPHILS NFR BLD AUTO: 1 %
BASOPHILS NFR BLD: NORMAL %
BILIRUB SERPL-MCNC: 0.5 MG/DL (ref 0.1–2)
BILIRUB UR QL: NEGATIVE
BUN BLD-MCNC: 25 MG/DL (ref 7–18)
BUN SERPL-MCNC: 26 MG/DL
BUN/CREAT SERPL: 23.1 (ref 10–20)
BUN/CREAT SERPL: NORMAL
CALCIUM BLD-MCNC: 9 MG/DL (ref 8.5–10.1)
CALCIUM SERPL-MCNC: NORMAL MG/DL
CALCIUM, CORRECTED: NORMAL
CHLORIDE SERPL-SCNC: 108 MMOL/L
CHLORIDE SERPL-SCNC: 108 MMOL/L (ref 98–112)
CHOLEST SERPL-MCNC: 140 MG/DL
CHOLEST SERPL-MCNC: 140 MG/DL
CHOLEST SMN-MCNC: 140 MG/DL (ref ?–200)
CHOLEST/HDLC SERPL: NORMAL {RATIO}
CHOLEST/HDLC SERPL: NORMAL {RATIO}
CO2 SERPL-SCNC: 27 MMOL/L (ref 21–32)
CO2 SERPL-SCNC: NORMAL MMOL/L
COLOR UR: YELLOW
CREAT BLD-MCNC: 1.08 MG/DL (ref 0.55–1.02)
CREAT SERPL-MCNC: 1.08 MG/DL
DEPRECATED HBV CORE AB SER IA-ACNC: 40.8 NG/ML (ref 18–340)
DEPRECATED RDW RBC AUTO: 50 FL (ref 35.1–46.3)
DIFFERENTIAL METHOD BLD: NORMAL
EOSINOPHIL # BLD AUTO: 0.08 X10(3) UL (ref 0–0.7)
EOSINOPHIL # BLD: NORMAL 10*3/UL
EOSINOPHIL NFR BLD AUTO: 1.2 %
EOSINOPHIL NFR BLD: NORMAL %
ERYTHROCYTE [DISTWIDTH] IN BLOOD BY AUTOMATED COUNT: 14 % (ref 11–15)
ERYTHROCYTE [DISTWIDTH] IN BLOOD: NORMAL %
FERRITIN SERPL-MCNC: 96 NG/ML
GLOBULIN PLAS-MCNC: 4.1 G/DL (ref 2.8–4.4)
GLUCOSE BLD-MCNC: 82 MG/DL (ref 70–99)
GLUCOSE SERPL-MCNC: 82 MG/DL
GLUCOSE UR-MCNC: NEGATIVE MG/DL
HCT VFR BLD AUTO: 34.3 % (ref 35–48)
HCT VFR BLD CALC: 34.3 %
HDLC SERPL-MCNC: 48 MG/DL
HDLC SERPL-MCNC: 48 MG/DL
HDLC SERPL-MCNC: 48 MG/DL (ref 40–59)
HGB BLD-MCNC: 11.3 G/DL
HGB BLD-MCNC: 11.3 G/DL (ref 12–16)
IMM GRANULOCYTES # BLD AUTO: 0.02 X10(3) UL (ref 0–1)
IMM GRANULOCYTES NFR BLD: 0.3 %
IMMATURE GRANULOCYTES (OFFPRE25): NORMAL
IRON SATURATION: 16 % (ref 15–50)
IRON SERPL-MCNC: 46 UG/DL (ref 50–170)
KETONES UR-MCNC: NEGATIVE MG/DL
LDLC SERPL CALC-MCNC: 80 MG/DL
LDLC SERPL CALC-MCNC: 80 MG/DL
LDLC SERPL CALC-MCNC: 80 MG/DL (ref ?–100)
LENGTH OF FAST TIME PATIENT: NORMAL H
LYMPHOCYTES # BLD AUTO: 1.79 X10(3) UL (ref 1–4)
LYMPHOCYTES # BLD: NORMAL 10*3/UL
LYMPHOCYTES NFR BLD AUTO: 26.4 %
LYMPHOCYTES NFR BLD: NORMAL %
M PROTEIN MFR SERPL ELPH: 7.3 G/DL (ref 6.4–8.2)
MCH RBC QN AUTO: 31.8 PG
MCH RBC QN AUTO: 31.8 PG (ref 26–34)
MCHC RBC AUTO-ENTMCNC: 32.9 G/DL
MCHC RBC AUTO-ENTMCNC: 32.9 G/DL (ref 31–37)
MCV RBC AUTO: 96.6 FL
MCV RBC AUTO: 96.6 FL (ref 80–100)
MONOCYTES # BLD AUTO: 0.44 X10(3) UL (ref 0.1–1)
MONOCYTES # BLD: NORMAL 10*3/UL
MONOCYTES NFR BLD AUTO: 6.5 %
MONOCYTES NFR BLD: NORMAL %
MPV (OFFPRE2): NORMAL
NEUTROPHILS # BLD AUTO: 4.37 X10 (3) UL (ref 1.5–7.7)
NEUTROPHILS # BLD AUTO: 4.37 X10(3) UL (ref 1.5–7.7)
NEUTROPHILS # BLD: NORMAL 10*3/UL
NEUTROPHILS NFR BLD AUTO: 64.6 %
NEUTROPHILS NFR BLD: NORMAL %
NITRITE UR QL STRIP.AUTO: POSITIVE
NONHDLC SERPL-MCNC: 92 MG/DL (ref ?–130)
NONHDLC SERPL-MCNC: NORMAL MG/DL
NONHDLC SERPL-MCNC: NORMAL MG/DL
NRBC BLD MANUAL-RTO: NORMAL %
NT-PROBNP SERPL-MCNC: 3914 PG/ML (ref ?–450)
OSMOLALITY SERPL CALC.SUM OF ELEC: 297 MOSM/KG (ref 275–295)
PATIENT FASTING Y/N/NP: NO
PATIENT FASTING Y/N/NP: NO
PH UR: 6 [PH] (ref 5–8)
PLAT MORPH BLD: NORMAL
PLATELET # BLD AUTO: 215 10(3)UL (ref 150–450)
PLATELET # BLD: 215 K/MCL
POTASSIUM SERPL-SCNC: 3.7 MMOL/L
POTASSIUM SERPL-SCNC: 3.7 MMOL/L (ref 3.5–5.1)
PROT UR-MCNC: 100 MG/DL
RBC # BLD AUTO: 3.55 X10(6)UL (ref 3.8–5.3)
RBC # BLD: 3.55 10*6/UL
RBC #/AREA URNS AUTO: 6 /HPF
RBC MORPH BLD: NORMAL
SODIUM SERPL-SCNC: 142 MMOL/L
SODIUM SERPL-SCNC: 142 MMOL/L (ref 136–145)
SP GR UR STRIP: 1.02 (ref 1–1.03)
TOTAL IRON BINDING CAPACITY: 282 UG/DL (ref 240–450)
TRANSFERRIN SERPL-MCNC: 189 MG/DL (ref 200–360)
TRIGL SERPL-MCNC: 61 MG/DL
TRIGL SERPL-MCNC: 61 MG/DL
TRIGL SERPL-MCNC: 61 MG/DL (ref 30–149)
UROBILINOGEN UR STRIP-ACNC: <2
VIT B12 SERPL-MCNC: 20 PG/ML
VIT B12 SERPL-MCNC: 250 PG/ML (ref 193–986)
VLDLC SERPL CALC-MCNC: 12 MG/DL (ref 0–30)
VLDLC SERPL CALC-MCNC: NORMAL MG/DL
VLDLC SERPL CALC-MCNC: NORMAL MG/DL
WBC # BLD AUTO: 6.8 X10(3) UL (ref 4–11)
WBC # BLD: 6.5 K/MCL
WBC #/AREA URNS AUTO: 877 /HPF
WBC MORPH BLD: NORMAL

## 2020-08-17 PROCEDURE — 80061 LIPID PANEL: CPT

## 2020-08-17 PROCEDURE — 84466 ASSAY OF TRANSFERRIN: CPT

## 2020-08-17 PROCEDURE — 99212 OFFICE O/P EST SF 10 MIN: CPT | Performed by: NURSE PRACTITIONER

## 2020-08-17 PROCEDURE — 81001 URINALYSIS AUTO W/SCOPE: CPT

## 2020-08-17 PROCEDURE — 99214 OFFICE O/P EST MOD 30 MIN: CPT | Performed by: NURSE PRACTITIONER

## 2020-08-17 PROCEDURE — 36415 COLL VENOUS BLD VENIPUNCTURE: CPT

## 2020-08-17 PROCEDURE — 82728 ASSAY OF FERRITIN: CPT

## 2020-08-17 PROCEDURE — 85025 COMPLETE CBC W/AUTO DIFF WBC: CPT

## 2020-08-17 PROCEDURE — 82607 VITAMIN B-12: CPT

## 2020-08-17 PROCEDURE — 80053 COMPREHEN METABOLIC PANEL: CPT

## 2020-08-17 PROCEDURE — 83540 ASSAY OF IRON: CPT

## 2020-08-17 PROCEDURE — 83880 ASSAY OF NATRIURETIC PEPTIDE: CPT

## 2020-08-17 RX ORDER — SILDENAFIL CITRATE 20 MG/1
20 TABLET ORAL 3 TIMES DAILY
Qty: 90 TABLET | Refills: 0 | Status: SHIPPED | OUTPATIENT
Start: 2020-08-17

## 2020-08-17 RX ORDER — LISINOPRIL 40 MG/1
40 TABLET ORAL DAILY
COMMUNITY
Start: 2020-08-17 | End: 2020-08-17

## 2020-08-17 RX ORDER — LISINOPRIL 40 MG/1
40 TABLET ORAL DAILY
Qty: 30 TABLET | Refills: 0 | Status: SHIPPED | OUTPATIENT
Start: 2020-08-17 | End: 2020-09-20

## 2020-08-17 RX ORDER — METOPROLOL SUCCINATE 25 MG/1
50 TABLET, EXTENDED RELEASE ORAL NIGHTLY
Qty: 60 TABLET | Refills: 1 | COMMUNITY
Start: 2020-08-18 | End: 2020-09-02

## 2020-08-17 NOTE — PATIENT INSTRUCTIONS
Restart lisinopril 40 mg daily     Switch metoprolol succinate 25 mg tabs - take both tablets totally 50 mg at bedtime starting tomorrow 8/18/20     Continue all your same medications    Call if having any dizziness, lightheadedness, heart racing, palpitat

## 2020-08-17 NOTE — TELEPHONE ENCOUNTER
S/w pt daughter, Gauri and she saw Dr. Kelly Mata on 7/21 d/t right hip hematoma pt developed post op right hip hemiarthroplasty on 6/12 by Dr. Wilfrid Ramirez. She states Dr. Elva Butler told pt it would go away in 3-4 wks. Gauri states today is 4 wks and it is still there.

## 2020-08-17 NOTE — TELEPHONE ENCOUNTER
Per daughter patient was informed by Dr. Yessica Chew that her hematoma should be gone by this time. Patient still has hematoma. Daughter states she will be in the [facility] today at 12:30pm and is wondering if she should come up.  please advise

## 2020-08-17 NOTE — PROGRESS NOTES
[address] [facility] Patient Status:  No patient class for patient encounter    1929 MRN [id number]   Location [facility] MD Dr. Maddie Ricketts is a 80 year hip/ leg pain and weakness , using rolling walker.      Objective:  Lab Results   Component Value Date/Time    WBC 5.8 07/01/2020 06:50 AM    HGB 9.2 (L) 07/01/2020 06:50 AM    HCT 28.6 (L) 07/01/2020 06:50 AM    .0 07/01/2020 06:50 AM    CREATSERUM distal hip hematoma, no ecchymosis , tender to palpation,     Diagnostics:  EC2020 atrial fib  Echocardiogram: 20 EF 55-60%, no wma, mod MR, severe LA dilation, RV dilated, decreased systolic function, RA dilation, mod Pulm regurg, L pleural e mg mg daily   -resume lisinopril 40 mg daily , switch toprol to 50 mg at HS to decrease nighttime palpitations.    -wear compression stockings during the day   -cmp , cbc, iron studies today as scheduled , with add on pro bnp.  -follow up in the specialty c Sildenafil Citrate 20 MG Oral Tab, Take 20 mg by mouth 3 (three) times daily. , Disp: , Rfl:       Tone Sanders NP    8/17/20

## 2020-08-17 NOTE — TELEPHONE ENCOUNTER
dilan notified per Dr. Nina Garcia message. Advised to CB if symptoms worsen. She verbalized understanding.

## 2020-08-18 ENCOUNTER — CLINICAL ABSTRACT (OUTPATIENT)
Dept: CARDIOLOGY | Age: 85
End: 2020-08-18

## 2020-08-19 ENCOUNTER — APPOINTMENT (OUTPATIENT)
Dept: PHYSICAL THERAPY | Age: 85
End: 2020-08-19
Attending: PHYSICIAN ASSISTANT
Payer: MEDICARE

## 2020-08-19 ENCOUNTER — OFFICE VISIT (OUTPATIENT)
Dept: PHYSICAL THERAPY | Age: 85
End: 2020-08-19
Attending: INTERNAL MEDICINE
Payer: MEDICARE

## 2020-08-19 PROCEDURE — 97110 THERAPEUTIC EXERCISES: CPT

## 2020-08-19 NOTE — PROGRESS NOTES
Dx:  Orthopedic aftercare (Z47.89)  S/P Hip arthroplasty  R hip,  Date of Onset: 6/12/20        Insurance (Authorized # of Visits): 12 visits per [address] Physician: Dr. Jemma Gutierres MD visit: 08/24/2020  Fall Risk: standard with 1.5# each  2 x10 ea  -Seated hip add with squeeze ball 2 x10  -Hooklying B hip abd with YTB 1 x10  -Supine bridges with YTB above knees 2x5 reps  -Supine glut set 20x   -Sidelying R hip abduction 2x5 reps  -Standing heel raises with RW 2x10  -Standing

## 2020-08-20 ENCOUNTER — TELEPHONE (OUTPATIENT)
Dept: NEPHROLOGY | Facility: CLINIC | Age: 85
End: 2020-08-20

## 2020-08-20 DIAGNOSIS — N30.01 ACUTE CYSTITIS WITH HEMATURIA: ICD-10-CM

## 2020-08-20 DIAGNOSIS — N18.30 CHRONIC KIDNEY DISEASE, STAGE III (MODERATE) (HCC): Primary | ICD-10-CM

## 2020-08-20 NOTE — TELEPHONE ENCOUNTER
Labs overall were okay but she is still anemic but improving. Iron levels were a little low as was vitamin B12. Start vitamin B12 250 mcg p.o. daily and ferrous sulfate 325 mg daily. Repeat CBC a day or 2 before her upcoming visit with me in September. Urinalysis also suggest a UTI. Any symptoms. Do urine C&S.

## 2020-08-24 ENCOUNTER — ANTI-COAG VISIT (OUTPATIENT)
Dept: INTERNAL MEDICINE CLINIC | Facility: CLINIC | Age: 85
End: 2020-08-24
Payer: MEDICARE

## 2020-08-24 ENCOUNTER — TELEPHONE (OUTPATIENT)
Dept: INTERNAL MEDICINE CLINIC | Facility: CLINIC | Age: 85
End: 2020-08-24

## 2020-08-24 ENCOUNTER — HOSPITAL ENCOUNTER (OUTPATIENT)
Dept: GENERAL RADIOLOGY | Facility: HOSPITAL | Age: 85
Discharge: HOME | End: 2020-08-24
Attending: ORTHOPAEDIC SURGERY
Payer: MEDICARE

## 2020-08-24 ENCOUNTER — OFFICE VISIT (OUTPATIENT)
Dept: ORTHOPEDICS CLINIC | Facility: CLINIC | Age: 85
End: 2020-08-24
Payer: MEDICARE

## 2020-08-24 VITALS — WEIGHT: 122 LBS | BODY MASS INDEX: 20 KG/M2

## 2020-08-24 DIAGNOSIS — Z51.81 ENCOUNTER FOR THERAPEUTIC DRUG MONITORING: ICD-10-CM

## 2020-08-24 DIAGNOSIS — Z79.01 LONG TERM (CURRENT) USE OF ANTICOAGULANTS: ICD-10-CM

## 2020-08-24 DIAGNOSIS — Z47.89 ORTHOPEDIC AFTERCARE: Primary | ICD-10-CM

## 2020-08-24 DIAGNOSIS — I48.91 ATRIAL FIBRILLATION, UNSPECIFIED TYPE (HCC): ICD-10-CM

## 2020-08-24 DIAGNOSIS — Z47.89 ORTHOPEDIC AFTERCARE: ICD-10-CM

## 2020-08-24 DIAGNOSIS — I48.20 CHRONIC ATRIAL FIBRILLATION (HCC): ICD-10-CM

## 2020-08-24 LAB
INR: 1.9 (ref 0.8–1.2)
TEST STRIP EXPIRATION DATE: ABNORMAL DATE

## 2020-08-24 PROCEDURE — 73502 X-RAY EXAM HIP UNI 2-3 VIEWS: CPT | Performed by: ORTHOPAEDIC SURGERY

## 2020-08-24 PROCEDURE — 36416 COLLJ CAPILLARY BLOOD SPEC: CPT

## 2020-08-24 PROCEDURE — 85610 PROTHROMBIN TIME: CPT

## 2020-08-24 PROCEDURE — G0463 HOSPITAL OUTPT CLINIC VISIT: HCPCS | Performed by: ORTHOPAEDIC SURGERY

## 2020-08-24 PROCEDURE — 99024 POSTOP FOLLOW-UP VISIT: CPT | Performed by: ORTHOPAEDIC SURGERY

## 2020-08-24 NOTE — TELEPHONE ENCOUNTER
Today's INR 1.9  Goal 2.0-3.0    Per protocol, weekly dose increase by 10%, actual increase 14.3% and repeat INR in 2-4 weeks.

## 2020-08-24 NOTE — PROGRESS NOTES
NURSING INTAKE COMMENTS: Patient presents with:  Post-Op: 3rd ,denies pain ,patient has a hematoma on right hip       HPI: This 80year old female presents today for follow-up of her right hip hemiarthroplasty. She has no pain.   She reports that the lump mg total) by mouth nightly. 60 tablet 1   • lisinopril 40 MG Oral Tab Take 1 tablet (40 mg total) by mouth daily.  30 tablet 0   • Warfarin Sodium 2.5 MG Oral Tab TAKE ONE TABLET BY  MOUTH DAILY as directed by Coumadin Clinic 90 tablet 0   • torsemide 10 MG (55.3 kg)   BMI 20.30 kg/m²   Constitutional: appears well hydrated, alert and responsive, no acute distress noted  Extremities: Well-healed scar. No obvious hematoma formation. Minimal swelling.       Imaging: Xr Hip W Or Wo Pelvis 2 Or 3 Views, Right (c Dragon speech recognition technology. Please excuse any typos.     Shun Diaz MD

## 2020-08-25 ENCOUNTER — OFFICE VISIT (OUTPATIENT)
Dept: PHYSICAL THERAPY | Age: 85
End: 2020-08-25
Attending: INTERNAL MEDICINE
Payer: MEDICARE

## 2020-08-25 PROCEDURE — 97110 THERAPEUTIC EXERCISES: CPT

## 2020-08-25 NOTE — PROGRESS NOTES
Dx:  Orthopedic aftercare (Z47.89)  S/P Hip arthroplasty  R hip,  Date of Onset: 6/12/20        Insurance (Authorized # of Visits): 12 visits per [address] Morales Physician: Dr. Jayden Gutierres MD visit: 08/24/2020  Fall Risk: standard GTb 2 x 10   - sit to/from stand from chair 2 x 10   - standing B heel raises 2 x 10   - standing R/L hip abduction/extension with YTB above knee 2 x 10 each   - R fwd step up on 4 inch, 6 inch step 1 x 10 each with B U/E  - R/L toe tap on 12 inch step 2 x Total Timed Treatment: 45 min  Total Treatment Time: 45 min

## 2020-08-26 RX ORDER — FERROUS SULFATE 325(65) MG
325 TABLET ORAL
Qty: 90 TABLET | Refills: 1 | Status: CANCELLED | OUTPATIENT
Start: 2020-08-26

## 2020-08-26 RX ORDER — ASPIRIN 81 MG
100 TABLET, DELAYED RELEASE (ENTERIC COATED) ORAL DAILY
Qty: 90 TABLET | Refills: 1 | Status: CANCELLED | OUTPATIENT
Start: 2020-08-26

## 2020-08-26 RX ORDER — FERROUS SULFATE 325(65) MG
325 TABLET ORAL
Qty: 90 TABLET | Refills: 1 | OUTPATIENT
Start: 2020-08-26

## 2020-08-26 NOTE — TELEPHONE ENCOUNTER
Patient transferred from [address] [place] [address]. Patient states she was in hospital since 6/11/20 for fractured hip and just returned to her home yesterday. Discussed Dr. James Monterroso notes below. Patient is requesting Rx for B12 and ferrous sulfate sent to [facility] - 90day Rx pended for MD approval.  She also states when admitted, ferrous sulfate gave her severe constipation which required a stool softener. Pt requesting Rx for stool softener. Re: CBC - pt plans to complete 1wk before nephro appt (to coordinate with other appt). She verbalized understanding that lab dept now requires scheduled appointments. CBC ordered. Re: UTI - she feels pressure on the bladder, but thinks it's related to water pill. Denies pain or burning, but having frequency and urgency. RN strongly encouraged urine test asap, but she will wait to see if current symptoms worsen. Urine culture ordered.

## 2020-08-28 ENCOUNTER — TELEPHONE (OUTPATIENT)
Dept: NEPHROLOGY | Facility: CLINIC | Age: 85
End: 2020-08-28

## 2020-08-28 ENCOUNTER — LAB ENCOUNTER (OUTPATIENT)
Dept: LAB | Age: 85
End: 2020-08-28
Attending: INTERNAL MEDICINE
Payer: MEDICARE

## 2020-08-28 DIAGNOSIS — N30.01 ACUTE CYSTITIS WITH HEMATURIA: ICD-10-CM

## 2020-08-28 PROCEDURE — 87088 URINE BACTERIA CULTURE: CPT

## 2020-08-28 PROCEDURE — 87086 URINE CULTURE/COLONY COUNT: CPT

## 2020-08-28 PROCEDURE — 87186 SC STD MICRODIL/AGAR DIL: CPT

## 2020-08-31 ENCOUNTER — TELEPHONE (OUTPATIENT)
Dept: NEPHROLOGY | Facility: CLINIC | Age: 85
End: 2020-08-31

## 2020-08-31 RX ORDER — NITROFURANTOIN 25; 75 MG/1; MG/1
100 CAPSULE ORAL 2 TIMES DAILY
Qty: 10 CAPSULE | Refills: 0 | Status: SHIPPED | OUTPATIENT
Start: 2020-08-31 | End: 2020-09-05

## 2020-08-31 NOTE — TELEPHONE ENCOUNTER
8/17/2020 scc    Your Appointments    Wednesday September 02, 2020 12:30 PM CDT  [address] [facility] with CHANTAL Short. Vonnie [address] ([address]) [address]  [address] [facility]  [address]  [address] [place] [address]

## 2020-08-31 NOTE — TELEPHONE ENCOUNTER
Patient contacted. She is aware that urine C&S is positive for a UTI. Antibiotic prescription e-scripted to [facility] verified with patient (They deliver) She knows to inform Dr. Maeve Car if she is not better.

## 2020-09-02 ENCOUNTER — OFFICE VISIT (OUTPATIENT)
Dept: CARDIOLOGY CLINIC | Facility: HOSPITAL | Age: 85
End: 2020-09-02
Attending: NURSE PRACTITIONER
Payer: MEDICARE

## 2020-09-02 VITALS
DIASTOLIC BLOOD PRESSURE: 48 MMHG | HEART RATE: 68 BPM | WEIGHT: 120 LBS | OXYGEN SATURATION: 95 % | BODY MASS INDEX: 20 KG/M2 | SYSTOLIC BLOOD PRESSURE: 134 MMHG

## 2020-09-02 DIAGNOSIS — N18.30 CKD (CHRONIC KIDNEY DISEASE) STAGE 3, GFR 30-59 ML/MIN (HCC): ICD-10-CM

## 2020-09-02 DIAGNOSIS — I50.9 HEART FAILURE, UNSPECIFIED (HCC): Primary | ICD-10-CM

## 2020-09-02 DIAGNOSIS — I11.0 BENIGN HYPERTENSIVE HEART DISEASE WITH HEART FAILURE (HCC): ICD-10-CM

## 2020-09-02 DIAGNOSIS — I48.21 PERMANENT ATRIAL FIBRILLATION (HCC): ICD-10-CM

## 2020-09-02 DIAGNOSIS — N18.30 CHRONIC KIDNEY DISEASE, STAGE III (MODERATE) (HCC): ICD-10-CM

## 2020-09-02 DIAGNOSIS — I27.21 PAH (PULMONARY ARTERY HYPERTENSION) (HCC): ICD-10-CM

## 2020-09-02 DIAGNOSIS — I50.33 ACUTE ON CHRONIC HEART FAILURE WITH PRESERVED EJECTION FRACTION (HFPEF) (HCC): ICD-10-CM

## 2020-09-02 DIAGNOSIS — R60.0 BILATERAL LEG EDEMA: ICD-10-CM

## 2020-09-02 DIAGNOSIS — I50.813 ACUTE ON CHRONIC RIGHT-SIDED HEART FAILURE (HCC): ICD-10-CM

## 2020-09-02 LAB
ANION GAP SERPL CALC-SCNC: 8 MMOL/L (ref 0–18)
BASOPHILS # BLD AUTO: 0.04 X10(3) UL (ref 0–0.2)
BASOPHILS NFR BLD AUTO: 0.5 %
BUN BLD-MCNC: 21 MG/DL (ref 7–18)
BUN SERPL-MCNC: 21 MG/DL
BUN/CREAT SERPL: 18.6 (ref 10–20)
CALCIUM BLD-MCNC: 9.2 MG/DL (ref 8.5–10.1)
CALCIUM SERPL-MCNC: 9.2 MG/DL
CHLORIDE SERPL-SCNC: 104 MMOL/L
CHLORIDE SERPL-SCNC: 104 MMOL/L (ref 98–112)
CO2 SERPL-SCNC: 27 MMOL/L (ref 21–32)
CREAT BLD-MCNC: 1.13 MG/DL (ref 0.55–1.02)
CREAT SERPL-MCNC: 1.13 MG/DL
DEPRECATED RDW RBC AUTO: 48.1 FL (ref 35.1–46.3)
EOSINOPHIL # BLD AUTO: 0.25 X10(3) UL (ref 0–0.7)
EOSINOPHIL NFR BLD AUTO: 3.3 %
ERYTHROCYTE [DISTWIDTH] IN BLOOD BY AUTOMATED COUNT: 13.7 % (ref 11–15)
GLUCOSE BLD-MCNC: 87 MG/DL (ref 70–99)
GLUCOSE SERPL-MCNC: 87 MG/DL
HCT VFR BLD AUTO: 36.2 % (ref 35–48)
HCT VFR BLD CALC: 36.2 %
HGB BLD-MCNC: 11.9 G/DL
HGB BLD-MCNC: 11.9 G/DL (ref 12–16)
IMM GRANULOCYTES # BLD AUTO: 0.02 X10(3) UL (ref 0–1)
IMM GRANULOCYTES NFR BLD: 0.3 %
LYMPHOCYTES # BLD AUTO: 1.57 X10(3) UL (ref 1–4)
LYMPHOCYTES NFR BLD AUTO: 20.6 %
MCH RBC QN AUTO: 31.2 PG (ref 26–34)
MCHC RBC AUTO-ENTMCNC: 32.9 G/DL (ref 31–37)
MCV RBC AUTO: 95 FL (ref 80–100)
MONOCYTES # BLD AUTO: 0.51 X10(3) UL (ref 0.1–1)
MONOCYTES NFR BLD AUTO: 6.7 %
NEUTROPHILS # BLD AUTO: 5.23 X10 (3) UL (ref 1.5–7.7)
NEUTROPHILS # BLD AUTO: 5.23 X10(3) UL (ref 1.5–7.7)
NEUTROPHILS NFR BLD AUTO: 68.6 %
NT-PROBNP SERPL-MCNC: 3543 PG/ML
NT-PROBNP SERPL-MCNC: 3543 PG/ML (ref ?–450)
OSMOLALITY SERPL CALC.SUM OF ELEC: 290 MOSM/KG (ref 275–295)
PATIENT FASTING Y/N/NP: NO
PLATELET # BLD AUTO: 212 10(3)UL (ref 150–450)
PLATELET # BLD: 212 K/MCL
POTASSIUM SERPL-SCNC: 4.2 MMOL/L
POTASSIUM SERPL-SCNC: 4.2 MMOL/L (ref 3.5–5.1)
RBC # BLD AUTO: 3.81 X10(6)UL (ref 3.8–5.3)
RBC # BLD: 3.81 10*6/UL
SODIUM SERPL-SCNC: 139 MMOL/L
SODIUM SERPL-SCNC: 139 MMOL/L (ref 136–145)
WBC # BLD AUTO: 7.6 X10(3) UL (ref 4–11)
WBC # BLD: 7.6 K/MCL

## 2020-09-02 PROCEDURE — 83880 ASSAY OF NATRIURETIC PEPTIDE: CPT | Performed by: NURSE PRACTITIONER

## 2020-09-02 PROCEDURE — 85025 COMPLETE CBC W/AUTO DIFF WBC: CPT | Performed by: NURSE PRACTITIONER

## 2020-09-02 PROCEDURE — 99212 OFFICE O/P EST SF 10 MIN: CPT | Performed by: NURSE PRACTITIONER

## 2020-09-02 PROCEDURE — 99214 OFFICE O/P EST MOD 30 MIN: CPT | Performed by: NURSE PRACTITIONER

## 2020-09-02 PROCEDURE — 36415 COLL VENOUS BLD VENIPUNCTURE: CPT | Performed by: NURSE PRACTITIONER

## 2020-09-02 PROCEDURE — 80048 BASIC METABOLIC PNL TOTAL CA: CPT | Performed by: NURSE PRACTITIONER

## 2020-09-02 RX ORDER — METOPROLOL SUCCINATE 25 MG/1
50 TABLET, EXTENDED RELEASE ORAL NIGHTLY
Qty: 60 TABLET | Refills: 1 | OUTPATIENT
Start: 2020-09-02

## 2020-09-02 RX ORDER — METOPROLOL SUCCINATE 25 MG/1
50 TABLET, EXTENDED RELEASE ORAL 2 TIMES DAILY
Qty: 60 TABLET | Refills: 1 | COMMUNITY
Start: 2020-09-02

## 2020-09-02 NOTE — PATIENT INSTRUCTIONS
Take an additional torsemide 10 mg tablet tomorrow afternoon about 3 -4 pm     Increase toprol to 75 mg at bedtime ( can take 3 - 25 mg tabs )     Continue all your same medications    Call if having any dizziness, lightheadedness, heart racing, palpitatio

## 2020-09-02 NOTE — PROGRESS NOTES
[address] [facility] Patient Status:  No patient class for patient encounter    1929 MRN [id number]   Location [facility] MD Dr. Darnell Solorzano is a 80 year rolling walker.      Objective:  Lab Results   Component Value Date/Time    WBC 6.8 08/17/2020 02:10 PM    HGB 11.3 (L) 08/17/2020 02:10 PM    HCT 34.3 (L) 08/17/2020 02:10 PM    .0 08/17/2020 02:10 PM    CREATSERUM 1.08 (H) 08/17/2020 02:10 PM    BU ecchymosis , tender to palpation,     Diagnostics:  EC2020 atrial fib  Echocardiogram: 20 EF 55-60%, no wma, mod MR, severe LA dilation, RV dilated, decreased systolic function, RA dilation, mod Pulm regurg, L pleural effusion.        RHC :  nighttime palpitations, pt will monitor home HR and bp, watch for bradycardia  -wear compression stockings during the day   -follow up in the specialty care clinic as needed  -follow up with Dr. Kiley Corcoran 9/14 and Dr. Saira Briggs 9/21     CKD stage III   -renal f torsemide 10 MG Oral Tab, Take 1 tablet (10 mg total) by mouth daily. , Disp: , Rfl:   •  LOPERAMIDE HCL OR, Take 30 mL by mouth., Disp: , Rfl:   •  ROSUVASTATIN CALCIUM 5 MG Oral Tab, TAKE 1 TABLET BY MOUTH  NIGHTLY, Disp: 90 tablet, Rfl: 0  •  UPTRAVI 160

## 2020-09-04 ENCOUNTER — TELEPHONE (OUTPATIENT)
Dept: NEPHROLOGY | Facility: CLINIC | Age: 85
End: 2020-09-04

## 2020-09-04 ENCOUNTER — TELEPHONE (OUTPATIENT)
Dept: INTERNAL MEDICINE CLINIC | Facility: CLINIC | Age: 85
End: 2020-09-04

## 2020-09-04 RX ORDER — CIPROFLOXACIN 250 MG/1
250 TABLET, FILM COATED ORAL 2 TIMES DAILY
Qty: 10 TABLET | Refills: 0 | Status: SHIPPED | OUTPATIENT
Start: 2020-09-04 | End: 2020-09-09

## 2020-09-04 NOTE — TELEPHONE ENCOUNTER
Order pending. Please see interaction below. Drug-Drug: Warfarin Sodium and Ciprofloxacin HCl   Hypoprothrombinemic effects of Anticoagulants may be increased by Quinolones. Dosage reduction of Anticoagulants may be required.    Details

## 2020-09-04 NOTE — TELEPHONE ENCOUNTER
Called and spoke with [place]. Educated that the antibiotic may interact with her warfarin and thin the blood/increase her INR. Advised her to eat some extra servings of greens/veggies while taking the antibiotic. Reports that she is waiting for someone to  the medication for her, so she may not start taking it until tomorrow 9/5. Reports that her daughter is coming to take her to appointments next Thursday 9/10 but she is not sure that they can fit in the coumadin clinic also. Offered to have a mobile lab go to her home to draw her blood and she agreed. Order faxed to [facility] for INR to be drawn on Wednesday 9/9. Let [place] know that [facility] RN will call her with the results and provide updated instructions to her. She verbalized understanding.

## 2020-09-04 NOTE — TELEPHONE ENCOUNTER
Med order sent as written by MD. Aldo Davis patient and advised on med. Advised to notify the coumadin clinic today and Pt agreed.

## 2020-09-04 NOTE — TELEPHONE ENCOUNTER
Pt completed antibiotics. Feels symptoms are unresolved. Asking for further advice    Spoke to pt. Started on Macrobid BID on 8/31/2020. Has one dose remaining, with Urgency persisting. Feels symptoms are not better after antibiotics.    Denies burning,

## 2020-09-04 NOTE — TELEPHONE ENCOUNTER
Can switch to Cipro 250 mg twice daily x5 days. If no improvement needs to repeat urinalysis and urine culture.

## 2020-09-04 NOTE — TELEPHONE ENCOUNTER
Patient states today is the last day of her taking antibiotics for her UTI but it hasn't cleared up. Please call. Thank you.

## 2020-09-09 ENCOUNTER — TELEPHONE (OUTPATIENT)
Dept: NEPHROLOGY | Facility: CLINIC | Age: 85
End: 2020-09-09

## 2020-09-09 LAB — INR: 4.2 (ref 0.8–1.2)

## 2020-09-09 NOTE — TELEPHONE ENCOUNTER
PT/INR was too high. Hold Coumadin today and she needs to talk with the Coumadin clinic clinic tomorrow for further orders.

## 2020-09-09 NOTE — TELEPHONE ENCOUNTER
Patient states she just took coumadin this evening. Advised her to follow up with coumadin clinic tomorrow. Routed to coumadin clinic as CAMILLE.

## 2020-09-10 ENCOUNTER — TELEPHONE (OUTPATIENT)
Dept: INTERNAL MEDICINE CLINIC | Facility: CLINIC | Age: 85
End: 2020-09-10

## 2020-09-10 ENCOUNTER — ANTI-COAG VISIT (OUTPATIENT)
Dept: INTERNAL MEDICINE CLINIC | Facility: CLINIC | Age: 85
End: 2020-09-10

## 2020-09-10 DIAGNOSIS — I48.21 PERMANENT ATRIAL FIBRILLATION (HCC): Primary | ICD-10-CM

## 2020-09-10 DIAGNOSIS — Z79.01 LONG TERM (CURRENT) USE OF ANTICOAGULANTS: ICD-10-CM

## 2020-09-10 DIAGNOSIS — Z51.81 ENCOUNTER FOR THERAPEUTIC DRUG MONITORING: ICD-10-CM

## 2020-09-10 NOTE — TELEPHONE ENCOUNTER
INR 4.2   Goal 2.0-3.0  9/10 s/w [place], per protocol to hold dose for 2 days, she will take extra greens for 2 days. She finished her antibiotic yesterday. She will check lab on Monday, will not decrease weekly schedule as was 1.9 prior to antibiotic.  Gray Hong

## 2020-09-14 ENCOUNTER — APPOINTMENT (OUTPATIENT)
Dept: LAB | Facility: HOSPITAL | Age: 85
End: 2020-09-14
Attending: INTERNAL MEDICINE
Payer: MEDICARE

## 2020-09-14 ENCOUNTER — OFFICE VISIT (OUTPATIENT)
Dept: CARDIOLOGY | Age: 85
End: 2020-09-14

## 2020-09-14 VITALS
HEIGHT: 66 IN | HEART RATE: 68 BPM | WEIGHT: 122 LBS | BODY MASS INDEX: 19.61 KG/M2 | RESPIRATION RATE: 18 BRPM | SYSTOLIC BLOOD PRESSURE: 120 MMHG | DIASTOLIC BLOOD PRESSURE: 60 MMHG

## 2020-09-14 DIAGNOSIS — I27.0 PRIMARY PULMONARY HYPERTENSION (CMD): Primary | ICD-10-CM

## 2020-09-14 DIAGNOSIS — Z51.81 ENCOUNTER FOR THERAPEUTIC DRUG MONITORING: ICD-10-CM

## 2020-09-14 DIAGNOSIS — Z79.01 LONG TERM (CURRENT) USE OF ANTICOAGULANTS: ICD-10-CM

## 2020-09-14 DIAGNOSIS — I27.20 PULMONARY HYPERTENSION (CMD): ICD-10-CM

## 2020-09-14 DIAGNOSIS — I48.21 PERMANENT ATRIAL FIBRILLATION (HCC): ICD-10-CM

## 2020-09-14 DIAGNOSIS — I50.812 CHRONIC RIGHT-SIDED CONGESTIVE HEART FAILURE (CMD): ICD-10-CM

## 2020-09-14 LAB
INR BLD: 2.17 (ref 0.9–1.2)
PROTHROMBIN TIME: 23.8 SECONDS (ref 11.8–14.5)

## 2020-09-14 PROCEDURE — 36415 COLL VENOUS BLD VENIPUNCTURE: CPT

## 2020-09-14 PROCEDURE — 85610 PROTHROMBIN TIME: CPT

## 2020-09-14 PROCEDURE — 99215 OFFICE O/P EST HI 40 MIN: CPT | Performed by: INTERNAL MEDICINE

## 2020-09-14 RX ORDER — METOPROLOL SUCCINATE 25 MG/1
75 TABLET, EXTENDED RELEASE ORAL DAILY
COMMUNITY
End: 2020-09-14 | Stop reason: SDUPTHER

## 2020-09-14 RX ORDER — TORSEMIDE 10 MG/1
10 TABLET ORAL DAILY
Qty: 90 TABLET | Refills: 3 | Status: SHIPPED | OUTPATIENT
Start: 2020-09-14 | End: 2021-11-01

## 2020-09-14 RX ORDER — METOPROLOL SUCCINATE 25 MG/1
75 TABLET, EXTENDED RELEASE ORAL DAILY
Qty: 270 TABLET | Refills: 3 | Status: SHIPPED | OUTPATIENT
Start: 2020-09-14 | End: 2020-12-07 | Stop reason: DRUGHIGH

## 2020-09-14 RX ORDER — TORSEMIDE 10 MG/1
10 TABLET ORAL DAILY
COMMUNITY
End: 2020-09-14 | Stop reason: SDUPTHER

## 2020-09-14 RX ORDER — SILDENAFIL CITRATE 20 MG/1
20 TABLET ORAL 3 TIMES DAILY
Qty: 270 TABLET | Refills: 3 | Status: SHIPPED | OUTPATIENT
Start: 2020-09-14 | End: 2021-07-28

## 2020-09-14 SDOH — HEALTH STABILITY: MENTAL HEALTH: HOW OFTEN DO YOU HAVE A DRINK CONTAINING ALCOHOL?: NEVER

## 2020-09-14 ASSESSMENT — PATIENT HEALTH QUESTIONNAIRE - PHQ9
2. FEELING DOWN, DEPRESSED OR HOPELESS: NOT AT ALL
CLINICAL INTERPRETATION OF PHQ9 SCORE: NO FURTHER SCREENING NEEDED
SUM OF ALL RESPONSES TO PHQ9 QUESTIONS 1 AND 2: 0
SUM OF ALL RESPONSES TO PHQ9 QUESTIONS 1 AND 2: 0
1. LITTLE INTEREST OR PLEASURE IN DOING THINGS: NOT AT ALL
CLINICAL INTERPRETATION OF PHQ2 SCORE: NO FURTHER SCREENING NEEDED

## 2020-09-14 ASSESSMENT — ENCOUNTER SYMPTOMS
FEVER: 0
CHILLS: 0
WEIGHT GAIN: 0
SUSPICIOUS LESIONS: 0
HEMATOCHEZIA: 0
ALLERGIC/IMMUNOLOGIC COMMENTS: NO NEW FOOD ALLERGIES
COUGH: 0
BRUISES/BLEEDS EASILY: 0
HEMOPTYSIS: 0

## 2020-09-15 ENCOUNTER — TELEPHONE (OUTPATIENT)
Dept: INTERNAL MEDICINE CLINIC | Facility: CLINIC | Age: 85
End: 2020-09-15

## 2020-09-15 ENCOUNTER — ANTI-COAG VISIT (OUTPATIENT)
Dept: INTERNAL MEDICINE CLINIC | Facility: CLINIC | Age: 85
End: 2020-09-15

## 2020-09-15 ENCOUNTER — CLINICAL ABSTRACT (OUTPATIENT)
Dept: CARDIOLOGY | Age: 85
End: 2020-09-15

## 2020-09-15 ENCOUNTER — TELEPHONE (OUTPATIENT)
Dept: NEPHROLOGY | Facility: CLINIC | Age: 85
End: 2020-09-15

## 2020-09-15 DIAGNOSIS — Z51.81 ENCOUNTER FOR THERAPEUTIC DRUG MONITORING: ICD-10-CM

## 2020-09-15 DIAGNOSIS — Z79.01 LONG TERM (CURRENT) USE OF ANTICOAGULANTS: ICD-10-CM

## 2020-09-15 DIAGNOSIS — I48.91 ATRIAL FIBRILLATION, UNSPECIFIED TYPE (HCC): ICD-10-CM

## 2020-09-15 DIAGNOSIS — I48.20 CHRONIC ATRIAL FIBRILLATION (HCC): Primary | ICD-10-CM

## 2020-09-15 NOTE — TELEPHONE ENCOUNTER
Anticoag referral expires soon. Order pended. Please sign, thank you.     Dx: Chronic atrial fibrillation (HCC) (I48.2)  [facility] unspecified type ([facility] (I48.91)  Encounter for therapeutic drug monitoring (Z51.81)  Long term (current) use of ant

## 2020-09-15 NOTE — TELEPHONE ENCOUNTER
[facility] RN did not leave a message for her last night. Called and spoke with [place]. See Anticoag note.

## 2020-09-15 NOTE — TELEPHONE ENCOUNTER
Pt states that she received a partial message from the coumadin clinic. Patient was not able to get the full message. Pt would like to speak with the nurse so that she knows what dosage of coumadin she should be taking.

## 2020-09-20 RX ORDER — LISINOPRIL 40 MG/1
40 TABLET ORAL DAILY
Qty: 30 TABLET | Refills: 0 | Status: SHIPPED | OUTPATIENT
Start: 2020-09-20

## 2020-09-21 ENCOUNTER — ANTI-COAG VISIT (OUTPATIENT)
Dept: INTERNAL MEDICINE CLINIC | Facility: CLINIC | Age: 85
End: 2020-09-21
Payer: MEDICARE

## 2020-09-21 ENCOUNTER — OFFICE VISIT (OUTPATIENT)
Dept: NEPHROLOGY | Facility: CLINIC | Age: 85
End: 2020-09-21
Payer: MEDICARE

## 2020-09-21 VITALS
TEMPERATURE: 97 F | HEART RATE: 61 BPM | HEIGHT: 64 IN | SYSTOLIC BLOOD PRESSURE: 154 MMHG | DIASTOLIC BLOOD PRESSURE: 54 MMHG | WEIGHT: 122.38 LBS | BODY MASS INDEX: 20.89 KG/M2

## 2020-09-21 DIAGNOSIS — E78.00 HYPERCHOLESTEROLEMIA: ICD-10-CM

## 2020-09-21 DIAGNOSIS — Z51.81 ENCOUNTER FOR THERAPEUTIC DRUG MONITORING: ICD-10-CM

## 2020-09-21 DIAGNOSIS — Z79.01 LONG TERM (CURRENT) USE OF ANTICOAGULANTS: ICD-10-CM

## 2020-09-21 DIAGNOSIS — I48.91 ATRIAL FIBRILLATION, UNSPECIFIED TYPE (HCC): ICD-10-CM

## 2020-09-21 DIAGNOSIS — I10 ESSENTIAL HYPERTENSION: ICD-10-CM

## 2020-09-21 DIAGNOSIS — I48.20 CHRONIC ATRIAL FIBRILLATION (HCC): ICD-10-CM

## 2020-09-21 DIAGNOSIS — N18.30 CHRONIC KIDNEY DISEASE, STAGE III (MODERATE) (HCC): Primary | ICD-10-CM

## 2020-09-21 LAB
INR: 2.7 (ref 0.8–1.2)
TEST STRIP EXPIRATION DATE: ABNORMAL DATE

## 2020-09-21 PROCEDURE — G0463 HOSPITAL OUTPT CLINIC VISIT: HCPCS | Performed by: INTERNAL MEDICINE

## 2020-09-21 PROCEDURE — 36416 COLLJ CAPILLARY BLOOD SPEC: CPT

## 2020-09-21 PROCEDURE — 85610 PROTHROMBIN TIME: CPT

## 2020-09-21 PROCEDURE — 99214 OFFICE O/P EST MOD 30 MIN: CPT | Performed by: INTERNAL MEDICINE

## 2020-09-21 RX ORDER — ROSUVASTATIN CALCIUM 5 MG/1
5 TABLET, COATED ORAL NIGHTLY
Qty: 90 TABLET | Refills: 1 | Status: SHIPPED | OUTPATIENT
Start: 2020-09-21 | End: 2021-02-05

## 2020-09-22 NOTE — PROGRESS NOTES
[address] [facility]  Nephrology Daily Progress Note    Amelia Rodriguez  [id number]  80year old  Patient presents with:  Chronic Kidney Disease: follow up      HPI:   Amelia Rodriguez is a 80year old female.   19-year-old female with a history of chronic at Resp - - -   Temp - - 97.2   SpO2 95 - -   Weight 120 lbs - 122 lbs 6 oz   Height - - 64   BODY MASS INDEX - 21.01 -       VITALS: WEIGHT ONLY 8/24/2020 9/2/2020 9/21/2020   Weight 122 lbs 120 lbs 122 lbs 6 oz       Constitutional: appears well hydrated nightly., Disp: 60 tablet, Rfl: 1  •  Sildenafil Citrate 20 MG Oral Tab, Take 1 tablet (20 mg total) by mouth 3 (three) times daily. , Disp: 90 tablet, Rfl: 0  •  Warfarin Sodium 2.5 MG Oral Tab, TAKE ONE TABLET BY  MOUTH DAILY as directed by Coumadin Clini atrial fibrillation (HCC)     Atrial fibrillation, unspecified type ([place])      Overall has done quite well given her age and recent right hip fracture. She has lost some weight. She states she has not been eating as well since being at home.   Daughter fe

## 2020-10-09 ENCOUNTER — ANTI-COAG VISIT (OUTPATIENT)
Dept: INTERNAL MEDICINE CLINIC | Facility: CLINIC | Age: 85
End: 2020-10-09
Payer: MEDICARE

## 2020-10-09 ENCOUNTER — TELEPHONE (OUTPATIENT)
Dept: HEMATOLOGY/ONCOLOGY | Facility: HOSPITAL | Age: 85
End: 2020-10-09

## 2020-10-09 DIAGNOSIS — I48.20 CHRONIC ATRIAL FIBRILLATION (HCC): ICD-10-CM

## 2020-10-09 DIAGNOSIS — I48.91 ATRIAL FIBRILLATION, UNSPECIFIED TYPE (HCC): ICD-10-CM

## 2020-10-09 DIAGNOSIS — Z51.81 ENCOUNTER FOR THERAPEUTIC DRUG MONITORING: ICD-10-CM

## 2020-10-09 DIAGNOSIS — Z79.01 LONG TERM (CURRENT) USE OF ANTICOAGULANTS: ICD-10-CM

## 2020-10-09 PROCEDURE — 85610 PROTHROMBIN TIME: CPT

## 2020-10-09 PROCEDURE — 36416 COLLJ CAPILLARY BLOOD SPEC: CPT

## 2020-10-09 NOTE — TELEPHONE ENCOUNTER
[place] called to schedule her f/u with  around 11/10 for her annual. Karissa Holger is currently booked in November. Is there an opening I can offer her? She is requesting something that is not early morning.

## 2020-10-21 RX ORDER — WARFARIN SODIUM 2.5 MG/1
TABLET ORAL
Qty: 90 TABLET | Refills: 1 | Status: SHIPPED | OUTPATIENT
Start: 2020-10-21 | End: 2021-04-23

## 2020-10-21 NOTE — TELEPHONE ENCOUNTER
Last seen 9/21/2020. Last Coumadin Clinic appointment was on 10/9/2020. Refill(s) pended and routed to Dr. Munira Edwards.

## 2020-11-02 RX ORDER — LISINOPRIL 40 MG/1
40 TABLET ORAL DAILY
Qty: 90 TABLET | Refills: 3 | Status: SHIPPED | OUTPATIENT
Start: 2020-11-02 | End: 2021-09-13

## 2020-11-10 ENCOUNTER — ANTI-COAG VISIT (OUTPATIENT)
Dept: INTERNAL MEDICINE CLINIC | Facility: CLINIC | Age: 85
End: 2020-11-10
Payer: MEDICARE

## 2020-11-10 DIAGNOSIS — Z79.01 LONG TERM (CURRENT) USE OF ANTICOAGULANTS: ICD-10-CM

## 2020-11-10 DIAGNOSIS — I48.91 ATRIAL FIBRILLATION, UNSPECIFIED TYPE (HCC): ICD-10-CM

## 2020-11-10 DIAGNOSIS — I48.20 CHRONIC ATRIAL FIBRILLATION (HCC): ICD-10-CM

## 2020-11-10 DIAGNOSIS — Z51.81 ENCOUNTER FOR THERAPEUTIC DRUG MONITORING: ICD-10-CM

## 2020-11-10 PROCEDURE — 85610 PROTHROMBIN TIME: CPT

## 2020-11-10 PROCEDURE — 36416 COLLJ CAPILLARY BLOOD SPEC: CPT

## 2020-11-18 ENCOUNTER — OFFICE VISIT (OUTPATIENT)
Dept: HEMATOLOGY/ONCOLOGY | Facility: HOSPITAL | Age: 85
End: 2020-11-18
Attending: INTERNAL MEDICINE
Payer: MEDICARE

## 2020-11-18 VITALS
TEMPERATURE: 98 F | OXYGEN SATURATION: 97 % | HEART RATE: 52 BPM | SYSTOLIC BLOOD PRESSURE: 145 MMHG | BODY MASS INDEX: 20.97 KG/M2 | DIASTOLIC BLOOD PRESSURE: 69 MMHG | RESPIRATION RATE: 18 BRPM | WEIGHT: 122.81 LBS | HEIGHT: 64.02 IN

## 2020-11-18 DIAGNOSIS — Z85.3 ENCOUNTER FOR FOLLOW-UP SURVEILLANCE OF BREAST CANCER: ICD-10-CM

## 2020-11-18 DIAGNOSIS — Z08 ENCOUNTER FOR FOLLOW-UP SURVEILLANCE OF BREAST CANCER: ICD-10-CM

## 2020-11-18 DIAGNOSIS — Z85.3 HISTORY OF LEFT BREAST CANCER: Primary | ICD-10-CM

## 2020-11-18 PROCEDURE — 99213 OFFICE O/P EST LOW 20 MIN: CPT | Performed by: INTERNAL MEDICINE

## 2020-11-18 NOTE — PROGRESS NOTES
HPI   North Humberto is a 80year old female here for f/u of History of left breast cancer  (primary encounter diagnosis)  Encounter for follow-up surveillance of breast cancer     She completed the anastrozole for 5 yrs in March of 2017.       Denies an 1   • lisinopril 40 MG Oral Tab Take 1 tablet (40 mg total) by mouth daily. 30 tablet 0   • Metoprolol Succinate ER 25 MG Oral Tablet 24 Hr Take 3 tablets (75 mg total) by mouth nightly.  60 tablet 1   • Sildenafil Citrate 20 MG Oral Tab Take 1 tablet (20 m Left 09/19/2007    [facility]     Social History    Tobacco Use      Smoking status: Never Smoker      Smokeless tobacco: Never Used    Alcohol use: Yes    Drug use: No      Family History   Problem Relation Age of Onset   • Prostate Cancer Father    • Heart Diso No orders of the defined types were placed in this encounter. Results From Past 48 Hours:  No results found for this or any previous visit (from the past 48 hour(s)).      Results for orders placed or performed in visit on 11/10/20   PROTHROMBI

## 2020-11-24 ENCOUNTER — LAB ENCOUNTER (OUTPATIENT)
Dept: LAB | Age: 85
End: 2020-11-24
Attending: INTERNAL MEDICINE
Payer: MEDICARE

## 2020-11-24 DIAGNOSIS — I50.812 CHRONIC RIGHT-SIDED HEART FAILURE (HCC): ICD-10-CM

## 2020-11-24 DIAGNOSIS — I27.0 PRIMARY PULMONARY HYPERTENSION (HCC): Primary | ICD-10-CM

## 2020-11-24 LAB
BUN SERPL-MCNC: 28 MG/DL
CALCIUM SERPL-MCNC: 9.2 MG/DL
CHLORIDE SERPL-SCNC: 108 MMOL/L
CREAT SERPL-MCNC: 1.28 MG/DL
GLUCOSE SERPL-MCNC: 109 MG/DL
HCT VFR BLD CALC: 35.5 %
HGB BLD-MCNC: 11.5 G/DL
NT-PROBNP SERPL-MCNC: 7890 PG/ML
PLATELET # BLD: 235 K/MCL
POTASSIUM SERPL-SCNC: 3.6 MMOL/L
RBC # BLD: 3.81 10*6/UL
SODIUM SERPL-SCNC: 142 MMOL/L
WBC # BLD: 6.8 K/MCL

## 2020-11-24 PROCEDURE — 80048 BASIC METABOLIC PNL TOTAL CA: CPT

## 2020-11-24 PROCEDURE — 83880 ASSAY OF NATRIURETIC PEPTIDE: CPT

## 2020-11-24 PROCEDURE — 85025 COMPLETE CBC W/AUTO DIFF WBC: CPT

## 2020-11-24 PROCEDURE — 36415 COLL VENOUS BLD VENIPUNCTURE: CPT

## 2020-11-25 ENCOUNTER — CLINICAL ABSTRACT (OUTPATIENT)
Dept: CARDIOLOGY | Age: 85
End: 2020-11-25

## 2020-12-07 ENCOUNTER — ANCILLARY PROCEDURE (OUTPATIENT)
Dept: CARDIOLOGY | Age: 85
End: 2020-12-07
Attending: INTERNAL MEDICINE

## 2020-12-07 ENCOUNTER — OFFICE VISIT (OUTPATIENT)
Dept: CARDIOLOGY | Age: 85
End: 2020-12-07

## 2020-12-07 VITALS
DIASTOLIC BLOOD PRESSURE: 58 MMHG | BODY MASS INDEX: 19.93 KG/M2 | RESPIRATION RATE: 18 BRPM | HEIGHT: 66 IN | SYSTOLIC BLOOD PRESSURE: 116 MMHG | WEIGHT: 124 LBS | HEART RATE: 62 BPM

## 2020-12-07 DIAGNOSIS — I50.812 CHRONIC RIGHT-SIDED HEART FAILURE (CMD): ICD-10-CM

## 2020-12-07 DIAGNOSIS — I27.20 PULMONARY HYPERTENSION (CMD): Primary | ICD-10-CM

## 2020-12-07 DIAGNOSIS — I27.20 PULMONARY HYPERTENSION (CMD): ICD-10-CM

## 2020-12-07 PROCEDURE — 93306 TTE W/DOPPLER COMPLETE: CPT | Performed by: INTERNAL MEDICINE

## 2020-12-07 PROCEDURE — 99215 OFFICE O/P EST HI 40 MIN: CPT | Performed by: INTERNAL MEDICINE

## 2020-12-07 RX ORDER — METOPROLOL SUCCINATE 50 MG/1
50 TABLET, EXTENDED RELEASE ORAL 2 TIMES DAILY
Qty: 180 TABLET | Refills: 3 | Status: SHIPPED | OUTPATIENT
Start: 2020-12-07 | End: 2021-11-01

## 2020-12-07 SDOH — HEALTH STABILITY: PHYSICAL HEALTH: ON AVERAGE, HOW MANY DAYS PER WEEK DO YOU ENGAGE IN MODERATE TO STRENUOUS EXERCISE (LIKE A BRISK WALK)?: 0 DAYS

## 2020-12-07 SDOH — HEALTH STABILITY: MENTAL HEALTH: HOW OFTEN DO YOU HAVE A DRINK CONTAINING ALCOHOL?: NEVER

## 2020-12-07 SDOH — HEALTH STABILITY: PHYSICAL HEALTH: ON AVERAGE, HOW MANY MINUTES DO YOU ENGAGE IN EXERCISE AT THIS LEVEL?: 0 MIN

## 2020-12-07 ASSESSMENT — PATIENT HEALTH QUESTIONNAIRE - PHQ9
CLINICAL INTERPRETATION OF PHQ9 SCORE: NO FURTHER SCREENING NEEDED
SUM OF ALL RESPONSES TO PHQ9 QUESTIONS 1 AND 2: 0
2. FEELING DOWN, DEPRESSED OR HOPELESS: NOT AT ALL
SUM OF ALL RESPONSES TO PHQ9 QUESTIONS 1 AND 2: 0
CLINICAL INTERPRETATION OF PHQ2 SCORE: NO FURTHER SCREENING NEEDED
1. LITTLE INTEREST OR PLEASURE IN DOING THINGS: NOT AT ALL

## 2020-12-07 ASSESSMENT — ENCOUNTER SYMPTOMS
HEMOPTYSIS: 0
FEVER: 0
ALLERGIC/IMMUNOLOGIC COMMENTS: NO NEW FOOD ALLERGIES
COUGH: 0
BRUISES/BLEEDS EASILY: 0
DIARRHEA: 1
WEIGHT GAIN: 0
HEMATOCHEZIA: 0
SUSPICIOUS LESIONS: 0
CHILLS: 0

## 2020-12-08 ENCOUNTER — TELEPHONE (OUTPATIENT)
Dept: CARDIOLOGY | Age: 85
End: 2020-12-08

## 2020-12-17 ENCOUNTER — ANTI-COAG VISIT (OUTPATIENT)
Dept: INTERNAL MEDICINE CLINIC | Facility: CLINIC | Age: 85
End: 2020-12-17
Payer: MEDICARE

## 2020-12-17 DIAGNOSIS — Z51.81 ENCOUNTER FOR THERAPEUTIC DRUG MONITORING: ICD-10-CM

## 2020-12-17 DIAGNOSIS — I48.91 ATRIAL FIBRILLATION, UNSPECIFIED TYPE (HCC): ICD-10-CM

## 2020-12-17 DIAGNOSIS — Z79.01 LONG TERM (CURRENT) USE OF ANTICOAGULANTS: ICD-10-CM

## 2020-12-17 DIAGNOSIS — I48.20 CHRONIC ATRIAL FIBRILLATION (HCC): ICD-10-CM

## 2020-12-17 PROCEDURE — 85610 PROTHROMBIN TIME: CPT

## 2020-12-17 PROCEDURE — 36416 COLLJ CAPILLARY BLOOD SPEC: CPT

## 2021-01-07 ENCOUNTER — TELEPHONE (OUTPATIENT)
Dept: INTERNAL MEDICINE CLINIC | Facility: CLINIC | Age: 86
End: 2021-01-07

## 2021-01-07 NOTE — TELEPHONE ENCOUNTER
Patient would like to speak with a nurse in regards to her coumadin and a procedure she's going to do. Please call her back    Thank you.

## 2021-01-07 NOTE — TELEPHONE ENCOUNTER
Patient calling that she is having a cast made in her ear at the hearing aid center. The center is concerned as possible ear bleeding may occur during casting. . Asking if she should hold her coumadin for this.    Explained coumadin clinic can not make that

## 2021-01-11 NOTE — TELEPHONE ENCOUNTER
Pt states that she is supposed to have cast made on 1/13/21. Is it OK to proceed or does she need clearance? Please call.

## 2021-01-12 ENCOUNTER — TELEPHONE (OUTPATIENT)
Dept: CARDIOLOGY | Age: 86
End: 2021-01-12

## 2021-01-19 ENCOUNTER — PATIENT OUTREACH (OUTPATIENT)
Dept: CASE MANAGEMENT | Age: 86
End: 2021-01-19

## 2021-01-19 NOTE — PROGRESS NOTES
I (Healthcare ), attempted to contact patient to introduce them to the Chronic Care Management Program. The patient did not answer so, I will contact the patient again in a few days. I (Healthcare ), attempted to contact patient, no answer.  I c

## 2021-01-25 ENCOUNTER — PATIENT OUTREACH (OUTPATIENT)
Dept: CASE MANAGEMENT | Age: 86
End: 2021-01-25

## 2021-01-25 ENCOUNTER — ANTI-COAG VISIT (OUTPATIENT)
Dept: INTERNAL MEDICINE CLINIC | Facility: CLINIC | Age: 86
End: 2021-01-25
Payer: MEDICARE

## 2021-01-25 DIAGNOSIS — Z51.81 ENCOUNTER FOR THERAPEUTIC DRUG MONITORING: ICD-10-CM

## 2021-01-25 DIAGNOSIS — I48.20 CHRONIC ATRIAL FIBRILLATION (HCC): ICD-10-CM

## 2021-01-25 DIAGNOSIS — I48.91 ATRIAL FIBRILLATION, UNSPECIFIED TYPE (HCC): ICD-10-CM

## 2021-01-25 DIAGNOSIS — Z79.01 LONG TERM (CURRENT) USE OF ANTICOAGULANTS: ICD-10-CM

## 2021-01-25 LAB
INR: 2.6 (ref 0.8–1.2)
TEST STRIP EXPIRATION DATE: ABNORMAL DATE

## 2021-01-25 PROCEDURE — 85610 PROTHROMBIN TIME: CPT

## 2021-01-25 PROCEDURE — 36416 COLLJ CAPILLARY BLOOD SPEC: CPT

## 2021-01-25 PROCEDURE — 93793 ANTICOAG MGMT PT WARFARIN: CPT

## 2021-01-27 DIAGNOSIS — Z23 NEED FOR VACCINATION: ICD-10-CM

## 2021-01-29 ENCOUNTER — IMMUNIZATION (OUTPATIENT)
Dept: LAB | Age: 86
End: 2021-01-29

## 2021-01-29 DIAGNOSIS — Z23 NEED FOR VACCINATION: Primary | ICD-10-CM

## 2021-01-29 PROCEDURE — 0011A COVID-19 MODERNA VACCINE: CPT

## 2021-01-29 PROCEDURE — 91301 COVID-19 MODERNA VACCINE: CPT

## 2021-02-05 RX ORDER — ROSUVASTATIN CALCIUM 5 MG/1
TABLET, COATED ORAL
Qty: 90 TABLET | Refills: 0 | Status: SHIPPED | OUTPATIENT
Start: 2021-02-05 | End: 2021-04-26

## 2021-02-05 NOTE — TELEPHONE ENCOUNTER
Patient requesting 1 year supply. Last seen 9/21/2020. Return to clinic in 6 months (3/21) Last lipid panel was done on 8/17/20. Refill(s) pended and routed to Dr. Yolanda Daniel.

## 2021-02-26 ENCOUNTER — IMMUNIZATION (OUTPATIENT)
Dept: LAB | Age: 86
End: 2021-02-26

## 2021-02-26 DIAGNOSIS — Z23 NEED FOR VACCINATION: Primary | ICD-10-CM

## 2021-02-26 PROCEDURE — 0012A COVID-19 MODERNA VACCINE: CPT

## 2021-02-26 PROCEDURE — 91301 COVID-19 MODERNA VACCINE: CPT

## 2021-03-04 ENCOUNTER — TELEPHONE (OUTPATIENT)
Dept: CARDIOLOGY | Age: 86
End: 2021-03-04

## 2021-03-09 ENCOUNTER — ANTI-COAG VISIT (OUTPATIENT)
Dept: INTERNAL MEDICINE CLINIC | Facility: CLINIC | Age: 86
End: 2021-03-09
Payer: MEDICARE

## 2021-03-09 DIAGNOSIS — Z79.01 LONG TERM (CURRENT) USE OF ANTICOAGULANTS: ICD-10-CM

## 2021-03-09 DIAGNOSIS — I48.91 ATRIAL FIBRILLATION, UNSPECIFIED TYPE (HCC): ICD-10-CM

## 2021-03-09 DIAGNOSIS — Z51.81 ENCOUNTER FOR THERAPEUTIC DRUG MONITORING: ICD-10-CM

## 2021-03-09 DIAGNOSIS — I48.20 CHRONIC ATRIAL FIBRILLATION (HCC): ICD-10-CM

## 2021-03-09 LAB
INR: 2.8 (ref 0.8–1.2)
TEST STRIP EXPIRATION DATE: ABNORMAL DATE

## 2021-03-09 PROCEDURE — 36416 COLLJ CAPILLARY BLOOD SPEC: CPT

## 2021-03-09 PROCEDURE — 93793 ANTICOAG MGMT PT WARFARIN: CPT

## 2021-03-09 PROCEDURE — 85610 PROTHROMBIN TIME: CPT

## 2021-03-10 ENCOUNTER — TELEPHONE (OUTPATIENT)
Dept: CARDIOLOGY | Age: 86
End: 2021-03-10

## 2021-03-10 ENCOUNTER — OFFICE VISIT (OUTPATIENT)
Dept: CARDIOLOGY | Age: 86
End: 2021-03-10

## 2021-03-10 VITALS
RESPIRATION RATE: 18 BRPM | HEIGHT: 66 IN | OXYGEN SATURATION: 98 % | HEART RATE: 60 BPM | SYSTOLIC BLOOD PRESSURE: 112 MMHG | DIASTOLIC BLOOD PRESSURE: 60 MMHG | WEIGHT: 119 LBS | BODY MASS INDEX: 19.13 KG/M2

## 2021-03-10 DIAGNOSIS — I50.812 CHRONIC RIGHT-SIDED HEART FAILURE (CMD): ICD-10-CM

## 2021-03-10 DIAGNOSIS — E55.9 VITAMIN D DEFICIENCY: ICD-10-CM

## 2021-03-10 DIAGNOSIS — I27.0 PRIMARY PULMONARY HYPERTENSION (CMD): Primary | ICD-10-CM

## 2021-03-10 DIAGNOSIS — E78.00 HYPERCHOLESTEREMIA: ICD-10-CM

## 2021-03-10 PROCEDURE — 99215 OFFICE O/P EST HI 40 MIN: CPT | Performed by: INTERNAL MEDICINE

## 2021-03-10 SDOH — HEALTH STABILITY: MENTAL HEALTH: HOW OFTEN DO YOU HAVE A DRINK CONTAINING ALCOHOL?: NEVER

## 2021-03-10 SDOH — HEALTH STABILITY: PHYSICAL HEALTH: ON AVERAGE, HOW MANY MINUTES DO YOU ENGAGE IN EXERCISE AT THIS LEVEL?: 0 MIN

## 2021-03-10 SDOH — HEALTH STABILITY: PHYSICAL HEALTH: ON AVERAGE, HOW MANY DAYS PER WEEK DO YOU ENGAGE IN MODERATE TO STRENUOUS EXERCISE (LIKE A BRISK WALK)?: 0 DAYS

## 2021-03-10 ASSESSMENT — ENCOUNTER SYMPTOMS
SUSPICIOUS LESIONS: 0
CHILLS: 0
HEMATOCHEZIA: 0
FEVER: 0
DIARRHEA: 1
BRUISES/BLEEDS EASILY: 0
HEMOPTYSIS: 0
WEIGHT GAIN: 0
COUGH: 0
ALLERGIC/IMMUNOLOGIC COMMENTS: NO NEW FOOD ALLERGIES

## 2021-03-10 ASSESSMENT — PATIENT HEALTH QUESTIONNAIRE - PHQ9
CLINICAL INTERPRETATION OF PHQ2 SCORE: NO FURTHER SCREENING NEEDED
CLINICAL INTERPRETATION OF PHQ9 SCORE: NO FURTHER SCREENING NEEDED
2. FEELING DOWN, DEPRESSED OR HOPELESS: NOT AT ALL
SUM OF ALL RESPONSES TO PHQ9 QUESTIONS 1 AND 2: 0
1. LITTLE INTEREST OR PLEASURE IN DOING THINGS: NOT AT ALL
SUM OF ALL RESPONSES TO PHQ9 QUESTIONS 1 AND 2: 0

## 2021-03-11 RX ORDER — SELEXIPAG 1600 UG/1
TABLET, COATED ORAL
Qty: 60 TABLET | Refills: 5 | Status: SHIPPED | OUTPATIENT
Start: 2021-03-11 | End: 2021-09-08

## 2021-04-09 ENCOUNTER — OFFICE VISIT (OUTPATIENT)
Dept: NEPHROLOGY | Facility: CLINIC | Age: 86
End: 2021-04-09
Payer: MEDICARE

## 2021-04-09 VITALS
WEIGHT: 122 LBS | DIASTOLIC BLOOD PRESSURE: 53 MMHG | HEART RATE: 51 BPM | BODY MASS INDEX: 20.83 KG/M2 | SYSTOLIC BLOOD PRESSURE: 154 MMHG | HEIGHT: 64 IN

## 2021-04-09 DIAGNOSIS — I48.21 PERMANENT ATRIAL FIBRILLATION (HCC): ICD-10-CM

## 2021-04-09 DIAGNOSIS — N18.31 STAGE 3A CHRONIC KIDNEY DISEASE (HCC): ICD-10-CM

## 2021-04-09 DIAGNOSIS — I10 ESSENTIAL HYPERTENSION: Primary | ICD-10-CM

## 2021-04-09 DIAGNOSIS — I27.20 PULMONARY HYPERTENSION (HCC): ICD-10-CM

## 2021-04-09 PROCEDURE — 99214 OFFICE O/P EST MOD 30 MIN: CPT | Performed by: INTERNAL MEDICINE

## 2021-04-10 NOTE — PROGRESS NOTES
[address] [facility]  Nephrology Daily Progress Note    Susana Perera  [id number]  80year old  Patient presents with: Follow - Up: 6 months f/u      HPI:   Susana Perera is a 80year old female.   15-year-old female with a history of chronic atrial fib Weight 122 lbs 6 oz 122 lbs 13 oz 122 lbs       Constitutional: appears well hydrated alert and responsive no acute distress noted  Neck/Thyroid: neck is supple without adenopathy  Lymphatic: no abnormal cervical, supraclavicular or axillary adenopathy i daily., Disp: 90 tablet, Rfl: 0  •  torsemide 10 MG Oral Tab, Take 1 tablet (10 mg total) by mouth daily. , Disp: , Rfl:   •  LOPERAMIDE HCL OR, Take 30 mL by mouth., Disp: , Rfl:   •  UPTRAVI 1600 MCG Oral Tab, 1,600 mcg Q12H.  , Disp: , Rfl:     Allergies to check it daily and to call me in 1 week. We will adjust medications if needed. Routine follow-up laboratory studies were ordered. Return in 6 months. Maintain adequate hydration. Avoid nonsteroidals. Daughter was present.          Orders Placed Thi

## 2021-04-10 NOTE — PATIENT INSTRUCTIONS
Please check your blood pressures daily and call me in 1 week. Do follow-up laboratory studies as ordered. Orders are in the computer. Fast for 12 hours.

## 2021-04-14 ENCOUNTER — LAB ENCOUNTER (OUTPATIENT)
Dept: LAB | Age: 86
End: 2021-04-14
Attending: INTERNAL MEDICINE
Payer: MEDICARE

## 2021-04-14 DIAGNOSIS — I10 ESSENTIAL HYPERTENSION: ICD-10-CM

## 2021-04-14 DIAGNOSIS — N18.31 STAGE 3A CHRONIC KIDNEY DISEASE (HCC): ICD-10-CM

## 2021-04-14 DIAGNOSIS — I48.21 PERMANENT ATRIAL FIBRILLATION (HCC): ICD-10-CM

## 2021-04-14 PROCEDURE — 80053 COMPREHEN METABOLIC PANEL: CPT

## 2021-04-14 PROCEDURE — 36415 COLL VENOUS BLD VENIPUNCTURE: CPT

## 2021-04-14 PROCEDURE — 80061 LIPID PANEL: CPT

## 2021-04-14 PROCEDURE — 85025 COMPLETE CBC W/AUTO DIFF WBC: CPT

## 2021-04-17 ENCOUNTER — TELEPHONE (OUTPATIENT)
Dept: NEPHROLOGY | Facility: CLINIC | Age: 86
End: 2021-04-17

## 2021-04-17 DIAGNOSIS — N18.31 STAGE 3A CHRONIC KIDNEY DISEASE (HCC): Primary | ICD-10-CM

## 2021-04-17 NOTE — TELEPHONE ENCOUNTER
Labs okay except kidney function getting a little worse. Make sure she is drinking plenty of fluids. Avoid nonsteroidals. Repeat renal panel in 1 month. How are blood pressure readings? ?

## 2021-04-19 NOTE — TELEPHONE ENCOUNTER
Spoke with patient and she verbalizes understanding of note. Will increase water intake. She notes she's on a water pill, torsemide, per cardiology. Only takes Tylenol as needed for pain.      Reports BP readings:  4/10: 107/50  4/11: 101/50  4/12: 108/49

## 2021-04-20 ENCOUNTER — ANTI-COAG VISIT (OUTPATIENT)
Dept: INTERNAL MEDICINE CLINIC | Facility: CLINIC | Age: 86
End: 2021-04-20
Payer: MEDICARE

## 2021-04-20 DIAGNOSIS — I48.91 ATRIAL FIBRILLATION, UNSPECIFIED TYPE (HCC): ICD-10-CM

## 2021-04-20 DIAGNOSIS — Z79.01 LONG TERM (CURRENT) USE OF ANTICOAGULANTS: ICD-10-CM

## 2021-04-20 DIAGNOSIS — Z51.81 ENCOUNTER FOR THERAPEUTIC DRUG MONITORING: ICD-10-CM

## 2021-04-20 DIAGNOSIS — I48.20 CHRONIC ATRIAL FIBRILLATION (HCC): ICD-10-CM

## 2021-04-20 PROCEDURE — 93793 ANTICOAG MGMT PT WARFARIN: CPT

## 2021-04-20 PROCEDURE — 36416 COLLJ CAPILLARY BLOOD SPEC: CPT

## 2021-04-20 PROCEDURE — 85610 PROTHROMBIN TIME: CPT

## 2021-04-23 RX ORDER — WARFARIN SODIUM 2.5 MG/1
TABLET ORAL
Qty: 90 TABLET | Refills: 1 | Status: SHIPPED | OUTPATIENT
Start: 2021-04-23 | End: 2021-06-25

## 2021-04-23 NOTE — TELEPHONE ENCOUNTER
Current Outpatient Medications   Medication Sig Dispense Refill   • Warfarin Sodium 2.5 MG Oral Tab TAKE ONE TABLET BY  MOUTH 6 days and 2 tablets 1 day as directed by Coumadin Clinic 90 tablet 1

## 2021-04-26 RX ORDER — ROSUVASTATIN CALCIUM 5 MG/1
TABLET, COATED ORAL
Qty: 90 TABLET | Refills: 0 | Status: SHIPPED | OUTPATIENT
Start: 2021-04-26 | End: 2021-07-28

## 2021-04-26 NOTE — TELEPHONE ENCOUNTER
Prescription refilled one time per protocol in Dr. Jennifer Esquivel absence from the office this week.

## 2021-05-18 ENCOUNTER — LAB ENCOUNTER (OUTPATIENT)
Dept: LAB | Age: 86
End: 2021-05-18
Attending: INTERNAL MEDICINE
Payer: MEDICARE

## 2021-05-18 DIAGNOSIS — N18.31 STAGE 3A CHRONIC KIDNEY DISEASE (HCC): ICD-10-CM

## 2021-05-18 PROCEDURE — 80069 RENAL FUNCTION PANEL: CPT

## 2021-05-18 PROCEDURE — 36415 COLL VENOUS BLD VENIPUNCTURE: CPT

## 2021-05-21 ENCOUNTER — TELEPHONE (OUTPATIENT)
Dept: NEPHROLOGY | Facility: CLINIC | Age: 86
End: 2021-05-21

## 2021-05-21 DIAGNOSIS — N18.30 STAGE 3 CHRONIC KIDNEY DISEASE, UNSPECIFIED WHETHER STAGE 3A OR 3B CKD (HCC): Primary | ICD-10-CM

## 2021-05-21 NOTE — TELEPHONE ENCOUNTER
Kidney function fairly stable. If doing well just repeat CBC and renal panel in 3 months. Please make standing order.

## 2021-06-08 ENCOUNTER — ANTI-COAG VISIT (OUTPATIENT)
Dept: INTERNAL MEDICINE CLINIC | Facility: CLINIC | Age: 86
End: 2021-06-08
Payer: MEDICARE

## 2021-06-08 DIAGNOSIS — Z79.01 LONG TERM (CURRENT) USE OF ANTICOAGULANTS: ICD-10-CM

## 2021-06-08 DIAGNOSIS — I48.91 ATRIAL FIBRILLATION, UNSPECIFIED TYPE (HCC): ICD-10-CM

## 2021-06-08 DIAGNOSIS — Z51.81 ENCOUNTER FOR THERAPEUTIC DRUG MONITORING: ICD-10-CM

## 2021-06-08 DIAGNOSIS — I48.20 CHRONIC ATRIAL FIBRILLATION (HCC): ICD-10-CM

## 2021-06-08 PROCEDURE — 93793 ANTICOAG MGMT PT WARFARIN: CPT

## 2021-06-08 PROCEDURE — 85610 PROTHROMBIN TIME: CPT

## 2021-06-24 NOTE — TELEPHONE ENCOUNTER
Last seen 4/9/21. Return to clinic in 6 months (10/21) Last Coumadin Clinic appointment was on 6/8/21. Refill(s) pended and routed to Dr. Isa Marion.

## 2021-06-25 RX ORDER — WARFARIN SODIUM 2.5 MG/1
TABLET ORAL
Qty: 104 TABLET | Refills: 3 | Status: SHIPPED | OUTPATIENT
Start: 2021-06-25

## 2021-07-21 ENCOUNTER — ANTI-COAG VISIT (OUTPATIENT)
Dept: INTERNAL MEDICINE CLINIC | Facility: CLINIC | Age: 86
End: 2021-07-21
Payer: MEDICARE

## 2021-07-21 DIAGNOSIS — I48.91 ATRIAL FIBRILLATION, UNSPECIFIED TYPE (HCC): ICD-10-CM

## 2021-07-21 DIAGNOSIS — I48.20 CHRONIC ATRIAL FIBRILLATION (HCC): ICD-10-CM

## 2021-07-21 DIAGNOSIS — Z79.01 LONG TERM (CURRENT) USE OF ANTICOAGULANTS: ICD-10-CM

## 2021-07-21 DIAGNOSIS — Z51.81 ENCOUNTER FOR THERAPEUTIC DRUG MONITORING: ICD-10-CM

## 2021-07-21 LAB
INR: 2.6 (ref 0.8–1.2)
TEST STRIP EXPIRATION DATE: ABNORMAL DATE

## 2021-07-21 PROCEDURE — 85610 PROTHROMBIN TIME: CPT

## 2021-07-21 PROCEDURE — 93793 ANTICOAG MGMT PT WARFARIN: CPT

## 2021-07-28 DIAGNOSIS — I27.20 PULMONARY HYPERTENSION (CMD): ICD-10-CM

## 2021-07-28 RX ORDER — SILDENAFIL CITRATE 20 MG/1
20 TABLET ORAL 3 TIMES DAILY
Qty: 270 TABLET | Refills: 3 | Status: SHIPPED | OUTPATIENT
Start: 2021-07-28 | End: 2022-08-15

## 2021-07-28 NOTE — TELEPHONE ENCOUNTER
Last seen 4/9/21. Last lipid panel was done on 4/14/21. Return to clinic in 6 months (10/21) Refill(s) pended and routed to Dr. Shasta Hough.

## 2021-07-29 RX ORDER — ROSUVASTATIN CALCIUM 5 MG/1
TABLET, COATED ORAL
Qty: 90 TABLET | Refills: 0 | Status: SHIPPED | OUTPATIENT
Start: 2021-07-29 | End: 2021-11-01

## 2021-08-13 ENCOUNTER — LAB ENCOUNTER (OUTPATIENT)
Dept: LAB | Age: 86
End: 2021-08-13
Attending: INTERNAL MEDICINE
Payer: MEDICARE

## 2021-08-13 DIAGNOSIS — N18.30 STAGE 3 CHRONIC KIDNEY DISEASE, UNSPECIFIED WHETHER STAGE 3A OR 3B CKD (HCC): ICD-10-CM

## 2021-08-13 LAB
ALBUMIN SERPL-MCNC: 3.5 G/DL (ref 3.4–5)
ANION GAP SERPL CALC-SCNC: 5 MMOL/L (ref 0–18)
BASOPHILS # BLD AUTO: 0.06 X10(3) UL (ref 0–0.2)
BASOPHILS NFR BLD AUTO: 0.8 %
BUN BLD-MCNC: 32 MG/DL (ref 7–18)
BUN/CREAT SERPL: 23.4 (ref 10–20)
CALCIUM BLD-MCNC: 9.4 MG/DL (ref 8.5–10.1)
CHLORIDE SERPL-SCNC: 111 MMOL/L (ref 98–112)
CO2 SERPL-SCNC: 24 MMOL/L (ref 21–32)
CREAT BLD-MCNC: 1.37 MG/DL
DEPRECATED RDW RBC AUTO: 52.9 FL (ref 35.1–46.3)
EOSINOPHIL # BLD AUTO: 0.08 X10(3) UL (ref 0–0.7)
EOSINOPHIL NFR BLD AUTO: 1 %
ERYTHROCYTE [DISTWIDTH] IN BLOOD BY AUTOMATED COUNT: 15.2 % (ref 11–15)
GLUCOSE BLD-MCNC: 96 MG/DL (ref 70–99)
HCT VFR BLD AUTO: 35.4 %
HGB BLD-MCNC: 11.5 G/DL
IMM GRANULOCYTES # BLD AUTO: 0.02 X10(3) UL (ref 0–1)
IMM GRANULOCYTES NFR BLD: 0.3 %
LYMPHOCYTES # BLD AUTO: 2.15 X10(3) UL (ref 1–4)
LYMPHOCYTES NFR BLD AUTO: 27.8 %
MCH RBC QN AUTO: 30.8 PG (ref 26–34)
MCHC RBC AUTO-ENTMCNC: 32.5 G/DL (ref 31–37)
MCV RBC AUTO: 94.9 FL
MONOCYTES # BLD AUTO: 0.51 X10(3) UL (ref 0.1–1)
MONOCYTES NFR BLD AUTO: 6.6 %
NEUTROPHILS # BLD AUTO: 4.92 X10 (3) UL (ref 1.5–7.7)
NEUTROPHILS # BLD AUTO: 4.92 X10(3) UL (ref 1.5–7.7)
NEUTROPHILS NFR BLD AUTO: 63.5 %
OSMOLALITY SERPL CALC.SUM OF ELEC: 297 MOSM/KG (ref 275–295)
PHOSPHATE SERPL-MCNC: 3.4 MG/DL (ref 2.5–4.9)
PLATELET # BLD AUTO: 231 10(3)UL (ref 150–450)
POTASSIUM SERPL-SCNC: 4.6 MMOL/L (ref 3.5–5.1)
RBC # BLD AUTO: 3.73 X10(6)UL
SODIUM SERPL-SCNC: 140 MMOL/L (ref 136–145)
WBC # BLD AUTO: 7.7 X10(3) UL (ref 4–11)

## 2021-08-13 PROCEDURE — 80069 RENAL FUNCTION PANEL: CPT

## 2021-08-13 PROCEDURE — 36415 COLL VENOUS BLD VENIPUNCTURE: CPT

## 2021-08-13 PROCEDURE — 85025 COMPLETE CBC W/AUTO DIFF WBC: CPT

## 2021-08-14 ENCOUNTER — TELEPHONE (OUTPATIENT)
Dept: NEPHROLOGY | Facility: CLINIC | Age: 86
End: 2021-08-14

## 2021-08-14 DIAGNOSIS — N18.30 STAGE 3 CHRONIC KIDNEY DISEASE, UNSPECIFIED WHETHER STAGE 3A OR 3B CKD (HCC): ICD-10-CM

## 2021-08-14 DIAGNOSIS — E78.00 PURE HYPERCHOLESTEROLEMIA: Primary | ICD-10-CM

## 2021-08-15 NOTE — TELEPHONE ENCOUNTER
Kidney function remains stable. If doing well repeat a CBC, CMP, lipid panel, urinalysis in 3 months and then see for follow-up.

## 2021-08-16 NOTE — TELEPHONE ENCOUNTER
Patient contacted. Results message relayed. Patient states she feels fine. She is aware to do lab work in 3 months (fast 12 hours) and see Dr. Mj Felipe for a follow up appointment. Orders entered in system.

## 2021-09-01 ENCOUNTER — ANTI-COAG VISIT (OUTPATIENT)
Dept: INTERNAL MEDICINE CLINIC | Facility: CLINIC | Age: 86
End: 2021-09-01
Payer: MEDICARE

## 2021-09-01 DIAGNOSIS — I48.91 ATRIAL FIBRILLATION, UNSPECIFIED TYPE (HCC): ICD-10-CM

## 2021-09-01 DIAGNOSIS — I48.20 CHRONIC ATRIAL FIBRILLATION (HCC): ICD-10-CM

## 2021-09-01 DIAGNOSIS — Z51.81 ENCOUNTER FOR THERAPEUTIC DRUG MONITORING: ICD-10-CM

## 2021-09-01 DIAGNOSIS — Z79.01 LONG TERM (CURRENT) USE OF ANTICOAGULANTS: ICD-10-CM

## 2021-09-01 LAB
INR: 2.1 (ref 0.8–1.2)
TEST STRIP EXPIRATION DATE: ABNORMAL DATE

## 2021-09-01 PROCEDURE — 85610 PROTHROMBIN TIME: CPT

## 2021-09-01 PROCEDURE — 93793 ANTICOAG MGMT PT WARFARIN: CPT

## 2021-09-08 RX ORDER — SELEXIPAG 1600 UG/1
TABLET, COATED ORAL
Qty: 60 TABLET | Refills: 5 | Status: SHIPPED | OUTPATIENT
Start: 2021-09-08 | End: 2022-04-08 | Stop reason: SDUPTHER

## 2021-09-10 ENCOUNTER — APPOINTMENT (OUTPATIENT)
Dept: CARDIOLOGY | Age: 86
End: 2021-09-10
Attending: INTERNAL MEDICINE

## 2021-09-13 ENCOUNTER — LAB ENCOUNTER (OUTPATIENT)
Dept: LAB | Age: 86
End: 2021-09-13
Payer: MEDICARE

## 2021-09-13 ENCOUNTER — HOSPITAL ENCOUNTER (OUTPATIENT)
Dept: CV DIAGNOSTICS | Age: 86
Discharge: HOME | End: 2021-09-13
Payer: MEDICARE

## 2021-09-13 DIAGNOSIS — I27.0 PRIMARY PULMONARY HYPERTENSION (HCC): ICD-10-CM

## 2021-09-13 DIAGNOSIS — E78.00 PURE HYPERCHOLESTEROLEMIA: ICD-10-CM

## 2021-09-13 DIAGNOSIS — I50.812 CHRONIC RIGHT-SIDED HEART FAILURE (HCC): ICD-10-CM

## 2021-09-13 DIAGNOSIS — I27.0 PRIMARY PULMONARY HYPERTENSION (HCC): Primary | ICD-10-CM

## 2021-09-13 LAB
ALBUMIN SERPL-MCNC: 3.4 G/DL (ref 3.4–5)
ALBUMIN SERPL-MCNC: 3.6 G/DL
ALBUMIN/GLOB SERPL: 0.9 {RATIO} (ref 1–2)
ALP LIVER SERPL-CCNC: 68 U/L
ALP SERPL-CCNC: 68 U/L
ALT SERPL-CCNC: 10 U/L
ALT SERPL-CCNC: 10 UNITS/L
ANION GAP SERPL CALC-SCNC: 8 MMOL/L
ANION GAP SERPL CALC-SCNC: 8 MMOL/L (ref 0–18)
AST SERPL-CCNC: 15 U/L (ref 15–37)
AST SERPL-CCNC: 15 UNITS/L
BASOPHILS # BLD AUTO: 0.06 X10(3) UL (ref 0–0.2)
BASOPHILS NFR BLD AUTO: 1 %
BILIRUB SERPL-MCNC: 0.5 MG/DL
BILIRUB SERPL-MCNC: 0.5 MG/DL (ref 0.1–2)
BUN BLD-MCNC: 38 MG/DL (ref 7–18)
BUN SERPL-MCNC: 38 MG/DL
BUN/CREAT SERPL: 25.5 (ref 10–20)
CALCIUM BLD-MCNC: 8.6 MG/DL (ref 8.5–10.1)
CALCIUM SERPL-MCNC: 8.6 MG/DL
CHLORIDE SERPL-SCNC: 109 MMOL/L
CHLORIDE SERPL-SCNC: 109 MMOL/L (ref 98–112)
CHOLEST SERPL-MCNC: 151 MG/DL
CHOLEST SMN-MCNC: 151 MG/DL (ref ?–200)
CO2 SERPL-SCNC: 26 MMOL/L
CO2 SERPL-SCNC: 26 MMOL/L (ref 21–32)
CREAT BLD-MCNC: 1.49 MG/DL
CREAT SERPL-MCNC: 1.49 MG/DL
DEPRECATED RDW RBC AUTO: 56.1 FL (ref 35.1–46.3)
EOSINOPHIL # BLD AUTO: 0.09 X10(3) UL (ref 0–0.7)
EOSINOPHIL NFR BLD AUTO: 1.6 %
ERYTHROCYTE [DISTWIDTH] IN BLOOD BY AUTOMATED COUNT: 15.8 % (ref 11–15)
GLOBULIN PLAS-MCNC: 3.6 G/DL (ref 2.8–4.4)
GLUCOSE BLD-MCNC: 71 MG/DL (ref 70–99)
GLUCOSE SERPL-MCNC: 71 MG/DL
HCT VFR BLD AUTO: 32.7 %
HCT VFR BLD CALC: 32.7 %
HDLC SERPL-MCNC: 52 MG/DL
HDLC SERPL-MCNC: 52 MG/DL (ref 40–59)
HGB BLD-MCNC: 10.5 G/DL
HGB BLD-MCNC: 10.5 G/DL
IMM GRANULOCYTES # BLD AUTO: 0.02 X10(3) UL (ref 0–1)
IMM GRANULOCYTES NFR BLD: 0.3 %
LDLC SERPL CALC-MCNC: 87 MG/DL
LDLC SERPL CALC-MCNC: 87 MG/DL (ref ?–100)
LYMPHOCYTES # BLD AUTO: 1.89 X10(3) UL (ref 1–4)
LYMPHOCYTES NFR BLD AUTO: 32.8 %
M PROTEIN MFR SERPL ELPH: 7 G/DL (ref 6.4–8.2)
MCH RBC QN AUTO: 30.7 PG (ref 26–34)
MCHC RBC AUTO-ENTMCNC: 32.1 G/DL (ref 31–37)
MCV RBC AUTO: 95.6 FL
MONOCYTES # BLD AUTO: 0.44 X10(3) UL (ref 0.1–1)
MONOCYTES NFR BLD AUTO: 7.6 %
NEUTROPHILS # BLD AUTO: 3.26 X10 (3) UL (ref 1.5–7.7)
NEUTROPHILS # BLD AUTO: 3.26 X10(3) UL (ref 1.5–7.7)
NEUTROPHILS NFR BLD AUTO: 56.7 %
NONHDLC SERPL-MCNC: 99 MG/DL (ref ?–130)
NT-PROBNP SERPL-MCNC: 5129 PG/ML
NT-PROBNP SERPL-MCNC: 5129 PG/ML (ref ?–450)
OSMOLALITY SERPL CALC.SUM OF ELEC: 304 MOSM/KG (ref 275–295)
PATIENT FASTING Y/N/NP: NO
PATIENT FASTING Y/N/NP: NO
PLATELET # BLD AUTO: 199 10(3)UL (ref 150–450)
PLATELET # BLD: 199 K/MCL
POTASSIUM SERPL-SCNC: 4.4 MMOL/L
POTASSIUM SERPL-SCNC: 4.4 MMOL/L (ref 3.5–5.1)
PROT SERPL-MCNC: 3.4 G/DL
RBC # BLD AUTO: 3.42 X10(6)UL
RBC # BLD: 3.42 10*6/UL
SODIUM SERPL-SCNC: 143 MMOL/L
SODIUM SERPL-SCNC: 143 MMOL/L (ref 136–145)
TRIGL SERPL-MCNC: 61 MG/DL
TRIGL SERPL-MCNC: 61 MG/DL (ref 30–149)
TSH SERPL-ACNC: 1.44 MCUNITS/ML
TSI SER-ACNC: 1.44 MIU/ML (ref 0.36–3.74)
VLDLC SERPL CALC-MCNC: 10 MG/DL (ref 0–30)
WBC # BLD AUTO: 5.8 X10(3) UL (ref 4–11)
WBC # BLD: 5.8 K/MCL

## 2021-09-13 PROCEDURE — 84443 ASSAY THYROID STIM HORMONE: CPT

## 2021-09-13 PROCEDURE — 93306 TTE W/DOPPLER COMPLETE: CPT | Performed by: INTERNAL MEDICINE

## 2021-09-13 PROCEDURE — 80053 COMPREHEN METABOLIC PANEL: CPT

## 2021-09-13 PROCEDURE — 85025 COMPLETE CBC W/AUTO DIFF WBC: CPT

## 2021-09-13 PROCEDURE — 36415 COLL VENOUS BLD VENIPUNCTURE: CPT

## 2021-09-13 PROCEDURE — 80061 LIPID PANEL: CPT

## 2021-09-13 PROCEDURE — 83880 ASSAY OF NATRIURETIC PEPTIDE: CPT

## 2021-09-13 PROCEDURE — 93306 TTE W/DOPPLER COMPLETE: CPT

## 2021-09-13 RX ORDER — LISINOPRIL 40 MG/1
40 TABLET ORAL DAILY
Qty: 90 TABLET | Refills: 3 | Status: SHIPPED | OUTPATIENT
Start: 2021-09-13 | End: 2022-09-12

## 2021-09-14 ENCOUNTER — CLINICAL ABSTRACT (OUTPATIENT)
Dept: CARDIOLOGY | Age: 86
End: 2021-09-14

## 2021-09-15 ENCOUNTER — OFFICE VISIT (OUTPATIENT)
Dept: CARDIOLOGY | Age: 86
End: 2021-09-15

## 2021-09-15 ENCOUNTER — IMMUNIZATION (OUTPATIENT)
Dept: LAB | Facility: HOSPITAL | Age: 86
End: 2021-09-15
Attending: EMERGENCY MEDICINE
Payer: MEDICARE

## 2021-09-15 VITALS
HEIGHT: 66 IN | WEIGHT: 122 LBS | RESPIRATION RATE: 18 BRPM | DIASTOLIC BLOOD PRESSURE: 50 MMHG | HEART RATE: 56 BPM | SYSTOLIC BLOOD PRESSURE: 104 MMHG | BODY MASS INDEX: 19.61 KG/M2

## 2021-09-15 DIAGNOSIS — I27.29 RIGHT HEART FAILURE DUE TO PULMONARY HYPERTENSION (CMD): ICD-10-CM

## 2021-09-15 DIAGNOSIS — E55.9 VITAMIN D DEFICIENCY: ICD-10-CM

## 2021-09-15 DIAGNOSIS — I27.20 PULMONARY HYPERTENSION (CMD): Primary | ICD-10-CM

## 2021-09-15 DIAGNOSIS — I50.810 RIGHT HEART FAILURE DUE TO PULMONARY HYPERTENSION (CMD): ICD-10-CM

## 2021-09-15 DIAGNOSIS — E78.00 HYPERCHOLESTEREMIA: ICD-10-CM

## 2021-09-15 DIAGNOSIS — Z23 NEED FOR VACCINATION: Primary | ICD-10-CM

## 2021-09-15 PROCEDURE — 0013A SARSCOV2 VAC 100MCG/0.5ML IM: CPT

## 2021-09-15 PROCEDURE — 99214 OFFICE O/P EST MOD 30 MIN: CPT | Performed by: INTERNAL MEDICINE

## 2021-09-15 ASSESSMENT — ENCOUNTER SYMPTOMS
FEVER: 0
WEIGHT GAIN: 0
HEMATOCHEZIA: 0
HEMOPTYSIS: 0
BRUISES/BLEEDS EASILY: 0
ALLERGIC/IMMUNOLOGIC COMMENTS: NO NEW FOOD ALLERGIES
SUSPICIOUS LESIONS: 0
CHILLS: 0
DIARRHEA: 1
COUGH: 0

## 2021-09-24 ENCOUNTER — APPOINTMENT (OUTPATIENT)
Dept: CARDIOLOGY | Age: 86
End: 2021-09-24

## 2021-10-06 ENCOUNTER — TELEPHONE (OUTPATIENT)
Dept: INTERNAL MEDICINE CLINIC | Facility: CLINIC | Age: 86
End: 2021-10-06

## 2021-10-06 DIAGNOSIS — Z79.01 LONG TERM (CURRENT) USE OF ANTICOAGULANTS: ICD-10-CM

## 2021-10-06 DIAGNOSIS — I48.20 CHRONIC ATRIAL FIBRILLATION (HCC): Primary | ICD-10-CM

## 2021-10-06 DIAGNOSIS — I48.91 ATRIAL FIBRILLATION, UNSPECIFIED TYPE (HCC): ICD-10-CM

## 2021-10-06 DIAGNOSIS — Z51.81 ENCOUNTER FOR THERAPEUTIC DRUG MONITORING: ICD-10-CM

## 2021-10-06 NOTE — TELEPHONE ENCOUNTER
Anticoag referral expires soon. Order pended. Please sign, thank you.       Dx: Chronic atrial fibrillation (HCC) (I48.20)  [facility] (I48.91)  Encounter for therapeutic drug monitoring (Z51.81)  Long term (current) use of

## 2021-10-08 ENCOUNTER — OFFICE VISIT (OUTPATIENT)
Dept: OTOLARYNGOLOGY | Facility: CLINIC | Age: 86
End: 2021-10-08
Payer: MEDICARE

## 2021-10-08 VITALS — BODY MASS INDEX: 20.83 KG/M2 | WEIGHT: 122 LBS | HEIGHT: 64 IN

## 2021-10-08 DIAGNOSIS — H61.23 BILATERAL IMPACTED CERUMEN: Primary | ICD-10-CM

## 2021-10-08 PROCEDURE — 69210 REMOVE IMPACTED EAR WAX UNI: CPT | Performed by: OTOLARYNGOLOGY

## 2021-10-08 NOTE — PROGRESS NOTES
Sloan Ceballos is a 80year old female. Patient presents with:  Ear Problem: pt presents today for both ears cleaning due to wax build up.        HISTORY OF PRESENT ILLNESS    Patient presents for cerumen removal. No other complaints or concerns at this      Per NG: Pregnacy 40 week 7 lb(s) 14 oz Male   •       Per NG:  -breech  OUTCOME IS 40 week 9 lb(s) Female   • RADIATION LEFT  2011   • YAG CAPSULOTOMY - OS - LEFT EYE Left 2007    APOLINAR       REVIEW OF SYSTEMS    System Neg/Pos Det needed.       Current Outpatient Medications:   •  ROSUVASTATIN 5 MG Oral Tab, TAKE 1 TABLET BY MOUTH AT  NIGHT, Disp: 90 tablet, Rfl: 0  •  warfarin 2.5 MG Oral Tab, TAKE 1 TABLET BY MOUTH 6  DAYS A WEEK  AND 2 TABLETS  BY MOUTH 1 DAY A WEEK  AS  DIRECTED

## 2021-10-13 ENCOUNTER — ANTI-COAG VISIT (OUTPATIENT)
Dept: INTERNAL MEDICINE CLINIC | Facility: CLINIC | Age: 86
End: 2021-10-13
Payer: MEDICARE

## 2021-10-13 DIAGNOSIS — Z51.81 ENCOUNTER FOR THERAPEUTIC DRUG MONITORING: ICD-10-CM

## 2021-10-13 DIAGNOSIS — I48.91 ATRIAL FIBRILLATION, UNSPECIFIED TYPE (HCC): ICD-10-CM

## 2021-10-13 DIAGNOSIS — Z79.01 LONG TERM (CURRENT) USE OF ANTICOAGULANTS: ICD-10-CM

## 2021-10-13 DIAGNOSIS — I48.20 CHRONIC ATRIAL FIBRILLATION (HCC): ICD-10-CM

## 2021-10-13 PROCEDURE — 85610 PROTHROMBIN TIME: CPT

## 2021-10-13 PROCEDURE — 93793 ANTICOAG MGMT PT WARFARIN: CPT

## 2021-11-01 RX ORDER — TORSEMIDE 10 MG/1
10 TABLET ORAL DAILY
Qty: 90 TABLET | Refills: 3 | Status: SHIPPED | OUTPATIENT
Start: 2021-11-01 | End: 2022-09-12

## 2021-11-01 RX ORDER — METOPROLOL SUCCINATE 50 MG/1
TABLET, EXTENDED RELEASE ORAL
Qty: 180 TABLET | Refills: 3 | Status: SHIPPED | OUTPATIENT
Start: 2021-11-01 | End: 2022-09-12

## 2021-11-01 RX ORDER — ROSUVASTATIN CALCIUM 5 MG/1
TABLET, COATED ORAL
Qty: 90 TABLET | Refills: 1 | Status: SHIPPED | OUTPATIENT
Start: 2021-11-01

## 2021-11-15 ENCOUNTER — LAB ENCOUNTER (OUTPATIENT)
Dept: LAB | Age: 86
End: 2021-11-15
Attending: FAMILY MEDICINE
Payer: MEDICARE

## 2021-11-15 DIAGNOSIS — E78.00 PURE HYPERCHOLESTEROLEMIA: ICD-10-CM

## 2021-11-15 DIAGNOSIS — Z79.01 LONG TERM (CURRENT) USE OF ANTICOAGULANTS: ICD-10-CM

## 2021-11-15 DIAGNOSIS — I48.91 ATRIAL FIBRILLATION, UNSPECIFIED TYPE (HCC): ICD-10-CM

## 2021-11-15 DIAGNOSIS — I48.20 CHRONIC ATRIAL FIBRILLATION (HCC): ICD-10-CM

## 2021-11-15 DIAGNOSIS — Z51.81 ENCOUNTER FOR THERAPEUTIC DRUG MONITORING: ICD-10-CM

## 2021-11-15 DIAGNOSIS — N18.30 STAGE 3 CHRONIC KIDNEY DISEASE, UNSPECIFIED WHETHER STAGE 3A OR 3B CKD (HCC): ICD-10-CM

## 2021-11-15 PROCEDURE — 85610 PROTHROMBIN TIME: CPT

## 2021-11-15 PROCEDURE — 84100 ASSAY OF PHOSPHORUS: CPT

## 2021-11-15 PROCEDURE — 80053 COMPREHEN METABOLIC PANEL: CPT

## 2021-11-15 PROCEDURE — 80061 LIPID PANEL: CPT

## 2021-11-15 PROCEDURE — 85025 COMPLETE CBC W/AUTO DIFF WBC: CPT

## 2021-11-15 PROCEDURE — 36415 COLL VENOUS BLD VENIPUNCTURE: CPT

## 2021-11-16 ENCOUNTER — TELEPHONE (OUTPATIENT)
Dept: INTERNAL MEDICINE CLINIC | Facility: CLINIC | Age: 86
End: 2021-11-16

## 2021-11-16 ENCOUNTER — ANTI-COAG VISIT (OUTPATIENT)
Dept: INTERNAL MEDICINE CLINIC | Facility: CLINIC | Age: 86
End: 2021-11-16

## 2021-11-16 DIAGNOSIS — I48.20 CHRONIC ATRIAL FIBRILLATION (HCC): ICD-10-CM

## 2021-11-16 DIAGNOSIS — Z51.81 ENCOUNTER FOR THERAPEUTIC DRUG MONITORING: ICD-10-CM

## 2021-11-16 DIAGNOSIS — I48.91 ATRIAL FIBRILLATION, UNSPECIFIED TYPE (HCC): ICD-10-CM

## 2021-11-16 DIAGNOSIS — Z79.01 LONG TERM (CURRENT) USE OF ANTICOAGULANTS: ICD-10-CM

## 2021-11-18 ENCOUNTER — OFFICE VISIT (OUTPATIENT)
Dept: HEMATOLOGY/ONCOLOGY | Facility: HOSPITAL | Age: 86
End: 2021-11-18
Attending: INTERNAL MEDICINE
Payer: MEDICARE

## 2021-11-18 VITALS
BODY MASS INDEX: 20.49 KG/M2 | HEART RATE: 55 BPM | DIASTOLIC BLOOD PRESSURE: 93 MMHG | TEMPERATURE: 98 F | OXYGEN SATURATION: 97 % | WEIGHT: 120 LBS | SYSTOLIC BLOOD PRESSURE: 157 MMHG | HEIGHT: 64.02 IN | RESPIRATION RATE: 18 BRPM

## 2021-11-18 DIAGNOSIS — Z08 ENCOUNTER FOR FOLLOW-UP SURVEILLANCE OF BREAST CANCER: ICD-10-CM

## 2021-11-18 DIAGNOSIS — Z85.3 ENCOUNTER FOR FOLLOW-UP SURVEILLANCE OF BREAST CANCER: ICD-10-CM

## 2021-11-18 DIAGNOSIS — Z85.3 HISTORY OF LEFT BREAST CANCER: Primary | ICD-10-CM

## 2021-11-18 PROCEDURE — 99213 OFFICE O/P EST LOW 20 MIN: CPT | Performed by: INTERNAL MEDICINE

## 2021-11-18 NOTE — PROGRESS NOTES
HPI   [place] is a 80year old female here for f/u of History of left breast cancer  (primary encounter diagnosis)  Encounter for follow-up surveillance of breast cancer     She completed the anastrozole for 5 yrs in March of 2017.       Denies an 2002    Per NG: cardioversion attempt   • Breast cancer ([facility] 11/2011    Per NG: lumpectomy and radiation 11/2011 LT   • Breast lump 1952    Per NG: excised--benign LT   • Breast lump 2002    Per NG : excised -- benign RT   • Conjunctivitis 2006    Per NG: lb)  10/08/21 : 55.3 kg (122 lb)  04/09/21 : 55.3 kg (122 lb)  11/18/20 : 55.7 kg (122 lb 12.8 oz)  09/21/20 : 55.5 kg (122 lb 6.4 oz)  09/02/20 : 54.4 kg (120 lb)    General: Patient is alert, not in acute distress. HEENT: EOMs intact. PERRL.  Oropharynx 295 mOsm/kg    GFR, Non- 32 (L) >=60    GFR, -American 37 (L) >=60    ALT 14 13 - 56 U/L    AST 21 15 - 37 U/L    Alkaline Phosphatase 80 55 - 142 U/L    Bilirubin, Total 0.4 0.1 - 2.0 mg/dL    Total Protein 6.9 6.4 - 8.2 g/dL    Alb

## 2021-11-22 ENCOUNTER — OFFICE VISIT (OUTPATIENT)
Dept: NEPHROLOGY | Facility: CLINIC | Age: 86
End: 2021-11-22
Payer: MEDICARE

## 2021-11-22 VITALS
DIASTOLIC BLOOD PRESSURE: 48 MMHG | HEIGHT: 64 IN | BODY MASS INDEX: 20.66 KG/M2 | HEART RATE: 48 BPM | SYSTOLIC BLOOD PRESSURE: 120 MMHG | WEIGHT: 121 LBS

## 2021-11-22 DIAGNOSIS — N18.32 STAGE 3B CHRONIC KIDNEY DISEASE (HCC): Primary | ICD-10-CM

## 2021-11-22 DIAGNOSIS — I10 ESSENTIAL HYPERTENSION: ICD-10-CM

## 2021-11-22 DIAGNOSIS — I27.20 PULMONARY HYPERTENSION (HCC): ICD-10-CM

## 2021-11-22 PROCEDURE — 99214 OFFICE O/P EST MOD 30 MIN: CPT | Performed by: INTERNAL MEDICINE

## 2021-11-23 NOTE — PROGRESS NOTES
[facility]  Nephrology Daily Progress Note    Karena Gerber  [id number]  80year old  Patient presents with: Follow - Up: Follow up blood work done last week      HPI:   Kaerna Gerber is a 80year old female.   49-year-old female with a histor well hydrated alert and responsive no acute distress noted  Neck/Thyroid: neck is supple without adenopathy  Lymphatic: no abnormal cervical, supraclavicular or axillary adenopathy is noted  Respiratory: normal to inspection lungs are clear to auscultation torsemide 10 MG Oral Tab, Take 1 tablet (10 mg total) by mouth daily. , Disp: , Rfl:   •  LOPERAMIDE HCL OR, Take 30 mL by mouth., Disp: , Rfl:   •  UPTRAVI 1600 MCG Oral Tab, 1,600 mcg Q12H.  , Disp: , Rfl:     Allergies:    Aldactone [Spironol*    HYPERKA control. Hemoglobin 11.8. Therefore continue current medications. She states Dr. Hue Malhotra will be doing labs in about 3 months. Advised him to send me copies of the labs I can follow along. Otherwise return in 6 months. Sooner if any problems arise.   D

## 2021-12-06 ENCOUNTER — TELEPHONE (OUTPATIENT)
Dept: NEPHROLOGY | Facility: CLINIC | Age: 86
End: 2021-12-06

## 2021-12-06 NOTE — TELEPHONE ENCOUNTER
Pt called with BP readings:     11/23/21 119/55  51  11/24/21  130/64  52  11/25/21  140/60  50   11/26/21  123/55  51   11/27/21  123/57  51   11/28/21  108/48  44   11/29/21  114/53  47     No dates for these readings:      108/53  53, 107/50 52, 127/57

## 2021-12-07 ENCOUNTER — TELEPHONE (OUTPATIENT)
Dept: NEPHROLOGY | Facility: CLINIC | Age: 86
End: 2021-12-07

## 2021-12-07 NOTE — TELEPHONE ENCOUNTER
Patient dropped off her application to renew her parking placard for Dr. Hector Medina to complete and sign. Please call patient when completed. Placed in RN in-box.

## 2021-12-22 ENCOUNTER — TELEPHONE (OUTPATIENT)
Dept: INTERNAL MEDICINE CLINIC | Facility: CLINIC | Age: 86
End: 2021-12-22

## 2021-12-22 ENCOUNTER — ANTI-COAG VISIT (OUTPATIENT)
Dept: INTERNAL MEDICINE CLINIC | Facility: CLINIC | Age: 86
End: 2021-12-22
Payer: MEDICARE

## 2021-12-22 DIAGNOSIS — I48.20 CHRONIC ATRIAL FIBRILLATION (HCC): ICD-10-CM

## 2021-12-22 DIAGNOSIS — Z51.81 ENCOUNTER FOR THERAPEUTIC DRUG MONITORING: ICD-10-CM

## 2021-12-22 DIAGNOSIS — I48.91 ATRIAL FIBRILLATION, UNSPECIFIED TYPE (HCC): ICD-10-CM

## 2021-12-22 DIAGNOSIS — Z79.01 LONG TERM (CURRENT) USE OF ANTICOAGULANTS: ICD-10-CM

## 2021-12-22 PROCEDURE — 85610 PROTHROMBIN TIME: CPT

## 2021-12-22 PROCEDURE — 93793 ANTICOAG MGMT PT WARFARIN: CPT

## 2021-12-22 NOTE — TELEPHONE ENCOUNTER
Today's INR= 3.7  Goal INR= 2.0-3.0    Reports her appetite has been less lately. INR has been therapeutic at current weekly dose. Per protocol, weekly dose decrease 5-10%.    Outside of protocol- actual decrease 12% (due to tablet strength), hold one d

## 2022-01-01 ENCOUNTER — EXTERNAL RECORD (OUTPATIENT)
Dept: OTHER | Age: 87
End: 2022-01-01

## 2022-01-11 ENCOUNTER — ANTI-COAG VISIT (OUTPATIENT)
Dept: INTERNAL MEDICINE CLINIC | Facility: CLINIC | Age: 87
End: 2022-01-11
Payer: MEDICARE

## 2022-01-11 DIAGNOSIS — I48.91 ATRIAL FIBRILLATION, UNSPECIFIED TYPE (HCC): ICD-10-CM

## 2022-01-11 DIAGNOSIS — Z79.01 LONG TERM (CURRENT) USE OF ANTICOAGULANTS: ICD-10-CM

## 2022-01-11 DIAGNOSIS — I48.20 CHRONIC ATRIAL FIBRILLATION (HCC): ICD-10-CM

## 2022-01-11 DIAGNOSIS — Z51.81 ENCOUNTER FOR THERAPEUTIC DRUG MONITORING: ICD-10-CM

## 2022-01-11 LAB — INR: 1.9 (ref 0.8–1.2)

## 2022-01-11 PROCEDURE — 85610 PROTHROMBIN TIME: CPT

## 2022-01-11 PROCEDURE — 93793 ANTICOAG MGMT PT WARFARIN: CPT

## 2022-02-10 ENCOUNTER — ANTI-COAG VISIT (OUTPATIENT)
Dept: INTERNAL MEDICINE CLINIC | Facility: CLINIC | Age: 87
End: 2022-02-10
Payer: MEDICARE

## 2022-02-10 DIAGNOSIS — Z79.01 LONG TERM (CURRENT) USE OF ANTICOAGULANTS: ICD-10-CM

## 2022-02-10 DIAGNOSIS — Z51.81 ENCOUNTER FOR THERAPEUTIC DRUG MONITORING: ICD-10-CM

## 2022-02-10 DIAGNOSIS — I48.91 ATRIAL FIBRILLATION, UNSPECIFIED TYPE (HCC): ICD-10-CM

## 2022-02-10 DIAGNOSIS — I48.20 CHRONIC ATRIAL FIBRILLATION (HCC): ICD-10-CM

## 2022-02-10 LAB — INR: 1.8 (ref 2–3)

## 2022-02-10 PROCEDURE — 93793 ANTICOAG MGMT PT WARFARIN: CPT

## 2022-02-10 PROCEDURE — 85610 PROTHROMBIN TIME: CPT

## 2022-02-11 NOTE — MR AVS SNAPSHOT
GURU: The patient plans to discharge home with [facility] with wife to transport when stable for discharge. RUR: N/A    1. The patient is from home. 2. Ortho, PT/OT following. 3. The patient plans to discharge home with home health. Medicare Outpatient Observation Notice (MOON)/ [place] Outpatient Observation Notice (Bhavani Davis) provided to patient/representative with verbal explanation of the notice. Time allotted for questions regarding the notice. Patient /representative provided a completed copy of the MOON/VOON notice. Copy placed on bedside chart. Care Management Interventions  PCP Verified by CM: Yes  Palliative Care Criteria Met (RRAT>21 & CHF Dx)?: No  Mode of Transport at Discharge: Other (see comment)  Transition of Care Consult (CM Consult): [address]: No  Reason Outside [place]: Physician referred to specific agency  Physical Therapy Consult: Yes  Occupational Therapy Consult: Yes  Speech Therapy Consult: No  Support Systems: Spouse/Significant Other  Confirm Follow Up Transport: Family  The Plan for Transition of Care is Related to the Following Treatment Goals : The patient plans to discharge home with home health. The Patient and/or Patient Representative was Provided with a Choice of Provider and Agrees with the Discharge Plan?: Yes  Freedom of Choice List was Provided with Basic Dialogue that Supports the Patient's Individualized Plan of Care/Goals, Treatment Preferences and Shares the Quality Data Associated with the Providers?: Yes  [place] Resource Information Provided?: No  Discharge Location  Patient Expects to be Discharged to[de-identified] Home     Reason for Admission:  Right Total Knee                     RUR Score:    N/A                 Plan for utilizing home health:           The Plan for Transition of Care is related to the following treatment goals: Home Health    The Patient and/or patient representative Michaelle Mckinnon was [facility] [place] [address] [address] [id number] [phone]               Thank you for choosing us for your health care visit with Marlee Viera MD.  We are glad to serve you and happy to provide you with this summary o Instructions and Information about Your Health    Fasting blood test in approximately 6 months  Continue same medicines and call if changes       Allergies as of May 25, 2017     No Known Allergies                Today's Vital Signs     BP Pulse Weight provided with a choice of provider and agrees   with the discharge plan. [x] Yes [] No    Freedom of choice list was provided with basic dialogue that supports the patient's individualized plan of care/goals, treatment preferences and shares the quality data associated with the providers. [x] Yes [] No    PCP: First and Last name:  Darius Oneal MD, phone: [phone]     Name of Practice:    Are you a current patient: Yes/No: Yes   Approximate date of last visit: Jan, 2022   Can you participate in a virtual visit with your PCP: Yes                    Current Advanced Directive/Advance Care Plan: Full Code      Healthcare Decision Maker:   Click here to complete [employer] including selection of the Healthcare Decision Maker Relationship (ie \"Primary\")             Primary Decision MakerMajobenjamin Smith - [place] - [phone]                  Transition of Care Plan:                      CM met with the patient in room 551. The patient is alert and oriented x4. Confirmed demographics. Prior to surgery, the patient was independent with ADL's/IADL's, drives a vehicle, owns a rolling walker and uses [facility] pharmacy in [address] [facility] [address]. The patient plans to discharge home with home health and wife to transport when stable for discharge. CM following for discharge needs.      Mami Villagran RN/CRM - Metoprolol Succinate  MG Tb24            MyChart                  Visit [facility] online at  [facility]

## 2022-02-24 ENCOUNTER — TELEPHONE (OUTPATIENT)
Dept: NEPHROLOGY | Facility: CLINIC | Age: 87
End: 2022-02-24

## 2022-02-24 ENCOUNTER — ANTI-COAG VISIT (OUTPATIENT)
Dept: INTERNAL MEDICINE CLINIC | Facility: CLINIC | Age: 87
End: 2022-02-24
Payer: MEDICARE

## 2022-02-24 DIAGNOSIS — Z51.81 ENCOUNTER FOR THERAPEUTIC DRUG MONITORING: ICD-10-CM

## 2022-02-24 DIAGNOSIS — I48.91 ATRIAL FIBRILLATION, UNSPECIFIED TYPE (HCC): ICD-10-CM

## 2022-02-24 DIAGNOSIS — Z79.01 LONG TERM (CURRENT) USE OF ANTICOAGULANTS: ICD-10-CM

## 2022-02-24 DIAGNOSIS — I48.20 CHRONIC ATRIAL FIBRILLATION (HCC): ICD-10-CM

## 2022-02-24 LAB — INR: 3.1 (ref 2–3)

## 2022-02-24 PROCEDURE — 93793 ANTICOAG MGMT PT WARFARIN: CPT

## 2022-02-24 PROCEDURE — 85610 PROTHROMBIN TIME: CPT

## 2022-02-24 NOTE — TELEPHONE ENCOUNTER
Left detailed message regarding erroneous letter sent by following script. PCP correction made to chart.

## 2022-03-10 ENCOUNTER — LAB ENCOUNTER (OUTPATIENT)
Dept: LAB | Age: 87
End: 2022-03-10
Attending: INTERNAL MEDICINE
Payer: MEDICARE

## 2022-03-10 ENCOUNTER — ANTI-COAG VISIT (OUTPATIENT)
Dept: INTERNAL MEDICINE CLINIC | Facility: CLINIC | Age: 87
End: 2022-03-10
Payer: MEDICARE

## 2022-03-10 DIAGNOSIS — Z51.81 ENCOUNTER FOR THERAPEUTIC DRUG MONITORING: ICD-10-CM

## 2022-03-10 DIAGNOSIS — I27.20 PORTOPULMONARY HYPERTENSION (HCC): Primary | ICD-10-CM

## 2022-03-10 DIAGNOSIS — I48.91 ATRIAL FIBRILLATION, UNSPECIFIED TYPE (HCC): ICD-10-CM

## 2022-03-10 DIAGNOSIS — K76.6 PORTOPULMONARY HYPERTENSION (HCC): Primary | ICD-10-CM

## 2022-03-10 DIAGNOSIS — I50.810 BERNHEIM'S SYNDROME (HCC): ICD-10-CM

## 2022-03-10 DIAGNOSIS — E55.9 VITAMIN D DEFICIENCY: ICD-10-CM

## 2022-03-10 DIAGNOSIS — I48.20 CHRONIC ATRIAL FIBRILLATION (HCC): ICD-10-CM

## 2022-03-10 DIAGNOSIS — Z79.01 LONG TERM (CURRENT) USE OF ANTICOAGULANTS: ICD-10-CM

## 2022-03-10 LAB
25(OH)D3+25(OH)D2 SERPL-MCNC: 19.9 NG/ML (ref 30–100)
ABSOLUTE IMMATURE GRANULOCYTES (OFFPRE24): 0.02 X10(3) UL (ref 0–1)
ALBUMIN SERPL-MCNC: 3.6 G/DL (ref 3.4–5)
ALBUMIN SERPL-MCNC: 3.6 G/DL (ref 3.4–5)
ALBUMIN/GLOB SERPL: 1 {RATIO} (ref 1–2)
ALBUMIN/GLOB SERPL: 1 {RATIO} (ref 1–2)
ALP LIVER SERPL-CCNC: 87 U/L
ALP SERPL-CCNC: 87 U/L (ref 55–142)
ALT SERPL-CCNC: 15 U/L
ALT SERPL-CCNC: 15 U/L (ref 13–56)
ANION GAP SERPL CALC-SCNC: 5 MMOL/L (ref 0–18)
ANION GAP SERPL CALC-SCNC: 5 MMOL/L (ref 0–18)
AST SERPL-CCNC: 20 U/L (ref 15–37)
AST SERPL-CCNC: 20 U/L (ref 15–37)
BASOPHILS # BLD AUTO: 0.07 X10(3) UL (ref 0–0.2)
BASOPHILS # BLD: 0.07 X10(3) UL (ref 0–0.2)
BASOPHILS NFR BLD AUTO: 1.1 %
BASOPHILS NFR BLD: 1.1 %
BILIRUB SERPL-MCNC: 0.4 MG/DL (ref 0.1–2)
BILIRUB SERPL-MCNC: 0.4 MG/DL (ref 0.1–2)
BUN BLD-MCNC: 39 MG/DL (ref 7–18)
BUN SERPL-MCNC: 39 MG/DL (ref 7–18)
BUN/CREAT SERPL: 24.8 (ref 10–20)
BUN/CREAT SERPL: 24.8 (ref 10–20)
CALCIUM BLD-MCNC: 9.1 MG/DL (ref 8.5–10.1)
CALCIUM SERPL-MCNC: 9.1 MG/DL (ref 8.5–10.1)
CALCULATED OSMO: 299 MOSM/KG (ref 275–295)
CHLORIDE SERPL-SCNC: 108 MMOL/L (ref 98–112)
CHLORIDE SERPL-SCNC: 108 MMOL/L (ref 98–112)
CHOLEST SERPL-MCNC: 151 MG/DL
CHOLEST SERPL-MCNC: 151 MG/DL (ref ?–200)
CO2 SERPL-SCNC: 27 MMOL/L (ref 21–32)
CO2 SERPL-SCNC: 27 MMOL/L (ref 21–32)
CREAT BLD-MCNC: 1.57 MG/DL
CREAT SERPL-MCNC: 1.57 MG/DL (ref 0.55–1.02)
DEPRECATED RDW RBC AUTO: 50.5 FL (ref 35.1–46.3)
EOSINOPHIL # BLD AUTO: 0.09 X10(3) UL (ref 0–0.7)
EOSINOPHIL # BLD: 0.09 X10(3) UL (ref 0–0.7)
EOSINOPHIL NFR BLD AUTO: 1.4 %
EOSINOPHIL NFR BLD: 1.4 %
ERYTHROCYTE [DISTWIDTH] IN BLOOD BY AUTOMATED COUNT: 14.6 % (ref 11–15)
ERYTHROCYTE [DISTWIDTH] IN BLOOD BY AUTOMATED COUNT: 50.5 FL (ref 35.1–46.3)
ERYTHROCYTE [DISTWIDTH] IN BLOOD: 14.6 % (ref 11–15)
FASTING PATIENT LIPID ANSWER: NO
FASTING STATUS PATIENT QL REPORTED: NO
GLOBULIN PLAS-MCNC: 3.7 G/DL (ref 2.8–4.4)
GLOBULIN SER-MCNC: 3.7 G/DL (ref 2.8–4.4)
GLUCOSE BLD-MCNC: 97 MG/DL (ref 70–99)
GLUCOSE SERPL-MCNC: 97 MG/DL (ref 70–99)
HCT VFR BLD AUTO: 35.5 %
HCT VFR BLD CALC: 35.5 % (ref 35–48)
HDLC SERPL-MCNC: 54 MG/DL (ref 40–59)
HDLC SERPL-MCNC: 54 MG/DL (ref 40–59)
HGB BLD-MCNC: 11.4 G/DL
HGB BLD-MCNC: 11.4 G/DL (ref 12–16)
IMM GRANULOCYTES # BLD AUTO: 0.02 X10(3) UL (ref 0–1)
IMM GRANULOCYTES NFR BLD: 0.3 %
IMMATURE GRANULOCYTES (OFFPRE25): 0.3 %
INR: 2.9 (ref 2–3)
LDLC SERPL CALC-MCNC: 82 MG/DL
LDLC SERPL CALC-MCNC: 82 MG/DL (ref ?–100)
LENGTH OF FAST TIME PATIENT: NO H
LENGTH OF FAST TIME PATIENT: NO H
LYMPHOCYTES # BLD AUTO: 2.15 X10(3) UL (ref 1–4)
LYMPHOCYTES # BLD: 2.15 X10(3) UL (ref 1–4)
LYMPHOCYTES NFR BLD AUTO: 33.1 %
LYMPHOCYTES NFR BLD: 33.1 %
MCH RBC QN AUTO: 30.2 PG (ref 26–34)
MCH RBC QN AUTO: 30.2 PG (ref 26–34)
MCHC RBC AUTO-ENTMCNC: 32.1 G/DL (ref 31–37)
MCHC RBC AUTO-ENTMCNC: 32.1 G/DL (ref 31–37)
MCV RBC AUTO: 93.9 FL
MCV RBC AUTO: 93.9 FL (ref 80–100)
MONOCYTES # BLD AUTO: 0.45 X10(3) UL (ref 0.1–1)
MONOCYTES # BLD: 0.45 X10(3) UL (ref 0.1–1)
MONOCYTES NFR BLD AUTO: 6.9 %
MONOCYTES NFR BLD: 6.9 %
NEUTROPHILS # BLD AUTO: 3.72 X10 (3) UL (ref 1.5–7.7)
NEUTROPHILS # BLD AUTO: 3.72 X10(3) UL (ref 1.5–7.7)
NEUTROPHILS # BLD: 3.72 X10(3) UL (ref 1.5–7.7)
NEUTROPHILS NFR BLD AUTO: 57.2 %
NEUTROPHILS NFR BLD: 57.2 %
NONHDLC SERPL-MCNC: 97 MG/DL
NONHDLC SERPL-MCNC: 97 MG/DL (ref ?–130)
NT-PROBNP SERPL-MCNC: 4600 PG/ML (ref ?–450)
NT-PROBNP SERPL-MCNC: ABNORMAL PG/ML
OSMOLALITY SERPL CALC.SUM OF ELEC: 299 MOSM/KG (ref 275–295)
PLATELET # BLD AUTO: 258 10(3)UL (ref 150–450)
PLATELET # BLD: 258 10(3)UL (ref 150–450)
POTASSIUM SERPL-SCNC: 4.8 MMOL/L (ref 3.5–5.1)
POTASSIUM SERPL-SCNC: 4.8 MMOL/L (ref 3.5–5.1)
PROT SERPL-MCNC: 7.3 G/DL (ref 6.4–8.2)
PROT SERPL-MCNC: 7.3 G/DL (ref 6.4–8.2)
RBC # BLD AUTO: 3.78 X10(6)UL
RBC # BLD: 3.78 X10(6)UL (ref 3.8–5.3)
SODIUM SERPL-SCNC: 140 MMOL/L (ref 136–145)
SODIUM SERPL-SCNC: 140 MMOL/L (ref 136–145)
TRIGL SERPL-MCNC: 80 MG/DL (ref 30–149)
TRIGL SERPL-MCNC: 80 MG/DL (ref 30–149)
TSH SERPL-ACNC: 1.36 MIU/ML (ref 0.36–3.74)
TSI SER-ACNC: 1.36 MIU/ML (ref 0.36–3.74)
VIT D+METAB SERPL-MCNC: 19.9 NG/ML (ref 30–100)
VLDLC SERPL CALC-MCNC: 13 MG/DL (ref 0–30)
VLDLC SERPL CALC-MCNC: 13 MG/DL (ref 0–30)
WBC # BLD AUTO: 6.5 X10(3) UL (ref 4–11)
WBC # BLD: 6.5 X10(3) UL (ref 4–11)

## 2022-03-10 PROCEDURE — 80053 COMPREHEN METABOLIC PANEL: CPT

## 2022-03-10 PROCEDURE — 84443 ASSAY THYROID STIM HORMONE: CPT

## 2022-03-10 PROCEDURE — 85025 COMPLETE CBC W/AUTO DIFF WBC: CPT

## 2022-03-10 PROCEDURE — 85610 PROTHROMBIN TIME: CPT

## 2022-03-10 PROCEDURE — 80061 LIPID PANEL: CPT

## 2022-03-10 PROCEDURE — 36415 COLL VENOUS BLD VENIPUNCTURE: CPT

## 2022-03-10 PROCEDURE — 83880 ASSAY OF NATRIURETIC PEPTIDE: CPT

## 2022-03-10 PROCEDURE — 93793 ANTICOAG MGMT PT WARFARIN: CPT

## 2022-03-10 PROCEDURE — 82306 VITAMIN D 25 HYDROXY: CPT

## 2022-03-21 ENCOUNTER — ANCILLARY PROCEDURE (OUTPATIENT)
Dept: CARDIOLOGY | Age: 87
End: 2022-03-21
Attending: INTERNAL MEDICINE

## 2022-03-21 ENCOUNTER — OFFICE VISIT (OUTPATIENT)
Dept: CARDIOLOGY | Age: 87
End: 2022-03-21

## 2022-03-21 VITALS
HEIGHT: 66 IN | WEIGHT: 121.5 LBS | RESPIRATION RATE: 18 BRPM | SYSTOLIC BLOOD PRESSURE: 138 MMHG | DIASTOLIC BLOOD PRESSURE: 50 MMHG | HEART RATE: 60 BPM | BODY MASS INDEX: 19.53 KG/M2

## 2022-03-21 DIAGNOSIS — R06.09 DYSPNEA ON EXERTION: ICD-10-CM

## 2022-03-21 DIAGNOSIS — I27.29 RIGHT HEART FAILURE DUE TO PULMONARY HYPERTENSION (CMD): ICD-10-CM

## 2022-03-21 DIAGNOSIS — I50.812 CHRONIC RIGHT-SIDED HEART FAILURE (CMD): ICD-10-CM

## 2022-03-21 DIAGNOSIS — I27.20 PULMONARY HYPERTENSION (CMD): ICD-10-CM

## 2022-03-21 DIAGNOSIS — R06.02 SOB (SHORTNESS OF BREATH): ICD-10-CM

## 2022-03-21 DIAGNOSIS — E55.9 VITAMIN D DEFICIENCY: ICD-10-CM

## 2022-03-21 DIAGNOSIS — I50.810 RIGHT HEART FAILURE DUE TO PULMONARY HYPERTENSION (CMD): ICD-10-CM

## 2022-03-21 DIAGNOSIS — E78.00 HYPERCHOLESTEREMIA: ICD-10-CM

## 2022-03-21 DIAGNOSIS — I27.0 PRIMARY PULMONARY HYPERTENSION (CMD): Primary | ICD-10-CM

## 2022-03-21 LAB
AORTIC VALVE AREA: NORMAL
AV MEAN GRADIENT (AVMG): NORMAL
AV MEAN VELOCITY (AVMV): NORMAL
AV PEAK GRADIENT (AVPG): 10
AV PEAK VELOCITY (AVPV): 1.43
AV STENOSIS SEVERITY TEXT: NORMAL
LV EF: NORMAL %

## 2022-03-21 PROCEDURE — 99215 OFFICE O/P EST HI 40 MIN: CPT | Performed by: INTERNAL MEDICINE

## 2022-03-21 PROCEDURE — 93306 TTE W/DOPPLER COMPLETE: CPT | Performed by: INTERNAL MEDICINE

## 2022-03-21 SDOH — HEALTH STABILITY: PHYSICAL HEALTH: ON AVERAGE, HOW MANY MINUTES DO YOU ENGAGE IN EXERCISE AT THIS LEVEL?: 0 MIN

## 2022-03-21 SDOH — HEALTH STABILITY: PHYSICAL HEALTH: ON AVERAGE, HOW MANY DAYS PER WEEK DO YOU ENGAGE IN MODERATE TO STRENUOUS EXERCISE (LIKE A BRISK WALK)?: 0 DAYS

## 2022-03-21 ASSESSMENT — ENCOUNTER SYMPTOMS
ABDOMINAL PAIN: 0
SUSPICIOUS LESIONS: 0
ALLERGIC/IMMUNOLOGIC COMMENTS: NO NEW FOOD ALLERGIES
DIARRHEA: 0
HEADACHES: 0
BRUISES/BLEEDS EASILY: 0
COUGH: 0
ALTERED MENTAL STATUS: 0
LOSS OF BALANCE: 0
WEIGHT GAIN: 0
BLOATING: 0
CONSTIPATION: 0
INSOMNIA: 0
DIZZINESS: 0
CHILLS: 0
BACK PAIN: 0
FEVER: 0
LIGHT-HEADEDNESS: 0
HEMOPTYSIS: 0
HEMATOCHEZIA: 0
WEIGHT LOSS: 0
DEPRESSION: 0

## 2022-03-21 ASSESSMENT — PATIENT HEALTH QUESTIONNAIRE - PHQ9
SUM OF ALL RESPONSES TO PHQ9 QUESTIONS 1 AND 2: 0
1. LITTLE INTEREST OR PLEASURE IN DOING THINGS: NOT AT ALL
SUM OF ALL RESPONSES TO PHQ9 QUESTIONS 1 AND 2: 0
2. FEELING DOWN, DEPRESSED OR HOPELESS: NOT AT ALL
CLINICAL INTERPRETATION OF PHQ2 SCORE: NO FURTHER SCREENING NEEDED

## 2022-03-30 ENCOUNTER — ANTI-COAG VISIT (OUTPATIENT)
Dept: INTERNAL MEDICINE CLINIC | Facility: CLINIC | Age: 87
End: 2022-03-30
Payer: MEDICARE

## 2022-03-30 DIAGNOSIS — Z79.01 LONG TERM (CURRENT) USE OF ANTICOAGULANTS: ICD-10-CM

## 2022-03-30 DIAGNOSIS — I48.91 ATRIAL FIBRILLATION, UNSPECIFIED TYPE (HCC): ICD-10-CM

## 2022-03-30 DIAGNOSIS — I48.20 CHRONIC ATRIAL FIBRILLATION (HCC): ICD-10-CM

## 2022-03-30 DIAGNOSIS — Z51.81 ENCOUNTER FOR THERAPEUTIC DRUG MONITORING: ICD-10-CM

## 2022-03-30 LAB — INR: 3.1 (ref 2–3)

## 2022-03-30 PROCEDURE — 85610 PROTHROMBIN TIME: CPT

## 2022-03-30 PROCEDURE — 93793 ANTICOAG MGMT PT WARFARIN: CPT

## 2022-04-08 ENCOUNTER — TELEPHONE (OUTPATIENT)
Dept: CARDIOLOGY | Age: 87
End: 2022-04-08

## 2022-04-18 ENCOUNTER — ANTI-COAG VISIT (OUTPATIENT)
Dept: INTERNAL MEDICINE CLINIC | Facility: CLINIC | Age: 87
End: 2022-04-18
Payer: MEDICARE

## 2022-04-18 DIAGNOSIS — I48.91 ATRIAL FIBRILLATION, UNSPECIFIED TYPE (HCC): ICD-10-CM

## 2022-04-18 DIAGNOSIS — I48.20 CHRONIC ATRIAL FIBRILLATION (HCC): ICD-10-CM

## 2022-04-18 DIAGNOSIS — Z79.01 LONG TERM (CURRENT) USE OF ANTICOAGULANTS: ICD-10-CM

## 2022-04-18 DIAGNOSIS — Z51.81 ENCOUNTER FOR THERAPEUTIC DRUG MONITORING: ICD-10-CM

## 2022-04-18 LAB
INR: 3.1 (ref 0.8–1.2)
TEST STRIP EXPIRATION DATE: ABNORMAL DATE

## 2022-04-18 PROCEDURE — 85610 PROTHROMBIN TIME: CPT

## 2022-04-18 PROCEDURE — 93793 ANTICOAG MGMT PT WARFARIN: CPT

## 2022-04-21 ENCOUNTER — EXTERNAL RECORD (OUTPATIENT)
Dept: CARDIOLOGY | Age: 87
End: 2022-04-21

## 2022-04-21 RX ORDER — ROSUVASTATIN CALCIUM 5 MG/1
TABLET, COATED ORAL
Qty: 30 TABLET | Refills: 0 | Status: SHIPPED | OUTPATIENT
Start: 2022-04-21

## 2022-05-04 ENCOUNTER — ANTI-COAG VISIT (OUTPATIENT)
Dept: INTERNAL MEDICINE CLINIC | Facility: CLINIC | Age: 87
End: 2022-05-04
Payer: MEDICARE

## 2022-05-04 DIAGNOSIS — Z51.81 ENCOUNTER FOR THERAPEUTIC DRUG MONITORING: ICD-10-CM

## 2022-05-04 DIAGNOSIS — I48.20 CHRONIC ATRIAL FIBRILLATION (HCC): ICD-10-CM

## 2022-05-04 DIAGNOSIS — Z79.01 LONG TERM (CURRENT) USE OF ANTICOAGULANTS: ICD-10-CM

## 2022-05-04 DIAGNOSIS — I48.91 ATRIAL FIBRILLATION, UNSPECIFIED TYPE (HCC): ICD-10-CM

## 2022-05-04 LAB
INR: 2.2 (ref 0.8–1.2)
TEST STRIP EXPIRATION DATE: ABNORMAL DATE

## 2022-05-04 PROCEDURE — 93793 ANTICOAG MGMT PT WARFARIN: CPT

## 2022-05-04 PROCEDURE — 85610 PROTHROMBIN TIME: CPT

## 2022-05-27 ENCOUNTER — ANTI-COAG VISIT (OUTPATIENT)
Dept: ANTICOAGULATION | Facility: CLINIC | Age: 87
End: 2022-05-27
Payer: MEDICARE

## 2022-05-27 DIAGNOSIS — Z79.01 LONG TERM (CURRENT) USE OF ANTICOAGULANTS: ICD-10-CM

## 2022-05-27 DIAGNOSIS — I48.91 ATRIAL FIBRILLATION, UNSPECIFIED TYPE (HCC): ICD-10-CM

## 2022-05-27 DIAGNOSIS — Z51.81 ENCOUNTER FOR THERAPEUTIC DRUG MONITORING: ICD-10-CM

## 2022-05-27 DIAGNOSIS — I48.20 CHRONIC ATRIAL FIBRILLATION (HCC): ICD-10-CM

## 2022-05-27 LAB
INR: 1.9 (ref 0.8–1.2)
TEST STRIP EXPIRATION DATE: ABNORMAL DATE

## 2022-06-10 ENCOUNTER — OFFICE VISIT (OUTPATIENT)
Dept: NEPHROLOGY | Facility: CLINIC | Age: 87
End: 2022-06-10
Payer: MEDICARE

## 2022-06-10 VITALS
SYSTOLIC BLOOD PRESSURE: 140 MMHG | HEART RATE: 45 BPM | DIASTOLIC BLOOD PRESSURE: 52 MMHG | BODY MASS INDEX: 20.16 KG/M2 | WEIGHT: 121 LBS | HEIGHT: 65 IN

## 2022-06-10 DIAGNOSIS — I10 ESSENTIAL HYPERTENSION: ICD-10-CM

## 2022-06-10 DIAGNOSIS — N18.32 STAGE 3B CHRONIC KIDNEY DISEASE (HCC): Primary | ICD-10-CM

## 2022-06-10 PROCEDURE — 99214 OFFICE O/P EST MOD 30 MIN: CPT | Performed by: INTERNAL MEDICINE

## 2022-06-10 PROCEDURE — 1126F AMNT PAIN NOTED NONE PRSNT: CPT | Performed by: INTERNAL MEDICINE

## 2022-06-10 RX ORDER — MULTIVIT-MIN/IRON/FOLIC ACID/K 18-600-40
CAPSULE ORAL
COMMUNITY

## 2022-06-16 ENCOUNTER — TELEPHONE (OUTPATIENT)
Dept: NEPHROLOGY | Facility: CLINIC | Age: 87
End: 2022-06-16

## 2022-06-16 ENCOUNTER — LAB ENCOUNTER (OUTPATIENT)
Dept: LAB | Age: 87
End: 2022-06-16
Attending: INTERNAL MEDICINE
Payer: MEDICARE

## 2022-06-16 DIAGNOSIS — N18.32 STAGE 3B CHRONIC KIDNEY DISEASE (HCC): ICD-10-CM

## 2022-06-16 DIAGNOSIS — N18.32 STAGE 3B CHRONIC KIDNEY DISEASE (HCC): Primary | ICD-10-CM

## 2022-06-16 LAB
ALBUMIN SERPL-MCNC: 3.3 G/DL (ref 3.4–5)
ANION GAP SERPL CALC-SCNC: 8 MMOL/L (ref 0–18)
BASOPHILS # BLD AUTO: 0.07 X10(3) UL (ref 0–0.2)
BASOPHILS NFR BLD AUTO: 1.2 %
BILIRUB UR QL: NEGATIVE
BUN BLD-MCNC: 31 MG/DL (ref 7–18)
BUN/CREAT SERPL: 20 (ref 10–20)
CALCIUM BLD-MCNC: 9.1 MG/DL (ref 8.5–10.1)
CHLORIDE SERPL-SCNC: 111 MMOL/L (ref 98–112)
CO2 SERPL-SCNC: 24 MMOL/L (ref 21–32)
COLOR UR: YELLOW
CREAT BLD-MCNC: 1.55 MG/DL
CREAT UR-SCNC: 111 MG/DL
DEPRECATED RDW RBC AUTO: 55.7 FL (ref 35.1–46.3)
EOSINOPHIL # BLD AUTO: 0.2 X10(3) UL (ref 0–0.7)
EOSINOPHIL NFR BLD AUTO: 3.4 %
ERYTHROCYTE [DISTWIDTH] IN BLOOD BY AUTOMATED COUNT: 15.7 % (ref 11–15)
GLUCOSE BLD-MCNC: 87 MG/DL (ref 70–99)
GLUCOSE UR-MCNC: NEGATIVE MG/DL
HCT VFR BLD AUTO: 34 %
HGB BLD-MCNC: 10.8 G/DL
IMM GRANULOCYTES # BLD AUTO: 0.01 X10(3) UL (ref 0–1)
IMM GRANULOCYTES NFR BLD: 0.2 %
KETONES UR-MCNC: NEGATIVE MG/DL
LYMPHOCYTES # BLD AUTO: 2.4 X10(3) UL (ref 1–4)
LYMPHOCYTES NFR BLD AUTO: 40.5 %
MCH RBC QN AUTO: 30.2 PG (ref 26–34)
MCHC RBC AUTO-ENTMCNC: 31.8 G/DL (ref 31–37)
MCV RBC AUTO: 95 FL
MICROALBUMIN UR-MCNC: 1.53 MG/DL
MICROALBUMIN/CREAT 24H UR-RTO: 13.8 UG/MG (ref ?–30)
MONOCYTES # BLD AUTO: 0.44 X10(3) UL (ref 0.1–1)
MONOCYTES NFR BLD AUTO: 7.4 %
NEUTROPHILS # BLD AUTO: 2.8 X10 (3) UL (ref 1.5–7.7)
NEUTROPHILS # BLD AUTO: 2.8 X10(3) UL (ref 1.5–7.7)
NEUTROPHILS NFR BLD AUTO: 47.3 %
NITRITE UR QL STRIP.AUTO: NEGATIVE
OSMOLALITY SERPL CALC.SUM OF ELEC: 302 MOSM/KG (ref 275–295)
PH UR: 5 [PH] (ref 5–8)
PHOSPHATE SERPL-MCNC: 3.8 MG/DL (ref 2.5–4.9)
PLATELET # BLD AUTO: 214 10(3)UL (ref 150–450)
POTASSIUM SERPL-SCNC: 4.3 MMOL/L (ref 3.5–5.1)
PROT UR-MCNC: NEGATIVE MG/DL
RBC # BLD AUTO: 3.58 X10(6)UL
RBC #/AREA URNS AUTO: >10 /HPF
SODIUM SERPL-SCNC: 143 MMOL/L (ref 136–145)
SP GR UR STRIP: 1.01 (ref 1–1.03)
UROBILINOGEN UR STRIP-ACNC: <2
VIT C UR-MCNC: NEGATIVE MG/DL
WBC # BLD AUTO: 5.9 X10(3) UL (ref 4–11)
WBC #/AREA URNS AUTO: >50 /HPF

## 2022-06-16 PROCEDURE — 80069 RENAL FUNCTION PANEL: CPT

## 2022-06-16 PROCEDURE — 81001 URINALYSIS AUTO W/SCOPE: CPT

## 2022-06-16 PROCEDURE — 85025 COMPLETE CBC W/AUTO DIFF WBC: CPT

## 2022-06-16 PROCEDURE — 82570 ASSAY OF URINE CREATININE: CPT

## 2022-06-16 PROCEDURE — 82043 UR ALBUMIN QUANTITATIVE: CPT

## 2022-06-16 PROCEDURE — 36415 COLL VENOUS BLD VENIPUNCTURE: CPT

## 2022-06-16 NOTE — TELEPHONE ENCOUNTER
Kidney function remains stable. Urinalysis does suggest a UTI. If she has any symptoms do urine C&S. Otherwise repeat CBC and renal panel in 3 months.

## 2022-06-17 NOTE — TELEPHONE ENCOUNTER
Dr Yadi Jessica  Informed pt of note below ,verbalized understanding stated voiding  Fine,no discomfort,no fever.

## 2022-06-22 ENCOUNTER — ANTI-COAG VISIT (OUTPATIENT)
Dept: ANTICOAGULATION | Facility: CLINIC | Age: 87
End: 2022-06-22
Payer: MEDICARE

## 2022-06-22 DIAGNOSIS — Z79.01 LONG TERM (CURRENT) USE OF ANTICOAGULANTS: ICD-10-CM

## 2022-06-22 DIAGNOSIS — I48.91 ATRIAL FIBRILLATION, UNSPECIFIED TYPE (HCC): ICD-10-CM

## 2022-06-22 DIAGNOSIS — Z51.81 ENCOUNTER FOR THERAPEUTIC DRUG MONITORING: ICD-10-CM

## 2022-06-22 DIAGNOSIS — I48.20 CHRONIC ATRIAL FIBRILLATION (HCC): ICD-10-CM

## 2022-06-22 LAB
INR: 1.8 (ref 0.8–1.2)
TEST STRIP EXPIRATION DATE: ABNORMAL DATE

## 2022-06-22 PROCEDURE — 85610 PROTHROMBIN TIME: CPT | Performed by: FAMILY MEDICINE

## 2022-06-22 PROCEDURE — 93793 ANTICOAG MGMT PT WARFARIN: CPT | Performed by: FAMILY MEDICINE

## 2022-06-23 RX ORDER — WARFARIN SODIUM 2.5 MG/1
TABLET ORAL
Qty: 104 TABLET | Refills: 3 | Status: SHIPPED | OUTPATIENT
Start: 2022-06-23

## 2022-07-11 RX ORDER — ROSUVASTATIN CALCIUM 5 MG/1
TABLET, COATED ORAL
Qty: 30 TABLET | Refills: 3 | Status: SHIPPED | OUTPATIENT
Start: 2022-07-11

## 2022-07-20 ENCOUNTER — ANTI-COAG VISIT (OUTPATIENT)
Dept: ANTICOAGULATION | Facility: CLINIC | Age: 87
End: 2022-07-20
Payer: MEDICARE

## 2022-07-20 DIAGNOSIS — I48.91 ATRIAL FIBRILLATION, UNSPECIFIED TYPE (HCC): ICD-10-CM

## 2022-07-20 DIAGNOSIS — I48.20 CHRONIC ATRIAL FIBRILLATION (HCC): ICD-10-CM

## 2022-07-20 DIAGNOSIS — Z79.01 LONG TERM (CURRENT) USE OF ANTICOAGULANTS: ICD-10-CM

## 2022-07-20 DIAGNOSIS — Z51.81 ENCOUNTER FOR THERAPEUTIC DRUG MONITORING: ICD-10-CM

## 2022-07-20 LAB
INR: 2.1 (ref 0.8–1.2)
TEST STRIP EXPIRATION DATE: ABNORMAL DATE

## 2022-07-20 PROCEDURE — 85610 PROTHROMBIN TIME: CPT | Performed by: FAMILY MEDICINE

## 2022-07-20 PROCEDURE — 93793 ANTICOAG MGMT PT WARFARIN: CPT | Performed by: FAMILY MEDICINE

## 2022-08-09 ENCOUNTER — OFFICE VISIT (OUTPATIENT)
Dept: OTOLARYNGOLOGY | Facility: CLINIC | Age: 87
End: 2022-08-09
Payer: MEDICARE

## 2022-08-09 VITALS — BODY MASS INDEX: 20.16 KG/M2 | WEIGHT: 121 LBS | HEIGHT: 65 IN | TEMPERATURE: 98 F

## 2022-08-09 DIAGNOSIS — H61.23 BILATERAL IMPACTED CERUMEN: Primary | ICD-10-CM

## 2022-08-09 PROCEDURE — 99213 OFFICE O/P EST LOW 20 MIN: CPT | Performed by: OTOLARYNGOLOGY

## 2022-08-09 RX ORDER — LORATADINE 10 MG/1
10 TABLET ORAL DAILY
Qty: 30 TABLET | Refills: 3 | Status: SHIPPED | OUTPATIENT
Start: 2022-08-09

## 2022-08-09 RX ORDER — MONTELUKAST SODIUM 10 MG/1
10 TABLET ORAL NIGHTLY
Qty: 30 TABLET | Refills: 3 | Status: SHIPPED | OUTPATIENT
Start: 2022-08-09

## 2022-08-13 DIAGNOSIS — I27.20 PULMONARY HYPERTENSION (CMD): ICD-10-CM

## 2022-08-15 RX ORDER — SILDENAFIL CITRATE 20 MG/1
20 TABLET ORAL 3 TIMES DAILY
Qty: 270 TABLET | Refills: 3 | Status: SHIPPED | OUTPATIENT
Start: 2022-08-15 | End: 2023-02-21 | Stop reason: DRUGHIGH

## 2022-08-16 ENCOUNTER — TELEPHONE (OUTPATIENT)
Dept: OTOLARYNGOLOGY | Facility: CLINIC | Age: 87
End: 2022-08-16

## 2022-08-16 RX ORDER — LORATADINE 10 MG/1
10 TABLET ORAL DAILY
Qty: 30 TABLET | Refills: 3 | Status: SHIPPED | OUTPATIENT
Start: 2022-08-16

## 2022-08-16 RX ORDER — MONTELUKAST SODIUM 10 MG/1
10 TABLET ORAL NIGHTLY
Qty: 30 TABLET | Refills: 3 | Status: SHIPPED | OUTPATIENT
Start: 2022-08-16

## 2022-08-16 NOTE — TELEPHONE ENCOUNTER
Spoke with patient. Confirmed pharmacy. Forwarded medications Montelukast and loratadine to requested [facility] per protocol.

## 2022-08-17 ENCOUNTER — ANTI-COAG VISIT (OUTPATIENT)
Dept: ANTICOAGULATION | Facility: CLINIC | Age: 87
End: 2022-08-17
Payer: MEDICARE

## 2022-08-17 DIAGNOSIS — Z51.81 ENCOUNTER FOR THERAPEUTIC DRUG MONITORING: ICD-10-CM

## 2022-08-17 DIAGNOSIS — I48.91 ATRIAL FIBRILLATION, UNSPECIFIED TYPE (HCC): ICD-10-CM

## 2022-08-17 DIAGNOSIS — Z79.01 LONG TERM (CURRENT) USE OF ANTICOAGULANTS: ICD-10-CM

## 2022-08-17 DIAGNOSIS — I48.20 CHRONIC ATRIAL FIBRILLATION (HCC): ICD-10-CM

## 2022-08-17 LAB
INR: 2.5 (ref 0.8–1.2)
TEST STRIP EXPIRATION DATE: ABNORMAL DATE

## 2022-08-17 PROCEDURE — 85610 PROTHROMBIN TIME: CPT | Performed by: INTERNAL MEDICINE

## 2022-08-17 PROCEDURE — 93793 ANTICOAG MGMT PT WARFARIN: CPT | Performed by: INTERNAL MEDICINE

## 2022-08-29 RX ORDER — ROSUVASTATIN CALCIUM 5 MG/1
5 TABLET, COATED ORAL NIGHTLY
Qty: 90 TABLET | Refills: 0 | Status: SHIPPED | OUTPATIENT
Start: 2022-08-29 | End: 2022-11-17

## 2022-09-12 RX ORDER — TORSEMIDE 10 MG/1
10 TABLET ORAL DAILY
Qty: 90 TABLET | Refills: 3 | Status: SHIPPED | OUTPATIENT
Start: 2022-09-12 | End: 2022-12-02 | Stop reason: DRUGHIGH

## 2022-09-12 RX ORDER — METOPROLOL SUCCINATE 50 MG/1
TABLET, EXTENDED RELEASE ORAL
Qty: 180 TABLET | Refills: 3 | Status: SHIPPED | OUTPATIENT
Start: 2022-09-12 | End: 2022-09-26 | Stop reason: ALTCHOICE

## 2022-09-12 RX ORDER — LISINOPRIL 40 MG/1
40 TABLET ORAL DAILY
Qty: 90 TABLET | Refills: 3 | Status: SHIPPED | OUTPATIENT
Start: 2022-09-12 | End: 2022-10-11 | Stop reason: ALTCHOICE

## 2022-09-14 ENCOUNTER — ANTI-COAG VISIT (OUTPATIENT)
Dept: ANTICOAGULATION | Facility: CLINIC | Age: 87
End: 2022-09-14
Payer: MEDICARE

## 2022-09-14 DIAGNOSIS — I48.20 CHRONIC ATRIAL FIBRILLATION (HCC): ICD-10-CM

## 2022-09-14 DIAGNOSIS — I48.91 ATRIAL FIBRILLATION, UNSPECIFIED TYPE (HCC): ICD-10-CM

## 2022-09-14 DIAGNOSIS — Z51.81 ENCOUNTER FOR THERAPEUTIC DRUG MONITORING: ICD-10-CM

## 2022-09-14 DIAGNOSIS — Z79.01 LONG TERM (CURRENT) USE OF ANTICOAGULANTS: ICD-10-CM

## 2022-09-14 LAB
INR: 2.4 (ref 0.8–1.2)
TEST STRIP EXPIRATION DATE: ABNORMAL DATE

## 2022-09-14 PROCEDURE — 93793 ANTICOAG MGMT PT WARFARIN: CPT | Performed by: INTERNAL MEDICINE

## 2022-09-14 PROCEDURE — 85610 PROTHROMBIN TIME: CPT | Performed by: INTERNAL MEDICINE

## 2022-09-20 ENCOUNTER — LAB ENCOUNTER (OUTPATIENT)
Dept: LAB | Age: 87
End: 2022-09-20
Attending: INTERNAL MEDICINE

## 2022-09-20 DIAGNOSIS — E55.9 AVITAMINOSIS D: Primary | ICD-10-CM

## 2022-09-20 DIAGNOSIS — N18.32 STAGE 3B CHRONIC KIDNEY DISEASE (HCC): ICD-10-CM

## 2022-09-20 DIAGNOSIS — E78.00 PURE HYPERCHOLESTEROLEMIA: ICD-10-CM

## 2022-09-20 DIAGNOSIS — I50.812 CHRONIC RIGHT-SIDED HEART FAILURE (HCC): ICD-10-CM

## 2022-09-20 DIAGNOSIS — R06.02 SHORTNESS OF BREATH: ICD-10-CM

## 2022-09-20 DIAGNOSIS — I27.0 PRIMARY PULMONARY HYPERTENSION (HCC): ICD-10-CM

## 2022-09-20 DIAGNOSIS — R06.00 DYSPNEA, PAROXYSMAL NOCTURNAL: ICD-10-CM

## 2022-09-20 LAB
25(OH)D3+25(OH)D2 SERPL-MCNC: 56.8 NG/ML (ref 30–100)
ABSOLUTE IMMATURE GRANULOCYTES (OFFPRE24): 0.02 X10(3) UL (ref 0–1)
ALBUMIN SERPL-MCNC: 3.4 G/DL (ref 3.4–5)
ALBUMIN SERPL-MCNC: 3.4 G/DL (ref 3.4–5)
ALBUMIN/GLOB SERPL: 0.9 {RATIO} (ref 1–2)
ALBUMIN/GLOB SERPL: 0.9 {RATIO} (ref 1–2)
ALP LIVER SERPL-CCNC: 68 U/L
ALP SERPL-CCNC: 68 U/L (ref 55–142)
ALT SERPL-CCNC: 11 U/L
ALT SERPL-CCNC: 11 U/L (ref 13–56)
ANION GAP SERPL CALC-SCNC: 8 MMOL/L (ref 0–18)
ANION GAP SERPL CALC-SCNC: 8 MMOL/L (ref 0–18)
AST SERPL-CCNC: 16 U/L (ref 15–37)
AST SERPL-CCNC: 16 U/L (ref 15–37)
BASOPHILS # BLD AUTO: 0.06 X10(3) UL (ref 0–0.2)
BASOPHILS # BLD: 0.06 X10(3) UL (ref 0–0.2)
BASOPHILS NFR BLD AUTO: 1.1 %
BASOPHILS NFR BLD: 1.1 %
BILIRUB SERPL-MCNC: 0.7 MG/DL (ref 0.1–2)
BILIRUB SERPL-MCNC: 0.7 MG/DL (ref 0.1–2)
BUN BLD-MCNC: 22 MG/DL (ref 7–18)
BUN SERPL-MCNC: 22 MG/DL (ref 7–18)
BUN/CREAT SERPL: 15 (ref 10–20)
BUN/CREAT SERPL: 15 (ref 10–20)
CALCIUM BLD-MCNC: 8.9 MG/DL (ref 8.5–10.1)
CALCIUM SERPL-MCNC: 8.9 MG/DL (ref 8.5–10.1)
CALCULATED OSMO: 297 MOSM/KG (ref 275–295)
CHLORIDE SERPL-SCNC: 108 MMOL/L (ref 98–112)
CHLORIDE SERPL-SCNC: 108 MMOL/L (ref 98–112)
CHOLEST SERPL-MCNC: 126 MG/DL
CHOLEST SERPL-MCNC: 126 MG/DL (ref ?–200)
CO2 SERPL-SCNC: 26 MMOL/L (ref 21–32)
CO2 SERPL-SCNC: 26 MMOL/L (ref 21–32)
CREAT BLD-MCNC: 1.47 MG/DL
CREAT SERPL-MCNC: 1.47 MG/DL (ref 0.55–1.02)
DEPRECATED RDW RBC AUTO: 59.7 FL (ref 35.1–46.3)
EOSINOPHIL # BLD AUTO: 0.07 X10(3) UL (ref 0–0.7)
EOSINOPHIL # BLD: 0.07 X10(3) UL (ref 0–0.7)
EOSINOPHIL NFR BLD AUTO: 1.2 %
EOSINOPHIL NFR BLD: 1.2 %
ERYTHROCYTE [DISTWIDTH] IN BLOOD BY AUTOMATED COUNT: 16.8 % (ref 11–15)
ERYTHROCYTE [DISTWIDTH] IN BLOOD BY AUTOMATED COUNT: 59.7 FL (ref 35.1–46.3)
ERYTHROCYTE [DISTWIDTH] IN BLOOD: 16.8 % (ref 11–15)
FASTING PATIENT LIPID ANSWER: YES
FASTING STATUS PATIENT QL REPORTED: YES
GFR SERPLBLD BASED ON 1.73 SQ M-ARVRAT: 33 ML/MIN/1.73M2 (ref 60–?)
GFR SERPLBLD SCHWARTZ-ARVRAT: 33 ML/MIN/1.73M2
GLOBULIN PLAS-MCNC: 3.6 G/DL (ref 2.8–4.4)
GLOBULIN SER-MCNC: 3.6 G/DL (ref 2.8–4.4)
GLUCOSE BLD-MCNC: 88 MG/DL (ref 70–99)
GLUCOSE SERPL-MCNC: 88 MG/DL (ref 70–99)
HCT VFR BLD AUTO: 34.8 %
HCT VFR BLD CALC: 34.8 % (ref 35–48)
HDLC SERPL-MCNC: 46 MG/DL (ref 40–59)
HDLC SERPL-MCNC: 46 MG/DL (ref 40–59)
HGB BLD-MCNC: 11.1 G/DL
HGB BLD-MCNC: 11.1 G/DL (ref 12–16)
IMM GRANULOCYTES # BLD AUTO: 0.02 X10(3) UL (ref 0–1)
IMM GRANULOCYTES NFR BLD: 0.4 %
IMMATURE GRANULOCYTES (OFFPRE25): 0.4 %
LDLC SERPL CALC-MCNC: 67 MG/DL
LDLC SERPL CALC-MCNC: 67 MG/DL (ref ?–100)
LENGTH OF FAST TIME PATIENT: YES H
LENGTH OF FAST TIME PATIENT: YES H
LYMPHOCYTES # BLD AUTO: 1.69 X10(3) UL (ref 1–4)
LYMPHOCYTES # BLD: 1.69 X10(3) UL (ref 1–4)
LYMPHOCYTES NFR BLD AUTO: 30 %
LYMPHOCYTES NFR BLD: 30 %
MCH RBC QN AUTO: 30.7 PG (ref 26–34)
MCH RBC QN AUTO: 30.7 PG (ref 26–34)
MCHC RBC AUTO-ENTMCNC: 31.9 G/DL (ref 31–37)
MCHC RBC AUTO-ENTMCNC: 31.9 G/DL (ref 31–37)
MCV RBC AUTO: 96.1 FL
MCV RBC AUTO: 96.1 FL (ref 80–100)
MONOCYTES # BLD AUTO: 0.36 X10(3) UL (ref 0.1–1)
MONOCYTES # BLD: 0.36 X10(3) UL (ref 0.1–1)
MONOCYTES NFR BLD AUTO: 6.4 %
MONOCYTES NFR BLD: 6.4 %
NEUTROPHILS # BLD AUTO: 3.43 X10 (3) UL (ref 1.5–7.7)
NEUTROPHILS # BLD AUTO: 3.43 X10(3) UL (ref 1.5–7.7)
NEUTROPHILS # BLD: 3.43 X10 (3) UL (ref 1.5–7.7)
NEUTROPHILS NFR BLD AUTO: 60.9 %
NEUTROPHILS NFR BLD: 60.9 %
NONHDLC SERPL-MCNC: 80 MG/DL
NONHDLC SERPL-MCNC: 80 MG/DL (ref ?–130)
NT-PROBNP SERPL-MCNC: 5660 PG/ML (ref ?–450)
NT-PROBNP SERPL-MCNC: ABNORMAL PG/ML
OSMOLALITY SERPL CALC.SUM OF ELEC: 297 MOSM/KG (ref 275–295)
PHOSPHATE SERPL-MCNC: 3.3 MG/DL (ref 2.5–4.9)
PLATELET # BLD AUTO: 208 10(3)UL (ref 150–450)
PLATELET # BLD: 208 10(3)UL (ref 150–450)
POTASSIUM SERPL-SCNC: 3.6 MMOL/L (ref 3.5–5.1)
POTASSIUM SERPL-SCNC: 3.6 MMOL/L (ref 3.5–5.1)
PROT SERPL-MCNC: 7 G/DL (ref 6.4–8.2)
PROT SERPL-MCNC: 7 G/DL (ref 6.4–8.2)
RBC # BLD AUTO: 3.62 X10(6)UL
RBC # BLD: 3.62 X10(6)UL (ref 3.8–5.3)
SODIUM SERPL-SCNC: 142 MMOL/L (ref 136–145)
SODIUM SERPL-SCNC: 142 MMOL/L (ref 136–145)
TRIGL SERPL-MCNC: 61 MG/DL (ref 30–149)
TRIGL SERPL-MCNC: 61 MG/DL (ref 30–149)
TSH SERPL-ACNC: 1.11 MIU/ML (ref 0.36–3.74)
TSI SER-ACNC: 1.11 MIU/ML (ref 0.36–3.74)
VIT D+METAB SERPL-MCNC: 56.8 NG/ML (ref 30–100)
VLDLC SERPL CALC-MCNC: 9 MG/DL (ref 0–30)
VLDLC SERPL CALC-MCNC: 9 MG/DL (ref 0–30)
WBC # BLD AUTO: 5.6 X10(3) UL (ref 4–11)
WBC # BLD: 5.6 X10(3) UL (ref 4–11)

## 2022-09-20 PROCEDURE — 80053 COMPREHEN METABOLIC PANEL: CPT

## 2022-09-20 PROCEDURE — 84443 ASSAY THYROID STIM HORMONE: CPT

## 2022-09-20 PROCEDURE — 83880 ASSAY OF NATRIURETIC PEPTIDE: CPT

## 2022-09-20 PROCEDURE — 80061 LIPID PANEL: CPT

## 2022-09-20 PROCEDURE — 36415 COLL VENOUS BLD VENIPUNCTURE: CPT

## 2022-09-20 PROCEDURE — 84100 ASSAY OF PHOSPHORUS: CPT

## 2022-09-20 PROCEDURE — 85025 COMPLETE CBC W/AUTO DIFF WBC: CPT

## 2022-09-20 PROCEDURE — 82306 VITAMIN D 25 HYDROXY: CPT

## 2022-09-22 ENCOUNTER — TELEPHONE (OUTPATIENT)
Dept: NEPHROLOGY | Facility: CLINIC | Age: 87
End: 2022-09-22

## 2022-09-22 DIAGNOSIS — N18.32 STAGE 3B CHRONIC KIDNEY DISEASE (HCC): Primary | ICD-10-CM

## 2022-09-22 NOTE — TELEPHONE ENCOUNTER
Your kidney function overall remains stable. If doing well repeat a CBC and renal panel in 3 months and then see for follow-up.

## 2022-09-26 ENCOUNTER — ANCILLARY PROCEDURE (OUTPATIENT)
Dept: CARDIOLOGY | Age: 87
End: 2022-09-26
Attending: INTERNAL MEDICINE

## 2022-09-26 ENCOUNTER — OFFICE VISIT (OUTPATIENT)
Dept: CARDIOLOGY | Age: 87
End: 2022-09-26

## 2022-09-26 VITALS — HEIGHT: 66 IN | BODY MASS INDEX: 19.93 KG/M2 | WEIGHT: 124.01 LBS

## 2022-09-26 DIAGNOSIS — I27.0 PRIMARY PULMONARY HYPERTENSION (CMD): Primary | ICD-10-CM

## 2022-09-26 DIAGNOSIS — I27.29 RIGHT HEART FAILURE DUE TO PULMONARY HYPERTENSION (CMD): ICD-10-CM

## 2022-09-26 DIAGNOSIS — I27.0 PRIMARY PULMONARY HYPERTENSION (CMD): ICD-10-CM

## 2022-09-26 DIAGNOSIS — I34.0 NONRHEUMATIC MITRAL VALVE REGURGITATION: ICD-10-CM

## 2022-09-26 DIAGNOSIS — I50.810 RIGHT HEART FAILURE DUE TO PULMONARY HYPERTENSION (CMD): ICD-10-CM

## 2022-09-26 LAB
AORTIC VALVE AREA: NORMAL
ASCENDING AORTA (AAD): 3.1
AV MEAN GRADIENT (AVMG): NORMAL
AV MEAN VELOCITY (AVMV): NORMAL
AV PEAK GRADIENT (AVPG): 8
AV PEAK VELOCITY (AVPV): 1.42
AV STENOSIS SEVERITY TEXT: NORMAL
DOP CALC LVOT PEAK VEL (LVOTPV): 0.73
E WAVE DECELARATION TIME (MDT): 179
EST RIGHT VENT SYSTOLIC PRESSURE BY TRICUSPID REGURGITATION JET (RVSP): 119.04
INTERVENTRICULAR SEPTUM IN END DIASTOLE (IVSD): 0.8
LEFT INTERNAL DIMENSION IN SYSTOLE (LVSD): 2.8
LEFT VENTRICULAR INTERNAL DIMENSION IN DIASTOLE (LVDD): 4
LEFT VENTRICULAR POSTERIOR WALL IN END DIASTOLE (LVPW): 0.9
LV EF: NORMAL %
LVOT 2D (LVOTD): 2
MV E TISSUE VEL LAT (MELV): 5.43
MV E TISSUE VEL MED (MESV): 4.7
MV E WAVE VEL/E TISSUE VEL MED(MSR): 25.96
MV PEAK A VELOCITY (MVPAV): 0.59
MV PEAK E VELOCITY (MVPEV): 1.22
TRICUSPID VALVE PEAK REGURGITATION VELOCITY (TRPV): 5.1
TV ESTIMATED RIGHT ARTERIAL PRESSURE (RAP): 15

## 2022-09-26 PROCEDURE — 93306 TTE W/DOPPLER COMPLETE: CPT | Performed by: INTERNAL MEDICINE

## 2022-09-26 PROCEDURE — 99215 OFFICE O/P EST HI 40 MIN: CPT | Performed by: INTERNAL MEDICINE

## 2022-09-26 RX ORDER — MONTELUKAST SODIUM 10 MG/1
10 TABLET ORAL NIGHTLY
COMMUNITY
Start: 2022-08-16 | End: 2023-01-18

## 2022-09-26 RX ORDER — CARVEDILOL 12.5 MG/1
12.5 TABLET ORAL 2 TIMES DAILY WITH MEALS
Qty: 180 TABLET | Refills: 3 | Status: SHIPPED | OUTPATIENT
Start: 2022-09-26 | End: 2022-12-02 | Stop reason: SDUPTHER

## 2022-09-26 RX ORDER — VITAMIN B COMPLEX
50 TABLET ORAL DAILY
COMMUNITY

## 2022-09-26 RX ORDER — LORATADINE 10 MG/1
10 TABLET ORAL
COMMUNITY
Start: 2022-08-16 | End: 2023-01-18

## 2022-09-26 SDOH — HEALTH STABILITY: PHYSICAL HEALTH: ON AVERAGE, HOW MANY DAYS PER WEEK DO YOU ENGAGE IN MODERATE TO STRENUOUS EXERCISE (LIKE A BRISK WALK)?: 0 DAYS

## 2022-09-26 SDOH — HEALTH STABILITY: PHYSICAL HEALTH: ON AVERAGE, HOW MANY MINUTES DO YOU ENGAGE IN EXERCISE AT THIS LEVEL?: 0 MIN

## 2022-09-26 ASSESSMENT — ENCOUNTER SYMPTOMS
WEIGHT LOSS: 0
LIGHT-HEADEDNESS: 0
BRUISES/BLEEDS EASILY: 0
BACK PAIN: 0
ALLERGIC/IMMUNOLOGIC COMMENTS: NO NEW FOOD ALLERGIES
ALTERED MENTAL STATUS: 0
DIZZINESS: 0
HEMOPTYSIS: 0
DIARRHEA: 0
SUSPICIOUS LESIONS: 0
BLOATING: 0
HEADACHES: 0
HEMATOCHEZIA: 0
COUGH: 0
LOSS OF BALANCE: 0
CONSTIPATION: 0
ABDOMINAL PAIN: 0
WEIGHT GAIN: 1
FEVER: 0
CHILLS: 0
DEPRESSION: 0
INSOMNIA: 0

## 2022-09-26 ASSESSMENT — PATIENT HEALTH QUESTIONNAIRE - PHQ9
2. FEELING DOWN, DEPRESSED OR HOPELESS: NOT AT ALL
1. LITTLE INTEREST OR PLEASURE IN DOING THINGS: NOT AT ALL
CLINICAL INTERPRETATION OF PHQ2 SCORE: NO FURTHER SCREENING NEEDED
SUM OF ALL RESPONSES TO PHQ9 QUESTIONS 1 AND 2: 0
SUM OF ALL RESPONSES TO PHQ9 QUESTIONS 1 AND 2: 0

## 2022-10-10 ENCOUNTER — TELEPHONE (OUTPATIENT)
Dept: NEPHROLOGY | Facility: CLINIC | Age: 87
End: 2022-10-10

## 2022-10-10 DIAGNOSIS — I48.20 CHRONIC ATRIAL FIBRILLATION (HCC): ICD-10-CM

## 2022-10-10 DIAGNOSIS — I48.91 ATRIAL FIBRILLATION, UNSPECIFIED TYPE (HCC): ICD-10-CM

## 2022-10-10 DIAGNOSIS — Z79.01 LONG TERM (CURRENT) USE OF ANTICOAGULANTS: Primary | ICD-10-CM

## 2022-10-10 DIAGNOSIS — Z51.81 ENCOUNTER FOR THERAPEUTIC DRUG MONITORING: ICD-10-CM

## 2022-10-10 NOTE — TELEPHONE ENCOUNTER
The Anticoagulation Referral has . Please review and sign the pended Anticoagulation Referral for your patient to continue care with the [address] [place] [id number]. Thank You!     Referral Information:  Order Date: Oct 6, 2021  Darnell Hendrix Order #: [id number]   Referral Type: AMB REFERRAL TO ANTICOAGULATION MONITORING Dx: Chronic atrial fibrillation (HCC) (I48.20)  Atrial fibrillation, unspecified type ([facility] [address] (I48.91)  Encounter for therapeutic drug monitoring (Z51.81)  Long term (current) use of anticoagulants (Z79.01)

## 2022-10-11 ENCOUNTER — OFFICE VISIT (OUTPATIENT)
Dept: CARDIOLOGY | Age: 87
End: 2022-10-11

## 2022-10-11 VITALS
SYSTOLIC BLOOD PRESSURE: 171 MMHG | HEART RATE: 52 BPM | DIASTOLIC BLOOD PRESSURE: 59 MMHG | WEIGHT: 123 LBS | BODY MASS INDEX: 20.49 KG/M2 | HEIGHT: 65 IN

## 2022-10-11 DIAGNOSIS — I34.0 NONRHEUMATIC MITRAL VALVE REGURGITATION: Primary | ICD-10-CM

## 2022-10-11 DIAGNOSIS — I27.20 PULMONARY HYPERTENSION (CMD): ICD-10-CM

## 2022-10-11 DIAGNOSIS — E78.00 HYPERCHOLESTEREMIA: ICD-10-CM

## 2022-10-11 DIAGNOSIS — I10 PRIMARY HYPERTENSION: ICD-10-CM

## 2022-10-11 DIAGNOSIS — I48.91 ATRIAL FIBRILLATION, UNSPECIFIED TYPE (CMD): ICD-10-CM

## 2022-10-11 PROCEDURE — 99214 OFFICE O/P EST MOD 30 MIN: CPT | Performed by: NURSE PRACTITIONER

## 2022-10-11 RX ORDER — LISINOPRIL 40 MG/1
40 TABLET ORAL DAILY
Qty: 90 TABLET | Refills: 3 | Status: SHIPPED
Start: 2022-10-11 | End: 2023-01-21 | Stop reason: SDUPTHER

## 2022-10-11 SDOH — HEALTH STABILITY: PHYSICAL HEALTH: ON AVERAGE, HOW MANY DAYS PER WEEK DO YOU ENGAGE IN MODERATE TO STRENUOUS EXERCISE (LIKE A BRISK WALK)?: 0 DAYS

## 2022-10-11 SDOH — HEALTH STABILITY: PHYSICAL HEALTH: ON AVERAGE, HOW MANY MINUTES DO YOU ENGAGE IN EXERCISE AT THIS LEVEL?: 0 MIN

## 2022-10-11 ASSESSMENT — PATIENT HEALTH QUESTIONNAIRE - PHQ9
CLINICAL INTERPRETATION OF PHQ2 SCORE: NO FURTHER SCREENING NEEDED
SUM OF ALL RESPONSES TO PHQ9 QUESTIONS 1 AND 2: 0
2. FEELING DOWN, DEPRESSED OR HOPELESS: NOT AT ALL
SUM OF ALL RESPONSES TO PHQ9 QUESTIONS 1 AND 2: 0
1. LITTLE INTEREST OR PLEASURE IN DOING THINGS: NOT AT ALL

## 2022-10-12 ENCOUNTER — ANTI-COAG VISIT (OUTPATIENT)
Dept: ANTICOAGULATION | Facility: CLINIC | Age: 87
End: 2022-10-12
Payer: MEDICARE

## 2022-10-12 DIAGNOSIS — I48.91 ATRIAL FIBRILLATION, UNSPECIFIED TYPE (HCC): ICD-10-CM

## 2022-10-12 DIAGNOSIS — Z51.81 ENCOUNTER FOR THERAPEUTIC DRUG MONITORING: ICD-10-CM

## 2022-10-12 DIAGNOSIS — Z79.01 LONG TERM (CURRENT) USE OF ANTICOAGULANTS: ICD-10-CM

## 2022-10-12 DIAGNOSIS — I48.20 CHRONIC ATRIAL FIBRILLATION (HCC): ICD-10-CM

## 2022-10-12 LAB — INR: 2.6 (ref 0.8–1.2)

## 2022-10-12 PROCEDURE — 85610 PROTHROMBIN TIME: CPT | Performed by: INTERNAL MEDICINE

## 2022-10-12 PROCEDURE — 93793 ANTICOAG MGMT PT WARFARIN: CPT | Performed by: INTERNAL MEDICINE

## 2022-10-18 DIAGNOSIS — I27.20 PULMONARY HYPERTENSION (CMD): Primary | ICD-10-CM

## 2022-10-18 DIAGNOSIS — I27.0 PRIMARY PULMONARY HYPERTENSION (CMD): ICD-10-CM

## 2022-10-18 RX ORDER — MONTELUKAST SODIUM 10 MG/1
TABLET ORAL
Qty: 90 TABLET | Refills: 2 | Status: SHIPPED | OUTPATIENT
Start: 2022-10-18

## 2022-10-19 ENCOUNTER — TELEPHONE (OUTPATIENT)
Dept: CARDIOLOGY | Age: 87
End: 2022-10-19

## 2022-10-21 ENCOUNTER — APPOINTMENT (OUTPATIENT)
Dept: CARDIOLOGY | Age: 87
End: 2022-10-21
Attending: NURSE PRACTITIONER

## 2022-10-21 ENCOUNTER — HOSPITAL ENCOUNTER (EMERGENCY)
Facility: HOSPITAL | Age: 87
Discharge: HOME | End: 2022-10-21
Attending: EMERGENCY MEDICINE
Payer: MEDICARE

## 2022-10-21 VITALS
DIASTOLIC BLOOD PRESSURE: 58 MMHG | WEIGHT: 123 LBS | HEART RATE: 61 BPM | OXYGEN SATURATION: 95 % | RESPIRATION RATE: 22 BRPM | BODY MASS INDEX: 20.49 KG/M2 | HEIGHT: 65 IN | TEMPERATURE: 98 F | SYSTOLIC BLOOD PRESSURE: 147 MMHG

## 2022-10-21 DIAGNOSIS — R60.0 BILATERAL LOWER EXTREMITY EDEMA: Primary | ICD-10-CM

## 2022-10-21 LAB
ANION GAP SERPL CALC-SCNC: 9 MMOL/L (ref 0–18)
BASOPHILS # BLD AUTO: 0.06 X10(3) UL (ref 0–0.2)
BASOPHILS NFR BLD AUTO: 1.1 %
BUN BLD-MCNC: 28 MG/DL (ref 7–18)
BUN/CREAT SERPL: 19.7 (ref 10–20)
CALCIUM BLD-MCNC: 8.7 MG/DL (ref 8.5–10.1)
CHLORIDE SERPL-SCNC: 104 MMOL/L (ref 98–112)
CO2 SERPL-SCNC: 27 MMOL/L (ref 21–32)
CREAT BLD-MCNC: 1.42 MG/DL
DEPRECATED RDW RBC AUTO: 55.8 FL (ref 35.1–46.3)
EOSINOPHIL # BLD AUTO: 0.1 X10(3) UL (ref 0–0.7)
EOSINOPHIL NFR BLD AUTO: 1.8 %
ERYTHROCYTE [DISTWIDTH] IN BLOOD BY AUTOMATED COUNT: 15.5 % (ref 11–15)
GFR SERPLBLD BASED ON 1.73 SQ M-ARVRAT: 34 ML/MIN/1.73M2 (ref 60–?)
GLUCOSE BLD-MCNC: 88 MG/DL (ref 70–99)
HCT VFR BLD AUTO: 32 %
HGB BLD-MCNC: 10.2 G/DL
IMM GRANULOCYTES # BLD AUTO: 0.02 X10(3) UL (ref 0–1)
IMM GRANULOCYTES NFR BLD: 0.4 %
INR BLD: 2.53 (ref 0.85–1.16)
LYMPHOCYTES # BLD AUTO: 1.73 X10(3) UL (ref 1–4)
LYMPHOCYTES NFR BLD AUTO: 30.5 %
MAGNESIUM SERPL-MCNC: 2.2 MG/DL (ref 1.6–2.6)
MCH RBC QN AUTO: 30.7 PG (ref 26–34)
MCHC RBC AUTO-ENTMCNC: 31.9 G/DL (ref 31–37)
MCV RBC AUTO: 96.4 FL
MONOCYTES # BLD AUTO: 0.4 X10(3) UL (ref 0.1–1)
MONOCYTES NFR BLD AUTO: 7.1 %
NEUTROPHILS # BLD AUTO: 3.36 X10 (3) UL (ref 1.5–7.7)
NEUTROPHILS # BLD AUTO: 3.36 X10(3) UL (ref 1.5–7.7)
NEUTROPHILS NFR BLD AUTO: 59.1 %
NT-PROBNP SERPL-MCNC: 8273 PG/ML (ref ?–450)
OSMOLALITY SERPL CALC.SUM OF ELEC: 295 MOSM/KG (ref 275–295)
PLATELET # BLD AUTO: 211 10(3)UL (ref 150–450)
POTASSIUM SERPL-SCNC: 3 MMOL/L (ref 3.5–5.1)
PROTHROMBIN TIME: 26.8 SECONDS (ref 11.6–14.8)
RBC # BLD AUTO: 3.32 X10(6)UL
SODIUM SERPL-SCNC: 140 MMOL/L (ref 136–145)
WBC # BLD AUTO: 5.7 X10(3) UL (ref 4–11)

## 2022-10-21 PROCEDURE — 85025 COMPLETE CBC W/AUTO DIFF WBC: CPT | Performed by: EMERGENCY MEDICINE

## 2022-10-21 PROCEDURE — 83735 ASSAY OF MAGNESIUM: CPT | Performed by: EMERGENCY MEDICINE

## 2022-10-21 PROCEDURE — 93010 ELECTROCARDIOGRAM REPORT: CPT | Performed by: EMERGENCY MEDICINE

## 2022-10-21 PROCEDURE — 36415 COLL VENOUS BLD VENIPUNCTURE: CPT

## 2022-10-21 PROCEDURE — 80048 BASIC METABOLIC PNL TOTAL CA: CPT | Performed by: EMERGENCY MEDICINE

## 2022-10-21 PROCEDURE — 99284 EMERGENCY DEPT VISIT MOD MDM: CPT

## 2022-10-21 PROCEDURE — 83880 ASSAY OF NATRIURETIC PEPTIDE: CPT | Performed by: EMERGENCY MEDICINE

## 2022-10-21 PROCEDURE — 85610 PROTHROMBIN TIME: CPT | Performed by: EMERGENCY MEDICINE

## 2022-10-21 PROCEDURE — 93005 ELECTROCARDIOGRAM TRACING: CPT

## 2022-10-21 NOTE — ED INITIAL ASSESSMENT (HPI)
Worsening leg swelling x3 days. Taking double doses of torsemide x3 days. Denies cough, fever, CP, SOB.

## 2022-10-24 ENCOUNTER — TELEPHONE (OUTPATIENT)
Dept: CARDIOLOGY | Age: 87
End: 2022-10-24

## 2022-11-02 ENCOUNTER — TELEPHONE (OUTPATIENT)
Dept: CARDIOLOGY | Age: 87
End: 2022-11-02

## 2022-11-02 DIAGNOSIS — I27.29 RIGHT HEART FAILURE DUE TO PULMONARY HYPERTENSION (CMD): ICD-10-CM

## 2022-11-02 DIAGNOSIS — I50.810 RIGHT HEART FAILURE DUE TO PULMONARY HYPERTENSION (CMD): ICD-10-CM

## 2022-11-02 DIAGNOSIS — I27.0 PRIMARY PULMONARY HYPERTENSION (CMD): Primary | ICD-10-CM

## 2022-11-03 ENCOUNTER — LAB SERVICES (OUTPATIENT)
Dept: LAB | Age: 87
End: 2022-11-03

## 2022-11-03 ENCOUNTER — OFFICE VISIT (OUTPATIENT)
Dept: CARDIOLOGY | Age: 87
End: 2022-11-03

## 2022-11-03 VITALS
DIASTOLIC BLOOD PRESSURE: 76 MMHG | SYSTOLIC BLOOD PRESSURE: 175 MMHG | BODY MASS INDEX: 21.63 KG/M2 | HEIGHT: 65 IN | HEART RATE: 56 BPM | WEIGHT: 129.85 LBS

## 2022-11-03 DIAGNOSIS — I34.0 NONRHEUMATIC MITRAL VALVE REGURGITATION: Primary | ICD-10-CM

## 2022-11-03 DIAGNOSIS — E78.00 HYPERCHOLESTEREMIA: ICD-10-CM

## 2022-11-03 DIAGNOSIS — I50.810 RIGHT HEART FAILURE DUE TO PULMONARY HYPERTENSION (CMD): ICD-10-CM

## 2022-11-03 DIAGNOSIS — I27.0 PRIMARY PULMONARY HYPERTENSION (CMD): ICD-10-CM

## 2022-11-03 DIAGNOSIS — I27.29 RIGHT HEART FAILURE DUE TO PULMONARY HYPERTENSION (CMD): ICD-10-CM

## 2022-11-03 DIAGNOSIS — I10 PRIMARY HYPERTENSION: ICD-10-CM

## 2022-11-03 DIAGNOSIS — I48.91 ATRIAL FIBRILLATION, UNSPECIFIED TYPE (CMD): ICD-10-CM

## 2022-11-03 PROBLEM — Z86.69 HISTORY OF SLEEP APNEA: Chronic | Status: ACTIVE | Noted: 2022-11-03

## 2022-11-03 LAB
ANION GAP SERPL CALC-SCNC: 9 MMOL/L (ref 7–19)
BUN SERPL-MCNC: 24 MG/DL (ref 6–20)
BUN/CREAT SERPL: 19 (ref 7–25)
CALCIUM SERPL-MCNC: 8.7 MG/DL (ref 8.4–10.2)
CHLORIDE SERPL-SCNC: 107 MMOL/L (ref 97–110)
CO2 SERPL-SCNC: 32 MMOL/L (ref 21–32)
CREAT SERPL-MCNC: 1.28 MG/DL (ref 0.51–0.95)
FASTING DURATION TIME PATIENT: ABNORMAL H
GFR SERPLBLD BASED ON 1.73 SQ M-ARVRAT: 39 ML/MIN
GLUCOSE SERPL-MCNC: 96 MG/DL (ref 70–99)
NT-PROBNP SERPL-MCNC: 7353 PG/ML
POTASSIUM SERPL-SCNC: 3.5 MMOL/L (ref 3.4–5.1)
SODIUM SERPL-SCNC: 144 MMOL/L (ref 135–145)

## 2022-11-03 PROCEDURE — 80048 BASIC METABOLIC PNL TOTAL CA: CPT | Performed by: CLINICAL MEDICAL LABORATORY

## 2022-11-03 PROCEDURE — 99214 OFFICE O/P EST MOD 30 MIN: CPT | Performed by: NURSE PRACTITIONER

## 2022-11-03 PROCEDURE — 36415 COLL VENOUS BLD VENIPUNCTURE: CPT | Performed by: CLINICAL MEDICAL LABORATORY

## 2022-11-03 PROCEDURE — 83880 ASSAY OF NATRIURETIC PEPTIDE: CPT | Performed by: CLINICAL MEDICAL LABORATORY

## 2022-11-08 ENCOUNTER — APPOINTMENT (OUTPATIENT)
Dept: CARDIOLOGY | Age: 87
End: 2022-11-08

## 2022-11-09 ENCOUNTER — ANTI-COAG VISIT (OUTPATIENT)
Dept: ANTICOAGULATION | Facility: CLINIC | Age: 87
End: 2022-11-09
Payer: MEDICARE

## 2022-11-09 ENCOUNTER — TELEPHONE (OUTPATIENT)
Dept: ANTICOAGULATION | Facility: CLINIC | Age: 87
End: 2022-11-09

## 2022-11-09 DIAGNOSIS — Z79.01 LONG TERM (CURRENT) USE OF ANTICOAGULANTS: ICD-10-CM

## 2022-11-09 DIAGNOSIS — Z51.81 ENCOUNTER FOR THERAPEUTIC DRUG MONITORING: ICD-10-CM

## 2022-11-09 DIAGNOSIS — I48.91 ATRIAL FIBRILLATION, UNSPECIFIED TYPE (HCC): ICD-10-CM

## 2022-11-09 DIAGNOSIS — I48.20 CHRONIC ATRIAL FIBRILLATION (HCC): ICD-10-CM

## 2022-11-09 LAB — INR: 3.6 (ref 0.8–1.2)

## 2022-11-09 PROCEDURE — 85610 PROTHROMBIN TIME: CPT | Performed by: INTERNAL MEDICINE

## 2022-11-09 PROCEDURE — 93793 ANTICOAG MGMT PT WARFARIN: CPT | Performed by: INTERNAL MEDICINE

## 2022-11-09 NOTE — TELEPHONE ENCOUNTER
Today's INR= 3.6  Goal= 2.0-3.0    Recent lower extremity swelling and visit to ER- advised to increase diuretic- takes torsemide- until follow up with cardiology on 11/17. Per drugs. com, when taken with warfarin torsemide may increase INR. Per protocol, decrease weekly dose. Outside of protocol, instructed to hold one dose of warfarin tonight, weekly dose decrease 7.7% and recheck INR in 1 week.

## 2022-11-14 ENCOUNTER — LAB ENCOUNTER (OUTPATIENT)
Dept: LAB | Age: 87
End: 2022-11-14
Attending: NURSE PRACTITIONER
Payer: MEDICARE

## 2022-11-14 ENCOUNTER — EXTERNAL LAB (OUTPATIENT)
Dept: CARDIOLOGY | Age: 87
End: 2022-11-14

## 2022-11-14 DIAGNOSIS — I27.0 PRIMARY PULMONARY HYPERTENSION (HCC): ICD-10-CM

## 2022-11-14 DIAGNOSIS — I48.91 A-FIB (HCC): ICD-10-CM

## 2022-11-14 DIAGNOSIS — I10 HTN (HYPERTENSION): Primary | ICD-10-CM

## 2022-11-14 LAB
ANION GAP SERPL CALC-SCNC: 7 MMOL/L (ref 0–18)
ANION GAP SERPL CALC-SCNC: 7 MMOL/L (ref 0–18)
BUN BLD-MCNC: 26 MG/DL (ref 7–18)
BUN SERPL-MCNC: 26 MG/DL (ref 7–18)
BUN/CREAT SERPL: 17.8 (ref 10–20)
BUN/CREAT SERPL: 17.8 (ref 10–20)
CALCIUM BLD-MCNC: 8.6 MG/DL (ref 8.5–10.1)
CALCIUM SERPL-MCNC: 8.6 MG/DL (ref 8.5–10.1)
CALCULATED OSMO: 299 MOSM/KG (ref 275–295)
CHLORIDE SERPL-SCNC: 105 MMOL/L (ref 98–112)
CHLORIDE SERPL-SCNC: 105 MMOL/L (ref 98–112)
CO2 SERPL-SCNC: 30 MMOL/L (ref 21–32)
CO2 SERPL-SCNC: 30 MMOL/L (ref 21–32)
CREAT BLD-MCNC: 1.46 MG/DL
CREAT SERPL-MCNC: 1.46 MG/DL (ref 0.55–1.02)
FASTING STATUS PATIENT QL REPORTED: NO
GFR SERPLBLD BASED ON 1.73 SQ M-ARVRAT: 33 ML/MIN/1.73M2 (ref 60–?)
GFR SERPLBLD SCHWARTZ-ARVRAT: 33 ML/MIN/1.73M2
GLUCOSE BLD-MCNC: 103 MG/DL (ref 70–99)
GLUCOSE SERPL-MCNC: 103 MG/DL (ref 70–99)
LENGTH OF FAST TIME PATIENT: NO H
NT-PROBNP SERPL-MCNC: 6228 PG/ML (ref ?–450)
NT-PROBNP SERPL-MCNC: ABNORMAL PG/ML
OSMOLALITY SERPL CALC.SUM OF ELEC: 299 MOSM/KG (ref 275–295)
POTASSIUM SERPL-SCNC: 3.2 MMOL/L (ref 3.5–5.1)
POTASSIUM SERPL-SCNC: 3.2 MMOL/L (ref 3.5–5.1)
SODIUM SERPL-SCNC: 142 MMOL/L (ref 136–145)
SODIUM SERPL-SCNC: 142 MMOL/L (ref 136–145)

## 2022-11-14 PROCEDURE — 36415 COLL VENOUS BLD VENIPUNCTURE: CPT

## 2022-11-14 PROCEDURE — 83880 ASSAY OF NATRIURETIC PEPTIDE: CPT

## 2022-11-14 PROCEDURE — 80048 BASIC METABOLIC PNL TOTAL CA: CPT

## 2022-11-15 ENCOUNTER — TELEPHONE (OUTPATIENT)
Dept: CARDIOLOGY | Age: 87
End: 2022-11-15

## 2022-11-16 ENCOUNTER — APPOINTMENT (OUTPATIENT)
Dept: CARDIOLOGY | Age: 87
End: 2022-11-16

## 2022-11-17 ENCOUNTER — ANTI-COAG VISIT (OUTPATIENT)
Dept: ANTICOAGULATION | Facility: CLINIC | Age: 87
End: 2022-11-17
Payer: MEDICARE

## 2022-11-17 DIAGNOSIS — I48.20 CHRONIC ATRIAL FIBRILLATION (HCC): ICD-10-CM

## 2022-11-17 DIAGNOSIS — I48.91 ATRIAL FIBRILLATION, UNSPECIFIED TYPE (HCC): ICD-10-CM

## 2022-11-17 DIAGNOSIS — Z79.01 LONG TERM (CURRENT) USE OF ANTICOAGULANTS: ICD-10-CM

## 2022-11-17 DIAGNOSIS — Z51.81 ENCOUNTER FOR THERAPEUTIC DRUG MONITORING: ICD-10-CM

## 2022-11-17 LAB — INR: 2.6 (ref 0.8–1.2)

## 2022-11-17 PROCEDURE — 85610 PROTHROMBIN TIME: CPT | Performed by: INTERNAL MEDICINE

## 2022-11-17 PROCEDURE — 93793 ANTICOAG MGMT PT WARFARIN: CPT | Performed by: INTERNAL MEDICINE

## 2022-11-17 RX ORDER — ROSUVASTATIN CALCIUM 5 MG/1
5 TABLET, COATED ORAL NIGHTLY
Qty: 90 TABLET | Refills: 1 | Status: SHIPPED | OUTPATIENT
Start: 2022-11-17

## 2022-11-18 ENCOUNTER — TELEPHONE (OUTPATIENT)
Dept: CARDIOLOGY | Age: 87
End: 2022-11-18

## 2022-11-18 DIAGNOSIS — I27.0 PRIMARY PULMONARY HYPERTENSION (CMD): Primary | ICD-10-CM

## 2022-11-18 RX ORDER — POTASSIUM CHLORIDE 750 MG/1
10 TABLET, FILM COATED, EXTENDED RELEASE ORAL DAILY
Qty: 60 TABLET | Refills: 1 | Status: ON HOLD | OUTPATIENT
Start: 2022-11-18 | End: 2023-01-19 | Stop reason: SDUPTHER

## 2022-11-30 ENCOUNTER — ANTI-COAG VISIT (OUTPATIENT)
Dept: ANTICOAGULATION | Facility: CLINIC | Age: 87
End: 2022-11-30
Payer: MEDICARE

## 2022-11-30 ENCOUNTER — LAB ENCOUNTER (OUTPATIENT)
Dept: LAB | Age: 87
End: 2022-11-30
Attending: NURSE PRACTITIONER
Payer: MEDICARE

## 2022-11-30 ENCOUNTER — EXTERNAL LAB (OUTPATIENT)
Dept: CARDIOLOGY | Age: 87
End: 2022-11-30

## 2022-11-30 DIAGNOSIS — Z51.81 ENCOUNTER FOR THERAPEUTIC DRUG MONITORING: ICD-10-CM

## 2022-11-30 DIAGNOSIS — Z79.01 LONG TERM (CURRENT) USE OF ANTICOAGULANTS: ICD-10-CM

## 2022-11-30 DIAGNOSIS — I48.91 ATRIAL FIBRILLATION, UNSPECIFIED TYPE (HCC): ICD-10-CM

## 2022-11-30 DIAGNOSIS — I48.20 CHRONIC ATRIAL FIBRILLATION (HCC): ICD-10-CM

## 2022-11-30 DIAGNOSIS — I27.0 PRIMARY PULMONARY HYPERTENSION (HCC): Primary | ICD-10-CM

## 2022-11-30 LAB
ANION GAP SERPL CALC-SCNC: 7 MMOL/L (ref 0–18)
ANION GAP SERPL CALC-SCNC: 7 MMOL/L (ref 0–18)
BUN BLD-MCNC: 28 MG/DL (ref 7–18)
BUN SERPL-MCNC: 28 MG/DL (ref 7–18)
BUN/CREAT SERPL: 16.7 (ref 10–20)
BUN/CREAT SERPL: 16.7 (ref 10–20)
CALCIUM BLD-MCNC: 8.8 MG/DL (ref 8.5–10.1)
CALCIUM SERPL-MCNC: 8.8 MG/DL (ref 8.5–10.1)
CALCULATED OSMO: 297 MOSM/KG (ref 275–295)
CHLORIDE SERPL-SCNC: 105 MMOL/L (ref 98–112)
CHLORIDE SERPL-SCNC: 105 MMOL/L (ref 98–112)
CO2 SERPL-SCNC: 29 MMOL/L (ref 21–32)
CO2 SERPL-SCNC: 29 MMOL/L (ref 21–32)
CREAT BLD-MCNC: 1.68 MG/DL
CREAT SERPL-MCNC: 1.68 MG/DL (ref 0.55–1.02)
FASTING STATUS PATIENT QL REPORTED: NO
GFR SERPLBLD BASED ON 1.73 SQ M-ARVRAT: 28 ML/MIN/1.73M2 (ref 60–?)
GFR SERPLBLD SCHWARTZ-ARVRAT: 28 ML/MIN/1.73M2
GLUCOSE BLD-MCNC: 95 MG/DL (ref 70–99)
GLUCOSE SERPL-MCNC: 95 MG/DL (ref 70–99)
INR: 2.5 (ref 0.8–1.2)
LENGTH OF FAST TIME PATIENT: NO H
OSMOLALITY SERPL CALC.SUM OF ELEC: 297 MOSM/KG (ref 275–295)
POTASSIUM SERPL-SCNC: 4.3 MMOL/L (ref 3.5–5.1)
POTASSIUM SERPL-SCNC: 4.3 MMOL/L (ref 3.5–5.1)
SODIUM SERPL-SCNC: 141 MMOL/L (ref 136–145)
SODIUM SERPL-SCNC: 141 MMOL/L (ref 136–145)

## 2022-11-30 PROCEDURE — 36415 COLL VENOUS BLD VENIPUNCTURE: CPT

## 2022-11-30 PROCEDURE — 80048 BASIC METABOLIC PNL TOTAL CA: CPT

## 2022-11-30 PROCEDURE — 93793 ANTICOAG MGMT PT WARFARIN: CPT | Performed by: FAMILY MEDICINE

## 2022-11-30 PROCEDURE — 85610 PROTHROMBIN TIME: CPT | Performed by: FAMILY MEDICINE

## 2022-12-02 ENCOUNTER — LAB ENCOUNTER (OUTPATIENT)
Dept: LAB | Age: 87
End: 2022-12-02
Attending: INTERNAL MEDICINE
Payer: MEDICARE

## 2022-12-02 ENCOUNTER — OFFICE VISIT (OUTPATIENT)
Dept: CARDIOLOGY | Age: 87
End: 2022-12-02

## 2022-12-02 VITALS
SYSTOLIC BLOOD PRESSURE: 138 MMHG | DIASTOLIC BLOOD PRESSURE: 56 MMHG | BODY MASS INDEX: 20.92 KG/M2 | RESPIRATION RATE: 18 BRPM | WEIGHT: 130.18 LBS | HEART RATE: 51 BPM | HEIGHT: 66 IN

## 2022-12-02 DIAGNOSIS — I50.812 CHRONIC RIGHT-SIDED CONGESTIVE HEART FAILURE (CMD): ICD-10-CM

## 2022-12-02 DIAGNOSIS — N18.32 STAGE 3B CHRONIC KIDNEY DISEASE (HCC): ICD-10-CM

## 2022-12-02 DIAGNOSIS — I27.0 PRIMARY PULMONARY HYPERTENSION (CMD): Primary | ICD-10-CM

## 2022-12-02 LAB
ALBUMIN SERPL-MCNC: 3.3 G/DL (ref 3.4–5)
ANION GAP SERPL CALC-SCNC: 5 MMOL/L (ref 0–18)
BASOPHILS # BLD AUTO: 0.05 X10(3) UL (ref 0–0.2)
BASOPHILS NFR BLD AUTO: 0.9 %
BUN BLD-MCNC: 26 MG/DL (ref 7–18)
BUN/CREAT SERPL: 16.1 (ref 10–20)
CALCIUM BLD-MCNC: 8.6 MG/DL (ref 8.5–10.1)
CHLORIDE SERPL-SCNC: 106 MMOL/L (ref 98–112)
CO2 SERPL-SCNC: 30 MMOL/L (ref 21–32)
CREAT BLD-MCNC: 1.61 MG/DL
DEPRECATED RDW RBC AUTO: 50.5 FL (ref 35.1–46.3)
EOSINOPHIL # BLD AUTO: 0.1 X10(3) UL (ref 0–0.7)
EOSINOPHIL NFR BLD AUTO: 1.7 %
ERYTHROCYTE [DISTWIDTH] IN BLOOD BY AUTOMATED COUNT: 14.5 % (ref 11–15)
GFR SERPLBLD BASED ON 1.73 SQ M-ARVRAT: 30 ML/MIN/1.73M2 (ref 60–?)
GLUCOSE BLD-MCNC: 86 MG/DL (ref 70–99)
HCT VFR BLD AUTO: 29.3 %
HGB BLD-MCNC: 9.2 G/DL
IMM GRANULOCYTES # BLD AUTO: 0.01 X10(3) UL (ref 0–1)
IMM GRANULOCYTES NFR BLD: 0.2 %
LYMPHOCYTES # BLD AUTO: 1.96 X10(3) UL (ref 1–4)
LYMPHOCYTES NFR BLD AUTO: 34.1 %
MCH RBC QN AUTO: 29.9 PG (ref 26–34)
MCHC RBC AUTO-ENTMCNC: 31.4 G/DL (ref 31–37)
MCV RBC AUTO: 95.1 FL
MONOCYTES # BLD AUTO: 0.41 X10(3) UL (ref 0.1–1)
MONOCYTES NFR BLD AUTO: 7.1 %
NEUTROPHILS # BLD AUTO: 3.21 X10 (3) UL (ref 1.5–7.7)
NEUTROPHILS # BLD AUTO: 3.21 X10(3) UL (ref 1.5–7.7)
NEUTROPHILS NFR BLD AUTO: 56 %
OSMOLALITY SERPL CALC.SUM OF ELEC: 296 MOSM/KG (ref 275–295)
PHOSPHATE SERPL-MCNC: 3.3 MG/DL (ref 2.5–4.9)
PLATELET # BLD AUTO: 174 10(3)UL (ref 150–450)
POTASSIUM SERPL-SCNC: 3.9 MMOL/L (ref 3.5–5.1)
RBC # BLD AUTO: 3.08 X10(6)UL
SODIUM SERPL-SCNC: 141 MMOL/L (ref 136–145)
WBC # BLD AUTO: 5.7 X10(3) UL (ref 4–11)

## 2022-12-02 PROCEDURE — 99215 OFFICE O/P EST HI 40 MIN: CPT | Performed by: INTERNAL MEDICINE

## 2022-12-02 PROCEDURE — 36415 COLL VENOUS BLD VENIPUNCTURE: CPT

## 2022-12-02 PROCEDURE — 85025 COMPLETE CBC W/AUTO DIFF WBC: CPT

## 2022-12-02 PROCEDURE — 80069 RENAL FUNCTION PANEL: CPT

## 2022-12-02 RX ORDER — CARVEDILOL 6.25 MG/1
6.25 TABLET ORAL 2 TIMES DAILY WITH MEALS
Qty: 180 TABLET | Refills: 3 | Status: SHIPPED | OUTPATIENT
Start: 2022-12-02 | End: 2023-02-21 | Stop reason: HOSPADM

## 2022-12-02 RX ORDER — TORSEMIDE 10 MG/1
20 TABLET ORAL DAILY
Status: ON HOLD | COMMUNITY
Start: 2022-12-02 | End: 2023-01-19 | Stop reason: SDUPTHER

## 2022-12-02 SDOH — HEALTH STABILITY: PHYSICAL HEALTH: ON AVERAGE, HOW MANY MINUTES DO YOU ENGAGE IN EXERCISE AT THIS LEVEL?: 0 MIN

## 2022-12-02 SDOH — HEALTH STABILITY: PHYSICAL HEALTH: ON AVERAGE, HOW MANY DAYS PER WEEK DO YOU ENGAGE IN MODERATE TO STRENUOUS EXERCISE (LIKE A BRISK WALK)?: 0 DAYS

## 2022-12-02 ASSESSMENT — ENCOUNTER SYMPTOMS
LOSS OF BALANCE: 0
HEADACHES: 0
DIARRHEA: 0
ALTERED MENTAL STATUS: 0
WEIGHT LOSS: 0
HEMATOCHEZIA: 0
INSOMNIA: 0
CHILLS: 0
WEIGHT GAIN: 1
CONSTIPATION: 0
SUSPICIOUS LESIONS: 0
ALLERGIC/IMMUNOLOGIC COMMENTS: NO NEW FOOD ALLERGIES
ABDOMINAL PAIN: 0
COUGH: 0
SHORTNESS OF BREATH: 1
DIZZINESS: 0
DEPRESSION: 0
HEMOPTYSIS: 0
BLOATING: 0
FEVER: 0
BACK PAIN: 0
BRUISES/BLEEDS EASILY: 0
LIGHT-HEADEDNESS: 0

## 2022-12-02 ASSESSMENT — PATIENT HEALTH QUESTIONNAIRE - PHQ9
2. FEELING DOWN, DEPRESSED OR HOPELESS: NOT AT ALL
SUM OF ALL RESPONSES TO PHQ9 QUESTIONS 1 AND 2: 0
1. LITTLE INTEREST OR PLEASURE IN DOING THINGS: NOT AT ALL
SUM OF ALL RESPONSES TO PHQ9 QUESTIONS 1 AND 2: 0
CLINICAL INTERPRETATION OF PHQ2 SCORE: NO FURTHER SCREENING NEEDED

## 2022-12-07 ENCOUNTER — OFFICE VISIT (OUTPATIENT)
Dept: NEPHROLOGY | Facility: CLINIC | Age: 87
End: 2022-12-07
Payer: MEDICARE

## 2022-12-07 VITALS
SYSTOLIC BLOOD PRESSURE: 162 MMHG | BODY MASS INDEX: 21.33 KG/M2 | WEIGHT: 128 LBS | HEART RATE: 56 BPM | DIASTOLIC BLOOD PRESSURE: 56 MMHG | HEIGHT: 65 IN

## 2022-12-07 DIAGNOSIS — E78.00 HYPERCHOLESTEROLEMIA: ICD-10-CM

## 2022-12-07 DIAGNOSIS — D64.9 ANEMIA, UNSPECIFIED TYPE: ICD-10-CM

## 2022-12-07 DIAGNOSIS — N18.32 STAGE 3B CHRONIC KIDNEY DISEASE (HCC): Primary | ICD-10-CM

## 2022-12-07 PROCEDURE — 99214 OFFICE O/P EST MOD 30 MIN: CPT | Performed by: INTERNAL MEDICINE

## 2022-12-07 PROCEDURE — 1126F AMNT PAIN NOTED NONE PRSNT: CPT | Performed by: INTERNAL MEDICINE

## 2022-12-07 RX ORDER — POTASSIUM CHLORIDE 750 MG/1
10 TABLET, FILM COATED, EXTENDED RELEASE ORAL
COMMUNITY

## 2022-12-07 RX ORDER — CARVEDILOL 6.25 MG/1
6.25 TABLET ORAL 2 TIMES DAILY WITH MEALS
COMMUNITY

## 2022-12-12 ENCOUNTER — LAB ENCOUNTER (OUTPATIENT)
Dept: LAB | Age: 87
End: 2022-12-12
Attending: INTERNAL MEDICINE
Payer: MEDICARE

## 2022-12-12 ENCOUNTER — EXTERNAL LAB (OUTPATIENT)
Dept: OTHER | Age: 87
End: 2022-12-12

## 2022-12-12 DIAGNOSIS — N18.32 STAGE 3B CHRONIC KIDNEY DISEASE (HCC): ICD-10-CM

## 2022-12-12 DIAGNOSIS — D64.9 ANEMIA, UNSPECIFIED TYPE: ICD-10-CM

## 2022-12-12 DIAGNOSIS — I27.0 PRIMARY PULMONARY HYPERTENSION (HCC): Primary | ICD-10-CM

## 2022-12-12 DIAGNOSIS — E78.00 HYPERCHOLESTEROLEMIA: ICD-10-CM

## 2022-12-12 DIAGNOSIS — I50.812 CHRONIC RIGHT-SIDED HEART FAILURE (HCC): ICD-10-CM

## 2022-12-12 LAB
ALBUMIN SERPL-MCNC: 3.1 G/DL (ref 3.4–5)
ALBUMIN SERPL-MCNC: 3.1 G/DL (ref 3.4–5)
ALBUMIN/GLOB SERPL: 0.9 {RATIO} (ref 1–2)
ALBUMIN/GLOB SERPL: 0.9 {RATIO} (ref 1–2)
ALP LIVER SERPL-CCNC: 72 U/L
ALP SERPL-CCNC: 72 U/L (ref 55–142)
ALT SERPL-CCNC: 10 U/L
ALT SERPL-CCNC: 10 U/L (ref 13–56)
ANION GAP SERPL CALC-SCNC: 6 MMOL/L (ref 0–18)
ANION GAP SERPL CALC-SCNC: 6 MMOL/L (ref 0–18)
AST SERPL-CCNC: 12 U/L (ref 15–37)
AST SERPL-CCNC: 12 U/L (ref 15–37)
BASOPHILS # BLD AUTO: 0.04 X10(3) UL (ref 0–0.2)
BASOPHILS NFR BLD AUTO: 0.8 %
BILIRUB SERPL-MCNC: 0.5 MG/DL (ref 0.1–2)
BILIRUB SERPL-MCNC: 0.5 MG/DL (ref 0.1–2)
BUN BLD-MCNC: 21 MG/DL (ref 7–18)
BUN SERPL-MCNC: 21 MG/DL (ref 7–18)
BUN/CREAT SERPL: 13.9 (ref 10–20)
BUN/CREAT SERPL: 13.9 (ref 10–20)
CALCIUM BLD-MCNC: 8.7 MG/DL (ref 8.5–10.1)
CALCIUM SERPL-MCNC: 8.7 MG/DL (ref 8.5–10.1)
CALCULATED OSMO: 300 MOSM/KG (ref 275–295)
CHLORIDE SERPL-SCNC: 107 MMOL/L (ref 98–112)
CHLORIDE SERPL-SCNC: 107 MMOL/L (ref 98–112)
CHOLEST SERPL-MCNC: 117 MG/DL (ref ?–200)
CO2 SERPL-SCNC: 31 MMOL/L (ref 21–32)
CO2 SERPL-SCNC: 31 MMOL/L (ref 21–32)
CREAT BLD-MCNC: 1.51 MG/DL
CREAT SERPL-MCNC: 1.51 MG/DL (ref 0.55–1.02)
DEPRECATED RDW RBC AUTO: 51.8 FL (ref 35.1–46.3)
EOSINOPHIL # BLD AUTO: 0.23 X10(3) UL (ref 0–0.7)
EOSINOPHIL NFR BLD AUTO: 4.5 %
ERYTHROCYTE [DISTWIDTH] IN BLOOD BY AUTOMATED COUNT: 15 % (ref 11–15)
FASTING PATIENT LIPID ANSWER: YES
FASTING STATUS PATIENT QL REPORTED: YES
GFR SERPLBLD BASED ON 1.73 SQ M-ARVRAT: 32 ML/MIN/1.73M2 (ref 60–?)
GFR SERPLBLD SCHWARTZ-ARVRAT: 32 ML/MIN/1.73M2
GLOBULIN PLAS-MCNC: 3.5 G/DL (ref 2.8–4.4)
GLOBULIN SER-MCNC: 3.5 G/DL (ref 2.8–4.4)
GLUCOSE BLD-MCNC: 89 MG/DL (ref 70–99)
GLUCOSE SERPL-MCNC: 89 MG/DL (ref 70–99)
HCT VFR BLD AUTO: 31.4 %
HDLC SERPL-MCNC: 46 MG/DL (ref 40–59)
HGB BLD-MCNC: 9.8 G/DL
IMM GRANULOCYTES # BLD AUTO: 0.01 X10(3) UL (ref 0–1)
IMM GRANULOCYTES NFR BLD: 0.2 %
IRON SATN MFR SERPL: 10 %
IRON SERPL-MCNC: 34 UG/DL
LDLC SERPL CALC-MCNC: 57 MG/DL (ref ?–100)
LENGTH OF FAST TIME PATIENT: YES H
LYMPHOCYTES # BLD AUTO: 1.88 X10(3) UL (ref 1–4)
LYMPHOCYTES NFR BLD AUTO: 36.9 %
MCH RBC QN AUTO: 29.4 PG (ref 26–34)
MCHC RBC AUTO-ENTMCNC: 31.2 G/DL (ref 31–37)
MCV RBC AUTO: 94.3 FL
MONOCYTES # BLD AUTO: 0.34 X10(3) UL (ref 0.1–1)
MONOCYTES NFR BLD AUTO: 6.7 %
NEUTROPHILS # BLD AUTO: 2.59 X10 (3) UL (ref 1.5–7.7)
NEUTROPHILS # BLD AUTO: 2.59 X10(3) UL (ref 1.5–7.7)
NEUTROPHILS NFR BLD AUTO: 50.9 %
NONHDLC SERPL-MCNC: 71 MG/DL (ref ?–130)
NT-PROBNP SERPL-MCNC: 8094 PG/ML (ref ?–450)
OSMOLALITY SERPL CALC.SUM OF ELEC: 300 MOSM/KG (ref 275–295)
PHOSPHATE SERPL-MCNC: 3.2 MG/DL (ref 2.5–4.9)
PLATELET # BLD AUTO: 199 10(3)UL (ref 150–450)
POTASSIUM SERPL-SCNC: 3.5 MMOL/L (ref 3.5–5.1)
POTASSIUM SERPL-SCNC: 3.5 MMOL/L (ref 3.5–5.1)
PROT SERPL-MCNC: 6.6 G/DL (ref 6.4–8.2)
PROT SERPL-MCNC: 6.6 G/DL (ref 6.4–8.2)
RBC # BLD AUTO: 3.33 X10(6)UL
SODIUM SERPL-SCNC: 144 MMOL/L (ref 136–145)
SODIUM SERPL-SCNC: 144 MMOL/L (ref 136–145)
TIBC SERPL-MCNC: 341 UG/DL (ref 240–450)
TRANSFERRIN SERPL-MCNC: 229 MG/DL (ref 200–360)
TRIGL SERPL-MCNC: 65 MG/DL (ref 30–149)
VLDLC SERPL CALC-MCNC: 9 MG/DL (ref 0–30)
WBC # BLD AUTO: 5.1 X10(3) UL (ref 4–11)

## 2022-12-12 PROCEDURE — 83880 ASSAY OF NATRIURETIC PEPTIDE: CPT

## 2022-12-12 PROCEDURE — 84100 ASSAY OF PHOSPHORUS: CPT

## 2022-12-12 PROCEDURE — 80061 LIPID PANEL: CPT

## 2022-12-12 PROCEDURE — 80053 COMPREHEN METABOLIC PANEL: CPT

## 2022-12-12 PROCEDURE — 36415 COLL VENOUS BLD VENIPUNCTURE: CPT

## 2022-12-12 PROCEDURE — 85025 COMPLETE CBC W/AUTO DIFF WBC: CPT

## 2022-12-12 PROCEDURE — 84466 ASSAY OF TRANSFERRIN: CPT

## 2022-12-12 PROCEDURE — 83540 ASSAY OF IRON: CPT

## 2022-12-13 ENCOUNTER — TELEPHONE (OUTPATIENT)
Dept: NEPHROLOGY | Facility: CLINIC | Age: 87
End: 2022-12-13

## 2022-12-13 DIAGNOSIS — N18.32 STAGE 3B CHRONIC KIDNEY DISEASE (CKD) (HCC): Primary | ICD-10-CM

## 2022-12-13 RX ORDER — FERROUS SULFATE 325(65) MG
325 TABLET ORAL 2 TIMES DAILY WITH MEALS
Qty: 60 TABLET | Refills: 0 | Status: SHIPPED | OUTPATIENT
Start: 2022-12-13

## 2022-12-13 NOTE — TELEPHONE ENCOUNTER
Kidney function overall stable. Still anemic and she is iron deficient. Any signs of bleeding. If not start ferrous sulfate 325 mg twice daily. Repeat CBC and renal panel in 1 month.

## 2022-12-15 ENCOUNTER — TELEPHONE (OUTPATIENT)
Dept: CARDIOLOGY | Age: 87
End: 2022-12-15

## 2022-12-15 ENCOUNTER — TELEPHONE (OUTPATIENT)
Dept: SCHEDULING | Age: 87
End: 2022-12-15

## 2022-12-15 DIAGNOSIS — I27.0 PRIMARY PULMONARY HYPERTENSION (CMD): ICD-10-CM

## 2022-12-15 DIAGNOSIS — D50.9 IRON DEFICIENCY ANEMIA, UNSPECIFIED IRON DEFICIENCY ANEMIA TYPE: Primary | ICD-10-CM

## 2022-12-22 PROBLEM — D50.9 IRON DEFICIENCY ANEMIA: Status: ACTIVE | Noted: 2022-12-22

## 2022-12-22 RX ORDER — METHYLPREDNISOLONE SODIUM SUCCINATE 125 MG/2ML
125 INJECTION, POWDER, LYOPHILIZED, FOR SOLUTION INTRAMUSCULAR; INTRAVENOUS
Status: CANCELLED | OUTPATIENT
Start: 2023-01-03

## 2022-12-22 RX ORDER — SODIUM CHLORIDE 9 MG/ML
INJECTION, SOLUTION INTRAVENOUS CONTINUOUS PRN
Status: CANCELLED | OUTPATIENT
Start: 2023-01-03

## 2022-12-22 RX ORDER — FAMOTIDINE 10 MG/ML
20 INJECTION, SOLUTION INTRAVENOUS
Status: CANCELLED | OUTPATIENT
Start: 2023-01-03

## 2022-12-22 RX ORDER — IRON SUCROSE 20 MG/ML
200 INJECTION, SOLUTION INTRAVENOUS
Qty: 100 ML | Refills: 3 | OUTPATIENT
Start: 2022-12-26 | End: 2023-01-10

## 2022-12-22 RX ORDER — 0.9 % SODIUM CHLORIDE 0.9 %
2 VIAL (ML) INJECTION PRN
Status: CANCELLED | OUTPATIENT
Start: 2023-01-03

## 2022-12-22 RX ORDER — DIPHENHYDRAMINE HYDROCHLORIDE 50 MG/ML
50 INJECTION INTRAMUSCULAR; INTRAVENOUS
Status: CANCELLED | OUTPATIENT
Start: 2023-01-03

## 2022-12-27 ENCOUNTER — TELEPHONE (OUTPATIENT)
Dept: CARDIOLOGY | Age: 87
End: 2022-12-27

## 2022-12-27 ENCOUNTER — PREP FOR CASE (OUTPATIENT)
Dept: CARDIOLOGY | Age: 87
End: 2022-12-27

## 2022-12-27 DIAGNOSIS — I27.0 PRIMARY PULMONARY HYPERTENSION (CMD): Primary | ICD-10-CM

## 2022-12-27 RX ORDER — 0.9 % SODIUM CHLORIDE 0.9 %
2 VIAL (ML) INJECTION EVERY 12 HOURS SCHEDULED
Status: CANCELLED | OUTPATIENT
Start: 2022-12-27

## 2023-01-03 ENCOUNTER — TELEPHONE (OUTPATIENT)
Dept: CARDIOLOGY | Age: 88
End: 2023-01-03

## 2023-01-04 ENCOUNTER — TELEPHONE (OUTPATIENT)
Dept: ANTICOAGULATION | Facility: CLINIC | Age: 88
End: 2023-01-04

## 2023-01-04 ENCOUNTER — ANTI-COAG VISIT (OUTPATIENT)
Dept: ANTICOAGULATION | Facility: CLINIC | Age: 88
End: 2023-01-04
Payer: MEDICARE

## 2023-01-04 DIAGNOSIS — Z51.81 ENCOUNTER FOR THERAPEUTIC DRUG MONITORING: ICD-10-CM

## 2023-01-04 DIAGNOSIS — I48.91 ATRIAL FIBRILLATION, UNSPECIFIED TYPE (HCC): ICD-10-CM

## 2023-01-04 DIAGNOSIS — I48.20 CHRONIC ATRIAL FIBRILLATION (HCC): ICD-10-CM

## 2023-01-04 DIAGNOSIS — Z79.01 LONG TERM (CURRENT) USE OF ANTICOAGULANTS: ICD-10-CM

## 2023-01-04 LAB
INR: 3 (ref 0.8–1.2)
TEST STRIP EXPIRATION DATE: ABNORMAL DATE

## 2023-01-04 PROCEDURE — 93793 ANTICOAG MGMT PT WARFARIN: CPT | Performed by: FAMILY MEDICINE

## 2023-01-04 PROCEDURE — 85610 PROTHROMBIN TIME: CPT | Performed by: FAMILY MEDICINE

## 2023-01-04 NOTE — PROGRESS NOTES
Face to face. Here today with her daughter. Reports she fell at home 2 weeks ago, no injury but states her back is sore. She has been taking Tylenol 1000 mg every morning for the past 4 days and will continue to take daily until she is feeling better. Advised that Tylenol is safe to take but when taken consistently over time, it may increase INR when taken with warfarin. RN will adjust weekly dose per protocol so that she can continue to take the Tylenol. Plan for heart catheterization on Jan. 19. Plan to start oral iron and iron infusions this month- per [employer]- no interaction with warfarin and iron. Per protocol, continue current dose. Outside of protocol, decreased weekly dose 8.3% and recheck INR in 2 weeks. Instructed patient on dosing. Will review with Dr. Deanna Hardin. If alternate instructions recommended, will notify patient.

## 2023-01-04 NOTE — TELEPHONE ENCOUNTER
Today's INR= 3.0  Goal= 2.0-3.0    Reports she fell at home 2 weeks ago, no injury but states her back is sore. She has been taking Tylenol 1000 mg every morning for the past 4 days and will continue to take daily until she is feeling better. Advised that Tylenol is safe to take but when taken consistently over time, it may increase INR when taken with warfarin. RN will adjust weekly dose per protocol so that she can continue to take the Tylenol. Per protocol, continue current dose. Outside of protocol, decreased weekly dose 8.3% and recheck INR in 2 weeks.

## 2023-01-13 ENCOUNTER — OFFICE VISIT (OUTPATIENT)
Dept: CARDIOLOGY | Age: 88
End: 2023-01-13
Attending: NURSE PRACTITIONER

## 2023-01-13 VITALS
HEART RATE: 60 BPM | BODY MASS INDEX: 19.94 KG/M2 | SYSTOLIC BLOOD PRESSURE: 142 MMHG | DIASTOLIC BLOOD PRESSURE: 56 MMHG | WEIGHT: 121.69 LBS

## 2023-01-13 DIAGNOSIS — D50.9 IRON DEFICIENCY ANEMIA, UNSPECIFIED IRON DEFICIENCY ANEMIA TYPE: Primary | ICD-10-CM

## 2023-01-13 PROCEDURE — 96374 THER/PROPH/DIAG INJ IV PUSH: CPT

## 2023-01-13 PROCEDURE — 10002800 HB RX 250 W HCPCS: Performed by: INTERNAL MEDICINE

## 2023-01-13 RX ORDER — FAMOTIDINE 10 MG/ML
20 INJECTION, SOLUTION INTRAVENOUS
Status: DISCONTINUED | OUTPATIENT
Start: 2023-01-13 | End: 2023-01-13 | Stop reason: HOSPADM

## 2023-01-13 RX ORDER — FERROUS SULFATE 325(65) MG
1 TABLET ORAL 2 TIMES DAILY WITH MEALS
COMMUNITY
Start: 2022-12-13 | End: 2023-02-27

## 2023-01-13 RX ORDER — METHYLPREDNISOLONE SODIUM SUCCINATE 125 MG/2ML
125 INJECTION, POWDER, LYOPHILIZED, FOR SOLUTION INTRAMUSCULAR; INTRAVENOUS
Status: DISCONTINUED | OUTPATIENT
Start: 2023-01-13 | End: 2023-01-13 | Stop reason: HOSPADM

## 2023-01-13 RX ORDER — 0.9 % SODIUM CHLORIDE 0.9 %
2 VIAL (ML) INJECTION PRN
Status: DISCONTINUED | OUTPATIENT
Start: 2023-01-13 | End: 2023-01-13 | Stop reason: HOSPADM

## 2023-01-13 RX ORDER — METHYLPREDNISOLONE SODIUM SUCCINATE 125 MG/2ML
125 INJECTION, POWDER, LYOPHILIZED, FOR SOLUTION INTRAMUSCULAR; INTRAVENOUS
Status: CANCELLED | OUTPATIENT
Start: 2023-01-13

## 2023-01-13 RX ORDER — DIPHENHYDRAMINE HYDROCHLORIDE 50 MG/ML
50 INJECTION INTRAMUSCULAR; INTRAVENOUS
Status: CANCELLED | OUTPATIENT
Start: 2023-01-13

## 2023-01-13 RX ORDER — SODIUM CHLORIDE 9 MG/ML
INJECTION, SOLUTION INTRAVENOUS CONTINUOUS PRN
Status: CANCELLED | OUTPATIENT
Start: 2023-01-13

## 2023-01-13 RX ORDER — DIPHENHYDRAMINE HYDROCHLORIDE 50 MG/ML
50 INJECTION INTRAMUSCULAR; INTRAVENOUS
Status: DISCONTINUED | OUTPATIENT
Start: 2023-01-13 | End: 2023-01-13 | Stop reason: HOSPADM

## 2023-01-13 RX ORDER — SODIUM CHLORIDE 9 MG/ML
INJECTION, SOLUTION INTRAVENOUS CONTINUOUS PRN
Status: DISCONTINUED | OUTPATIENT
Start: 2023-01-13 | End: 2023-01-13 | Stop reason: HOSPADM

## 2023-01-13 RX ORDER — 0.9 % SODIUM CHLORIDE 0.9 %
2 VIAL (ML) INJECTION PRN
Status: CANCELLED | OUTPATIENT
Start: 2023-01-13

## 2023-01-13 RX ORDER — FAMOTIDINE 10 MG/ML
20 INJECTION, SOLUTION INTRAVENOUS
Status: CANCELLED | OUTPATIENT
Start: 2023-01-13

## 2023-01-13 RX ADMIN — IRON SUCROSE 200 MG: 20 INJECTION, SOLUTION INTRAVENOUS at 12:31

## 2023-01-17 ENCOUNTER — TELEPHONE (OUTPATIENT)
Dept: CARDIOLOGY | Age: 88
End: 2023-01-17

## 2023-01-17 ENCOUNTER — ANTI-COAG VISIT (OUTPATIENT)
Dept: ANTICOAGULATION | Facility: CLINIC | Age: 88
End: 2023-01-17

## 2023-01-17 ENCOUNTER — LAB ENCOUNTER (OUTPATIENT)
Dept: LAB | Age: 88
End: 2023-01-17
Attending: INTERNAL MEDICINE
Payer: MEDICARE

## 2023-01-17 DIAGNOSIS — I48.20 CHRONIC ATRIAL FIBRILLATION (HCC): ICD-10-CM

## 2023-01-17 DIAGNOSIS — Z79.01 LONG TERM (CURRENT) USE OF ANTICOAGULANTS: ICD-10-CM

## 2023-01-17 DIAGNOSIS — Z51.81 ENCOUNTER FOR THERAPEUTIC DRUG MONITORING: ICD-10-CM

## 2023-01-17 DIAGNOSIS — N18.32 STAGE 3B CHRONIC KIDNEY DISEASE (CKD) (HCC): ICD-10-CM

## 2023-01-17 DIAGNOSIS — I48.91 ATRIAL FIBRILLATION, UNSPECIFIED TYPE (HCC): ICD-10-CM

## 2023-01-17 LAB
ALBUMIN SERPL-MCNC: 3 G/DL (ref 3.4–5)
ANION GAP SERPL CALC-SCNC: 10 MMOL/L (ref 0–18)
BASOPHILS # BLD AUTO: 0.06 X10(3) UL (ref 0–0.2)
BASOPHILS NFR BLD AUTO: 1 %
BUN BLD-MCNC: 24 MG/DL (ref 7–18)
BUN/CREAT SERPL: 16 (ref 10–20)
CALCIUM BLD-MCNC: 8.7 MG/DL (ref 8.5–10.1)
CHLORIDE SERPL-SCNC: 104 MMOL/L (ref 98–112)
CO2 SERPL-SCNC: 25 MMOL/L (ref 21–32)
CREAT BLD-MCNC: 1.5 MG/DL
DEPRECATED RDW RBC AUTO: 59.5 FL (ref 35.1–46.3)
EOSINOPHIL # BLD AUTO: 0.11 X10(3) UL (ref 0–0.7)
EOSINOPHIL NFR BLD AUTO: 1.9 %
ERYTHROCYTE [DISTWIDTH] IN BLOOD BY AUTOMATED COUNT: 17.5 % (ref 11–15)
GFR SERPLBLD BASED ON 1.73 SQ M-ARVRAT: 32 ML/MIN/1.73M2 (ref 60–?)
GLUCOSE BLD-MCNC: 95 MG/DL (ref 70–99)
HCT VFR BLD AUTO: 31.3 %
HGB BLD-MCNC: 9.8 G/DL
IMM GRANULOCYTES # BLD AUTO: 0.01 X10(3) UL (ref 0–1)
IMM GRANULOCYTES NFR BLD: 0.2 %
INR: 1.9 (ref 0.8–1.2)
LYMPHOCYTES # BLD AUTO: 2.11 X10(3) UL (ref 1–4)
LYMPHOCYTES NFR BLD AUTO: 35.9 %
MCH RBC QN AUTO: 29.1 PG (ref 26–34)
MCHC RBC AUTO-ENTMCNC: 31.3 G/DL (ref 31–37)
MCV RBC AUTO: 92.9 FL
MONOCYTES # BLD AUTO: 0.41 X10(3) UL (ref 0.1–1)
MONOCYTES NFR BLD AUTO: 7 %
NEUTROPHILS # BLD AUTO: 3.17 X10 (3) UL (ref 1.5–7.7)
NEUTROPHILS # BLD AUTO: 3.17 X10(3) UL (ref 1.5–7.7)
NEUTROPHILS NFR BLD AUTO: 54 %
OSMOLALITY SERPL CALC.SUM OF ELEC: 292 MOSM/KG (ref 275–295)
PHOSPHATE SERPL-MCNC: 3.5 MG/DL (ref 2.5–4.9)
PLATELET # BLD AUTO: 262 10(3)UL (ref 150–450)
POTASSIUM SERPL-SCNC: 4.3 MMOL/L (ref 3.5–5.1)
RBC # BLD AUTO: 3.37 X10(6)UL
SODIUM SERPL-SCNC: 139 MMOL/L (ref 136–145)
TEST STRIP EXPIRATION DATE: ABNORMAL DATE
WBC # BLD AUTO: 5.9 X10(3) UL (ref 4–11)

## 2023-01-17 PROCEDURE — 36415 COLL VENOUS BLD VENIPUNCTURE: CPT

## 2023-01-17 PROCEDURE — 85025 COMPLETE CBC W/AUTO DIFF WBC: CPT

## 2023-01-17 PROCEDURE — 80069 RENAL FUNCTION PANEL: CPT

## 2023-01-17 PROCEDURE — 85610 PROTHROMBIN TIME: CPT | Performed by: FAMILY MEDICINE

## 2023-01-17 PROCEDURE — 93793 ANTICOAG MGMT PT WARFARIN: CPT | Performed by: FAMILY MEDICINE

## 2023-01-18 ENCOUNTER — TELEPHONE (OUTPATIENT)
Dept: NEPHROLOGY | Facility: CLINIC | Age: 88
End: 2023-01-18

## 2023-01-18 DIAGNOSIS — N18.32 STAGE 3B CHRONIC KIDNEY DISEASE (CKD) (HCC): Primary | ICD-10-CM

## 2023-01-18 DIAGNOSIS — D64.9 ANEMIA, UNSPECIFIED TYPE: ICD-10-CM

## 2023-01-19 ENCOUNTER — HOSPITAL ENCOUNTER (OUTPATIENT)
Age: 88
Discharge: HOME | End: 2023-01-19
Attending: INTERNAL MEDICINE | Admitting: INTERNAL MEDICINE

## 2023-01-19 ENCOUNTER — APPOINTMENT (OUTPATIENT)
Dept: GENERAL RADIOLOGY | Age: 88
End: 2023-01-19
Attending: INTERNAL MEDICINE

## 2023-01-19 ENCOUNTER — APPOINTMENT (OUTPATIENT)
Dept: ULTRASOUND IMAGING | Age: 88
End: 2023-01-19
Attending: INTERNAL MEDICINE

## 2023-01-19 ENCOUNTER — APPOINTMENT (OUTPATIENT)
Dept: CARDIOLOGY | Age: 88
End: 2023-01-19
Attending: INTERNAL MEDICINE

## 2023-01-19 VITALS
TEMPERATURE: 97.5 F | BODY MASS INDEX: 19.54 KG/M2 | HEIGHT: 65 IN | WEIGHT: 117.28 LBS | DIASTOLIC BLOOD PRESSURE: 43 MMHG | SYSTOLIC BLOOD PRESSURE: 110 MMHG | HEART RATE: 53 BPM | OXYGEN SATURATION: 97 % | RESPIRATION RATE: 16 BRPM

## 2023-01-19 DIAGNOSIS — I27.20 PULMONARY HYPERTENSION (CMD): Primary | ICD-10-CM

## 2023-01-19 DIAGNOSIS — I50.812 CHRONIC RIGHT-SIDED CONGESTIVE HEART FAILURE (CMD): ICD-10-CM

## 2023-01-19 DIAGNOSIS — I27.0 PRIMARY PULMONARY HYPERTENSION (CMD): ICD-10-CM

## 2023-01-19 LAB
ANION GAP SERPL CALC-SCNC: 13 MMOL/L (ref 7–19)
BUN SERPL-MCNC: 24 MG/DL (ref 6–20)
BUN/CREAT SERPL: 17 (ref 7–25)
CALCIUM SERPL-MCNC: 8.6 MG/DL (ref 8.4–10.2)
CHLORIDE SERPL-SCNC: 101 MMOL/L (ref 97–110)
CO2 SERPL-SCNC: 27 MMOL/L (ref 21–32)
CREAT SERPL-MCNC: 1.39 MG/DL (ref 0.51–0.95)
DEPRECATED RDW RBC: 60.4 FL (ref 39–50)
ERYTHROCYTE [DISTWIDTH] IN BLOOD: 17.5 % (ref 11–15)
FASTING DURATION TIME PATIENT: ABNORMAL H
GFR SERPLBLD BASED ON 1.73 SQ M-ARVRAT: 35 ML/MIN
GLUCOSE SERPL-MCNC: 93 MG/DL (ref 70–99)
HCT VFR BLD CALC: 29 % (ref 36–46.5)
HCT VFR BLD CALC: 29 % (ref 36–46.5)
HCT VFR BLD CALC: 30.6 % (ref 36–46.5)
HGB BLD-MCNC: 9.8 G/DL (ref 12–15.5)
INR PPP: 1.8
MCH RBC QN AUTO: 29.8 PG (ref 26–34)
MCHC RBC AUTO-ENTMCNC: 32 G/DL (ref 32–36.5)
MCV RBC AUTO: 93 FL (ref 78–100)
NRBC BLD MANUAL-RTO: 0 /100 WBC
PLATELET # BLD AUTO: 203 K/MCL (ref 140–450)
POTASSIUM SERPL-SCNC: 3.7 MMOL/L (ref 3.4–5.1)
PROTHROMBIN TIME: 17.5 SEC (ref 9.7–11.8)
RBC # BLD: 3.29 MIL/MCL (ref 4–5.2)
SODIUM SERPL-SCNC: 137 MMOL/L (ref 135–145)
WBC # BLD: 5.2 K/MCL (ref 4.2–11)

## 2023-01-19 PROCEDURE — X1094 NO CHARGE VISIT: HCPCS | Performed by: INTERNAL MEDICINE

## 2023-01-19 PROCEDURE — 99153 MOD SED SAME PHYS/QHP EA: CPT | Performed by: INTERNAL MEDICINE

## 2023-01-19 PROCEDURE — 93451 RIGHT HEART CATH: CPT | Performed by: INTERNAL MEDICINE

## 2023-01-19 PROCEDURE — 93880 EXTRACRANIAL BILAT STUDY: CPT

## 2023-01-19 PROCEDURE — 10002800 HB RX 250 W HCPCS: Performed by: INTERNAL MEDICINE

## 2023-01-19 PROCEDURE — 10006023 HB SUPPLY 272: Performed by: INTERNAL MEDICINE

## 2023-01-19 PROCEDURE — 13000001 HB PHASE II RECOVERY EA 30 MINUTES: Performed by: INTERNAL MEDICINE

## 2023-01-19 PROCEDURE — 85610 PROTHROMBIN TIME: CPT | Performed by: INTERNAL MEDICINE

## 2023-01-19 PROCEDURE — 99152 MOD SED SAME PHYS/QHP 5/>YRS: CPT | Performed by: INTERNAL MEDICINE

## 2023-01-19 PROCEDURE — 80048 BASIC METABOLIC PNL TOTAL CA: CPT | Performed by: INTERNAL MEDICINE

## 2023-01-19 PROCEDURE — 36415 COLL VENOUS BLD VENIPUNCTURE: CPT | Performed by: INTERNAL MEDICINE

## 2023-01-19 PROCEDURE — 85027 COMPLETE CBC AUTOMATED: CPT | Performed by: INTERNAL MEDICINE

## 2023-01-19 PROCEDURE — 71046 X-RAY EXAM CHEST 2 VIEWS: CPT

## 2023-01-19 PROCEDURE — 10002801 HB RX 250 W/O HCPCS: Performed by: INTERNAL MEDICINE

## 2023-01-19 PROCEDURE — C1894 INTRO/SHEATH, NON-LASER: HCPCS | Performed by: INTERNAL MEDICINE

## 2023-01-19 RX ORDER — 0.9 % SODIUM CHLORIDE 0.9 %
2 VIAL (ML) INJECTION EVERY 12 HOURS SCHEDULED
Status: DISCONTINUED | OUTPATIENT
Start: 2023-01-19 | End: 2023-01-19 | Stop reason: HOSPADM

## 2023-01-19 RX ORDER — TORSEMIDE 20 MG/1
20 TABLET ORAL 2 TIMES DAILY
Qty: 60 TABLET | Refills: 3 | Status: SHIPPED | OUTPATIENT
Start: 2023-01-19 | End: 2023-02-07 | Stop reason: SDUPTHER

## 2023-01-19 RX ORDER — POTASSIUM CHLORIDE 750 MG/1
10 TABLET, FILM COATED, EXTENDED RELEASE ORAL 2 TIMES DAILY
Qty: 60 TABLET | Refills: 1 | Status: SHIPPED | OUTPATIENT
Start: 2023-01-19 | End: 2023-02-27

## 2023-01-19 RX ORDER — BUMETANIDE 0.25 MG/ML
1 INJECTION INTRAMUSCULAR; INTRAVENOUS ONCE
Status: COMPLETED | OUTPATIENT
Start: 2023-01-19 | End: 2023-01-19

## 2023-01-19 RX ORDER — LIDOCAINE HYDROCHLORIDE 20 MG/ML
INJECTION, SOLUTION EPIDURAL; INFILTRATION; INTRACAUDAL; PERINEURAL PRN
Status: DISCONTINUED | OUTPATIENT
Start: 2023-01-19 | End: 2023-01-19 | Stop reason: HOSPADM

## 2023-01-19 RX ADMIN — BUMETANIDE 1 MG: 0.25 INJECTION INTRAMUSCULAR; INTRAVENOUS at 15:12

## 2023-01-19 RX ADMIN — IRON SUCROSE 200 MG: 20 INJECTION, SOLUTION INTRAVENOUS at 15:00

## 2023-01-19 RX ADMIN — BUMETANIDE 1 MG/HR: 0.25 INJECTION INTRAMUSCULAR; INTRAVENOUS at 15:08

## 2023-01-19 ASSESSMENT — PAIN SCALES - GENERAL
PAINLEVEL_OUTOF10: 0

## 2023-01-19 NOTE — TELEPHONE ENCOUNTER
Kidney function remains stable but still too anemic. Make sure she is taking ferrous sulfate twice daily. If otherwise doing well repeat CBC, renal panel and iron studies just before upcoming visit in March.

## 2023-01-20 ENCOUNTER — TELEPHONE (OUTPATIENT)
Dept: CARDIOLOGY | Age: 88
End: 2023-01-20

## 2023-01-20 DIAGNOSIS — I27.20 PULMONARY HYPERTENSION (CMD): Primary | ICD-10-CM

## 2023-01-20 DIAGNOSIS — I50.812 CHRONIC RIGHT-SIDED CONGESTIVE HEART FAILURE (CMD): ICD-10-CM

## 2023-01-20 NOTE — TELEPHONE ENCOUNTER
Pt called and updated on labs. Verbalized understanding.  Pt is taking ferrous sulfate, Dr. Larry Fuentes ordered venofer infusions for pt

## 2023-01-23 RX ORDER — LISINOPRIL 40 MG/1
40 TABLET ORAL DAILY
Qty: 90 TABLET | Refills: 3 | Status: SHIPPED | OUTPATIENT
Start: 2023-01-23 | End: 2023-02-07 | Stop reason: SDUPTHER

## 2023-01-27 ENCOUNTER — OFFICE VISIT (OUTPATIENT)
Dept: CARDIOLOGY | Age: 88
End: 2023-01-27
Attending: INTERNAL MEDICINE

## 2023-01-27 VITALS — SYSTOLIC BLOOD PRESSURE: 104 MMHG | DIASTOLIC BLOOD PRESSURE: 62 MMHG | HEART RATE: 54 BPM

## 2023-01-27 DIAGNOSIS — D50.9 IRON DEFICIENCY ANEMIA, UNSPECIFIED IRON DEFICIENCY ANEMIA TYPE: Primary | ICD-10-CM

## 2023-01-27 LAB
ANION GAP SERPL CALC-SCNC: 11 MMOL/L (ref 7–19)
BASOPHILS # BLD: 0.1 K/MCL (ref 0–0.3)
BASOPHILS NFR BLD: 1 %
BUN SERPL-MCNC: 46 MG/DL (ref 6–20)
BUN/CREAT SERPL: 24 (ref 7–25)
CALCIUM SERPL-MCNC: 8.6 MG/DL (ref 8.4–10.2)
CHLORIDE SERPL-SCNC: 99 MMOL/L (ref 97–110)
CO2 SERPL-SCNC: 29 MMOL/L (ref 21–32)
CREAT SERPL-MCNC: 1.89 MG/DL (ref 0.51–0.95)
DEPRECATED RDW RBC: 59.3 FL (ref 39–50)
EOSINOPHIL # BLD: 0.1 K/MCL (ref 0–0.5)
EOSINOPHIL NFR BLD: 1 %
ERYTHROCYTE [DISTWIDTH] IN BLOOD: 17.2 % (ref 11–15)
FASTING DURATION TIME PATIENT: ABNORMAL H
GFR SERPLBLD BASED ON 1.73 SQ M-ARVRAT: 24 ML/MIN
GLUCOSE SERPL-MCNC: 96 MG/DL (ref 70–99)
HCT VFR BLD CALC: 34 % (ref 36–46.5)
HGB BLD-MCNC: 10.7 G/DL (ref 12–15.5)
IMM GRANULOCYTES # BLD AUTO: 0 K/MCL (ref 0–0.2)
IMM GRANULOCYTES # BLD: 0 %
LYMPHOCYTES # BLD: 2 K/MCL (ref 1–4)
LYMPHOCYTES NFR BLD: 35 %
MCH RBC QN AUTO: 29.5 PG (ref 26–34)
MCHC RBC AUTO-ENTMCNC: 31.5 G/DL (ref 32–36.5)
MCV RBC AUTO: 93.7 FL (ref 78–100)
MONOCYTES # BLD: 0.3 K/MCL (ref 0.3–0.9)
MONOCYTES NFR BLD: 5 %
NEUTROPHILS # BLD: 3.3 K/MCL (ref 1.8–7.7)
NEUTROPHILS NFR BLD: 58 %
NRBC BLD MANUAL-RTO: 0 /100 WBC
NT-PROBNP SERPL-MCNC: 4135 PG/ML
PLATELET # BLD AUTO: 263 K/MCL (ref 140–450)
POTASSIUM SERPL-SCNC: 4.6 MMOL/L (ref 3.4–5.1)
RBC # BLD: 3.63 MIL/MCL (ref 4–5.2)
SODIUM SERPL-SCNC: 134 MMOL/L (ref 135–145)
WBC # BLD: 5.8 K/MCL (ref 4.2–11)

## 2023-01-27 PROCEDURE — 96374 THER/PROPH/DIAG INJ IV PUSH: CPT

## 2023-01-27 PROCEDURE — 85025 COMPLETE CBC W/AUTO DIFF WBC: CPT | Performed by: INTERNAL MEDICINE

## 2023-01-27 PROCEDURE — 10002800 HB RX 250 W HCPCS: Performed by: INTERNAL MEDICINE

## 2023-01-27 PROCEDURE — 83880 ASSAY OF NATRIURETIC PEPTIDE: CPT | Performed by: INTERNAL MEDICINE

## 2023-01-27 PROCEDURE — 80048 BASIC METABOLIC PNL TOTAL CA: CPT | Performed by: INTERNAL MEDICINE

## 2023-01-27 RX ORDER — 0.9 % SODIUM CHLORIDE 0.9 %
2 VIAL (ML) INJECTION PRN
Status: DISCONTINUED | OUTPATIENT
Start: 2023-01-27 | End: 2023-01-27 | Stop reason: HOSPADM

## 2023-01-27 RX ORDER — SODIUM CHLORIDE 9 MG/ML
INJECTION, SOLUTION INTRAVENOUS CONTINUOUS PRN
OUTPATIENT
Start: 2023-01-27

## 2023-01-27 RX ORDER — FAMOTIDINE 10 MG/ML
20 INJECTION, SOLUTION INTRAVENOUS
OUTPATIENT
Start: 2023-01-27

## 2023-01-27 RX ORDER — 0.9 % SODIUM CHLORIDE 0.9 %
2 VIAL (ML) INJECTION PRN
OUTPATIENT
Start: 2023-01-27

## 2023-01-27 RX ORDER — SODIUM CHLORIDE 9 MG/ML
INJECTION, SOLUTION INTRAVENOUS CONTINUOUS PRN
Status: DISCONTINUED | OUTPATIENT
Start: 2023-01-27 | End: 2023-01-27 | Stop reason: HOSPADM

## 2023-01-27 RX ORDER — METHYLPREDNISOLONE SODIUM SUCCINATE 125 MG/2ML
125 INJECTION, POWDER, LYOPHILIZED, FOR SOLUTION INTRAMUSCULAR; INTRAVENOUS
OUTPATIENT
Start: 2023-01-27

## 2023-01-27 RX ORDER — DIPHENHYDRAMINE HYDROCHLORIDE 50 MG/ML
50 INJECTION INTRAMUSCULAR; INTRAVENOUS
OUTPATIENT
Start: 2023-01-27

## 2023-01-27 RX ORDER — DIPHENHYDRAMINE HYDROCHLORIDE 50 MG/ML
50 INJECTION INTRAMUSCULAR; INTRAVENOUS
Status: DISCONTINUED | OUTPATIENT
Start: 2023-01-27 | End: 2023-01-27 | Stop reason: HOSPADM

## 2023-01-27 RX ORDER — FAMOTIDINE 10 MG/ML
20 INJECTION, SOLUTION INTRAVENOUS
Status: DISCONTINUED | OUTPATIENT
Start: 2023-01-27 | End: 2023-01-27 | Stop reason: HOSPADM

## 2023-01-27 RX ORDER — METHYLPREDNISOLONE SODIUM SUCCINATE 125 MG/2ML
125 INJECTION, POWDER, LYOPHILIZED, FOR SOLUTION INTRAMUSCULAR; INTRAVENOUS
Status: DISCONTINUED | OUTPATIENT
Start: 2023-01-27 | End: 2023-01-27 | Stop reason: HOSPADM

## 2023-01-27 RX ADMIN — IRON SUCROSE 200 MG: 20 INJECTION, SOLUTION INTRAVENOUS at 13:23

## 2023-02-02 ENCOUNTER — LAB SERVICES (OUTPATIENT)
Dept: LAB | Age: 88
End: 2023-02-02

## 2023-02-02 ENCOUNTER — ANCILLARY PROCEDURE (OUTPATIENT)
Dept: CARDIOLOGY | Age: 88
End: 2023-02-02
Attending: INTERNAL MEDICINE

## 2023-02-02 DIAGNOSIS — I50.812 CHRONIC RIGHT-SIDED CONGESTIVE HEART FAILURE (CMD): ICD-10-CM

## 2023-02-02 DIAGNOSIS — I27.20 PULMONARY HYPERTENSION (CMD): ICD-10-CM

## 2023-02-02 DIAGNOSIS — I27.0 PRIMARY PULMONARY HYPERTENSION (CMD): ICD-10-CM

## 2023-02-02 LAB
ANION GAP SERPL CALC-SCNC: 11 MMOL/L (ref 7–19)
BASOPHILS # BLD: 0.1 K/MCL (ref 0–0.3)
BASOPHILS NFR BLD: 1 %
BUN SERPL-MCNC: 54 MG/DL (ref 6–20)
BUN/CREAT SERPL: 27 (ref 7–25)
CALCIUM SERPL-MCNC: 8.5 MG/DL (ref 8.4–10.2)
CHLORIDE SERPL-SCNC: 104 MMOL/L (ref 97–110)
CO2 SERPL-SCNC: 25 MMOL/L (ref 21–32)
CREAT SERPL-MCNC: 2 MG/DL (ref 0.51–0.95)
DEPRECATED RDW RBC: 58.7 FL (ref 39–50)
EOSINOPHIL # BLD: 0.1 K/MCL (ref 0–0.5)
EOSINOPHIL NFR BLD: 2 %
ERYTHROCYTE [DISTWIDTH] IN BLOOD: 17.2 % (ref 11–15)
FASTING DURATION TIME PATIENT: 0 HOURS (ref 0–999)
GFR SERPLBLD BASED ON 1.73 SQ M-ARVRAT: 23 ML/MIN
GLUCOSE SERPL-MCNC: 88 MG/DL (ref 70–99)
HCT VFR BLD CALC: 32.7 % (ref 36–46.5)
HGB BLD-MCNC: 10.4 G/DL (ref 12–15.5)
IMM GRANULOCYTES # BLD AUTO: 0 K/MCL (ref 0–0.2)
IMM GRANULOCYTES # BLD: 0 %
LYMPHOCYTES # BLD: 2.5 K/MCL (ref 1–4)
LYMPHOCYTES NFR BLD: 40 %
MCH RBC QN AUTO: 29.6 PG (ref 26–34)
MCHC RBC AUTO-ENTMCNC: 31.8 G/DL (ref 32–36.5)
MCV RBC AUTO: 93.2 FL (ref 78–100)
MONOCYTES # BLD: 0.4 K/MCL (ref 0.3–0.9)
MONOCYTES NFR BLD: 6 %
NEUTROPHILS # BLD: 3.2 K/MCL (ref 1.8–7.7)
NEUTROPHILS NFR BLD: 51 %
NRBC BLD MANUAL-RTO: 0 /100 WBC
NT-PROBNP SERPL-MCNC: 5397 PG/ML
PLATELET # BLD AUTO: 262 K/MCL (ref 140–450)
POTASSIUM SERPL-SCNC: 4.9 MMOL/L (ref 3.4–5.1)
RBC # BLD: 3.51 MIL/MCL (ref 4–5.2)
SODIUM SERPL-SCNC: 135 MMOL/L (ref 135–145)
WBC # BLD: 6.2 K/MCL (ref 4.2–11)

## 2023-02-02 PROCEDURE — 80048 BASIC METABOLIC PNL TOTAL CA: CPT | Performed by: CLINICAL MEDICAL LABORATORY

## 2023-02-02 PROCEDURE — 36415 COLL VENOUS BLD VENIPUNCTURE: CPT | Performed by: CLINICAL MEDICAL LABORATORY

## 2023-02-02 PROCEDURE — 83880 ASSAY OF NATRIURETIC PEPTIDE: CPT | Performed by: CLINICAL MEDICAL LABORATORY

## 2023-02-02 PROCEDURE — 85025 COMPLETE CBC W/AUTO DIFF WBC: CPT | Performed by: CLINICAL MEDICAL LABORATORY

## 2023-02-07 ENCOUNTER — OFFICE VISIT (OUTPATIENT)
Dept: CARDIOLOGY | Age: 88
End: 2023-02-07

## 2023-02-07 ENCOUNTER — ANTI-COAG VISIT (OUTPATIENT)
Dept: ANTICOAGULATION | Facility: CLINIC | Age: 88
End: 2023-02-07

## 2023-02-07 VITALS
WEIGHT: 111.11 LBS | HEIGHT: 66 IN | HEART RATE: 73 BPM | RESPIRATION RATE: 18 BRPM | BODY MASS INDEX: 17.86 KG/M2 | DIASTOLIC BLOOD PRESSURE: 61 MMHG | SYSTOLIC BLOOD PRESSURE: 110 MMHG

## 2023-02-07 DIAGNOSIS — D50.8 OTHER IRON DEFICIENCY ANEMIA: Primary | ICD-10-CM

## 2023-02-07 DIAGNOSIS — Z51.81 ENCOUNTER FOR THERAPEUTIC DRUG MONITORING: ICD-10-CM

## 2023-02-07 DIAGNOSIS — I48.20 CHRONIC ATRIAL FIBRILLATION (HCC): ICD-10-CM

## 2023-02-07 DIAGNOSIS — I50.812 CHRONIC RIGHT-SIDED CONGESTIVE HEART FAILURE (CMD): ICD-10-CM

## 2023-02-07 DIAGNOSIS — I48.91 ATRIAL FIBRILLATION, UNSPECIFIED TYPE (HCC): ICD-10-CM

## 2023-02-07 DIAGNOSIS — Z79.01 LONG TERM (CURRENT) USE OF ANTICOAGULANTS: ICD-10-CM

## 2023-02-07 DIAGNOSIS — I27.0 PRIMARY PULMONARY HYPERTENSION (CMD): ICD-10-CM

## 2023-02-07 LAB
INR: 2.1 (ref 0.8–1.2)
TEST STRIP EXPIRATION DATE: ABNORMAL DATE

## 2023-02-07 PROCEDURE — 93793 ANTICOAG MGMT PT WARFARIN: CPT | Performed by: INTERNAL MEDICINE

## 2023-02-07 PROCEDURE — 94618 PULMONARY STRESS TESTING: CPT | Performed by: INTERNAL MEDICINE

## 2023-02-07 PROCEDURE — 85610 PROTHROMBIN TIME: CPT | Performed by: INTERNAL MEDICINE

## 2023-02-07 PROCEDURE — 99215 OFFICE O/P EST HI 40 MIN: CPT | Performed by: INTERNAL MEDICINE

## 2023-02-07 RX ORDER — TORSEMIDE 20 MG/1
20 TABLET ORAL 2 TIMES DAILY
Qty: 60 TABLET | Refills: 11 | Status: SHIPPED | OUTPATIENT
Start: 2023-02-07 | End: 2023-02-21 | Stop reason: ALTCHOICE

## 2023-02-07 RX ORDER — LOPERAMIDE HYDROCHLORIDE 2 MG/1
2 TABLET ORAL EVERY OTHER DAY
COMMUNITY
Start: 2023-02-07 | End: 2023-02-27 | Stop reason: DRUGHIGH

## 2023-02-07 RX ORDER — LISINOPRIL 20 MG/1
20 TABLET ORAL DAILY
Qty: 30 TABLET | Refills: 11 | Status: SHIPPED | OUTPATIENT
Start: 2023-02-07 | End: 2023-02-21 | Stop reason: DRUGHIGH

## 2023-02-07 SDOH — HEALTH STABILITY: PHYSICAL HEALTH: ON AVERAGE, HOW MANY DAYS PER WEEK DO YOU ENGAGE IN MODERATE TO STRENUOUS EXERCISE (LIKE A BRISK WALK)?: 0 DAYS

## 2023-02-07 SDOH — HEALTH STABILITY: PHYSICAL HEALTH: ON AVERAGE, HOW MANY MINUTES DO YOU ENGAGE IN EXERCISE AT THIS LEVEL?: 0 MIN

## 2023-02-07 ASSESSMENT — ENCOUNTER SYMPTOMS
HEADACHES: 0
ABDOMINAL PAIN: 0
ALLERGIC/IMMUNOLOGIC COMMENTS: NO NEW FOOD ALLERGIES
CHILLS: 0
DEPRESSION: 0
BRUISES/BLEEDS EASILY: 0
FEVER: 0
INSOMNIA: 0
WEIGHT LOSS: 1
COUGH: 0
LIGHT-HEADEDNESS: 0
SHORTNESS OF BREATH: 1
WEIGHT GAIN: 0
SUSPICIOUS LESIONS: 0
BLOATING: 0
DIZZINESS: 0
HEMOPTYSIS: 0
ALTERED MENTAL STATUS: 0
DIARRHEA: 0
HEMATOCHEZIA: 0
CONSTIPATION: 0
LOSS OF BALANCE: 0
BACK PAIN: 0

## 2023-02-07 ASSESSMENT — 6 MINUTE WALK TEST (6MWT)
HEART RATE: 72
SA02: 95
SA02: 98
HEART RATE: 64
BORG FATIGUE SCALE SCORE: 0.5  VER, VERY SLIGHT.  JUST NOTICEABLE
HEART RATE: 73
BLOOD PRESSURE: 145/71
SAO2: 99
GENERAL DYSPNEA: VERY,VERY SLIGHT
BLOOD PRESSURE: 108/63
TOTAL DISTANCE WALKED (M): 174.35
NUMBER OF RESTS: 0

## 2023-02-07 ASSESSMENT — PATIENT HEALTH QUESTIONNAIRE - PHQ9
CLINICAL INTERPRETATION OF PHQ2 SCORE: NO FURTHER SCREENING NEEDED
SUM OF ALL RESPONSES TO PHQ9 QUESTIONS 1 AND 2: 0
1. LITTLE INTEREST OR PLEASURE IN DOING THINGS: NOT AT ALL
2. FEELING DOWN, DEPRESSED OR HOPELESS: NOT AT ALL
SUM OF ALL RESPONSES TO PHQ9 QUESTIONS 1 AND 2: 0

## 2023-02-08 PROCEDURE — 93227 XTRNL ECG REC<48 HR R&I: CPT | Performed by: INTERNAL MEDICINE

## 2023-02-16 ENCOUNTER — TELEPHONE (OUTPATIENT)
Dept: CARDIOLOGY | Age: 88
End: 2023-02-16

## 2023-02-21 RX ORDER — LISINOPRIL 2.5 MG/1
2.5 TABLET ORAL DAILY
COMMUNITY
End: 2023-03-02 | Stop reason: SDUPTHER

## 2023-02-21 RX ORDER — SILDENAFIL CITRATE 20 MG/1
10 TABLET ORAL 3 TIMES DAILY
COMMUNITY
End: 2023-11-15 | Stop reason: SDUPTHER

## 2023-02-22 ENCOUNTER — TELEPHONE (OUTPATIENT)
Dept: INTERNAL MEDICINE CLINIC | Facility: CLINIC | Age: 88
End: 2023-02-22

## 2023-02-22 NOTE — TELEPHONE ENCOUNTER
Salvatore Parker, home health nurse calling to state that patient was discharged from hospital on Monday 02/20/23, and was told to check PT INR within three days, by today. Nurse stated can check patient's PT INR, but needs orders placed to do so. Patient has appointment at clinic on 03/7/23.  Nurse stated will be there for a short while with patient,     [address]: [phone]

## 2023-02-22 NOTE — TELEPHONE ENCOUNTER
Chart reviewed. Patient's INR was stable/within therapeutic range during hospitalization. Spoke with Mariana Russo RN- she is from [facility]. [facility]. Reports that patient's doses of coreg and lisinopril were adjusted while in the hospital due to hypotension- no other changes to diet/meds. Advised that we should check INR prior to next visit to the clinic in 2 weeks due to recent med changes. Mariana Russo states she will check INR at the end of this week or beginning of next week and then she will call and report results to the coumadin clinic.

## 2023-02-23 NOTE — TELEPHONE ENCOUNTER
Call received from patient's daughter Ana Albarran. Advised that I spoke with the [facility] RN yesterday and she will come to check [place]'s INR later this week or early next week. Ana Albarran verbalized understanding. States that if [facility] does not come, she is bringing [place] to the lab next week Tuesday for blood draws ordered by Dr. Barbara Gibbons and they can include an INR or she may make an appointment in the clinic.

## 2023-02-24 ENCOUNTER — TELEPHONE (OUTPATIENT)
Dept: CARDIOLOGY | Age: 88
End: 2023-02-24

## 2023-02-24 ENCOUNTER — ANTI-COAG VISIT (OUTPATIENT)
Dept: ANTICOAGULATION | Facility: CLINIC | Age: 88
End: 2023-02-24

## 2023-02-24 ENCOUNTER — TELEPHONE (OUTPATIENT)
Dept: NEPHROLOGY | Facility: CLINIC | Age: 88
End: 2023-02-24

## 2023-02-24 ENCOUNTER — TELEPHONE (OUTPATIENT)
Dept: FAMILY MEDICINE CLINIC | Facility: CLINIC | Age: 88
End: 2023-02-24

## 2023-02-24 DIAGNOSIS — Z79.01 LONG TERM (CURRENT) USE OF ANTICOAGULANTS: ICD-10-CM

## 2023-02-24 DIAGNOSIS — Z51.81 ENCOUNTER FOR THERAPEUTIC DRUG MONITORING: ICD-10-CM

## 2023-02-24 DIAGNOSIS — I48.20 CHRONIC ATRIAL FIBRILLATION (HCC): ICD-10-CM

## 2023-02-24 DIAGNOSIS — I48.91 ATRIAL FIBRILLATION, UNSPECIFIED TYPE (HCC): ICD-10-CM

## 2023-02-24 LAB — INR: 1.7 (ref 0.8–1.2)

## 2023-02-24 NOTE — PROGRESS NOTES
INR result received from Richy Hodges. Spoke with Dre Ragsdale and Abisai Reilly from [place] [address] ([phone]). Richy Hodges states their visits will be on Mondays going forward. [place] is currently living with her daughter after recent hospitalization. Warfarin weekly dose had been decreased a few weeks ago to allow her to continue taking Tylenol daily. Maintenance plan indicates dose should be 1/2 tablet Monday and Friday and a full tablet all other days- [place] reported to Richy Hodges that she is taking a 1/2 tablet on Thursdays and a full tablet all other days. Adjusted maintenance plan to reflect this dose though no actual dose change was made today. INR is minimally below therapeutic goal range. Per protocol, continue current dose and recheck INR in 1 week.

## 2023-02-24 NOTE — TELEPHONE ENCOUNTER
Zaira Moore with [facility] is reporting abnormal results     INR 1.7  PT 19.2    Per RN, patient is taking 2.5mg of warfarin daily with half tablet on thursdays

## 2023-02-24 NOTE — TELEPHONE ENCOUNTER
Dr. Basia Valles, patient's home health PT reports BP was 115/40 with pulse of 46. Patient was asymptomatic, pulse regular.

## 2023-02-27 ENCOUNTER — OFFICE VISIT (OUTPATIENT)
Dept: CARDIOLOGY | Age: 88
End: 2023-02-27

## 2023-02-27 ENCOUNTER — LAB SERVICES (OUTPATIENT)
Dept: LAB | Age: 88
End: 2023-02-27

## 2023-02-27 VITALS
WEIGHT: 118.61 LBS | BODY MASS INDEX: 19.76 KG/M2 | HEIGHT: 65 IN | SYSTOLIC BLOOD PRESSURE: 101 MMHG | HEART RATE: 49 BPM | DIASTOLIC BLOOD PRESSURE: 50 MMHG

## 2023-02-27 DIAGNOSIS — N91.2 AMENIA: ICD-10-CM

## 2023-02-27 DIAGNOSIS — I27.20 PULMONARY HYPERTENSION (CMD): Primary | ICD-10-CM

## 2023-02-27 LAB
FERRITIN SERPL-MCNC: 164 NG/ML (ref 8–252)
IRON SATN MFR SERPL: 43 % (ref 15–45)
IRON SERPL-MCNC: 88 MCG/DL (ref 50–170)
TIBC SERPL-MCNC: 203 MCG/DL (ref 250–450)

## 2023-02-27 PROCEDURE — 36415 COLL VENOUS BLD VENIPUNCTURE: CPT | Performed by: CLINICAL MEDICAL LABORATORY

## 2023-02-27 PROCEDURE — 83550 IRON BINDING TEST: CPT | Performed by: CLINICAL MEDICAL LABORATORY

## 2023-02-27 PROCEDURE — 99215 OFFICE O/P EST HI 40 MIN: CPT | Performed by: INTERNAL MEDICINE

## 2023-02-27 PROCEDURE — 82728 ASSAY OF FERRITIN: CPT | Performed by: CLINICAL MEDICAL LABORATORY

## 2023-02-27 PROCEDURE — 83540 ASSAY OF IRON: CPT | Performed by: CLINICAL MEDICAL LABORATORY

## 2023-02-27 RX ORDER — TORSEMIDE 20 MG/1
20 TABLET ORAL DAILY
Qty: 30 TABLET | Refills: 11 | Status: SHIPPED | OUTPATIENT
Start: 2023-02-27

## 2023-02-27 RX ORDER — GUAIFENESIN 600 MG/1
1 TABLET, EXTENDED RELEASE ORAL 2 TIMES DAILY
COMMUNITY
Start: 2023-02-20 | End: 2023-03-02

## 2023-02-27 RX ORDER — FERROUS SULFATE 325(65) MG
1 TABLET ORAL 2 TIMES DAILY WITH MEALS
COMMUNITY
Start: 2023-02-27 | End: 2023-05-08 | Stop reason: SDUPTHER

## 2023-02-27 RX ORDER — LOPERAMIDE HYDROCHLORIDE 2 MG/1
1 TABLET ORAL DAILY
Status: SHIPPED | COMMUNITY
Start: 2023-02-27

## 2023-02-27 SDOH — HEALTH STABILITY: PHYSICAL HEALTH: ON AVERAGE, HOW MANY DAYS PER WEEK DO YOU ENGAGE IN MODERATE TO STRENUOUS EXERCISE (LIKE A BRISK WALK)?: 0 DAYS

## 2023-02-27 SDOH — HEALTH STABILITY: PHYSICAL HEALTH: ON AVERAGE, HOW MANY MINUTES DO YOU ENGAGE IN EXERCISE AT THIS LEVEL?: 0 MIN

## 2023-02-27 ASSESSMENT — ENCOUNTER SYMPTOMS
DIZZINESS: 0
HEMOPTYSIS: 0
ALTERED MENTAL STATUS: 0
COUGH: 0
HEMATOCHEZIA: 0
BLOATING: 0
FEVER: 0
DEPRESSION: 0
WEIGHT LOSS: 1
CHILLS: 0
ALLERGIC/IMMUNOLOGIC COMMENTS: NO NEW FOOD ALLERGIES
INSOMNIA: 0
LIGHT-HEADEDNESS: 0
LOSS OF BALANCE: 0
DIARRHEA: 0
SHORTNESS OF BREATH: 1
ABDOMINAL PAIN: 0
HEADACHES: 0
BRUISES/BLEEDS EASILY: 0
CONSTIPATION: 0
WEIGHT GAIN: 0
BACK PAIN: 0
SUSPICIOUS LESIONS: 0

## 2023-02-27 ASSESSMENT — PATIENT HEALTH QUESTIONNAIRE - PHQ9
CLINICAL INTERPRETATION OF PHQ2 SCORE: NO FURTHER SCREENING NEEDED
2. FEELING DOWN, DEPRESSED OR HOPELESS: NOT AT ALL
SUM OF ALL RESPONSES TO PHQ9 QUESTIONS 1 AND 2: 0
SUM OF ALL RESPONSES TO PHQ9 QUESTIONS 1 AND 2: 0
1. LITTLE INTEREST OR PLEASURE IN DOING THINGS: NOT AT ALL

## 2023-02-28 ENCOUNTER — LAB ENCOUNTER (OUTPATIENT)
Dept: LAB | Age: 88
End: 2023-02-28
Attending: INTERNAL MEDICINE
Payer: MEDICARE

## 2023-02-28 DIAGNOSIS — D64.9 ANEMIA, UNSPECIFIED TYPE: ICD-10-CM

## 2023-02-28 DIAGNOSIS — N18.32 STAGE 3B CHRONIC KIDNEY DISEASE (CKD) (HCC): ICD-10-CM

## 2023-02-28 LAB
ALBUMIN SERPL-MCNC: 3.2 G/DL (ref 3.4–5)
ANION GAP SERPL CALC-SCNC: 6 MMOL/L (ref 0–18)
BASOPHILS # BLD AUTO: 0.08 X10(3) UL (ref 0–0.2)
BASOPHILS NFR BLD AUTO: 1.2 %
BUN BLD-MCNC: 31 MG/DL (ref 7–18)
BUN/CREAT SERPL: 22.3 (ref 10–20)
CALCIUM BLD-MCNC: 8.8 MG/DL (ref 8.5–10.1)
CHLORIDE SERPL-SCNC: 110 MMOL/L (ref 98–112)
CO2 SERPL-SCNC: 23 MMOL/L (ref 21–32)
CREAT BLD-MCNC: 1.39 MG/DL
DEPRECATED RDW RBC AUTO: 57.4 FL (ref 35.1–46.3)
EOSINOPHIL # BLD AUTO: 0.07 X10(3) UL (ref 0–0.7)
EOSINOPHIL NFR BLD AUTO: 1.1 %
ERYTHROCYTE [DISTWIDTH] IN BLOOD BY AUTOMATED COUNT: 17.1 % (ref 11–15)
GFR SERPLBLD BASED ON 1.73 SQ M-ARVRAT: 35 ML/MIN/1.73M2 (ref 60–?)
GLUCOSE BLD-MCNC: 174 MG/DL (ref 70–99)
HCT VFR BLD AUTO: 31.5 %
HGB BLD-MCNC: 10.1 G/DL
IMM GRANULOCYTES # BLD AUTO: 0.02 X10(3) UL (ref 0–1)
IMM GRANULOCYTES NFR BLD: 0.3 %
IRON SATN MFR SERPL: 34 %
IRON SERPL-MCNC: 79 UG/DL
LYMPHOCYTES # BLD AUTO: 2.1 X10(3) UL (ref 1–4)
LYMPHOCYTES NFR BLD AUTO: 32 %
MCH RBC QN AUTO: 29.6 PG (ref 26–34)
MCHC RBC AUTO-ENTMCNC: 32.1 G/DL (ref 31–37)
MCV RBC AUTO: 92.4 FL
MONOCYTES # BLD AUTO: 0.32 X10(3) UL (ref 0.1–1)
MONOCYTES NFR BLD AUTO: 4.9 %
NEUTROPHILS # BLD AUTO: 3.97 X10 (3) UL (ref 1.5–7.7)
NEUTROPHILS # BLD AUTO: 3.97 X10(3) UL (ref 1.5–7.7)
NEUTROPHILS NFR BLD AUTO: 60.5 %
OSMOLALITY SERPL CALC.SUM OF ELEC: 299 MOSM/KG (ref 275–295)
PHOSPHATE SERPL-MCNC: 3.8 MG/DL (ref 2.5–4.9)
PLATELET # BLD AUTO: 253 10(3)UL (ref 150–450)
POTASSIUM SERPL-SCNC: 4.5 MMOL/L (ref 3.5–5.1)
RBC # BLD AUTO: 3.41 X10(6)UL
SODIUM SERPL-SCNC: 139 MMOL/L (ref 136–145)
TIBC SERPL-MCNC: 234 UG/DL (ref 240–450)
TRANSFERRIN SERPL-MCNC: 157 MG/DL (ref 200–360)
WBC # BLD AUTO: 6.6 X10(3) UL (ref 4–11)

## 2023-02-28 PROCEDURE — 85025 COMPLETE CBC W/AUTO DIFF WBC: CPT

## 2023-02-28 PROCEDURE — 84466 ASSAY OF TRANSFERRIN: CPT

## 2023-02-28 PROCEDURE — 36415 COLL VENOUS BLD VENIPUNCTURE: CPT

## 2023-02-28 PROCEDURE — 83540 ASSAY OF IRON: CPT

## 2023-02-28 PROCEDURE — 80069 RENAL FUNCTION PANEL: CPT

## 2023-03-01 ENCOUNTER — ANTI-COAG VISIT (OUTPATIENT)
Dept: ANTICOAGULATION | Facility: CLINIC | Age: 88
End: 2023-03-01

## 2023-03-01 ENCOUNTER — APPOINTMENT (OUTPATIENT)
Dept: CARDIOLOGY | Age: 88
End: 2023-03-01

## 2023-03-01 ENCOUNTER — TELEPHONE (OUTPATIENT)
Dept: ANTICOAGULATION | Facility: CLINIC | Age: 88
End: 2023-03-01

## 2023-03-01 DIAGNOSIS — I48.91 ATRIAL FIBRILLATION, UNSPECIFIED TYPE (HCC): ICD-10-CM

## 2023-03-01 DIAGNOSIS — Z51.81 ENCOUNTER FOR THERAPEUTIC DRUG MONITORING: ICD-10-CM

## 2023-03-01 DIAGNOSIS — I48.20 CHRONIC ATRIAL FIBRILLATION (HCC): ICD-10-CM

## 2023-03-01 DIAGNOSIS — Z79.01 LONG TERM (CURRENT) USE OF ANTICOAGULANTS: ICD-10-CM

## 2023-03-01 LAB — INR: 1.7 (ref 0.8–1.2)

## 2023-03-01 NOTE — TELEPHONE ENCOUNTER
Lulu with [address] [facility] would like to send over the INR results TT=18.8, INR 1.7,   Since 2-24-23 she has been taking Warfin 2.5 MG 1/2 tab Friday and 2.5MG all other days. Please call Ellie Ward back with [facility] as these numbers are low for patient.

## 2023-03-03 RX ORDER — LISINOPRIL 2.5 MG/1
2.5 TABLET ORAL DAILY
Qty: 30 TABLET | Refills: 1 | Status: SHIPPED | OUTPATIENT
Start: 2023-03-03 | End: 2023-07-05

## 2023-03-07 ENCOUNTER — OFFICE VISIT (OUTPATIENT)
Dept: NEPHROLOGY | Facility: CLINIC | Age: 88
End: 2023-03-07

## 2023-03-07 VITALS
SYSTOLIC BLOOD PRESSURE: 106 MMHG | DIASTOLIC BLOOD PRESSURE: 51 MMHG | HEIGHT: 65 IN | BODY MASS INDEX: 18.66 KG/M2 | HEART RATE: 52 BPM | WEIGHT: 112 LBS

## 2023-03-07 DIAGNOSIS — E78.00 HYPERCHOLESTEROLEMIA: ICD-10-CM

## 2023-03-07 DIAGNOSIS — N18.32 STAGE 3B CHRONIC KIDNEY DISEASE (CKD) (HCC): Primary | ICD-10-CM

## 2023-03-07 PROCEDURE — 99214 OFFICE O/P EST MOD 30 MIN: CPT | Performed by: INTERNAL MEDICINE

## 2023-03-07 RX ORDER — SILDENAFIL CITRATE 20 MG/1
10 TABLET ORAL 3 TIMES DAILY
Qty: 90 TABLET | Refills: 0 | COMMUNITY
Start: 2023-03-07

## 2023-03-07 RX ORDER — FERROUS SULFATE 325(65) MG
325 TABLET ORAL
Qty: 60 TABLET | Refills: 0 | COMMUNITY
Start: 2023-03-07

## 2023-03-08 ENCOUNTER — ANTI-COAG VISIT (OUTPATIENT)
Dept: ANTICOAGULATION | Facility: CLINIC | Age: 88
End: 2023-03-08

## 2023-03-08 ENCOUNTER — TELEPHONE (OUTPATIENT)
Dept: ANTICOAGULATION | Facility: CLINIC | Age: 88
End: 2023-03-08

## 2023-03-08 DIAGNOSIS — Z79.01 LONG TERM (CURRENT) USE OF ANTICOAGULANTS: ICD-10-CM

## 2023-03-08 DIAGNOSIS — Z51.81 ENCOUNTER FOR THERAPEUTIC DRUG MONITORING: ICD-10-CM

## 2023-03-08 DIAGNOSIS — I48.20 CHRONIC ATRIAL FIBRILLATION (HCC): ICD-10-CM

## 2023-03-08 DIAGNOSIS — I48.91 ATRIAL FIBRILLATION, UNSPECIFIED TYPE (HCC): ICD-10-CM

## 2023-03-08 LAB — INR: 2.1 (ref 0.8–1.2)

## 2023-03-08 NOTE — TELEPHONE ENCOUNTER
Miesha Ruano from [facility] calling that results are    Pt: 21.7  INR: 2.0  Patient is taking 2.5 MG and Miesha Ruano would like to know if things should change or not

## 2023-03-15 ENCOUNTER — TELEPHONE (OUTPATIENT)
Dept: NEPHROLOGY | Facility: CLINIC | Age: 88
End: 2023-03-15

## 2023-03-15 ENCOUNTER — ANTI-COAG VISIT (OUTPATIENT)
Dept: ANTICOAGULATION | Facility: CLINIC | Age: 88
End: 2023-03-15

## 2023-03-15 DIAGNOSIS — Z79.01 LONG TERM (CURRENT) USE OF ANTICOAGULANTS: ICD-10-CM

## 2023-03-15 DIAGNOSIS — I48.91 ATRIAL FIBRILLATION, UNSPECIFIED TYPE (HCC): ICD-10-CM

## 2023-03-15 DIAGNOSIS — Z51.81 ENCOUNTER FOR THERAPEUTIC DRUG MONITORING: ICD-10-CM

## 2023-03-15 DIAGNOSIS — I48.20 CHRONIC ATRIAL FIBRILLATION (HCC): ICD-10-CM

## 2023-03-15 LAB — INR: 2.2 (ref 0.8–1.2)

## 2023-03-15 NOTE — TELEPHONE ENCOUNTER
[address] [place] [address] is calling to advise INR 2.2 and PT 24.1 results for the patient and requesting callback for further orders.

## 2023-03-22 ENCOUNTER — ANTI-COAG VISIT (OUTPATIENT)
Dept: ANTICOAGULATION | Facility: CLINIC | Age: 88
End: 2023-03-22

## 2023-03-22 DIAGNOSIS — I48.91 ATRIAL FIBRILLATION, UNSPECIFIED TYPE (HCC): ICD-10-CM

## 2023-03-22 DIAGNOSIS — I48.20 CHRONIC ATRIAL FIBRILLATION (HCC): ICD-10-CM

## 2023-03-22 DIAGNOSIS — Z51.81 ENCOUNTER FOR THERAPEUTIC DRUG MONITORING: ICD-10-CM

## 2023-03-22 DIAGNOSIS — Z79.01 LONG TERM (CURRENT) USE OF ANTICOAGULANTS: ICD-10-CM

## 2023-03-22 LAB — INR: 2.1 (ref 0.8–1.2)

## 2023-03-22 NOTE — PROGRESS NOTES
INR result received from [facility] ([phone]). Spoke with Rachel Perez. States that the patient is being discharged from [facility] and would like to resume visits at the [facility] coumadin clinic. Spoke with [place]. Per protocol, continue current dose and recheck INR in 3 weeks. Appointment made in the coumadin clinic for 3/12.
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
## 2023-03-27 RX ORDER — POTASSIUM CHLORIDE 750 MG/1
10 TABLET, FILM COATED, EXTENDED RELEASE ORAL DAILY
Qty: 60 TABLET | Refills: 1 | Status: SHIPPED | OUTPATIENT
Start: 2023-03-27 | End: 2023-08-25 | Stop reason: CLARIF

## 2023-04-04 ENCOUNTER — OFFICE VISIT (OUTPATIENT)
Dept: CARDIOLOGY | Age: 88
End: 2023-04-04

## 2023-04-04 VITALS
OXYGEN SATURATION: 99 % | BODY MASS INDEX: 18.51 KG/M2 | HEART RATE: 48 BPM | SYSTOLIC BLOOD PRESSURE: 152 MMHG | DIASTOLIC BLOOD PRESSURE: 65 MMHG | WEIGHT: 111.11 LBS | HEIGHT: 65 IN | RESPIRATION RATE: 16 BRPM

## 2023-04-04 DIAGNOSIS — R00.1 BRADYCARDIA, UNSPECIFIED: ICD-10-CM

## 2023-04-04 DIAGNOSIS — I48.20 CHRONIC ATRIAL FIBRILLATION (CMD): Primary | ICD-10-CM

## 2023-04-04 PROCEDURE — 93000 ELECTROCARDIOGRAM COMPLETE: CPT | Performed by: INTERNAL MEDICINE

## 2023-04-04 PROCEDURE — 99214 OFFICE O/P EST MOD 30 MIN: CPT | Performed by: INTERNAL MEDICINE

## 2023-04-04 SDOH — HEALTH STABILITY: MENTAL HEALTH: DEPRESSION SCREENING SCORE: 0

## 2023-04-04 SDOH — HEALTH STABILITY: MENTAL HEALTH: LITTLE INTEREST OR PLEASURE IN ACTIVITY?: NOT AT ALL

## 2023-04-04 SDOH — HEALTH STABILITY: PHYSICAL HEALTH: ON AVERAGE, HOW MANY DAYS PER WEEK DO YOU ENGAGE IN MODERATE TO STRENUOUS EXERCISE (LIKE A BRISK WALK)?: 5 DAYS

## 2023-04-04 SDOH — HEALTH STABILITY: PHYSICAL HEALTH: ON AVERAGE, HOW MANY MINUTES DO YOU ENGAGE IN EXERCISE AT THIS LEVEL?: 20 MIN

## 2023-04-04 SDOH — HEALTH STABILITY: MENTAL HEALTH: FEELING DOWN, DEPRESSED OR HOPELESS?: NOT AT ALL

## 2023-04-04 SDOH — HEALTH STABILITY: MENTAL HEALTH: PHQ2 INTERPRETATION: NO FURTHER SCREENING NEEDED

## 2023-04-04 ASSESSMENT — PATIENT HEALTH QUESTIONNAIRE - PHQ9: SUM OF ALL RESPONSES TO PHQ9 QUESTIONS 1 AND 2: 0

## 2023-04-12 ENCOUNTER — ANTI-COAG VISIT (OUTPATIENT)
Dept: ANTICOAGULATION | Facility: CLINIC | Age: 88
End: 2023-04-12

## 2023-04-12 DIAGNOSIS — Z51.81 ENCOUNTER FOR THERAPEUTIC DRUG MONITORING: ICD-10-CM

## 2023-04-12 DIAGNOSIS — I48.91 ATRIAL FIBRILLATION, UNSPECIFIED TYPE (HCC): ICD-10-CM

## 2023-04-12 DIAGNOSIS — I48.20 CHRONIC ATRIAL FIBRILLATION (HCC): ICD-10-CM

## 2023-04-12 DIAGNOSIS — Z79.01 LONG TERM (CURRENT) USE OF ANTICOAGULANTS: ICD-10-CM

## 2023-04-12 LAB
INR: 1.6 (ref 0.8–1.2)
TEST STRIP EXPIRATION DATE: ABNORMAL DATE

## 2023-04-12 PROCEDURE — 93793 ANTICOAG MGMT PT WARFARIN: CPT | Performed by: INTERNAL MEDICINE

## 2023-04-12 PROCEDURE — 85610 PROTHROMBIN TIME: CPT | Performed by: INTERNAL MEDICINE

## 2023-04-12 NOTE — PROGRESS NOTES
Face to face. Here today with her daughter. Denies any missed doses, no change in diet. Reports that metoprolol has been discontinued. INR is minimally below therapeutic goal range- has been therapeutic at current weekly dose for the past few weeks. Per protocol, continue current dose and recheck INR in 2 weeks. Spine appears normal, range of motion is not limited, no muscle or joint tenderness

## 2023-04-26 ENCOUNTER — ANTI-COAG VISIT (OUTPATIENT)
Dept: ANTICOAGULATION | Facility: CLINIC | Age: 88
End: 2023-04-26

## 2023-04-26 ENCOUNTER — TELEPHONE (OUTPATIENT)
Dept: ANTICOAGULATION | Facility: CLINIC | Age: 88
End: 2023-04-26

## 2023-04-26 DIAGNOSIS — Z79.01 LONG TERM (CURRENT) USE OF ANTICOAGULANTS: ICD-10-CM

## 2023-04-26 DIAGNOSIS — Z51.81 ENCOUNTER FOR THERAPEUTIC DRUG MONITORING: ICD-10-CM

## 2023-04-26 DIAGNOSIS — I48.91 ATRIAL FIBRILLATION, UNSPECIFIED TYPE (HCC): ICD-10-CM

## 2023-04-26 DIAGNOSIS — I48.20 CHRONIC ATRIAL FIBRILLATION (HCC): ICD-10-CM

## 2023-04-26 LAB — INR: 1.4 (ref 2–3)

## 2023-04-26 PROCEDURE — 93793 ANTICOAG MGMT PT WARFARIN: CPT | Performed by: INTERNAL MEDICINE

## 2023-04-26 PROCEDURE — 85610 PROTHROMBIN TIME: CPT | Performed by: INTERNAL MEDICINE

## 2023-04-26 NOTE — PROGRESS NOTES
Patient in clinic with daughter for INR check. Denies changes with medications, might have had more Vit K this week in diet. Patient reports recent weightloss, was advised of concern by her specialist, she would like to begin taking 1 ensure daily. Confirmed daily dose with patient. INR continues to be below therapeutic range. Patient will come back on Tues 5/2 to have INR rechecked. Protocol: extra dose or increase weekly dose 10-20%,   Outside of Protocol: actual increase 7.1%, no extra dose.     3.75 mg every Wed; 2.5 mg all other days

## 2023-04-26 NOTE — TELEPHONE ENCOUNTER
INR: 1.4        Goal: 2-3    Patient in clinic with daughter for INR check. Denies changes with medications, might have had more Vit K this week in diet. Patient reports recent weightloss, was advised of concern by her specialist, she would like to begin taking 1 ensure daily. Confirmed daily dose with patient. INR continues to be below therapeutic range. Patient will come back on Tues 5/2 to have INR rechecked. Protocol: extra dose or increase weekly dose 10-20%,   Outside of Protocol: actual increase 7.1%, no extra dose.     3.75 mg every Wed; 2.5 mg all other days

## 2023-05-02 ENCOUNTER — LAB ENCOUNTER (OUTPATIENT)
Dept: LAB | Age: 88
End: 2023-05-02
Attending: INTERNAL MEDICINE
Payer: MEDICARE

## 2023-05-02 ENCOUNTER — EXTERNAL LAB (OUTPATIENT)
Dept: CARDIOLOGY | Age: 88
End: 2023-05-02

## 2023-05-02 ENCOUNTER — ANTI-COAG VISIT (OUTPATIENT)
Dept: ANTICOAGULATION | Facility: CLINIC | Age: 88
End: 2023-05-02

## 2023-05-02 DIAGNOSIS — Z79.01 LONG TERM (CURRENT) USE OF ANTICOAGULANTS: ICD-10-CM

## 2023-05-02 DIAGNOSIS — I50.812 CHRONIC RIGHT-SIDED CONGESTIVE HEART FAILURE (HCC): ICD-10-CM

## 2023-05-02 DIAGNOSIS — I48.91 ATRIAL FIBRILLATION, UNSPECIFIED TYPE (HCC): ICD-10-CM

## 2023-05-02 DIAGNOSIS — I27.0 PRIMARY PULMONARY HYPERTENSION (HCC): Primary | ICD-10-CM

## 2023-05-02 DIAGNOSIS — I48.20 CHRONIC ATRIAL FIBRILLATION (HCC): ICD-10-CM

## 2023-05-02 DIAGNOSIS — Z51.81 ENCOUNTER FOR THERAPEUTIC DRUG MONITORING: ICD-10-CM

## 2023-05-02 LAB
ABSOLUTE IMMATURE GRANULOCYTES (OFFPRE24): 0.01 X10(3) UL (ref 0–1)
ANION GAP SERPL CALC-SCNC: 9 MMOL/L (ref 0–18)
ANION GAP SERPL CALC-SCNC: 9 MMOL/L (ref 0–18)
BASOPHILS # BLD AUTO: 0.07 X10(3) UL (ref 0–0.2)
BASOPHILS # BLD: 0.07 X10(3) UL (ref 0–0.2)
BASOPHILS NFR BLD AUTO: 1 %
BASOPHILS NFR BLD: 1 %
BUN BLD-MCNC: 70 MG/DL (ref 7–18)
BUN SERPL-MCNC: 70 MG/DL (ref 7–18)
BUN/CREAT SERPL: 39.3 (ref 10–20)
BUN/CREAT SERPL: 39.3 (ref 10–20)
CALCIUM BLD-MCNC: 9.3 MG/DL (ref 8.5–10.1)
CALCIUM SERPL-MCNC: 9.3 MG/DL (ref 8.5–10.1)
CALCULATED OSMO: 308 MOSM/KG (ref 275–295)
CHLORIDE SERPL-SCNC: 108 MMOL/L (ref 98–112)
CHLORIDE SERPL-SCNC: 108 MMOL/L (ref 98–112)
CO2 SERPL-SCNC: 22 MMOL/L (ref 21–32)
CO2 SERPL-SCNC: 22 MMOL/L (ref 21–32)
CREAT BLD-MCNC: 1.78 MG/DL
CREAT SERPL-MCNC: 1.78 MG/DL (ref 0.55–1.02)
DEPRECATED RDW RBC AUTO: 53.1 FL (ref 35.1–46.3)
EOSINOPHIL # BLD AUTO: 0.1 X10(3) UL (ref 0–0.7)
EOSINOPHIL # BLD: 0.1 X10(3) UL (ref 0–0.7)
EOSINOPHIL NFR BLD AUTO: 1.4 %
EOSINOPHIL NFR BLD: 1.4 %
ERYTHROCYTE [DISTWIDTH] IN BLOOD BY AUTOMATED COUNT: 15.2 % (ref 11–15)
ERYTHROCYTE [DISTWIDTH] IN BLOOD BY AUTOMATED COUNT: 53.1 FL (ref 35.1–46.3)
ERYTHROCYTE [DISTWIDTH] IN BLOOD: 15.2 % (ref 11–15)
FASTING STATUS PATIENT QL REPORTED: YES
GFR SERPLBLD BASED ON 1.73 SQ M-ARVRAT: 26 ML/MIN/1.73M2 (ref 60–?)
GFR SERPLBLD SCHWARTZ-ARVRAT: 26 ML/MIN/1.73M2
GLUCOSE BLD-MCNC: 91 MG/DL (ref 70–99)
GLUCOSE SERPL-MCNC: 91 MG/DL (ref 70–99)
HCT VFR BLD AUTO: 35.3 %
HCT VFR BLD CALC: 35.3 % (ref 35–48)
HGB BLD-MCNC: 11.2 G/DL
HGB BLD-MCNC: 11.2 G/DL (ref 12–16)
IMM GRANULOCYTES # BLD AUTO: 0.01 X10(3) UL (ref 0–1)
IMM GRANULOCYTES NFR BLD: 0.1 %
IMMATURE GRANULOCYTES (OFFPRE25): 0.1 %
INR: 1.6 (ref 2–3)
LENGTH OF FAST TIME PATIENT: YES H
LYMPHOCYTES # BLD AUTO: 2.85 X10(3) UL (ref 1–4)
LYMPHOCYTES # BLD: 2.85 X10(3) UL (ref 1–4)
LYMPHOCYTES NFR BLD AUTO: 40.2 %
LYMPHOCYTES NFR BLD: 40.2 %
MCH RBC QN AUTO: 30 PG (ref 26–34)
MCH RBC QN AUTO: 30 PG (ref 26–34)
MCHC RBC AUTO-ENTMCNC: 31.7 G/DL (ref 31–37)
MCHC RBC AUTO-ENTMCNC: 31.7 G/DL (ref 31–37)
MCV RBC AUTO: 94.6 FL
MCV RBC AUTO: 94.6 FL (ref 80–100)
MONOCYTES # BLD AUTO: 0.41 X10(3) UL (ref 0.1–1)
MONOCYTES # BLD: 0.41 X10(3) UL (ref 0.1–1)
MONOCYTES NFR BLD AUTO: 5.8 %
MONOCYTES NFR BLD: 5.8 %
NEUTROPHILS # BLD AUTO: 3.65 X10 (3) UL (ref 1.5–7.7)
NEUTROPHILS # BLD AUTO: 3.65 X10(3) UL (ref 1.5–7.7)
NEUTROPHILS # BLD: 3.65 X10(3) UL (ref 1.5–7.7)
NEUTROPHILS NFR BLD AUTO: 51.5 %
NEUTROPHILS NFR BLD: 51.5 %
NT-PROBNP SERPL-MCNC: 3134 PG/ML (ref ?–450)
NT-PROBNP SERPL-MCNC: ABNORMAL PG/ML
OSMOLALITY SERPL CALC.SUM OF ELEC: 308 MOSM/KG (ref 275–295)
PLATELET # BLD AUTO: 237 10(3)UL (ref 150–450)
PLATELET # BLD: 237 X10(3) UL (ref 150–450)
POTASSIUM SERPL-SCNC: 5.1 MMOL/L (ref 3.5–5.1)
POTASSIUM SERPL-SCNC: 5.1 MMOL/L (ref 3.5–5.1)
RBC # BLD AUTO: 3.73 X10(6)UL
RBC # BLD: 3.73 X10(6)UL (ref 3.8–5.3)
SODIUM SERPL-SCNC: 139 MMOL/L (ref 136–145)
SODIUM SERPL-SCNC: 139 MMOL/L (ref 136–145)
WBC # BLD AUTO: 7.1 X10(3) UL (ref 4–11)
WBC # BLD: 7.1 X10(3) UL (ref 4–11)

## 2023-05-02 PROCEDURE — 36415 COLL VENOUS BLD VENIPUNCTURE: CPT

## 2023-05-02 PROCEDURE — 85025 COMPLETE CBC W/AUTO DIFF WBC: CPT

## 2023-05-02 PROCEDURE — 80048 BASIC METABOLIC PNL TOTAL CA: CPT

## 2023-05-02 PROCEDURE — 83880 ASSAY OF NATRIURETIC PEPTIDE: CPT

## 2023-05-02 PROCEDURE — 85610 PROTHROMBIN TIME: CPT | Performed by: INTERNAL MEDICINE

## 2023-05-02 PROCEDURE — 93793 ANTICOAG MGMT PT WARFARIN: CPT | Performed by: INTERNAL MEDICINE

## 2023-05-02 NOTE — PROGRESS NOTES
Patient in clinic for INR check. Continues with concerns of weight loss and low INR. INR today below therapeutic range at 1.6. Denies any changes with medications or INR. Reports did follow dosing change from last week. Protocol: increase weekly dose 5-10%, advised increase weekly dose 6.7% and begin 3.75mg today and tomorrow. Patient agreed with plan. Will recheck INR on 5/8.      3.75 mg every Tue, Wed; 2.5 mg all other days

## 2023-05-04 NOTE — TELEPHONE ENCOUNTER
Head,  normocephalic,  atraumatic,  Face,  Face within normal limits,  Ears,  External ears within normal limits,  Nose/Nasopharynx,  External nose  normal appearance, no nasal discharge,  Mouth and Throat,  Oral cavity appearance normal,  Lips,  Appearance normal Urine culture +. Start Macrobid 100 mg bid x 7 d. Call if not better.

## 2023-05-08 ENCOUNTER — ANTI-COAG VISIT (OUTPATIENT)
Dept: ANTICOAGULATION | Facility: CLINIC | Age: 88
End: 2023-05-08

## 2023-05-08 ENCOUNTER — OFFICE VISIT (OUTPATIENT)
Dept: CARDIOLOGY | Age: 88
End: 2023-05-08

## 2023-05-08 VITALS
WEIGHT: 109.79 LBS | HEIGHT: 65 IN | RESPIRATION RATE: 18 BRPM | BODY MASS INDEX: 18.29 KG/M2 | HEART RATE: 64 BPM | SYSTOLIC BLOOD PRESSURE: 117 MMHG | DIASTOLIC BLOOD PRESSURE: 56 MMHG

## 2023-05-08 DIAGNOSIS — I50.812 CHRONIC RIGHT-SIDED CONGESTIVE HEART FAILURE (CMD): ICD-10-CM

## 2023-05-08 DIAGNOSIS — I27.20 PULMONARY HYPERTENSION (CMD): Primary | ICD-10-CM

## 2023-05-08 DIAGNOSIS — Z79.01 LONG TERM (CURRENT) USE OF ANTICOAGULANTS: ICD-10-CM

## 2023-05-08 DIAGNOSIS — I48.91 ATRIAL FIBRILLATION, UNSPECIFIED TYPE (HCC): ICD-10-CM

## 2023-05-08 DIAGNOSIS — Z51.81 ENCOUNTER FOR THERAPEUTIC DRUG MONITORING: ICD-10-CM

## 2023-05-08 DIAGNOSIS — I48.20 CHRONIC ATRIAL FIBRILLATION (HCC): ICD-10-CM

## 2023-05-08 LAB
INR: 1.4 (ref 0.8–1.2)
TEST STRIP EXPIRATION DATE: ABNORMAL DATE

## 2023-05-08 PROCEDURE — 94618 PULMONARY STRESS TESTING: CPT | Performed by: INTERNAL MEDICINE

## 2023-05-08 PROCEDURE — 99215 OFFICE O/P EST HI 40 MIN: CPT | Performed by: INTERNAL MEDICINE

## 2023-05-08 RX ORDER — FERROUS SULFATE 325(65) MG
1 TABLET ORAL EVERY OTHER DAY
COMMUNITY
Start: 2023-05-08

## 2023-05-08 ASSESSMENT — ENCOUNTER SYMPTOMS
ALLERGIC/IMMUNOLOGIC COMMENTS: NO NEW FOOD ALLERGIES
FEVER: 0
INSOMNIA: 0
HEADACHES: 0
BACK PAIN: 0
CONSTIPATION: 0
DEPRESSION: 0
SUSPICIOUS LESIONS: 0
SHORTNESS OF BREATH: 0
WEIGHT LOSS: 1
DIZZINESS: 0
BLOATING: 0
HEMOPTYSIS: 0
ALTERED MENTAL STATUS: 0
LIGHT-HEADEDNESS: 0
BRUISES/BLEEDS EASILY: 0
CHILLS: 0
DIARRHEA: 0
COUGH: 0
WEIGHT GAIN: 0
HEMATOCHEZIA: 0
LOSS OF BALANCE: 0
ABDOMINAL PAIN: 0

## 2023-05-08 NOTE — PROGRESS NOTES
Face to face. Here today with her daughter. Reports that she has not been drinking Ensure daily as she planned- she only had one can this past week. She will drink one can occasionally and will let us know if frequency increases. Denies any missed doses of warfarin, no change in meds. INR remains below goal range. Per protocol, increase weekly dose 10-20%- actual increase 12.5% and recheck INR in 1 week- she will return in 10 days as her daughter will be out of town next Monday.      5 mg every Mon, Fri; 2.5 mg all other days

## 2023-05-18 ENCOUNTER — ANTI-COAG VISIT (OUTPATIENT)
Dept: ANTICOAGULATION | Facility: CLINIC | Age: 88
End: 2023-05-18

## 2023-05-18 DIAGNOSIS — Z79.01 LONG TERM (CURRENT) USE OF ANTICOAGULANTS: ICD-10-CM

## 2023-05-18 DIAGNOSIS — Z51.81 ENCOUNTER FOR THERAPEUTIC DRUG MONITORING: ICD-10-CM

## 2023-05-18 DIAGNOSIS — I48.91 ATRIAL FIBRILLATION, UNSPECIFIED TYPE (HCC): ICD-10-CM

## 2023-05-18 DIAGNOSIS — I48.20 CHRONIC ATRIAL FIBRILLATION (HCC): ICD-10-CM

## 2023-05-18 LAB — INR: 2 (ref 2–3)

## 2023-05-18 PROCEDURE — 85610 PROTHROMBIN TIME: CPT | Performed by: INTERNAL MEDICINE

## 2023-05-18 PROCEDURE — 93793 ANTICOAG MGMT PT WARFARIN: CPT | Performed by: INTERNAL MEDICINE

## 2023-05-18 NOTE — PROGRESS NOTES
Patient in clinic for visit with daughter. Denies any changes with medications/diet. Reports to drink Ensure once in awhile. Confirmed current Coumadin dose as noted below. INR within goal range. Advised per protocol, continue current dose and recheck INR 4-5 weeks, daughter will be going out of town.     5 mg every Mon, Fri; 2.5 mg all other days

## 2023-05-30 ENCOUNTER — ANTI-COAG VISIT (OUTPATIENT)
Dept: ANTICOAGULATION | Facility: CLINIC | Age: 88
End: 2023-05-30

## 2023-05-30 DIAGNOSIS — Z51.81 ENCOUNTER FOR THERAPEUTIC DRUG MONITORING: ICD-10-CM

## 2023-05-30 DIAGNOSIS — I48.91 ATRIAL FIBRILLATION, UNSPECIFIED TYPE (HCC): ICD-10-CM

## 2023-05-30 DIAGNOSIS — I48.20 CHRONIC ATRIAL FIBRILLATION (HCC): ICD-10-CM

## 2023-05-30 DIAGNOSIS — Z79.01 LONG TERM (CURRENT) USE OF ANTICOAGULANTS: ICD-10-CM

## 2023-05-30 LAB
INR: 2.2 (ref 0.8–1.2)
TEST STRIP EXPIRATION DATE: ABNORMAL DATE

## 2023-05-30 PROCEDURE — 93793 ANTICOAG MGMT PT WARFARIN: CPT | Performed by: INTERNAL MEDICINE

## 2023-05-30 PROCEDURE — 85610 PROTHROMBIN TIME: CPT | Performed by: INTERNAL MEDICINE

## 2023-05-30 NOTE — PROGRESS NOTES
Face to face. Reports that she is drinking Ensure occasionally. Per protocol, continue current dose and recheck INR in 4 weeks.      5 mg every Mon, Fri; 2.5 mg all other days

## 2023-06-21 ENCOUNTER — LAB ENCOUNTER (OUTPATIENT)
Dept: LAB | Age: 88
End: 2023-06-21
Attending: INTERNAL MEDICINE
Payer: MEDICARE

## 2023-06-21 DIAGNOSIS — N18.32 STAGE 3B CHRONIC KIDNEY DISEASE (CKD) (HCC): ICD-10-CM

## 2023-06-21 LAB
ALBUMIN SERPL-MCNC: 3.4 G/DL (ref 3.4–5)
ANION GAP SERPL CALC-SCNC: 8 MMOL/L (ref 0–18)
BASOPHILS # BLD AUTO: 0.05 X10(3) UL (ref 0–0.2)
BASOPHILS NFR BLD AUTO: 0.8 %
BUN BLD-MCNC: 71 MG/DL (ref 7–18)
BUN/CREAT SERPL: 38.2 (ref 10–20)
CALCIUM BLD-MCNC: 8.8 MG/DL (ref 8.5–10.1)
CHLORIDE SERPL-SCNC: 108 MMOL/L (ref 98–112)
CO2 SERPL-SCNC: 22 MMOL/L (ref 21–32)
CREAT BLD-MCNC: 1.86 MG/DL
DEPRECATED RDW RBC AUTO: 50.8 FL (ref 35.1–46.3)
EOSINOPHIL # BLD AUTO: 0.04 X10(3) UL (ref 0–0.7)
EOSINOPHIL NFR BLD AUTO: 0.6 %
ERYTHROCYTE [DISTWIDTH] IN BLOOD BY AUTOMATED COUNT: 14.7 % (ref 11–15)
GFR SERPLBLD BASED ON 1.73 SQ M-ARVRAT: 25 ML/MIN/1.73M2 (ref 60–?)
GLUCOSE BLD-MCNC: 132 MG/DL (ref 70–99)
HCT VFR BLD AUTO: 31.1 %
HGB BLD-MCNC: 10.1 G/DL
IMM GRANULOCYTES # BLD AUTO: 0 X10(3) UL (ref 0–1)
IMM GRANULOCYTES NFR BLD: 0 %
LYMPHOCYTES # BLD AUTO: 2.27 X10(3) UL (ref 1–4)
LYMPHOCYTES NFR BLD AUTO: 36.8 %
MCH RBC QN AUTO: 30.2 PG (ref 26–34)
MCHC RBC AUTO-ENTMCNC: 32.5 G/DL (ref 31–37)
MCV RBC AUTO: 93.1 FL
MONOCYTES # BLD AUTO: 0.36 X10(3) UL (ref 0.1–1)
MONOCYTES NFR BLD AUTO: 5.8 %
NEUTROPHILS # BLD AUTO: 3.45 X10 (3) UL (ref 1.5–7.7)
NEUTROPHILS # BLD AUTO: 3.45 X10(3) UL (ref 1.5–7.7)
NEUTROPHILS NFR BLD AUTO: 56 %
OSMOLALITY SERPL CALC.SUM OF ELEC: 309 MOSM/KG (ref 275–295)
PHOSPHATE SERPL-MCNC: 4.5 MG/DL (ref 2.5–4.9)
PLATELET # BLD AUTO: 192 10(3)UL (ref 150–450)
POTASSIUM SERPL-SCNC: 5.1 MMOL/L (ref 3.5–5.1)
RBC # BLD AUTO: 3.34 X10(6)UL
SODIUM SERPL-SCNC: 138 MMOL/L (ref 136–145)
WBC # BLD AUTO: 6.2 X10(3) UL (ref 4–11)

## 2023-06-21 PROCEDURE — 85025 COMPLETE CBC W/AUTO DIFF WBC: CPT

## 2023-06-21 PROCEDURE — 36415 COLL VENOUS BLD VENIPUNCTURE: CPT

## 2023-06-21 PROCEDURE — 80069 RENAL FUNCTION PANEL: CPT

## 2023-06-26 ENCOUNTER — ANTI-COAG VISIT (OUTPATIENT)
Dept: ANTICOAGULATION | Facility: CLINIC | Age: 88
End: 2023-06-26

## 2023-06-26 ENCOUNTER — OFFICE VISIT (OUTPATIENT)
Dept: NEPHROLOGY | Facility: CLINIC | Age: 88
End: 2023-06-26

## 2023-06-26 VITALS
HEIGHT: 65 IN | DIASTOLIC BLOOD PRESSURE: 63 MMHG | SYSTOLIC BLOOD PRESSURE: 153 MMHG | HEART RATE: 56 BPM | WEIGHT: 110 LBS | BODY MASS INDEX: 18.33 KG/M2

## 2023-06-26 DIAGNOSIS — N18.32 STAGE 3B CHRONIC KIDNEY DISEASE (CKD) (HCC): Primary | ICD-10-CM

## 2023-06-26 DIAGNOSIS — I48.91 ATRIAL FIBRILLATION, UNSPECIFIED TYPE (HCC): ICD-10-CM

## 2023-06-26 DIAGNOSIS — I48.20 CHRONIC ATRIAL FIBRILLATION (HCC): ICD-10-CM

## 2023-06-26 DIAGNOSIS — I10 ESSENTIAL HYPERTENSION: ICD-10-CM

## 2023-06-26 DIAGNOSIS — Z51.81 ENCOUNTER FOR THERAPEUTIC DRUG MONITORING: ICD-10-CM

## 2023-06-26 DIAGNOSIS — Z79.01 LONG TERM (CURRENT) USE OF ANTICOAGULANTS: Primary | ICD-10-CM

## 2023-06-26 LAB — INR: 2.1 (ref 2–3)

## 2023-06-26 PROCEDURE — 85610 PROTHROMBIN TIME: CPT | Performed by: INTERNAL MEDICINE

## 2023-06-26 PROCEDURE — 93793 ANTICOAG MGMT PT WARFARIN: CPT | Performed by: INTERNAL MEDICINE

## 2023-06-26 PROCEDURE — 99214 OFFICE O/P EST MOD 30 MIN: CPT | Performed by: INTERNAL MEDICINE

## 2023-06-26 RX ORDER — POTASSIUM CHLORIDE 750 MG/1
10 TABLET, FILM COATED, EXTENDED RELEASE ORAL DAILY
COMMUNITY
Start: 2023-03-27

## 2023-06-26 NOTE — PROGRESS NOTES
Patient in clinic for INR check. Denies changes with meds/diet. INR within goal range. Advised, per protocol, continue current dose as confirmed below and recheck INR 4-6 weeks.     5 mg every Mon, Fri; 2.5 mg all other days

## 2023-06-27 NOTE — PATIENT INSTRUCTIONS
Follow low-salt diet carefully. Make sure you are drinking plenty of fluids. Repeat your labs in 2 weeks. Orders are in the computer.

## 2023-06-27 NOTE — PROGRESS NOTES
[facility], [facility]  Nephrology Daily Progress Note    Tena Doe  [id number]  80year old  Patient presents with: Follow - Up      HPI:   Tena Doe is a 80year old female. 43-year-old female with a history of chronic atrial fibrillation, moderate to severe mitral regurgitation, moderate pulmonary hypertension followed by Dr. Rony Felix, hypertension, borderline blood sugars, hypercholesterolemia, left renal mass, history of left breast cancer and chronic kidney disease stage III who is now here for follow-up. Overall patient states she is doing well without any chest pain, shortness of breath, GI or urinary tract symptoms. Reviewed medicines. Beta-blockers have all been discontinued secondary to bradycardia. Lisinopril is down to 2.5 mg daily. On torsemide 20 mg daily. 1 ferrous sulfate daily. Probably not eating as well as she used to and has lost some body weight. She has been checking her blood pressures daily. Usually in the 120s over 60s. Heart rates in the 50s and low 60s.     ROS:     Constitutional:  Negative for decreased activity, fever, irritability and lethargy  Endocrine:  Negative for abnormal sleep patterns, increased activity, polydipsia and polyphagia  Cardiovascular:  Negative for cool extremity and irregular heartbeat/palpitations  Gastrointestinal:  Negative for abdominal pain, constipation, decreased appetite, diarrhea and vomiting  Genitourinary:  Negative for dysuria and hematuria  Hema/Lymph:  Negative for easy bleeding and easy bruising  Integumentary:  Negative for pruritus and rash  Musculoskeletal:  Negative for bone/joint symptoms  Neurological:  Negative for gait disturbance  Psychiatric:  Negative for inappropriate interaction and psychiatric symptoms  Respiratory:  Negative for cough, dyspnea and wheezing      PHYSICAL EXAM:         3/7/2023     1:20 PM 6/26/2023     2:06 PM 6/26/2023     2:14 PM   Vitals History   /51 160/52 153/63   Pulse 52 56    Weight 112 lbs 110 lbs    Height 5' 5\" (1.651 m) 5' 5\" (1.651 m)    BMI 18.64 kg/m2 18.3 kg/m2            12/7/2022     1:34 PM 3/7/2023     1:20 PM 6/26/2023     2:06 PM   VITALS: WEIGHT ONLY   Weight 128 lbs 112 lbs 110 lbs       Constitutional: appears well hydrated alert and responsive no acute distress noted  Neck/Thyroid: neck is supple without adenopathy  Lymphatic: no abnormal cervical, supraclavicular or axillary adenopathy is noted  Respiratory: normal to inspection lungs are clear to auscultation bilaterally normal respiratory effort  Cardiovascular: regular rate and rhythm no murmurs, gallups, or rubs  Abdomen: soft non-tender non-distended no organomegaly noted no masses  Musculoskeletal: full ROM all extremities good strength  no deformities  Extremities: no edema, cyanosis, or clubbing  Neurological: exam appropriate for age reflexes and motor skills appropriate for age    Labs:  CBC results over the last 6 months:  Recent Labs     06/21/23  1342   WBC 6.2   RBC 3.34*   HGB 10.1*   HCT 31.1*   .0   MCV 93.1   MCH 30.2   MCHC 32.5   RDW 14.7   NEPRELIM 3.45   NEPERCENT 56.0   LYPERCENT 36.8   MOPERCENT 5.8   EOPERCENT 0.6   BAPERCENT 0.8   NE 3.45   LYMABS 2.27   MOABSO 0.36   EOABSO 0.04   BAABSO 0.05       Renal Function Panel results over the last 6 months:  Recent Labs     06/21/23  1342   *      K 5.1      CO2 22.0   BUN 71*   CREATSERUM 1.86*   CA 8.8   ALB 3.4   PHOS 4.5   BUNCREA 38.2*   ANIONGAP 8   OSMOCALC 309*       Imaging  No results found. Medications:    Current Outpatient Medications:     Potassium Chloride ER 10 MEQ Oral Tab CR, Take 1 tablet (10 mEq total) by mouth daily. , Disp: , Rfl:     rosuvastatin 5 MG Oral Tab, Take 1 tablet (5 mg total) by mouth nightly., Disp: 90 tablet, Rfl: 0    Ferrous Sulfate 325 (65 Fe) MG Oral Tab, Take 1 tablet (325 mg total) by mouth daily with breakfast., Disp: 60 tablet, Rfl: 0    sildenafil 20 MG Oral Tab, Take 0.5 tablets (10 mg total) by mouth 3 (three) times daily. , Disp: 90 tablet, Rfl: 0    warfarin 2.5 MG Oral Tab, TAKE 1 TABLET BY MOUTH 6  DAYS PER WEEK AND 2 TABLETS 1 DAY WEEKLY AS DIRECTED BY COUMADIN CLINIC, Disp: 104 tablet, Rfl: 3    Cholecalciferol (VITAMIN D) 50 MCG (2000 UT) Oral Cap, Take by mouth., Disp: , Rfl:     lisinopril 40 MG Oral Tab, Take 1 tablet (40 mg total) by mouth daily. (Patient taking differently: Take 1 tablet (40 mg total) by mouth daily. Pt taking 2.5 mg daily), Disp: 30 tablet, Rfl: 0    torsemide 10 MG Oral Tab, Take 2 tablets (20 mg total) by mouth daily. , Disp: , Rfl:     LOPERAMIDE HCL OR, Take 30 mL by mouth., Disp: , Rfl:     UPTRAVI 1600 MCG Oral Tab, 1,600 mcg Q12H.  , Disp: , Rfl:     Allergies:    Aldactone [Spironol*    HYPERKALEMIA  Ambrisentan             NAUSEA AND VOMITING    Comment:Pulmonary edema         ASSESSMENT/PLAN:   Assessment   (N18.32) Stage 3b chronic kidney disease (CKD) (HCC)  (primary encounter diagnosis)  Plan: CBC WITH DIFFERENTIAL WITH PLATELET, RENAL         FUNCTION PANEL, URINALYSIS, ROUTINE,         MICROALB/CREAT RATIO, RANDOM URINE           (I10) Essential hypertension  Plan:          Patient Active Problem List:     Atrial fibrillation (HCC)     Afib (HCC)     Malignant neoplasm of left breast in female, estrogen receptor positive (HCC)     Breast lump     Benign hypertensive heart disease     Hypercholesterolemia     Pseudophakia of both eyes     Vitreous floaters of both eyes     Dyspnea     After cataract of right eye not obscuring vision     Screening mammogram, encounter for     CKD (chronic kidney disease) stage 3, GFR 30-59 ml/min (HCC)     Dry eye     Essential hypertension     Pulmonary hypertension (HCC)     Long term (current) use of anticoagulants     Encounter for therapeutic drug monitoring     Closed fracture of neck of right femur, initial encounter ([facility]     Anticoagulated     Closed right hip fracture, initial encounter ([facility]     Fracture of unspecified part of neck of right femur, initial encounter for closed fracture ([facility] [address]     Acute on chronic heart failure with preserved ejection fraction (HFpEF) (HCC)     Biventricular heart failure (HCC)     PAH (pulmonary artery hypertension) (HCC)     Bradycardia     Orthopedic aftercare     Bilateral leg edema     Chronic atrial fibrillation (HCC)     Atrial fibrillation, unspecified type ([facility] [address]     Encounter for follow-up surveillance of breast cancer     History of left breast cancer      Overall volume status appears to be acceptable. Blood pressures at home are in a decent range. Heart rates acceptable off beta-blockers. I reviewed recent labs though it from June 21, 2023. Fortunately creatinine is creeping up. Was 1.39 back in February 2023. Increased to 1.78 May 2, 2023 and now on June 21, 2023 creatinine is now up to 1.86 with an estimated GFR of 25 cc/min. Potassium 5.1. Hemoglobin 10.1. I therefore informed the patient and her daughter that there has been a decline in her renal function. Unfortunate risk for cardiorenal syndrome. Now on a much lower dose of lisinopril. Maintain adequate hydration. Avoid nonsteroidals. We will repeat laboratory studies in 2 weeks. If creatinine  continues to rise may try to decrease her torsemide. Low-salt diet. She probably is getting a bit more salt than she thinks if she is eating frozen dinners and tends to eat hotdogs very often. Return in 3 months.        Orders Placed This Encounter      CBC W Differential W Platelet      Renal Function Panel      Urinalysis, Routine      Microalb/Creat Ratio, Random Urine      6/26/2023  Donna Mack MD

## 2023-07-05 RX ORDER — LISINOPRIL 2.5 MG/1
2.5 TABLET ORAL DAILY
Qty: 90 TABLET | Refills: 3 | Status: SHIPPED | OUTPATIENT
Start: 2023-07-05 | End: 2023-08-25 | Stop reason: ALTCHOICE

## 2023-07-10 ENCOUNTER — LAB ENCOUNTER (OUTPATIENT)
Dept: LAB | Age: 88
End: 2023-07-10
Attending: INTERNAL MEDICINE
Payer: MEDICARE

## 2023-07-10 DIAGNOSIS — N18.32 STAGE 3B CHRONIC KIDNEY DISEASE (CKD) (HCC): ICD-10-CM

## 2023-07-10 LAB
ALBUMIN SERPL-MCNC: 3.6 G/DL (ref 3.4–5)
ANION GAP SERPL CALC-SCNC: 10 MMOL/L (ref 0–18)
BASOPHILS # BLD AUTO: 0.05 X10(3) UL (ref 0–0.2)
BASOPHILS NFR BLD AUTO: 0.8 %
BILIRUB UR QL: NEGATIVE
BUN BLD-MCNC: 61 MG/DL (ref 7–18)
BUN/CREAT SERPL: 27.4 (ref 10–20)
CALCIUM BLD-MCNC: 8.8 MG/DL (ref 8.5–10.1)
CHLORIDE SERPL-SCNC: 108 MMOL/L (ref 98–112)
CO2 SERPL-SCNC: 21 MMOL/L (ref 21–32)
COLOR UR: YELLOW
CREAT BLD-MCNC: 2.23 MG/DL
CREAT UR-SCNC: 76.9 MG/DL
DEPRECATED RDW RBC AUTO: 51.6 FL (ref 35.1–46.3)
EOSINOPHIL # BLD AUTO: 0.04 X10(3) UL (ref 0–0.7)
EOSINOPHIL NFR BLD AUTO: 0.6 %
ERYTHROCYTE [DISTWIDTH] IN BLOOD BY AUTOMATED COUNT: 14.6 % (ref 11–15)
GFR SERPLBLD BASED ON 1.73 SQ M-ARVRAT: 20 ML/MIN/1.73M2 (ref 60–?)
GLUCOSE BLD-MCNC: 92 MG/DL (ref 70–99)
GLUCOSE UR-MCNC: NORMAL MG/DL
HCT VFR BLD AUTO: 33.6 %
HGB BLD-MCNC: 10.9 G/DL
HYALINE CASTS #/AREA URNS AUTO: PRESENT /LPF
IMM GRANULOCYTES # BLD AUTO: 0.01 X10(3) UL (ref 0–1)
IMM GRANULOCYTES NFR BLD: 0.2 %
IRON SATN MFR SERPL: 40 %
IRON SERPL-MCNC: 94 UG/DL
KETONES UR-MCNC: NEGATIVE MG/DL
LEUKOCYTE ESTERASE UR QL STRIP.AUTO: 500
LYMPHOCYTES # BLD AUTO: 2.57 X10(3) UL (ref 1–4)
LYMPHOCYTES NFR BLD AUTO: 39.7 %
MCH RBC QN AUTO: 30.6 PG (ref 26–34)
MCHC RBC AUTO-ENTMCNC: 32.4 G/DL (ref 31–37)
MCV RBC AUTO: 94.4 FL
MICROALBUMIN UR-MCNC: 1.45 MG/DL
MICROALBUMIN/CREAT 24H UR-RTO: 18.9 UG/MG (ref ?–30)
MONOCYTES # BLD AUTO: 0.39 X10(3) UL (ref 0.1–1)
MONOCYTES NFR BLD AUTO: 6 %
NEUTROPHILS # BLD AUTO: 3.41 X10 (3) UL (ref 1.5–7.7)
NEUTROPHILS # BLD AUTO: 3.41 X10(3) UL (ref 1.5–7.7)
NEUTROPHILS NFR BLD AUTO: 52.7 %
NITRITE UR QL STRIP.AUTO: NEGATIVE
OSMOLALITY SERPL CALC.SUM OF ELEC: 305 MOSM/KG (ref 275–295)
PH UR: 5 [PH] (ref 5–8)
PHOSPHATE SERPL-MCNC: 5.2 MG/DL (ref 2.5–4.9)
PLATELET # BLD AUTO: 216 10(3)UL (ref 150–450)
POTASSIUM SERPL-SCNC: 5.6 MMOL/L (ref 3.5–5.1)
PROT UR-MCNC: NEGATIVE MG/DL
RBC # BLD AUTO: 3.56 X10(6)UL
RBC #/AREA URNS AUTO: >10 /HPF
SODIUM SERPL-SCNC: 139 MMOL/L (ref 136–145)
SP GR UR STRIP: 1.01 (ref 1–1.03)
TIBC SERPL-MCNC: 237 UG/DL (ref 240–450)
TRANSFERRIN SERPL-MCNC: 159 MG/DL (ref 200–360)
UROBILINOGEN UR STRIP-ACNC: NORMAL
WBC # BLD AUTO: 6.5 X10(3) UL (ref 4–11)
WBC #/AREA URNS AUTO: >50 /HPF
WBC CLUMPS UR QL AUTO: PRESENT /HPF

## 2023-07-10 PROCEDURE — 80069 RENAL FUNCTION PANEL: CPT

## 2023-07-10 PROCEDURE — 85025 COMPLETE CBC W/AUTO DIFF WBC: CPT

## 2023-07-10 PROCEDURE — 82043 UR ALBUMIN QUANTITATIVE: CPT

## 2023-07-10 PROCEDURE — 82570 ASSAY OF URINE CREATININE: CPT

## 2023-07-10 PROCEDURE — 83540 ASSAY OF IRON: CPT

## 2023-07-10 PROCEDURE — 36415 COLL VENOUS BLD VENIPUNCTURE: CPT

## 2023-07-10 PROCEDURE — 81001 URINALYSIS AUTO W/SCOPE: CPT

## 2023-07-10 PROCEDURE — 84466 ASSAY OF TRANSFERRIN: CPT

## 2023-07-12 ENCOUNTER — TELEPHONE (OUTPATIENT)
Dept: NEPHROLOGY | Facility: CLINIC | Age: 88
End: 2023-07-12

## 2023-07-12 DIAGNOSIS — N18.32 STAGE 3B CHRONIC KIDNEY DISEASE (CKD) (HCC): Primary | ICD-10-CM

## 2023-07-12 RX ORDER — TORSEMIDE 10 MG/1
10 TABLET ORAL DAILY
Qty: 30 TABLET | Refills: 3 | Status: SHIPPED | OUTPATIENT
Start: 2023-07-12

## 2023-07-12 RX ORDER — TORSEMIDE 10 MG/1
10 TABLET ORAL DAILY
COMMUNITY

## 2023-07-12 RX ORDER — LISINOPRIL 2.5 MG/1
2.5 TABLET ORAL DAILY
COMMUNITY

## 2023-07-12 NOTE — TELEPHONE ENCOUNTER
Dr Yadi Jessica Informed patient of note below and verbalized understanding denies any symptoms  of UTI stated is doing fine ,advised if any concerns to call our office.
Informed patient of note below and verbalized understanding,requesting to send Rx for Torsemide 10 mg daily to pharmacy requested at [facility] not comfortable cutting the pill (torsemide 20 mg),generated order for labs ,med list updated. List of low potassium food mailed to patient. Advised need 8 hours fasting prior to the test.
Kidney function is worse and potassium is too high. If she is still taking potassium chloride discontinue. Instruct her on a low potassium diet. If on torsemide 20 mg daily decrease to 10 mg daily. Update med list.  I think she is only on lisinopril 2.5 mg daily. Repeat CBC and renal panel in 1 weeks.
Urinalysis also suggest a UTI. Does she have any symptoms. If so do a urine C&S.
noted
Yes

## 2023-07-19 ENCOUNTER — LAB ENCOUNTER (OUTPATIENT)
Dept: LAB | Age: 88
End: 2023-07-19
Attending: INTERNAL MEDICINE
Payer: MEDICARE

## 2023-07-19 ENCOUNTER — TELEPHONE (OUTPATIENT)
Dept: NEPHROLOGY | Facility: CLINIC | Age: 88
End: 2023-07-19

## 2023-07-19 DIAGNOSIS — N18.32 STAGE 3B CHRONIC KIDNEY DISEASE (CKD) (HCC): ICD-10-CM

## 2023-07-19 LAB
ALBUMIN SERPL-MCNC: 3.6 G/DL (ref 3.4–5)
ANION GAP SERPL CALC-SCNC: 7 MMOL/L (ref 0–18)
BASOPHILS # BLD AUTO: 0.05 X10(3) UL (ref 0–0.2)
BASOPHILS NFR BLD AUTO: 0.7 %
BUN BLD-MCNC: 60 MG/DL (ref 7–18)
BUN/CREAT SERPL: 36.1 (ref 10–20)
CALCIUM BLD-MCNC: 8.9 MG/DL (ref 8.5–10.1)
CHLORIDE SERPL-SCNC: 114 MMOL/L (ref 98–112)
CO2 SERPL-SCNC: 21 MMOL/L (ref 21–32)
CREAT BLD-MCNC: 1.66 MG/DL
DEPRECATED RDW RBC AUTO: 52.7 FL (ref 35.1–46.3)
EOSINOPHIL # BLD AUTO: 0.07 X10(3) UL (ref 0–0.7)
EOSINOPHIL NFR BLD AUTO: 1 %
ERYTHROCYTE [DISTWIDTH] IN BLOOD BY AUTOMATED COUNT: 15.1 % (ref 11–15)
GFR SERPLBLD BASED ON 1.73 SQ M-ARVRAT: 28 ML/MIN/1.73M2 (ref 60–?)
GLUCOSE BLD-MCNC: 96 MG/DL (ref 70–99)
HCT VFR BLD AUTO: 32.9 %
HGB BLD-MCNC: 10.7 G/DL
IMM GRANULOCYTES # BLD AUTO: 0.01 X10(3) UL (ref 0–1)
IMM GRANULOCYTES NFR BLD: 0.1 %
LYMPHOCYTES # BLD AUTO: 2.63 X10(3) UL (ref 1–4)
LYMPHOCYTES NFR BLD AUTO: 39.4 %
MCH RBC QN AUTO: 30.8 PG (ref 26–34)
MCHC RBC AUTO-ENTMCNC: 32.5 G/DL (ref 31–37)
MCV RBC AUTO: 94.8 FL
MONOCYTES # BLD AUTO: 0.43 X10(3) UL (ref 0.1–1)
MONOCYTES NFR BLD AUTO: 6.4 %
NEUTROPHILS # BLD AUTO: 3.48 X10 (3) UL (ref 1.5–7.7)
NEUTROPHILS # BLD AUTO: 3.48 X10(3) UL (ref 1.5–7.7)
NEUTROPHILS NFR BLD AUTO: 52.4 %
OSMOLALITY SERPL CALC.SUM OF ELEC: 311 MOSM/KG (ref 275–295)
PHOSPHATE SERPL-MCNC: 3.7 MG/DL (ref 2.5–4.9)
PLATELET # BLD AUTO: 208 10(3)UL (ref 150–450)
POTASSIUM SERPL-SCNC: 4.5 MMOL/L (ref 3.5–5.1)
RBC # BLD AUTO: 3.47 X10(6)UL
SODIUM SERPL-SCNC: 142 MMOL/L (ref 136–145)
WBC # BLD AUTO: 6.7 X10(3) UL (ref 4–11)

## 2023-07-19 PROCEDURE — 85025 COMPLETE CBC W/AUTO DIFF WBC: CPT

## 2023-07-19 PROCEDURE — 80069 RENAL FUNCTION PANEL: CPT

## 2023-07-19 PROCEDURE — 36415 COLL VENOUS BLD VENIPUNCTURE: CPT

## 2023-07-19 NOTE — TELEPHONE ENCOUNTER
Kidney function was better. .  Potassium now normal.  Is she doing okay on the lower dose of torsemide. Any signs of fluid retention. If not CPM and repeat CBC and renal panel in 3 months.

## 2023-07-20 NOTE — TELEPHONE ENCOUNTER
Spoke to pt ( confirmed). Verbalized understanding. Requesting to call her dtr for future results d/t being [facility].

## 2023-08-01 ENCOUNTER — ANTI-COAG VISIT (OUTPATIENT)
Dept: ANTICOAGULATION | Facility: CLINIC | Age: 88
End: 2023-08-01

## 2023-08-01 DIAGNOSIS — Z79.01 LONG TERM (CURRENT) USE OF ANTICOAGULANTS: Primary | ICD-10-CM

## 2023-08-01 DIAGNOSIS — I48.91 ATRIAL FIBRILLATION, UNSPECIFIED TYPE (HCC): ICD-10-CM

## 2023-08-01 DIAGNOSIS — Z51.81 ENCOUNTER FOR THERAPEUTIC DRUG MONITORING: ICD-10-CM

## 2023-08-01 DIAGNOSIS — I48.20 CHRONIC ATRIAL FIBRILLATION (HCC): ICD-10-CM

## 2023-08-01 LAB
INR: 2.3 (ref 0.8–1.2)
TEST STRIP EXPIRATION DATE: ABNORMAL DATE

## 2023-08-01 PROCEDURE — 85610 PROTHROMBIN TIME: CPT | Performed by: INTERNAL MEDICINE

## 2023-08-01 PROCEDURE — 93793 ANTICOAG MGMT PT WARFARIN: CPT | Performed by: INTERNAL MEDICINE

## 2023-08-01 NOTE — PROGRESS NOTES
Face to face. Here today with her daughter. Per protocol, continue current dose and recheck INR in 4-6 weeks.      5 mg every Mon, Fri; 2.5 mg all other days

## 2023-08-25 ENCOUNTER — OFFICE VISIT (OUTPATIENT)
Dept: CARDIOLOGY | Age: 88
End: 2023-08-25

## 2023-08-25 ENCOUNTER — LAB SERVICES (OUTPATIENT)
Dept: LAB | Age: 88
End: 2023-08-25

## 2023-08-25 VITALS
BODY MASS INDEX: 18.2 KG/M2 | DIASTOLIC BLOOD PRESSURE: 53 MMHG | WEIGHT: 109.24 LBS | HEART RATE: 65 BPM | RESPIRATION RATE: 18 BRPM | SYSTOLIC BLOOD PRESSURE: 105 MMHG | HEIGHT: 65 IN

## 2023-08-25 DIAGNOSIS — E55.9 VITAMIN D DEFICIENCY: ICD-10-CM

## 2023-08-25 DIAGNOSIS — I27.0 PRIMARY PULMONARY HYPERTENSION (CMD): ICD-10-CM

## 2023-08-25 DIAGNOSIS — E78.00 HYPERCHOLESTEREMIA: ICD-10-CM

## 2023-08-25 DIAGNOSIS — R06.09 DYSPNEA ON EXERTION: ICD-10-CM

## 2023-08-25 DIAGNOSIS — I27.0 PRIMARY PULMONARY HYPERTENSION (CMD): Primary | ICD-10-CM

## 2023-08-25 PROCEDURE — 99215 OFFICE O/P EST HI 40 MIN: CPT | Performed by: INTERNAL MEDICINE

## 2023-08-25 PROCEDURE — 94618 PULMONARY STRESS TESTING: CPT | Performed by: INTERNAL MEDICINE

## 2023-08-25 SDOH — HEALTH STABILITY: PHYSICAL HEALTH: ON AVERAGE, HOW MANY DAYS PER WEEK DO YOU ENGAGE IN MODERATE TO STRENUOUS EXERCISE (LIKE A BRISK WALK)?: 0 DAYS

## 2023-08-25 SDOH — HEALTH STABILITY: PHYSICAL HEALTH: ON AVERAGE, HOW MANY MINUTES DO YOU ENGAGE IN EXERCISE AT THIS LEVEL?: 0 MIN

## 2023-08-25 ASSESSMENT — ENCOUNTER SYMPTOMS
DIZZINESS: 0
ALTERED MENTAL STATUS: 0
DEPRESSION: 0
HEMOPTYSIS: 0
SHORTNESS OF BREATH: 0
BLOATING: 0
LOSS OF BALANCE: 0
COUGH: 0
HEMATOCHEZIA: 0
ABDOMINAL PAIN: 0
LIGHT-HEADEDNESS: 0
CHILLS: 0
WEIGHT GAIN: 0
ALLERGIC/IMMUNOLOGIC COMMENTS: NO NEW FOOD ALLERGIES
INSOMNIA: 0
FEVER: 0
DIARRHEA: 0
BACK PAIN: 0
BRUISES/BLEEDS EASILY: 0
WEIGHT LOSS: 0
HEADACHES: 0
CONSTIPATION: 0
SUSPICIOUS LESIONS: 0

## 2023-09-07 ENCOUNTER — TELEPHONE (OUTPATIENT)
Dept: CARDIOLOGY | Age: 88
End: 2023-09-07

## 2023-09-12 ENCOUNTER — ANTI-COAG VISIT (OUTPATIENT)
Dept: ANTICOAGULATION | Facility: CLINIC | Age: 88
End: 2023-09-12

## 2023-09-12 ENCOUNTER — OFFICE VISIT (OUTPATIENT)
Dept: OTOLARYNGOLOGY | Facility: CLINIC | Age: 88
End: 2023-09-12

## 2023-09-12 VITALS — BODY MASS INDEX: 18.33 KG/M2 | HEIGHT: 65 IN | WEIGHT: 110 LBS

## 2023-09-12 DIAGNOSIS — I48.91 ATRIAL FIBRILLATION, UNSPECIFIED TYPE (HCC): ICD-10-CM

## 2023-09-12 DIAGNOSIS — I48.20 CHRONIC ATRIAL FIBRILLATION (HCC): ICD-10-CM

## 2023-09-12 DIAGNOSIS — Z51.81 ENCOUNTER FOR THERAPEUTIC DRUG MONITORING: ICD-10-CM

## 2023-09-12 DIAGNOSIS — I27.20 PULMONARY HYPERTENSION (HCC): ICD-10-CM

## 2023-09-12 DIAGNOSIS — Z79.01 LONG TERM (CURRENT) USE OF ANTICOAGULANTS: Primary | ICD-10-CM

## 2023-09-12 DIAGNOSIS — H61.23 BILATERAL IMPACTED CERUMEN: Primary | ICD-10-CM

## 2023-09-12 LAB
INR: 1.9 (ref 0.8–1.2)
TEST STRIP EXPIRATION DATE: ABNORMAL DATE

## 2023-09-12 PROCEDURE — 69210 REMOVE IMPACTED EAR WAX UNI: CPT | Performed by: OTOLARYNGOLOGY

## 2023-09-12 PROCEDURE — 85610 PROTHROMBIN TIME: CPT | Performed by: FAMILY MEDICINE

## 2023-09-12 PROCEDURE — 93793 ANTICOAG MGMT PT WARFARIN: CPT | Performed by: FAMILY MEDICINE

## 2023-09-12 RX ORDER — ACETAMINOPHEN 325 MG/1
2 TABLET ORAL EVERY 4 HOURS PRN
COMMUNITY
Start: 2023-02-20

## 2023-09-25 RX ORDER — ROSUVASTATIN CALCIUM 5 MG/1
5 TABLET, COATED ORAL NIGHTLY
Qty: 90 TABLET | Refills: 0 | Status: SHIPPED | OUTPATIENT
Start: 2023-09-25

## 2023-09-27 ENCOUNTER — TELEPHONE (OUTPATIENT)
Dept: ANTICOAGULATION | Facility: CLINIC | Age: 88
End: 2023-09-27

## 2023-09-27 DIAGNOSIS — I48.20 CHRONIC ATRIAL FIBRILLATION (HCC): Primary | ICD-10-CM

## 2023-09-27 DIAGNOSIS — Z79.01 MONITORING FOR LONG-TERM ANTICOAGULANT USE: ICD-10-CM

## 2023-09-27 DIAGNOSIS — Z51.81 MONITORING FOR LONG-TERM ANTICOAGULANT USE: ICD-10-CM

## 2023-09-27 NOTE — TELEPHONE ENCOUNTER
Anticoag referral expires soon. Order pended. Please sign, thank you.       Dx: Chronic atrial fibrillation (HCC) (I48.20

## 2023-10-06 ENCOUNTER — TELEPHONE (OUTPATIENT)
Dept: NEPHROLOGY | Facility: CLINIC | Age: 88
End: 2023-10-06

## 2023-10-06 DIAGNOSIS — N18.32 STAGE 3B CHRONIC KIDNEY DISEASE (CKD) (HCC): Primary | ICD-10-CM

## 2023-10-06 NOTE — TELEPHONE ENCOUNTER
Pts daughter called to find out if pt is due for labs before her next appt and if she needs to fast.  Please call.

## 2023-10-06 NOTE — TELEPHONE ENCOUNTER
Rn generated order ,pt daughter Lizbet(MATILDA verified)informed pt need 8 hours fasting prior to the test and verbalized understanding. please see encounter 7/9/23.

## 2023-10-11 ENCOUNTER — ANTI-COAG VISIT (OUTPATIENT)
Dept: ANTICOAGULATION | Facility: CLINIC | Age: 88
End: 2023-10-11

## 2023-10-11 DIAGNOSIS — I27.20 PULMONARY HYPERTENSION (HCC): Primary | ICD-10-CM

## 2023-10-11 DIAGNOSIS — Z79.01 LONG TERM (CURRENT) USE OF ANTICOAGULANTS: ICD-10-CM

## 2023-10-11 DIAGNOSIS — Z79.01 MONITORING FOR LONG-TERM ANTICOAGULANT USE: ICD-10-CM

## 2023-10-11 DIAGNOSIS — I48.20 CHRONIC ATRIAL FIBRILLATION (HCC): ICD-10-CM

## 2023-10-11 DIAGNOSIS — Z51.81 MONITORING FOR LONG-TERM ANTICOAGULANT USE: ICD-10-CM

## 2023-10-11 LAB
INR: 3.8 (ref 0.8–1.2)
TEST STRIP EXPIRATION DATE: ABNORMAL DATE

## 2023-10-11 PROCEDURE — 85610 PROTHROMBIN TIME: CPT | Performed by: FAMILY MEDICINE

## 2023-10-11 PROCEDURE — 93793 ANTICOAG MGMT PT WARFARIN: CPT | Performed by: FAMILY MEDICINE

## 2023-10-11 NOTE — PROGRESS NOTES
Face-to-Face / INR 3.8, supra therapeutic. (Goal 2.5 )    Etiology: She was doing well on this dose. Eating fine, no etoh ever, No otc tylenol. No med changes. ~  ~ Except Crestor might have been added back in -in September if she ran out at some point-Dr GONZALEZ is checking her fasting lipid for the next appt in a few weeks so their is some indication that she ran out for a while. ?? PLAN:  HOLD today then go back on current dose and recheck in 2 weeks. Her Trend looks like she ran out of Crestor about Feb 2023?? And restarted in Sept ???    Recheck INR 2 weeks. Pt reports: Any missed doses: No   Medications changes: No    Spoke to: [place] who verbalized understanding and agreement.     Plan per protocol: 10/11: Hold; Otherwise 5 mg every Mon, Fri; 2.5 mg all other days

## 2023-10-12 DIAGNOSIS — I27.0 PRIMARY PULMONARY HYPERTENSION (CMD): ICD-10-CM

## 2023-10-12 DIAGNOSIS — I27.20 PULMONARY HYPERTENSION (CMD): ICD-10-CM

## 2023-10-13 RX ORDER — SELEXIPAG 1600 UG/1
TABLET, COATED ORAL
Qty: 60 TABLET | Refills: 11 | Status: SHIPPED | OUTPATIENT
Start: 2023-10-13

## 2023-10-17 ENCOUNTER — CLINICAL DOCUMENTATION (OUTPATIENT)
Dept: CARDIOLOGY | Age: 88
End: 2023-10-17

## 2023-10-24 ENCOUNTER — TELEPHONE (OUTPATIENT)
Dept: CARDIOLOGY | Age: 88
End: 2023-10-24

## 2023-10-24 DIAGNOSIS — I27.0 PRIMARY PULMONARY HYPERTENSION (CMD): Primary | ICD-10-CM

## 2023-10-24 DIAGNOSIS — E55.9 VITAMIN D DEFICIENCY: ICD-10-CM

## 2023-10-24 DIAGNOSIS — R06.09 DYSPNEA ON EXERTION: ICD-10-CM

## 2023-10-24 DIAGNOSIS — E78.00 HYPERCHOLESTEREMIA: ICD-10-CM

## 2023-10-24 RX ORDER — LISINOPRIL 2.5 MG/1
2.5 TABLET ORAL DAILY
Qty: 90 TABLET | Refills: 3 | Status: SHIPPED | OUTPATIENT
Start: 2023-10-24

## 2023-10-27 ENCOUNTER — LAB ENCOUNTER (OUTPATIENT)
Dept: LAB | Age: 88
End: 2023-10-27
Attending: INTERNAL MEDICINE

## 2023-10-27 ENCOUNTER — ANTI-COAG VISIT (OUTPATIENT)
Dept: ANTICOAGULATION | Facility: CLINIC | Age: 88
End: 2023-10-27

## 2023-10-27 DIAGNOSIS — I48.20 CHRONIC ATRIAL FIBRILLATION (HCC): ICD-10-CM

## 2023-10-27 DIAGNOSIS — N18.32 STAGE 3B CHRONIC KIDNEY DISEASE (CKD) (HCC): ICD-10-CM

## 2023-10-27 DIAGNOSIS — Z79.01 LONG TERM (CURRENT) USE OF ANTICOAGULANTS: ICD-10-CM

## 2023-10-27 DIAGNOSIS — Z51.81 MONITORING FOR LONG-TERM ANTICOAGULANT USE: ICD-10-CM

## 2023-10-27 DIAGNOSIS — Z79.01 MONITORING FOR LONG-TERM ANTICOAGULANT USE: ICD-10-CM

## 2023-10-27 DIAGNOSIS — I27.20 PULMONARY HYPERTENSION (HCC): Primary | ICD-10-CM

## 2023-10-27 LAB
ALBUMIN SERPL-MCNC: 3.3 G/DL (ref 3.4–5)
ANION GAP SERPL CALC-SCNC: 8 MMOL/L (ref 0–18)
BASOPHILS # BLD AUTO: 0.07 X10(3) UL (ref 0–0.2)
BASOPHILS NFR BLD AUTO: 1.2 %
BUN BLD-MCNC: 31 MG/DL (ref 7–18)
BUN/CREAT SERPL: 22.8 (ref 10–20)
CALCIUM BLD-MCNC: 8.9 MG/DL (ref 8.5–10.1)
CHLORIDE SERPL-SCNC: 109 MMOL/L (ref 98–112)
CO2 SERPL-SCNC: 26 MMOL/L (ref 21–32)
CREAT BLD-MCNC: 1.36 MG/DL
DEPRECATED RDW RBC AUTO: 51.5 FL (ref 35.1–46.3)
EGFRCR SERPLBLD CKD-EPI 2021: 36 ML/MIN/1.73M2 (ref 60–?)
EOSINOPHIL # BLD AUTO: 0.24 X10(3) UL (ref 0–0.7)
EOSINOPHIL NFR BLD AUTO: 4.2 %
ERYTHROCYTE [DISTWIDTH] IN BLOOD BY AUTOMATED COUNT: 14.3 % (ref 11–15)
GLUCOSE BLD-MCNC: 83 MG/DL (ref 70–99)
HCT VFR BLD AUTO: 34.4 %
HGB BLD-MCNC: 10.9 G/DL
IMM GRANULOCYTES # BLD AUTO: 0.01 X10(3) UL (ref 0–1)
IMM GRANULOCYTES NFR BLD: 0.2 %
INR: 2.5 (ref 0.8–1.2)
LYMPHOCYTES # BLD AUTO: 2.34 X10(3) UL (ref 1–4)
LYMPHOCYTES NFR BLD AUTO: 40.6 %
MCH RBC QN AUTO: 30.9 PG (ref 26–34)
MCHC RBC AUTO-ENTMCNC: 31.7 G/DL (ref 31–37)
MCV RBC AUTO: 97.5 FL
MONOCYTES # BLD AUTO: 0.31 X10(3) UL (ref 0.1–1)
MONOCYTES NFR BLD AUTO: 5.4 %
NEUTROPHILS # BLD AUTO: 2.8 X10 (3) UL (ref 1.5–7.7)
NEUTROPHILS # BLD AUTO: 2.8 X10(3) UL (ref 1.5–7.7)
NEUTROPHILS NFR BLD AUTO: 48.4 %
OSMOLALITY SERPL CALC.SUM OF ELEC: 302 MOSM/KG (ref 275–295)
PHOSPHATE SERPL-MCNC: 3.7 MG/DL (ref 2.5–4.9)
PLATELET # BLD AUTO: 217 10(3)UL (ref 150–450)
POTASSIUM SERPL-SCNC: 4.2 MMOL/L (ref 3.5–5.1)
RBC # BLD AUTO: 3.53 X10(6)UL
SODIUM SERPL-SCNC: 143 MMOL/L (ref 136–145)
TEST STRIP EXPIRATION DATE: ABNORMAL DATE
WBC # BLD AUTO: 5.8 X10(3) UL (ref 4–11)

## 2023-10-27 PROCEDURE — 85610 PROTHROMBIN TIME: CPT | Performed by: FAMILY MEDICINE

## 2023-10-27 PROCEDURE — 80069 RENAL FUNCTION PANEL: CPT

## 2023-10-27 PROCEDURE — 36415 COLL VENOUS BLD VENIPUNCTURE: CPT

## 2023-10-27 PROCEDURE — 93793 ANTICOAG MGMT PT WARFARIN: CPT | Performed by: FAMILY MEDICINE

## 2023-10-27 PROCEDURE — 85025 COMPLETE CBC W/AUTO DIFF WBC: CPT

## 2023-10-27 NOTE — PROGRESS NOTES
Face to face. Here with her daughter Alex Villaseñor. Continues to take crestor for cholesterol. Restarted taking lisinopril- was on hold for 2 months. Per protocol, continue current dose and recheck INR in 4 weeks.      5 mg every Mon, Fri; 2.5 mg all other days

## 2023-11-03 ENCOUNTER — OFFICE VISIT (OUTPATIENT)
Dept: NEPHROLOGY | Facility: CLINIC | Age: 88
End: 2023-11-03

## 2023-11-03 VITALS
DIASTOLIC BLOOD PRESSURE: 79 MMHG | SYSTOLIC BLOOD PRESSURE: 172 MMHG | HEART RATE: 66 BPM | HEIGHT: 65 IN | BODY MASS INDEX: 18.16 KG/M2 | WEIGHT: 109 LBS

## 2023-11-03 DIAGNOSIS — E78.00 HYPERCHOLESTEROLEMIA: ICD-10-CM

## 2023-11-03 DIAGNOSIS — N18.32 STAGE 3B CHRONIC KIDNEY DISEASE (CKD) (HCC): Primary | ICD-10-CM

## 2023-11-03 PROCEDURE — 99214 OFFICE O/P EST MOD 30 MIN: CPT | Performed by: INTERNAL MEDICINE

## 2023-11-03 RX ORDER — TORSEMIDE 10 MG/1
10 TABLET ORAL DAILY
Qty: 90 TABLET | Refills: 1 | Status: SHIPPED | OUTPATIENT
Start: 2023-11-03

## 2023-11-03 RX ORDER — ROSUVASTATIN CALCIUM 5 MG/1
5 TABLET, COATED ORAL NIGHTLY
Qty: 90 TABLET | Refills: 1 | Status: SHIPPED | OUTPATIENT
Start: 2023-11-03

## 2023-11-03 NOTE — PROGRESS NOTES
[facility], [facility]  Nephrology Daily Progress Note    Tena Doe  [id number]  80year old  Patient presents with: Follow - Up: Pt had labs last week      HPI:   Tena Doe is a 80year old female. 51-year-old female with a history of chronic atrial fibrillation, moderate to severe mitral regurgitation, moderate pulmonary hypertension followed by Dr. Rony Felix, hypertension, borderline blood sugars, hypercholesterolemia, history of left breast cancer and chronic kidney disease stage III who is now here for follow-up. Overall patient states she has been doing reasonably well without any chest pain, shortness of breath, GI or urinary tract symptoms. Heart rates remain in the 60s and atrial fib. Cardiology stopped her lisinopril for a couple of months but blood pressure crept up and is now back on 2.5 mg daily. Heart rates in the 60s.       ROS:     Constitutional:  Negative for decreased activity, fever, irritability and lethargy  Endocrine:  Negative for abnormal sleep patterns, increased activity, polydipsia and polyphagia  Cardiovascular:  Negative for cool extremity and irregular heartbeat/palpitations  Gastrointestinal:  Negative for abdominal pain, constipation, decreased appetite, diarrhea and vomiting  Genitourinary:  Negative for dysuria and hematuria  Hema/Lymph:  Negative for easy bleeding and easy bruising  Integumentary:  Negative for pruritus and rash  Musculoskeletal:  Negative for bone/joint symptoms  Neurological:  Negative for gait disturbance  Psychiatric:  Negative for inappropriate interaction and psychiatric symptoms  Respiratory:  Negative for cough, dyspnea and wheezing      PHYSICAL EXAM:         6/26/2023     2:14 PM 9/12/2023     2:25 PM 11/3/2023     1:15 PM   Vitals History   /63  172/79   Pulse   66   Weight  110 lbs 109 lbs   Height  5' 5\" (1.651 m) 5' 5\" (1.651 m)   BMI  18.3 kg/m2 18.14 kg/m2           6/26/2023     2:06 PM 9/12/2023     2:25 PM 11/3/2023     1:15 PM VITALS: WEIGHT ONLY   Weight 110 lbs 110 lbs 109 lbs       Constitutional: appears well hydrated alert and responsive no acute distress noted  Neck/Thyroid: neck is supple without adenopathy  Lymphatic: no abnormal cervical, supraclavicular or axillary adenopathy is noted  Respiratory: normal to inspection lungs are clear to auscultation bilaterally normal respiratory effort  Cardiovascular: regular rate and rhythm no murmurs, gallups, or rubs  Abdomen: soft non-tender non-distended no organomegaly noted no masses  Musculoskeletal: full ROM all extremities good strength  no deformities  Extremities: no edema, cyanosis, or clubbing  Neurological: exam appropriate for age reflexes and motor skills appropriate for age    Labs:  CBC results over the last 6 months:  Recent Labs     10/27/23  1144   WBC 5.8   RBC 3.53*   HGB 10.9*   HCT 34.4*   .0   MCV 97.5   MCH 30.9   MCHC 31.7   RDW 14.3   NEPRELIM 2.80   NEPERCENT 48.4   LYPERCENT 40.6   MOPERCENT 5.4   EOPERCENT 4.2   BAPERCENT 1.2   NE 2.80   LYMABS 2.34   MOABSO 0.31   EOABSO 0.24   BAABSO 0.07       Renal Function Panel results over the last 6 months:  Recent Labs     10/27/23  1144   GLU 83      K 4.2      CO2 26.0   BUN 31*   CREATSERUM 1.36*   CA 8.9   ALB 3.3*   PHOS 3.7   BUNCREA 22.8*   ANIONGAP 8   OSMOCALC 302*       Imaging  No results found. Medications:    Current Outpatient Medications:     rosuvastatin 5 MG Oral Tab, Take 1 tablet (5 mg total) by mouth nightly., Disp: 90 tablet, Rfl: 0    acetaminophen 325 MG Oral Tab, Take 2 tablets (650 mg total) by mouth every 4 (four) hours as needed. , Disp: , Rfl:     lisinopril 2.5 MG Oral Tab, Take 1 tablet (2.5 mg total) by mouth daily. , Disp: , Rfl:     torsemide 10 MG Oral Tab, Take 1 tablet (10 mg total) by mouth daily. , Disp: , Rfl:     warfarin 2.5 MG Oral Tab, TAKE 1 TABLET BY MOUTH 6  DAYS PER WEEK AND 2 TABLETS BY MOUTH 1 DAY WEEKLY AS  DIRECTED BY COUMADIN CLINIC, Disp: 104 tablet, Rfl: 3    Ferrous Sulfate 325 (65 Fe) MG Oral Tab, Take 1 tablet (325 mg total) by mouth daily with breakfast., Disp: 60 tablet, Rfl: 0    sildenafil 20 MG Oral Tab, Take 0.5 tablets (10 mg total) by mouth 3 (three) times daily. , Disp: 90 tablet, Rfl: 0    Cholecalciferol (VITAMIN D) 50 MCG (2000 UT) Oral Cap, Take by mouth., Disp: , Rfl:     LOPERAMIDE HCL OR, Take 30 mL by mouth., Disp: , Rfl:     UPTRAVI 1600 MCG Oral Tab, 1,600 mcg Q12H.  , Disp: , Rfl:     Allergies:    Aldactone [Spironol*    HYPERKALEMIA  Ambrisentan             NAUSEA AND VOMITING    Comment:Pulmonary edema         ASSESSMENT/PLAN:   Assessment   (N18.32) Stage 3b chronic kidney disease (CKD) ([facility])  (primary encounter diagnosis)  Plan: CBC W Differential W Platelet, Comp Metabolic         Panel (14), Lipid Panel            (E78.00) Hypercholesterolemia  Plan: CBC W Differential W Platelet, Comp Metabolic         Panel (14), Lipid Panel                  Patient Active Problem List:     Atrial fibrillation (HCC)     Afib ([facility])     Malignant neoplasm of left breast in female, estrogen receptor positive      Breast lump     Benign hypertensive heart disease     Hypercholesterolemia     Pseudophakia of both eyes     Vitreous floaters of both eyes     Dyspnea     After cataract of right eye not obscuring vision     Screening mammogram, encounter for     CKD (chronic kidney disease) stage 3, GFR 30-59 ml/min ([facility])     Dry eye     Essential hypertension     Pulmonary hypertension ([address]     Long term (current) use of anticoagulants     Encounter for therapeutic drug monitoring     Closed fracture of neck of right femur, initial encounter ([address]     Anticoagulated     Closed right hip fracture, initial encounter ([address]     Fracture of unspecified part of neck of right femur, initial encounter for closed fracture ([address]     Acute on chronic heart failure with preserved ejection fraction (HFpEF) ([facility])     Biventricular heart failure ([facility])     [address] (pulmonary artery hypertension) (HCC)     Bradycardia     Orthopedic aftercare     Bilateral leg edema     Chronic atrial fibrillation (HCC)     Atrial fibrillation, unspecified type ([facility] [address]     Encounter for follow-up surveillance of breast cancer     History of left breast cancer     Monitoring for long-term anticoagulant use      Overall the patient appears to be stable. Repeat blood pressure is 126/70. Volume status appears to be acceptable. Continue same medications. Reviewed recent labs done on October 27, 2023. Creatinine overall better down to 1.36 with an estimated GFR 36 cc/min. Electrolytes were good. Hemoglobin 10.9. Repeat labs in 3 months. See in 3 to 6 months depending upon clinical course.            Orders Placed This Encounter      CBC W Differential W Platelet      Comp Metabolic Panel (14)      Lipid Panel      11/3/2023  Gabino Hernandez MD

## 2023-11-06 ENCOUNTER — TELEPHONE (OUTPATIENT)
Dept: NEPHROLOGY | Facility: CLINIC | Age: 88
End: 2023-11-06

## 2023-11-06 NOTE — TELEPHONE ENCOUNTER
Pt needs refill on torsemide 10 mg and rosuvastatin 5 mg please follow up pt is out of the medications sent to [email or web address], [phone], [phone]

## 2023-11-06 NOTE — TELEPHONE ENCOUNTER
Noted both medications were sent to [facility] on 11-3 with confirmation from pharmacy. Called pt and advised to call [facility] before going to  to make sure they are ready. Verbalized understanding.

## 2023-11-15 RX ORDER — SILDENAFIL CITRATE 20 MG/1
10 TABLET ORAL 3 TIMES DAILY
Qty: 45 TABLET | Refills: 3 | Status: SHIPPED | OUTPATIENT
Start: 2023-11-15

## 2023-11-28 ENCOUNTER — ANTI-COAG VISIT (OUTPATIENT)
Dept: ANTICOAGULATION | Facility: CLINIC | Age: 88
End: 2023-11-28

## 2023-11-28 DIAGNOSIS — Z79.01 LONG TERM (CURRENT) USE OF ANTICOAGULANTS: ICD-10-CM

## 2023-11-28 DIAGNOSIS — I27.20 PULMONARY HYPERTENSION (HCC): Primary | ICD-10-CM

## 2023-11-28 DIAGNOSIS — Z51.81 MONITORING FOR LONG-TERM ANTICOAGULANT USE: ICD-10-CM

## 2023-11-28 DIAGNOSIS — Z79.01 MONITORING FOR LONG-TERM ANTICOAGULANT USE: ICD-10-CM

## 2023-11-28 DIAGNOSIS — I48.20 CHRONIC ATRIAL FIBRILLATION (HCC): ICD-10-CM

## 2023-11-28 LAB — INR: 3.2 (ref 0.8–1.2)

## 2023-11-28 PROCEDURE — 85610 PROTHROMBIN TIME: CPT | Performed by: FAMILY MEDICINE

## 2023-11-28 PROCEDURE — 93793 ANTICOAG MGMT PT WARFARIN: CPT | Performed by: FAMILY MEDICINE

## 2023-11-28 NOTE — PROGRESS NOTES
Face to face. Here with her daughter Varsha Melendez. Denies any change in diet/meds. Does not eat much greens/veggies to be inconsistent. Per protocol, decrease weekly dose 5-10%- actual decrease 11% and recheck INR in 3 weeks.      5 mg every Mon; 2.5 mg all other days

## 2023-12-12 ENCOUNTER — TELEPHONE (OUTPATIENT)
Dept: CARDIOLOGY | Age: 88
End: 2023-12-12

## 2023-12-12 RX ORDER — LISINOPRIL 2.5 MG/1
2.5 TABLET ORAL DAILY
Qty: 90 TABLET | Refills: 3 | Status: SHIPPED | OUTPATIENT
Start: 2023-12-12

## 2024-01-08 ENCOUNTER — ANTI-COAG VISIT (OUTPATIENT)
Dept: ANTICOAGULATION | Facility: CLINIC | Age: 89
End: 2024-01-08

## 2024-01-08 DIAGNOSIS — Z51.81 MONITORING FOR LONG-TERM ANTICOAGULANT USE: ICD-10-CM

## 2024-01-08 DIAGNOSIS — Z79.01 LONG TERM (CURRENT) USE OF ANTICOAGULANTS: ICD-10-CM

## 2024-01-08 DIAGNOSIS — Z79.01 MONITORING FOR LONG-TERM ANTICOAGULANT USE: ICD-10-CM

## 2024-01-08 DIAGNOSIS — I48.20 CHRONIC ATRIAL FIBRILLATION (HCC): ICD-10-CM

## 2024-01-08 DIAGNOSIS — I27.20 PULMONARY HYPERTENSION (HCC): Primary | ICD-10-CM

## 2024-01-08 LAB — INR: 1.8 (ref 0.8–1.2)

## 2024-01-08 PROCEDURE — 93793 ANTICOAG MGMT PT WARFARIN: CPT | Performed by: FAMILY MEDICINE

## 2024-01-08 NOTE — PROGRESS NOTES
Face to Face visit with her daughter  INR 1.8.    Protocol instructs a dose decrease by 5-10%, actual dose change is 12.5%  Patient will return in 2 weeks.    Future Appointments   Date Time Provider Department Center   1/19/2024  1:15 PM Katty Land RN ECSCHCOUM [facility]   2/23/2024  1:00 PM Robert Narvaez MD [id number] [facility]   3/14/2024  1:30 PM Gurwinder Jackman MD [facility]

## 2024-01-18 ENCOUNTER — TELEPHONE (OUTPATIENT)
Dept: CARDIOLOGY | Age: 89
End: 2024-01-18

## 2024-01-19 ENCOUNTER — ANTI-COAG VISIT (OUTPATIENT)
Dept: ANTICOAGULATION | Facility: CLINIC | Age: 89
End: 2024-01-19

## 2024-01-19 DIAGNOSIS — Z51.81 MONITORING FOR LONG-TERM ANTICOAGULANT USE: ICD-10-CM

## 2024-01-19 DIAGNOSIS — Z79.01 LONG TERM (CURRENT) USE OF ANTICOAGULANTS: ICD-10-CM

## 2024-01-19 DIAGNOSIS — Z79.01 MONITORING FOR LONG-TERM ANTICOAGULANT USE: ICD-10-CM

## 2024-01-19 DIAGNOSIS — I27.20 PULMONARY HYPERTENSION (HCC): Primary | ICD-10-CM

## 2024-01-19 DIAGNOSIS — I48.20 CHRONIC ATRIAL FIBRILLATION (HCC): ICD-10-CM

## 2024-01-19 LAB — INR: 2.3 (ref 0.8–1.2)

## 2024-01-19 PROCEDURE — 93793 ANTICOAG MGMT PT WARFARIN: CPT | Performed by: FAMILY MEDICINE

## 2024-01-19 NOTE — PROGRESS NOTES
Face to face. Here with her daughter Lizbet.      Per protocol, continue current dose and recheck INR in 4 weeks.     5 mg every Mon, Tue; 2.5 mg all other days

## 2024-02-08 ENCOUNTER — EXTERNAL LAB (OUTPATIENT)
Dept: OTHER | Age: 89
End: 2024-02-08

## 2024-02-08 ENCOUNTER — LAB ENCOUNTER (OUTPATIENT)
Dept: LAB | Age: 89
End: 2024-02-08
Attending: INTERNAL MEDICINE
Payer: MEDICARE

## 2024-02-08 DIAGNOSIS — I27.0 PRIMARY PULMONARY HYPERTENSION (HCC): ICD-10-CM

## 2024-02-08 DIAGNOSIS — E78.00 HYPERCHOLESTEROLEMIA: ICD-10-CM

## 2024-02-08 DIAGNOSIS — N18.32 STAGE 3B CHRONIC KIDNEY DISEASE (CKD) (HCC): ICD-10-CM

## 2024-02-08 DIAGNOSIS — R06.09 DYSPNEA ON EXERTION: ICD-10-CM

## 2024-02-08 DIAGNOSIS — E55.9 VITAMIN D DEFICIENCY: Primary | ICD-10-CM

## 2024-02-08 LAB
25(OH)D3+25(OH)D2 SERPL-MCNC: 59.7 NG/ML (ref 30–100)
ABSOLUTE IMMATURE GRANULOCYTES (OFFPRE24): 0.01 X10(3)UL
ALBUMIN SERPL-MCNC: 4 G/DL (ref 3.2–4.8)
ALBUMIN SERPL-MCNC: 4 G/DL (ref 3.2–4.8)
ALBUMIN/GLOB SERPL: 1.3 {RATIO} (ref 1–2)
ALBUMIN/GLOB SERPL: 1.3 {RATIO} (ref 1–2)
ALP LIVER SERPL-CCNC: 62 U/L
ALP SERPL-CCNC: 62 U/L (ref 55–142)
ALT SERPL-CCNC: <7 U/L
ALT SERPL-CCNC: <7 U/L (ref 10–49)
ANION GAP SERPL CALC-SCNC: 7 MMOL/L (ref 0–18)
ANION GAP SERPL CALC-SCNC: 7 MMOL/L (ref 0–18)
AST SERPL-CCNC: 15 U/L
AST SERPL-CCNC: 15 U/L (ref ?–34)
BASOPHILS # BLD AUTO: 0.05 X10(3) UL (ref 0–0.2)
BASOPHILS # BLD: 0.05 X10(3)UL (ref 0–0.2)
BASOPHILS NFR BLD AUTO: 0.8 %
BASOPHILS NFR BLD: 0.8 %
BILIRUB SERPL-MCNC: 0.5 MG/DL (ref 0.2–0.9)
BILIRUB SERPL-MCNC: 0.5 MG/DL (ref 0.2–0.9)
BNP SERPL-MCNC: 227 PG/ML
BNP SERPL-MCNC: 227 PG/ML
BUN BLD-MCNC: 43 MG/DL (ref 9–23)
BUN SERPL-MCNC: 43 MG/DL (ref 9–23)
BUN/CREAT SERPL: 28.9 (ref 10–20)
BUN/CREAT SERPL: 28.9 (ref 10–20)
CALCIUM BLD-MCNC: 9.1 MG/DL (ref 8.7–10.4)
CALCIUM SERPL-MCNC: 9.1 MG/DL (ref 8.7–104)
CALCULATED OSMO: 307 MOSM/KG (ref 275–295)
CHLORIDE SERPL-SCNC: 109 MMOL/L (ref 98–112)
CHLORIDE SERPL-SCNC: 109 MMOL/L (ref 98–112)
CHOLEST SERPL-MCNC: 134 MG/DL
CHOLEST SERPL-MCNC: 134 MG/DL (ref ?–200)
CO2 SERPL-SCNC: 27 MMOL/L (ref 21–32)
CO2 SERPL-SCNC: 27 MMOL/L (ref 21–32)
CREAT BLD-MCNC: 1.49 MG/DL
CREAT SERPL-MCNC: 1.49 MG/DL (ref 0.55–1.02)
DEPRECATED RDW RBC AUTO: 50.9 FL (ref 35.1–46.3)
EGFRCR SERPLBLD CKD-EPI 2021: 32 ML/MIN/1.73M2 (ref 60–?)
EOSINOPHIL # BLD AUTO: 0.12 X10(3) UL (ref 0–0.7)
EOSINOPHIL # BLD: 0.12 X10(3)UL (ref 0–0.7)
EOSINOPHIL NFR BLD AUTO: 1.9 %
EOSINOPHIL NFR BLD: 1.9 %
ERYTHROCYTE [DISTWIDTH] IN BLOOD BY AUTOMATED COUNT: 15.3 % (ref 11–15)
ERYTHROCYTE [DISTWIDTH] IN BLOOD BY AUTOMATED COUNT: 50.9 FL (ref 35.1–46.3)
ERYTHROCYTE [DISTWIDTH] IN BLOOD: 15.3 % (ref 11–15)
FASTING PATIENT LIPID ANSWER: YES
FASTING STATUS PATIENT QL REPORTED: YES
GFR SERPLBLD SCHWARTZ-ARVRAT: 32 ML/MIN/1.73M2
GLOBULIN PLAS-MCNC: 3 G/DL (ref 2.8–4.4)
GLOBULIN SER-MCNC: 3 G/DL (ref 2.8–4.4)
GLUCOSE BLD-MCNC: 93 MG/DL (ref 70–99)
GLUCOSE SERPL-MCNC: 93 MG/DL (ref 70–99)
HCT VFR BLD AUTO: 34.4 %
HCT VFR BLD CALC: 34.4 % (ref 35–48)
HDLC SERPL-MCNC: 50 MG/DL (ref 40–59)
HDLC SERPL-MCNC: 50 MG/DL (ref 40–59)
HGB BLD-MCNC: 11.4 G/DL
HGB BLD-MCNC: 11.4 G/DL (ref 12–16)
IMM GRANULOCYTES # BLD AUTO: 0.01 X10(3) UL (ref 0–1)
IMM GRANULOCYTES NFR BLD: 0.2 %
IMMATURE GRANULOCYTES (OFFPRE25): 0.2 %
LDLC SERPL CALC-MCNC: 72 MG/DL
LDLC SERPL CALC-MCNC: 72 MG/DL (ref ?–100)
LENGTH OF FAST TIME PATIENT: YES H
LENGTH OF FAST TIME PATIENT: YES H
LYMPHOCYTES # BLD AUTO: 2.46 X10(3) UL (ref 1–4)
LYMPHOCYTES # BLD: 2.46 X10(3)UL (ref 1–4)
LYMPHOCYTES NFR BLD AUTO: 38.1 %
LYMPHOCYTES NFR BLD: 38.1 %
MCH RBC QN AUTO: 29.8 PG (ref 26–34)
MCH RBC QN AUTO: 29.8 PG (ref 26–34)
MCHC RBC AUTO-ENTMCNC: 33.1 G/DL (ref 31–37)
MCHC RBC AUTO-ENTMCNC: 33.1 G/DL (ref 31–37)
MCV RBC AUTO: 90.1 FL
MCV RBC AUTO: 90.1 FL (ref 80–100)
MONOCYTES # BLD AUTO: 0.4 X10(3) UL (ref 0.1–1)
MONOCYTES # BLD: 0.4 X10(3)UL (ref 0.1–1)
MONOCYTES NFR BLD AUTO: 6.2 %
MONOCYTES NFR BLD: 6.2 %
NEUTROPHILS # BLD AUTO: 3.42 X10 (3) UL (ref 1.5–7.7)
NEUTROPHILS # BLD AUTO: 3.42 X10(3) UL (ref 1.5–7.7)
NEUTROPHILS # BLD: 3.42 X10(3)UL (ref 1.5–7.7)
NEUTROPHILS NFR BLD AUTO: 52.8 %
NEUTROPHILS NFR BLD: 52.8 %
NONHDLC SERPL-MCNC: 84 MG/DL
NONHDLC SERPL-MCNC: 84 MG/DL (ref ?–130)
OSMOLALITY SERPL CALC.SUM OF ELEC: 307 MOSM/KG (ref 275–295)
PLATELET # BLD AUTO: 190 10(3)UL (ref 150–450)
PLATELET # BLD: 190 10(3)UL (ref 150–450)
POTASSIUM SERPL-SCNC: 4.5 MMOL/L (ref 3.5–5.1)
POTASSIUM SERPL-SCNC: 4.5 MMOL/L (ref 3.5–5.1)
PROT SERPL-MCNC: 7 G/DL (ref 5.7–8.2)
PROT SERPL-MCNC: 7 G/DL (ref 5.7–8.2)
RBC # BLD AUTO: 3.82 X10(6)UL
RBC # BLD: 3.82 X10(6)UL (ref 3.8–5.3)
SODIUM SERPL-SCNC: 143 MMOL/L (ref 136–145)
SODIUM SERPL-SCNC: 143 MMOL/L (ref 136–145)
TRIGL SERPL-MCNC: 53 MG/DL (ref 30–149)
TRIGL SERPL-MCNC: 53 MG/DL (ref 30–149)
TSH SERPL-ACNC: 1.12 MIU/ML (ref 0.55–4.78)
TSI SER-ACNC: 1.12 MIU/ML (ref 0.55–4.78)
VIT D+METAB SERPL-MCNC: 59.7 NG/ML (ref 30–100)
VLDLC SERPL CALC-MCNC: 8 MG/DL (ref 0–30)
VLDLC SERPL CALC-MCNC: 8 MG/DL (ref 0–30)
WBC # BLD AUTO: 6.5 X10(3) UL (ref 4–11)
WBC # BLD: 6.5 X10(3)UL (ref 4–11)

## 2024-02-08 PROCEDURE — 82306 VITAMIN D 25 HYDROXY: CPT

## 2024-02-08 PROCEDURE — 80053 COMPREHEN METABOLIC PANEL: CPT

## 2024-02-08 PROCEDURE — 36415 COLL VENOUS BLD VENIPUNCTURE: CPT

## 2024-02-08 PROCEDURE — 85025 COMPLETE CBC W/AUTO DIFF WBC: CPT

## 2024-02-08 PROCEDURE — 84443 ASSAY THYROID STIM HORMONE: CPT

## 2024-02-08 PROCEDURE — 83880 ASSAY OF NATRIURETIC PEPTIDE: CPT

## 2024-02-08 PROCEDURE — 80061 LIPID PANEL: CPT

## 2024-02-09 ENCOUNTER — APPOINTMENT (OUTPATIENT)
Dept: CARDIOLOGY | Age: 89
End: 2024-02-09
Attending: INTERNAL MEDICINE

## 2024-02-12 ENCOUNTER — APPOINTMENT (OUTPATIENT)
Dept: CARDIOLOGY | Age: 89
End: 2024-02-12
Attending: INTERNAL MEDICINE

## 2024-02-12 ENCOUNTER — TELEPHONE (OUTPATIENT)
Dept: CARDIOLOGY | Age: 89
End: 2024-02-12

## 2024-02-12 DIAGNOSIS — I27.0 PRIMARY PULMONARY HYPERTENSION  (CMD): ICD-10-CM

## 2024-02-12 DIAGNOSIS — R06.09 DYSPNEA ON EXERTION: ICD-10-CM

## 2024-02-12 LAB
AORTIC VALVE AREA (AVA): 1.43
ASCENDING AORTA (AAD): 15
AV PEAK GRADIENT (AVPG): 13
AV PEAK VELOCITY (AVPV): 1.8
AV STENOSIS SEVERITY TEXT: NORMAL
AVI LVOT PEAK GRADIENT (LVOTMG): 0.9
LEFT INTERNAL DIMENSION IN SYSTOLE (LVSD): 0.9
LEFT VENTRICULAR INTERNAL DIMENSION IN DIASTOLE (LVDD): 2.5
LEFT VENTRICULAR POSTERIOR WALL IN END DIASTOLE (LVPW): 3.7
LV EF: NORMAL %
LVOT VTI (LVOTVTI): 0.89
MV PEAK A VELOCITY (MVPAV): 221
RV END SYSTOLIC LONGITUDINAL STRAIN FREE WALL (RVGS): 1.9
TRICUSPID VALVE PEAK REGURGITATION VELOCITY (TRPV): 3.1

## 2024-02-12 PROCEDURE — 93306 TTE W/DOPPLER COMPLETE: CPT | Performed by: INTERNAL MEDICINE

## 2024-02-14 ENCOUNTER — TELEPHONE (OUTPATIENT)
Dept: NEPHROLOGY | Facility: CLINIC | Age: 89
End: 2024-02-14

## 2024-02-14 NOTE — TELEPHONE ENCOUNTER
Your kidney function overall looks stable.  Cholesterol under control.  Please follow-up as scheduled and we will review in more detail.   Written by Robert Narvaez MD on 2/9/2024  4:07 PM CST

## 2024-02-16 ENCOUNTER — TELEPHONE (OUTPATIENT)
Dept: CARDIOLOGY | Age: 89
End: 2024-02-16

## 2024-02-23 ENCOUNTER — ANTI-COAG VISIT (OUTPATIENT)
Dept: ANTICOAGULATION | Facility: CLINIC | Age: 89
End: 2024-02-23

## 2024-02-23 ENCOUNTER — OFFICE VISIT (OUTPATIENT)
Dept: NEPHROLOGY | Facility: CLINIC | Age: 89
End: 2024-02-23

## 2024-02-23 VITALS
HEART RATE: 58 BPM | SYSTOLIC BLOOD PRESSURE: 143 MMHG | HEIGHT: 65 IN | BODY MASS INDEX: 19.33 KG/M2 | WEIGHT: 116 LBS | DIASTOLIC BLOOD PRESSURE: 60 MMHG

## 2024-02-23 DIAGNOSIS — Z79.01 LONG TERM (CURRENT) USE OF ANTICOAGULANTS: ICD-10-CM

## 2024-02-23 DIAGNOSIS — I27.20 PULMONARY HYPERTENSION (HCC): Primary | ICD-10-CM

## 2024-02-23 DIAGNOSIS — Z79.01 MONITORING FOR LONG-TERM ANTICOAGULANT USE: ICD-10-CM

## 2024-02-23 DIAGNOSIS — Z51.81 MONITORING FOR LONG-TERM ANTICOAGULANT USE: ICD-10-CM

## 2024-02-23 DIAGNOSIS — I10 ESSENTIAL HYPERTENSION: ICD-10-CM

## 2024-02-23 DIAGNOSIS — N18.32 STAGE 3B CHRONIC KIDNEY DISEASE (CKD) (HCC): Primary | ICD-10-CM

## 2024-02-23 DIAGNOSIS — I48.20 CHRONIC ATRIAL FIBRILLATION (HCC): ICD-10-CM

## 2024-02-23 LAB — INR: 2.5 (ref 0.8–1.2)

## 2024-02-23 PROCEDURE — 99214 OFFICE O/P EST MOD 30 MIN: CPT | Performed by: INTERNAL MEDICINE

## 2024-02-23 PROCEDURE — 93793 ANTICOAG MGMT PT WARFARIN: CPT | Performed by: FAMILY MEDICINE

## 2024-02-23 NOTE — PROGRESS NOTES
TTR 80%    Face to face. Here with her daughter Lizbet. Reports that she recently turned 89 and is no longer driving- her daughter will make sure she gets to her appointments.     Per protocol, continue current dose and recheck INR in 4 weeks.     5 mg every Mon, Tue; 2.5 mg all other days

## 2024-02-24 NOTE — PROGRESS NOTES
[facility]  Nephrology Daily Progress Note    Juli Ramirez  [id number]  89 year old  Chief Complaint   Patient presents with    Abdominal Pain    Follow - Up     Pt had labs done 2/8       HPI:   Juli Ramirez is a 89 year old female.  89-year-old female with a history of chronic atrial fibrillation, moderate to severe mitral regurgitation, moderate pulmonary hypertension followed by Dr. Higuera, hypertension, borderline blood sugars, hypercholesterolemia, history of left breast cancer and chronic kidney disease stage III who is now here for follow-up.  Overall she states she is doing well without any chest pain, shortness of breath, GI or urinary tract symptoms.  Just had echocardiogram and is scheduled to see cardiology for follow-up shortly.  Daughter was present.      ROS:     Constitutional:  Negative for decreased activity, fever, irritability and lethargy  Endocrine:  Negative for abnormal sleep patterns, increased activity, polydipsia and polyphagia  Cardiovascular:  Negative for cool extremity and irregular heartbeat/palpitations  Gastrointestinal:  Negative for abdominal pain, constipation, decreased appetite, diarrhea and vomiting  Genitourinary:  Negative for dysuria and hematuria  Hema/Lymph:  Negative for easy bleeding and easy bruising  Integumentary:  Negative for pruritus and rash  Musculoskeletal:  Negative for bone/joint symptoms  Neurological:  Negative for gait disturbance  Psychiatric:  Negative for inappropriate interaction and psychiatric symptoms  Respiratory:  Negative for cough, dyspnea and wheezing      PHYSICAL EXAM:         9/12/2023     2:25 PM 11/3/2023     1:15 PM 2/23/2024     1:05 PM   Vitals History   BP  172/79 143/60   Pulse  66 58   Weight 110 lbs 109 lbs 116 lbs   Height 5' 5\" (1.651 m) 5' 5\" (1.651 m) 5' 5\" (1.651 m)   BMI 18.3 kg/m2 18.14 kg/m2 19.3 kg/m2           9/12/2023     2:25 PM 11/3/2023     1:15 PM 2/23/2024     1:05 PM   VITALS: WEIGHT ONLY   Weight  110 lbs 109 lbs 116 lbs       Constitutional: appears well hydrated alert and responsive no acute distress noted  Neck/Thyroid: neck is supple without adenopathy  Lymphatic: no abnormal cervical, supraclavicular or axillary adenopathy is noted  Respiratory: normal to inspection lungs are clear to auscultation bilaterally normal respiratory effort  Cardiovascular: regular rate and rhythm no murmurs, gallups, or rubs  Abdomen: soft non-tender non-distended no organomegaly noted no masses  Musculoskeletal: full ROM all extremities good strength  no deformities  Extremities: no edema, cyanosis, or clubbing  Neurological: exam appropriate for age reflexes and motor skills appropriate for age    Labs:  CBC results over the last 6 months:  Recent Labs     02/08/24  1138   WBC 6.5   RBC 3.82   HGB 11.4*   HCT 34.4*   .0   MCV 90.1   MCH 29.8   MCHC 33.1   RDW 15.3*   NEPRELIM 3.42   NEPERCENT 52.8   LYPERCENT 38.1   MOPERCENT 6.2   EOPERCENT 1.9   BAPERCENT 0.8   NE 3.42   LYMABS 2.46   MOABSO 0.40   EOABSO 0.12   BAABSO 0.05       Renal Function Panel results over the last 6 months:  Recent Labs     10/27/23  1144 02/08/24  1138   GLU 83 93    143   K 4.2 4.5    109   CO2 26.0 27.0   BUN 31* 43*   CREATSERUM 1.36* 1.49*   CA 8.9 9.1   ALB 3.3* 4.0   PHOS 3.7  --    BUNCREA 22.8* 28.9*   ANIONGAP 8 7   OSMOCALC 302* 307*       Imaging  No results found.    Medications:    Current Outpatient Medications:     rosuvastatin 5 MG Oral Tab, Take 1 tablet (5 mg total) by mouth nightly., Disp: 90 tablet, Rfl: 1    torsemide 10 MG Oral Tab, Take 1 tablet (10 mg total) by mouth daily., Disp: 90 tablet, Rfl: 1    acetaminophen 325 MG Oral Tab, Take 2 tablets (650 mg total) by mouth every 4 (four) hours as needed., Disp: , Rfl:     lisinopril 2.5 MG Oral Tab, Take 1 tablet (2.5 mg total) by mouth daily., Disp: , Rfl:     warfarin 2.5 MG Oral Tab, TAKE 1 TABLET BY MOUTH 6  DAYS PER WEEK AND 2 TABLETS BY MOUTH 1 DAY  WEEKLY AS  DIRECTED BY COUMADIN CLINIC, Disp: 104 tablet, Rfl: 3    Ferrous Sulfate 325 (65 Fe) MG Oral Tab, Take 1 tablet (325 mg total) by mouth daily with breakfast., Disp: 60 tablet, Rfl: 0    sildenafil 20 MG Oral Tab, Take 0.5 tablets (10 mg total) by mouth 3 (three) times daily., Disp: 90 tablet, Rfl: 0    Cholecalciferol (VITAMIN D) 50 MCG (2000 UT) Oral Cap, Take by mouth., Disp: , Rfl:     LOPERAMIDE HCL OR, Take 30 mL by mouth., Disp: , Rfl:     UPTRAVI 1600 MCG Oral Tab, 1,600 mcg Q12H.  , Disp: , Rfl:     Allergies:  Allergies   Allergen Reactions    Aldactone [Spironolactone] HYPERKALEMIA    Ambrisentan NAUSEA AND VOMITING     Pulmonary edema            ASSESSMENT/PLAN:   Assessment     ICD-10-CM    1. Stage 3b chronic kidney disease (CKD) ([facility])  N18.32 Renal Function Panel     CBC W Differential W Platelet      2. Essential hypertension  I10               Patient Active Problem List   Diagnosis    Atrial fibrillation (HCC)    Afib ([facility])    Malignant neoplasm of left breast in female, estrogen receptor positive ([facility])    Breast lump    Benign hypertensive heart disease    Hypercholesterolemia    Pseudophakia of both eyes    Vitreous floaters of both eyes    Dyspnea    After cataract of right eye not obscuring vision    Screening mammogram, encounter for    CKD (chronic kidney disease) stage 3, GFR 30-59 ml/min ([facility])    Dry eye    Essential hypertension    Pulmonary hypertension (HCC)    Long term (current) use of anticoagulants    Encounter for therapeutic drug monitoring    Closed fracture of neck of right femur, initial encounter ([facility])    Anticoagulated    Closed right hip fracture, initial encounter ([facility])    Fracture of unspecified part of neck of right femur, initial encounter for closed fracture ([facility])    Acute on chronic heart failure with preserved ejection fraction (HFpEF) ([facility])    Biventricular heart failure ([facility])    PAH (pulmonary artery hypertension) ([facility])    Bradycardia    Orthopedic  aftercare    Bilateral leg edema    Chronic atrial fibrillation (HCC)    Atrial fibrillation, unspecified type (HCC)    Encounter for follow-up surveillance of breast cancer    History of left breast cancer    Monitoring for long-term anticoagulant use     Overall appears to be stable.  Volume status acceptable.  Reviewed recent laboratory studies done in February 2024.  Creatinine overall stable at 1.49 with an estimated GFR 32 cc/min.  Electrolytes good.  Hemoglobin 11.4.  Lipids were good.  Therefore continue current medications.  Repeat CBC and renal panel in 3 months.  Return in 3 to 6 months depending upon clinical course.             Orders Placed This Encounter   Procedures    Renal Function Panel    CBC W Differential W Platelet       2/24/2024  Robert Narvaez MD

## 2024-03-12 ENCOUNTER — OFFICE VISIT (OUTPATIENT)
Dept: CARDIOLOGY | Age: 89
End: 2024-03-12

## 2024-03-12 VITALS
DIASTOLIC BLOOD PRESSURE: 65 MMHG | OXYGEN SATURATION: 96 % | HEART RATE: 76 BPM | BODY MASS INDEX: 18.99 KG/M2 | HEIGHT: 65 IN | WEIGHT: 113.98 LBS | SYSTOLIC BLOOD PRESSURE: 134 MMHG | RESPIRATION RATE: 18 BRPM

## 2024-03-12 DIAGNOSIS — I50.812 CHRONIC RIGHT-SIDED CONGESTIVE HEART FAILURE (CMD): ICD-10-CM

## 2024-03-12 DIAGNOSIS — I48.20 CHRONIC ATRIAL FIBRILLATION (CMD): ICD-10-CM

## 2024-03-12 DIAGNOSIS — I27.20 PULMONARY HYPERTENSION (CMD): Primary | ICD-10-CM

## 2024-03-12 DIAGNOSIS — E78.00 HYPERCHOLESTEREMIA: ICD-10-CM

## 2024-03-12 DIAGNOSIS — E55.9 VITAMIN D DEFICIENCY: ICD-10-CM

## 2024-03-12 SDOH — HEALTH STABILITY: PHYSICAL HEALTH: ON AVERAGE, HOW MANY MINUTES DO YOU ENGAGE IN EXERCISE AT THIS LEVEL?: 0 MIN

## 2024-03-12 SDOH — HEALTH STABILITY: PHYSICAL HEALTH: ON AVERAGE, HOW MANY DAYS PER WEEK DO YOU ENGAGE IN MODERATE TO STRENUOUS EXERCISE (LIKE A BRISK WALK)?: 0 DAYS

## 2024-03-12 ASSESSMENT — PATIENT HEALTH QUESTIONNAIRE - PHQ9
SUM OF ALL RESPONSES TO PHQ9 QUESTIONS 1 AND 2: 0
CLINICAL INTERPRETATION OF PHQ2 SCORE: NO FURTHER SCREENING NEEDED
2. FEELING DOWN, DEPRESSED OR HOPELESS: NOT AT ALL
SUM OF ALL RESPONSES TO PHQ9 QUESTIONS 1 AND 2: 0
1. LITTLE INTEREST OR PLEASURE IN DOING THINGS: NOT AT ALL

## 2024-03-12 ASSESSMENT — ENCOUNTER SYMPTOMS
INSOMNIA: 0
HEADACHES: 0
ABDOMINAL PAIN: 0
WEIGHT LOSS: 0
WEIGHT GAIN: 0
DIARRHEA: 0
BACK PAIN: 0
CHILLS: 0
BLOATING: 0
FEVER: 0
BRUISES/BLEEDS EASILY: 0
LOSS OF BALANCE: 0
ALTERED MENTAL STATUS: 0
CONSTIPATION: 0
ALLERGIC/IMMUNOLOGIC COMMENTS: NO NEW FOOD ALLERGIES
LIGHT-HEADEDNESS: 0
DIZZINESS: 0
COUGH: 0
SHORTNESS OF BREATH: 0
SUSPICIOUS LESIONS: 0
HEMATOCHEZIA: 0
HEMOPTYSIS: 0
DEPRESSION: 0

## 2024-03-14 ENCOUNTER — TELEPHONE (OUTPATIENT)
Dept: NEPHROLOGY | Facility: CLINIC | Age: 89
End: 2024-03-14

## 2024-03-14 NOTE — TELEPHONE ENCOUNTER
Daughter states pt saw specialist and will be sending fax to office, fax # [phone] from Dr. Colby Higuera from [facility] in [place] please follow up

## 2024-03-21 ENCOUNTER — OFFICE VISIT (OUTPATIENT)
Dept: OTOLARYNGOLOGY | Facility: CLINIC | Age: 89
End: 2024-03-21

## 2024-03-21 ENCOUNTER — ANTI-COAG VISIT (OUTPATIENT)
Dept: ANTICOAGULATION | Facility: CLINIC | Age: 89
End: 2024-03-21
Payer: MEDICARE

## 2024-03-21 DIAGNOSIS — I48.20 CHRONIC ATRIAL FIBRILLATION (HCC): ICD-10-CM

## 2024-03-21 DIAGNOSIS — H61.23 BILATERAL IMPACTED CERUMEN: Primary | ICD-10-CM

## 2024-03-21 DIAGNOSIS — Z79.01 LONG TERM (CURRENT) USE OF ANTICOAGULANTS: ICD-10-CM

## 2024-03-21 DIAGNOSIS — Z79.01 MONITORING FOR LONG-TERM ANTICOAGULANT USE: ICD-10-CM

## 2024-03-21 DIAGNOSIS — Z51.81 MONITORING FOR LONG-TERM ANTICOAGULANT USE: ICD-10-CM

## 2024-03-21 DIAGNOSIS — I27.20 PULMONARY HYPERTENSION (HCC): Primary | ICD-10-CM

## 2024-03-21 LAB
INR: 2.4 (ref 2–3)
TEST STRIP EXPIRATION DATE: NORMAL DATE

## 2024-03-21 PROCEDURE — 69210 REMOVE IMPACTED EAR WAX UNI: CPT | Performed by: OTOLARYNGOLOGY

## 2024-03-21 PROCEDURE — 93793 ANTICOAG MGMT PT WARFARIN: CPT | Performed by: FAMILY MEDICINE

## 2024-03-21 PROCEDURE — 85610 PROTHROMBIN TIME: CPT | Performed by: FAMILY MEDICINE

## 2024-03-21 NOTE — PROGRESS NOTES
Face-to-Face  / INR 2.4,  therapeutic. (Goal 2.5 ) TTR 80.1 %     Etiology: stable. No changes.    PLAN: continue the current dose. But spread out dose days.     Recheck INR 5 weeks is okay.    Pt reports:    No sign of unusual bruising or bleeding.  Any missed doses: No   Medications changes: No    Juli Somers  who verbalized understanding and agreement.    WARFARIN Plan per protocol: 3/22: 2.5 mg; Otherwise 5 mg every Mon, Fri; 2.5 mg all other days

## 2024-03-21 NOTE — PROGRESS NOTES
Juli Ramirez is a 89 year old female.    Chief Complaint   Patient presents with    Ear Problem     Bilateral impacted cerumen       HISTORY OF PRESENT ILLNESS    Patient presents for cerumen removal. No other complaints or concerns at this time    Social History     Socioeconomic History    Marital status:    Tobacco Use    Smoking status: Never    Smokeless tobacco: Never   Substance and Sexual Activity    Alcohol use: Not Currently    Drug use: No   Other Topics Concern    Caffeine Concern Yes     Comment: per NG: Coffee, 2 cups       Family History   Problem Relation Age of Onset    Prostate Cancer Father     Heart Disorder Mother         Per NG: Aortic Regurgitation    Prostate Cancer Brother     Breast Cancer Paternal Aunt         80    Breast Cancer Self 82    Diabetes Neg     Glaucoma Neg     Macular degeneration Neg        Past Medical History:   Diagnosis Date    Atrial fibrillation (HCC)     Per NG: cardioversion attempt    Breast cancer (HCC) 2011    Per NG: lumpectomy and radiation 2011 LT    Breast lump     Per NG: excised--benign LT    Breast lump     Per NG : excised -- benign RT    Conjunctivitis     Per NG: right eye    Coronary atherosclerosis     Dermatochalasis of both eyelids     High blood pressure     High cholesterol     Mitral regurgitation     Ovarian cyst     Per NG: AH/BSO    Screening     Per NG:dexascan '02,  normal    Tonsillitis     Per NG: T&A       Past Surgical History:   Procedure Laterality Date    CATARACT EXTRACTION W/  INTRAOCULAR LENS IMPLANT Right 2012    RJM    CATARACT EXTRACTION W/  INTRAOCULAR LENS IMPLANT Left 01/10/2006    RJM    HYSTERECTOMY      age 68    LUMPECTOMY LEFT  2011    MONET LOCALIZATION WIRE 1 SITE RIGHT (CPT=19281)            Pregnancy - 36 week 3 lb(s) 15 oz Female       ,    pregnacy           Per NG: Pregnacy 40 week 7 lb(s) 14 oz Male           Per NG:  -breech  OUTCOME IS 40 week 9 lb(s) Female    RADIATION LEFT  Nov 2011    YAG CAPSULOTOMY - OS - LEFT EYE Left 09/19/2007    RJ       REVIEW OF SYSTEMS    System Neg/Pos Details   Constitutional Negative Fatigue, fever and weight loss.   ENMT Negative Drooling.   Eyes Negative Blurred vision and vision changes.   Respiratory Negative Dyspnea and wheezing.   Cardio Negative Chest pain, irregular heartbeat/palpitations and syncope.   GI Negative Abdominal pain and diarrhea.   Endocrine Negative Cold intolerance and heat intolerance.   Neuro Negative Tremors.   Psych Negative Anxiety and depression.   Integumentary Negative Frequent skin infections, pigment change and rash.   Hema/Lymph Negative Easy bleeding and easy bruising.           PHYSICAL EXAM    There were no vitals taken for this visit.       Constitutional Normal Overall appearance - Normal.        Neck Exam Normal Inspection - Normal. Palpation - Normal. Parotid gland - Normal. Thyroid gland - Normal.             Head/Face Normal Facial features - Normal. Eyebrows - Normal. Skull - Normal.             Ears Normal Inspection - Right: Normal, Left: Normal. Canal - Right: Normal, Left: Normal. TM - Right: Normal, Left: Normal.   Skin Normal Inspection - Normal.                              Canals:  Right: Canal reveals cerumen impaction,   Left: Canal reveals cerumen impaction,     Tympanic Membranes:  Right: Normal tympanic membrane.   Left: Normal tympanic membrane.     TM Visualized Method:   Right TM examined via otomicroscopy.    Left TM examined via otomicroscopy.      PROCEDURE:    Removal of cerumen impaction   The cerumen impaction was completely removed using microscopy.   Removal was completed by using acurette and/or suction.   Comments: Return to clinic as needed.  Avoid q-tips, water precautions and use over the counter wax remedies as needed.      Current Outpatient Medications:     rosuvastatin 5 MG Oral Tab, Take 1 tablet (5 mg  total) by mouth nightly., Disp: 90 tablet, Rfl: 1    torsemide 10 MG Oral Tab, Take 1 tablet (10 mg total) by mouth daily., Disp: 90 tablet, Rfl: 1    acetaminophen 325 MG Oral Tab, Take 2 tablets (650 mg total) by mouth every 4 (four) hours as needed., Disp: , Rfl:     lisinopril 2.5 MG Oral Tab, Take 1 tablet (2.5 mg total) by mouth daily., Disp: , Rfl:     warfarin 2.5 MG Oral Tab, TAKE 1 TABLET BY MOUTH 6  DAYS PER WEEK AND 2 TABLETS BY MOUTH 1 DAY WEEKLY AS  DIRECTED BY COUMADIN CLINIC, Disp: 104 tablet, Rfl: 3    Ferrous Sulfate 325 (65 Fe) MG Oral Tab, Take 1 tablet (325 mg total) by mouth daily with breakfast., Disp: 60 tablet, Rfl: 0    sildenafil 20 MG Oral Tab, Take 0.5 tablets (10 mg total) by mouth 3 (three) times daily., Disp: 90 tablet, Rfl: 0    Cholecalciferol (VITAMIN D) 50 MCG (2000 UT) Oral Cap, Take by mouth., Disp: , Rfl:     LOPERAMIDE HCL OR, Take 30 mL by mouth., Disp: , Rfl:     UPTRAVI 1600 MCG Oral Tab, 1,600 mcg Q12H.  , Disp: , Rfl:   ASSESSMENT AND PLAN    1. Bilateral impacted cerumen    - REMOVAL IMPACTED CERUMEN REQUIRING INSTRUMENTATION, UNILATERAL      All cerumen was removed using microscopy. I have asked the patient to return to see me as needed for repeat cerumen removal in the future.      Gurwinder Jackman MD    3/21/2024    2:39 PM

## 2024-03-27 RX ORDER — SILDENAFIL CITRATE 20 MG/1
TABLET ORAL
Qty: 45 TABLET | Refills: 3 | Status: SHIPPED | OUTPATIENT
Start: 2024-03-27

## 2024-04-23 RX ORDER — WARFARIN SODIUM 2.5 MG/1
TABLET ORAL
Qty: 104 TABLET | Refills: 3 | Status: SHIPPED | OUTPATIENT
Start: 2024-04-23

## 2024-04-25 ENCOUNTER — ANTI-COAG VISIT (OUTPATIENT)
Dept: ANTICOAGULATION | Facility: CLINIC | Age: 89
End: 2024-04-25

## 2024-04-25 DIAGNOSIS — I48.20 CHRONIC ATRIAL FIBRILLATION (HCC): ICD-10-CM

## 2024-04-25 DIAGNOSIS — Z79.01 MONITORING FOR LONG-TERM ANTICOAGULANT USE: ICD-10-CM

## 2024-04-25 DIAGNOSIS — Z51.81 MONITORING FOR LONG-TERM ANTICOAGULANT USE: ICD-10-CM

## 2024-04-25 DIAGNOSIS — I27.20 PULMONARY HYPERTENSION (HCC): Primary | ICD-10-CM

## 2024-04-25 DIAGNOSIS — Z79.01 LONG TERM (CURRENT) USE OF ANTICOAGULANTS: ICD-10-CM

## 2024-04-25 LAB
INR: 3.1 (ref 2–3)
TEST STRIP EXPIRATION DATE: ABNORMAL DATE

## 2024-04-25 PROCEDURE — 85610 PROTHROMBIN TIME: CPT | Performed by: FAMILY MEDICINE

## 2024-04-25 PROCEDURE — 93793 ANTICOAG MGMT PT WARFARIN: CPT | Performed by: FAMILY MEDICINE

## 2024-04-25 NOTE — PROGRESS NOTES
Face-to-Face  / INR 3.1, supra therapeutic. (Goal 2.5 ) TTR 80.2 %     Etiology: stable.     PLAN: continue the current dose. (If she remembers reduce tomorrow to 3.75mg one time only.)    Recheck INR 3 weeks.    Pt reports:    No sign of unusual bruising or bleeding.  Any missed doses: No   Medications changes: No    Contacted  [place] by phone  who verbalized understanding and agreement.    WARFARIN Plan per protocol: 4/26: 3.75 mg; Otherwise 5 mg every Mon, Fri; 2.5 mg all other days

## 2024-04-25 NOTE — TELEPHONE ENCOUNTER
Current Outpatient Medications   Medication Sig Dispense Refill    rosuvastatin 5 MG Oral Tab Take 1 tablet (5 mg total) by mouth nightly. 90 tablet 1    torsemide 10 MG Oral Tab Take 1 tablet (10 mg total) by mouth daily. 90 tablet 1    Ferrous Sulfate 325 (65 Fe) MG Oral Tab Take 1 tablet (325 mg total) by mouth daily with breakfast. 60 tablet 0

## 2024-04-26 RX ORDER — LISINOPRIL 2.5 MG/1
2.5 TABLET ORAL DAILY
Qty: 90 TABLET | Refills: 3 | Status: SHIPPED | OUTPATIENT
Start: 2024-04-26

## 2024-04-26 RX ORDER — FERROUS SULFATE 325(65) MG
325 TABLET ORAL
Qty: 90 TABLET | Refills: 0 | Status: SHIPPED | OUTPATIENT
Start: 2024-04-26

## 2024-04-26 RX ORDER — ROSUVASTATIN CALCIUM 5 MG/1
5 TABLET, COATED ORAL NIGHTLY
Qty: 90 TABLET | Refills: 0 | Status: SHIPPED | OUTPATIENT
Start: 2024-04-26

## 2024-04-26 RX ORDER — TORSEMIDE 10 MG/1
10 TABLET ORAL DAILY
Qty: 90 TABLET | Refills: 0 | Status: SHIPPED | OUTPATIENT
Start: 2024-04-26

## 2024-05-08 ENCOUNTER — APPOINTMENT (OUTPATIENT)
Dept: GENERAL RADIOLOGY | Age: 89
End: 2024-05-08
Attending: PHYSICIAN ASSISTANT
Payer: MEDICARE

## 2024-05-08 ENCOUNTER — TELEPHONE (OUTPATIENT)
Dept: NEPHROLOGY | Facility: CLINIC | Age: 89
End: 2024-05-08

## 2024-05-08 ENCOUNTER — APPOINTMENT (OUTPATIENT)
Dept: CT IMAGING | Age: 89
End: 2024-05-08
Attending: PHYSICIAN ASSISTANT
Payer: MEDICARE

## 2024-05-08 ENCOUNTER — HOSPITAL ENCOUNTER (OUTPATIENT)
Age: 89
Discharge: HOME | End: 2024-05-08
Attending: PHYSICIAN ASSISTANT
Payer: MEDICARE

## 2024-05-08 VITALS
RESPIRATION RATE: 18 BRPM | SYSTOLIC BLOOD PRESSURE: 99 MMHG | HEART RATE: 64 BPM | TEMPERATURE: 98 F | OXYGEN SATURATION: 96 % | DIASTOLIC BLOOD PRESSURE: 36 MMHG

## 2024-05-08 DIAGNOSIS — S30.0XXA COCCYX CONTUSION, INITIAL ENCOUNTER: Primary | ICD-10-CM

## 2024-05-08 DIAGNOSIS — S30.0XXA CONTUSION OF SACRUM, INITIAL ENCOUNTER: ICD-10-CM

## 2024-05-08 DIAGNOSIS — S32.10XA CLOSED FRACTURE OF SACRUM, UNSPECIFIED PORTION OF SACRUM, INITIAL ENCOUNTER (HCC): ICD-10-CM

## 2024-05-08 DIAGNOSIS — S09.90XA CLOSED HEAD INJURY WITHOUT LOSS OF CONSCIOUSNESS, INITIAL ENCOUNTER: ICD-10-CM

## 2024-05-08 PROCEDURE — 72192 CT PELVIS W/O DYE: CPT | Performed by: PHYSICIAN ASSISTANT

## 2024-05-08 PROCEDURE — 70450 CT HEAD/BRAIN W/O DYE: CPT | Performed by: PHYSICIAN ASSISTANT

## 2024-05-08 PROCEDURE — 99204 OFFICE O/P NEW MOD 45 MIN: CPT | Performed by: PHYSICIAN ASSISTANT

## 2024-05-08 PROCEDURE — 72220 X-RAY EXAM SACRUM TAILBONE: CPT | Performed by: PHYSICIAN ASSISTANT

## 2024-05-08 NOTE — TELEPHONE ENCOUNTER
Patients daughter Lizbet calling for patient that fell 2 days ago and hurt her back/buttocks. Patients legs gave out when entering the car. Patients daughter asking if an xray should be placed or if patient should be seen in office. Please call at [phone],thanks.

## 2024-05-08 NOTE — TELEPHONE ENCOUNTER
Dr Narvaez please see note below spoke to patient daughter Lizbet MATILDA verified patient fell to left side left leg gave out while about to get in to the car last Friday ,able to walk fine ,complaining of pain to left buttock/tailbone only when sitting down 9/10,no swelling or bruise noted.Asking if can do Xray or need to be seen.

## 2024-05-08 NOTE — ED INITIAL ASSESSMENT (HPI)
Fell and landed L hip on Friday, has had pain since then when she sits. Hit head slightly when she fell. Currently taking Coumadin. No swelling or bruising per daughter. Taking Tylenol, last dose 1300.

## 2024-05-08 NOTE — TELEPHONE ENCOUNTER
Informed patient daughter Lizbet GREY verified and verbalized understanding stated will go to urgent care, but still wanted to schedule patient to see you just in case pt cannot tolerate sitting down in the waiting area,pt scheduled on 5/10/24.

## 2024-05-09 ENCOUNTER — TELEPHONE (OUTPATIENT)
Dept: NEPHROLOGY | Facility: CLINIC | Age: 89
End: 2024-05-09

## 2024-05-09 NOTE — TELEPHONE ENCOUNTER
Daughter states Urgent care is going to send patient recent results for MRI and X-ray please follow up

## 2024-05-09 NOTE — TELEPHONE ENCOUNTER
Related message to daughter. Patient is eating well, walking and feel discomfort in tail bone area when sitting.  Comfort measures discussed.

## 2024-05-09 NOTE — TELEPHONE ENCOUNTER
Reviewed x-rays and CT scan.  Fortunately no fractures or bleeding.  See though if not improving.

## 2024-05-09 NOTE — ED PROVIDER NOTES
Patient Seen in: [facility]    History     Chief Complaint   Patient presents with    Fall    Hip Pain     Stated Complaint: Left hip pain    HPI    89-year-old female presents with chief complaint of coccygeal pain.  Onset 5 days ago.  Patient states she sustained a mechanical fall onto her buttocks and also hitting her posterior head.  Patient denies other injury, loss of consciousness, altered mental status, nausea, vomiting, amnesia, weakness, paresthesias, vision changes, decreased range of motion of extremities, fever, chills, abdominal pain, diarrhea, constipation, dysuria, hematuria, saddle anesthesia, bowel/bladder incontinence.    Past Medical History:    Atrial fibrillation (HCC)    Per NG: cardioversion attempt    Breast cancer (HCC)    Per NG: lumpectomy and radiation 2011 LT    Breast lump    Per NG: excised--benign LT    Breast lump    Per NG : excised -- benign RT    Conjunctivitis    Per NG: right eye    Coronary atherosclerosis    Dermatochalasis of both eyelids    High blood pressure    High cholesterol    Mitral regurgitation    Ovarian cyst    Per NG: AH/BSO    Screening    Per NG:dexascan '02, '06 normal    Tonsillitis    Per NG: T&A       Past Surgical History:   Procedure Laterality Date    Cataract extraction w/  intraocular lens implant Right 2012    RJ    Cataract extraction w/  intraocular lens implant Left 01/10/2006    RJM    Hysterectomy      age 68    Lumpectomy left  2011    Judy localization wire 1 site right (cpt=19281)            Pregnancy - 36 week 3 lb(s) 15 oz Female       ,    pregnacy           Per NG: Pregnacy 40 week 7 lb(s) 14 oz Male          Per NG:  -breech  OUTCOME IS 40 week 9 lb(s) Female    Radiation left  2011    Yag capsulotomy - os - left eye Left 2007    [facility]            Family History   Problem Relation Age of Onset    Prostate Cancer Father     Heart Disorder Mother         Per NG:  Aortic Regurgitation    Prostate Cancer Brother     Breast Cancer Paternal Aunt         80    Breast Cancer Self 82    Diabetes Neg     Glaucoma Neg     Macular degeneration Neg        Social History     Socioeconomic History    Marital status:    Tobacco Use    Smoking status: Never    Smokeless tobacco: Never   Substance and Sexual Activity    Alcohol use: Not Currently    Drug use: No   Other Topics Concern    Caffeine Concern Yes     Comment: per NG: Coffee, 2 cups     Social Determinants of Health     Physical Activity: High Risk (3/12/2024)    [facility]    Exercise Vital Sign     On average, how many days per week do you engage in moderate to strenuous exercise (like a brisk walk)?: 0 days     On average, how many minutes do you engage in exercise at this level?: 0 min       Review of Systems    Positive for stated complaint: Left hip pain  Other systems are as noted in HPI.  Constitutional and vital signs reviewed.      All other systems reviewed and negative except as noted above.    PSFH elements reviewed from today and agreed except as otherwise stated in HPI.    Physical Exam     ED Triage Vitals [05/08/24 1550]   BP 99/36   Pulse 64   Resp 18   Temp 98.4 °F (36.9 °C)   Temp src Temporal   SpO2 96 %   O2 Device None (Room air)       Current:BP 99/36   Pulse 64   Temp 98.4 °F (36.9 °C) (Temporal)   Resp 18   SpO2 96%      PULSE OX within normal limits on room air as interpreted by this provider.    Constitutional: The patient is cooperative. Appears well-developed and well-nourished.  Mild discomfort.  Psychological: Alert, No abnormalities of mood, affect.  Head: Normocephalic/atraumatic. Nontender. No Cornelius sign, hemotympanum, raccoon sign.  Eyes: Pupils are equal round reactive to light. Conjunctiva are within normal limits. Extraocular motions intact bilaterally.  ENT: Oropharynx is clear.  Neck: The neck is supple. No meningeal signs.  Trachea normal. Nontender to palpation. No contusions. No abrasions. No penetrating injury.  Respiratory: Respiratory effort was normal. There is no stridor. Air entry is equal.  Cardiovascular: Regular rate and rhythm. Capillary refill is brisk.  Genitourinary: Not examined.  Lymphatic: No gross lymphadenopathy noted.  Musculoskeletal: Back - Normal to inspection. Positive tenderness to palpation present at coccyx.  Thoracic and lumbar spine nontender to palpation.  No CVA tenderness bilaterally.  Full range of motion with reported pain.  No palpable muscle spasm.  Remainder of musculoskeletal system is grossly intact.  There is no obvious deformity.  Neurological: No facial asymmetry.  Normal gait.  Normal sensory exam.  Patient exhibits normal speech.  Strength and range of motion symmetrical of all extremities x4.  Skin: Skin is normal to inspection and palpation. Warm and dry. No obvious bruising. No obvious rash. No open wounds.    ED Course   Labs Reviewed - No data to display    MDM       Radiology findings: CT PELVIS (CPT=72192)    Result Date: 5/8/2024  CONCLUSION:  1. No acute fracture. 2. Minimal anterior angulation of the distal sacrum may be related to an old healed fracture. 3. Osteoporosis. 4. Uncomplicated right total hip arthroplasty.    Dictated by (CST): Bello Maguire MD on 5/08/2024 at 6:49 PM     Finalized by (CST): Bello Maguire MD on 5/08/2024 at 6:56 PM          CT BRAIN OR HEAD (75461)    Result Date: 5/8/2024  CONCLUSION:  1. No acute intracranial finding. 2. Large vessel intracranial atherosclerosis.    Dictated by (CST): Bello Maguire MD on 5/08/2024 at 6:31 PM     Finalized by (CST): Bello Maguire MD on 5/08/2024 at 6:32 PM          XR SACRUM + COCCYX (MIN 2 VIEWS) (CPT=72220)    Result Date: 5/8/2024  CONCLUSION:   Questionable fracture and S5, possibly artifactual.  Recommend correlation with point tenderness and consider further evaluation with CT of the pelvis.  Additional  chronic or incidental findings are described in the body of this report.    elm-remote    Dictated by (CST): Juanito Blair MD on 5/08/2024 at 5:02 PM     Finalized by (CST): Juanito Blair MD on 5/08/2024 at 5:06 PM           X-ray images of sacrum and coccyx independently viewed by this provider-possible S5 fracture.    Physical exam remained stable as previously documented.  Available results reviewed with patient.    I have given the patient instructions regarding their diagnoses, expectations, follow up, and ER precautions. I explained to the patient that emergent conditions may arise and to go to the ER for new, worsening or any persistent conditions. I've explained the importance of following up with their doctor as instructed. The patient verbalized understanding of the discharge instructions and plan.    Disposition and Plan     Clinical Impression:  1. Coccyx contusion, initial encounter    2. Closed head injury without loss of consciousness, initial encounter    3. Closed fracture of sacrum, unspecified portion of sacrum, initial encounter ([facility])    4. Contusion of sacrum, initial encounter        Disposition:  Discharge    Follow-up:  Robert Narvaez MD  [address] [place] [id number]  [phone]    Call in 1 day  For follow-up      Medications Prescribed:  Current Discharge Medication List

## 2024-05-09 NOTE — TELEPHONE ENCOUNTER
Dr. Narvaez, daughter reached out to let you know mom was seen 5/9/24 at . Xray and CT was done.  Please review

## 2024-05-15 ENCOUNTER — LAB ENCOUNTER (OUTPATIENT)
Dept: LAB | Age: 89
End: 2024-05-15
Attending: FAMILY MEDICINE

## 2024-05-15 ENCOUNTER — ANTI-COAG VISIT (OUTPATIENT)
Dept: ANTICOAGULATION | Facility: CLINIC | Age: 89
End: 2024-05-15

## 2024-05-15 DIAGNOSIS — I48.20 CHRONIC ATRIAL FIBRILLATION (HCC): ICD-10-CM

## 2024-05-15 DIAGNOSIS — Z51.81 MONITORING FOR LONG-TERM ANTICOAGULANT USE: ICD-10-CM

## 2024-05-15 DIAGNOSIS — I27.20 PULMONARY HYPERTENSION (HCC): Primary | ICD-10-CM

## 2024-05-15 DIAGNOSIS — Z79.01 LONG TERM (CURRENT) USE OF ANTICOAGULANTS: Primary | ICD-10-CM

## 2024-05-15 DIAGNOSIS — Z79.01 LONG TERM (CURRENT) USE OF ANTICOAGULANTS: ICD-10-CM

## 2024-05-15 DIAGNOSIS — Z79.01 MONITORING FOR LONG-TERM ANTICOAGULANT USE: ICD-10-CM

## 2024-05-15 LAB
INR BLD: 3.71 (ref 0.8–1.2)
INR: 5.6 (ref 2–3)
PROTHROMBIN TIME: 39.1 SECONDS (ref 11.6–14.8)
TEST STRIP EXPIRATION DATE: ABNORMAL DATE

## 2024-05-15 PROCEDURE — 93793 ANTICOAG MGMT PT WARFARIN: CPT | Performed by: FAMILY MEDICINE

## 2024-05-15 PROCEDURE — 85610 PROTHROMBIN TIME: CPT | Performed by: FAMILY MEDICINE

## 2024-05-15 PROCEDURE — 36415 COLL VENOUS BLD VENIPUNCTURE: CPT | Performed by: FAMILY MEDICINE

## 2024-05-15 NOTE — PROGRESS NOTES
[facility] Labs: VENOUS / INR 3.7, supra therapeutic. (Goal 2.5 ) TTR 79.7 %     Etiology: More OTC Tylenol. Plus her daughter from [place] is in helping her- a change of routine.    PLAN: HOLD today then reduce the dose to 3.75mg MWF.    Recheck INR 2 weeks.    Pt reports:    No sign of unusual bruising or bleeding.  Any missed doses: No   Medications changes: OTC tylenol s/p fall.    Contacted  Evelyn Somers by phone  who verbalized understanding and agreement.    WARFARIN Plan per protocol: 5/15: Hold; Otherwise 3.75 mg every Mon, Wed, Fri; 2.5 mg all other days

## 2024-05-15 NOTE — PROGRESS NOTES
See Next INR encounter for dosing instructions  Face-to-Face  / INR 5.6, supra therapeutic. (Goal 2.5 ) TTR 79.74 %     Etiology: [place] has been using Tylenol because she fell and hit head and back. (She did go to ER)      Contacted  [place] verbalized understanding and agreement. AVS provided to HOLD today.

## 2024-05-31 ENCOUNTER — ANTI-COAG VISIT (OUTPATIENT)
Dept: ANTICOAGULATION | Facility: CLINIC | Age: 89
End: 2024-05-31

## 2024-05-31 DIAGNOSIS — I48.20 CHRONIC ATRIAL FIBRILLATION (HCC): ICD-10-CM

## 2024-05-31 DIAGNOSIS — I27.20 PULMONARY HYPERTENSION (HCC): Primary | ICD-10-CM

## 2024-05-31 DIAGNOSIS — Z51.81 MONITORING FOR LONG-TERM ANTICOAGULANT USE: ICD-10-CM

## 2024-05-31 DIAGNOSIS — Z79.01 MONITORING FOR LONG-TERM ANTICOAGULANT USE: ICD-10-CM

## 2024-05-31 DIAGNOSIS — Z79.01 LONG TERM (CURRENT) USE OF ANTICOAGULANTS: ICD-10-CM

## 2024-05-31 LAB
INR: 3 (ref 0.8–1.2)
TEST STRIP EXPIRATION DATE: ABNORMAL DATE

## 2024-05-31 PROCEDURE — 93793 ANTICOAG MGMT PT WARFARIN: CPT | Performed by: FAMILY MEDICINE

## 2024-05-31 PROCEDURE — 85610 PROTHROMBIN TIME: CPT | Performed by: FAMILY MEDICINE

## 2024-07-02 ENCOUNTER — OFFICE VISIT (OUTPATIENT)
Dept: OTOLARYNGOLOGY | Facility: CLINIC | Age: 89
End: 2024-07-02

## 2024-07-02 ENCOUNTER — ANTI-COAG VISIT (OUTPATIENT)
Dept: ANTICOAGULATION | Facility: CLINIC | Age: 89
End: 2024-07-02
Payer: MEDICARE

## 2024-07-02 DIAGNOSIS — Z51.81 MONITORING FOR LONG-TERM ANTICOAGULANT USE: ICD-10-CM

## 2024-07-02 DIAGNOSIS — H61.23 BILATERAL IMPACTED CERUMEN: Primary | ICD-10-CM

## 2024-07-02 DIAGNOSIS — Z79.01 LONG TERM (CURRENT) USE OF ANTICOAGULANTS: ICD-10-CM

## 2024-07-02 DIAGNOSIS — Z79.01 MONITORING FOR LONG-TERM ANTICOAGULANT USE: ICD-10-CM

## 2024-07-02 DIAGNOSIS — I48.20 CHRONIC ATRIAL FIBRILLATION (HCC): ICD-10-CM

## 2024-07-02 DIAGNOSIS — I27.20 PULMONARY HYPERTENSION (HCC): Primary | ICD-10-CM

## 2024-07-02 LAB
INR: 2.5 (ref 0.8–1.2)
TEST STRIP EXPIRATION DATE: ABNORMAL DATE

## 2024-07-02 PROCEDURE — 69210 REMOVE IMPACTED EAR WAX UNI: CPT | Performed by: OTOLARYNGOLOGY

## 2024-07-02 PROCEDURE — 93793 ANTICOAG MGMT PT WARFARIN: CPT | Performed by: FAMILY MEDICINE

## 2024-07-02 PROCEDURE — 85610 PROTHROMBIN TIME: CPT | Performed by: FAMILY MEDICINE

## 2024-07-02 NOTE — PROGRESS NOTES
TTR 79.5%    Face to face. Here today with her daughter.     Per protocol, continue current dose and recheck INR in 4 weeks.     3.75 mg every Mon, Wed, Fri; 2.5 mg all other days

## 2024-07-02 NOTE — PROGRESS NOTES
Juli Ramirez is a 89 year old female.    Chief Complaint   Patient presents with    Ear Wax     Ear cleaning        HISTORY OF PRESENT ILLNESS    Patient presents for cerumen removal. No other complaints or concerns at this time    Social History     Socioeconomic History    Marital status:    Tobacco Use    Smoking status: Never    Smokeless tobacco: Never   Substance and Sexual Activity    Alcohol use: Not Currently    Drug use: No   Other Topics Concern    Caffeine Concern Yes     Comment: per NG: Coffee, 2 cups       Family History   Problem Relation Age of Onset    Prostate Cancer Father     Heart Disorder Mother         Per NG: Aortic Regurgitation    Prostate Cancer Brother     Breast Cancer Paternal Aunt         80    Breast Cancer Self 82    Diabetes Neg     Glaucoma Neg     Macular degeneration Neg        Past Medical History:    Atrial fibrillation (HCC)    Per NG: cardioversion attempt    Breast cancer (HCC)    Per NG: lumpectomy and radiation 2011 LT    Breast lump    Per NG: excised--benign LT    Breast lump    Per NG : excised -- benign RT    Conjunctivitis    Per NG: right eye    Coronary atherosclerosis    Dermatochalasis of both eyelids    High blood pressure    High cholesterol    Mitral regurgitation    Ovarian cyst    Per NG: AH/BSO    Screening    Per NG:dexascan '02, '06 normal    Tonsillitis    Per NG: T&A       Past Surgical History:   Procedure Laterality Date    Cataract extraction w/  intraocular lens implant Right 2012    RJM    Cataract extraction w/  intraocular lens implant Left 01/10/2006    RJM    Hysterectomy      age 68    Lumpectomy left  2011    Judy localization wire 1 site right (cpt=19281)        1950    Pregnancy - 36 week 3 lb(s) 15 oz Female       195,    pregnacy           Per NG: Pregnacy 40 week 7 lb(s) 14 oz Male          Per NG:  -breech  OUTCOME IS 40 week 9 lb(s) Female    Radiation left  2011    Yag  capsulotomy - os - left eye Left 09/19/2007    [facility]       REVIEW OF SYSTEMS    System Neg/Pos Details   Constitutional Negative Fatigue, fever and weight loss.   ENMT Negative Drooling.   Eyes Negative Blurred vision and vision changes.   Respiratory Negative Dyspnea and wheezing.   Cardio Negative Chest pain, irregular heartbeat/palpitations and syncope.   GI Negative Abdominal pain and diarrhea.   Endocrine Negative Cold intolerance and heat intolerance.   Neuro Negative Tremors.   Psych Negative Anxiety and depression.   Integumentary Negative Frequent skin infections, pigment change and rash.   Hema/Lymph Negative Easy bleeding and easy bruising.           PHYSICAL EXAM    There were no vitals taken for this visit.       Constitutional Normal Overall appearance - Normal.        Neck Exam Normal Inspection - Normal. Palpation - Normal. Parotid gland - Normal. Thyroid gland - Normal.             Head/Face Normal Facial features - Normal. Eyebrows - Normal. Skull - Normal.             Ears Normal Inspection - Right: Normal, Left: Normal. Canal - Right: Normal, Left: Normal. TM - Right: Normal, Left: Normal.   Skin Normal Inspection - Normal.                              Canals:  Right: Canal reveals cerumen impaction,   Left: Canal reveals cerumen impaction,     Tympanic Membranes:  Right: Normal tympanic membrane.   Left: Normal tympanic membrane.     TM Visualized Method:   Right TM examined via otomicroscopy.    Left TM examined via otomicroscopy.      PROCEDURE:    Removal of cerumen impaction   The cerumen impaction was completely removed using microscopy.   Removal was completed by using acurette and/or suction.   Comments: Return to clinic as needed.  Avoid q-tips, water precautions and use over the counter wax remedies as needed.      Current Outpatient Medications:     torsemide 10 MG Oral Tab, Take 1 tablet (10 mg total) by mouth daily., Disp: 90 tablet, Rfl: 0    Ferrous Sulfate 325 (65 Fe) MG Oral Tab,  Take 1 tablet (325 mg total) by mouth daily with breakfast., Disp: 90 tablet, Rfl: 0    rosuvastatin 5 MG Oral Tab, Take 1 tablet (5 mg total) by mouth nightly., Disp: 90 tablet, Rfl: 0    warfarin 2.5 MG Oral Tab, TAKE 1 TABLET BY MOUTH 6  DAYS PER WEEK AND 2 TABLETS BY MOUTH 1 DAY WEEKLY AS  DIRECTED BY COUMADIN CLINIC, Disp: 104 tablet, Rfl: 3    rosuvastatin 5 MG Oral Tab, Take 1 tablet (5 mg total) by mouth nightly., Disp: 90 tablet, Rfl: 1    torsemide 10 MG Oral Tab, Take 1 tablet (10 mg total) by mouth daily., Disp: 90 tablet, Rfl: 1    acetaminophen 325 MG Oral Tab, Take 2 tablets (650 mg total) by mouth every 4 (four) hours as needed., Disp: , Rfl:     lisinopril 2.5 MG Oral Tab, Take 1 tablet (2.5 mg total) by mouth daily., Disp: , Rfl:     Ferrous Sulfate 325 (65 Fe) MG Oral Tab, Take 1 tablet (325 mg total) by mouth daily with breakfast., Disp: 60 tablet, Rfl: 0    sildenafil 20 MG Oral Tab, Take 0.5 tablets (10 mg total) by mouth 3 (three) times daily., Disp: 90 tablet, Rfl: 0    Cholecalciferol (VITAMIN D) 50 MCG (2000 UT) Oral Cap, Take by mouth., Disp: , Rfl:     LOPERAMIDE HCL OR, Take 30 mL by mouth., Disp: , Rfl:     UPTRAVI 1600 MCG Oral Tab, 1,600 mcg Q12H.  , Disp: , Rfl:   ASSESSMENT AND PLAN    1. Bilateral impacted cerumen        All cerumen was removed using microscopy. I have asked the patient to return to see me as needed for repeat cerumen removal in the future.      Gurwinder Jackman MD    7/2/2024    4:16 PM

## 2024-07-27 ENCOUNTER — HOSPITAL ENCOUNTER (INPATIENT)
Facility: HOSPITAL | Age: 89
LOS: 2 days | Discharge: HOME HEALTH | End: 2024-07-29
Attending: EMERGENCY MEDICINE | Admitting: INTERNAL MEDICINE
Payer: MEDICARE

## 2024-07-27 ENCOUNTER — APPOINTMENT (OUTPATIENT)
Dept: GENERAL RADIOLOGY | Facility: HOSPITAL | Age: 89
End: 2024-07-27
Attending: INTERNAL MEDICINE
Payer: MEDICARE

## 2024-07-27 ENCOUNTER — APPOINTMENT (OUTPATIENT)
Dept: GENERAL RADIOLOGY | Facility: HOSPITAL | Age: 89
End: 2024-07-27
Attending: EMERGENCY MEDICINE
Payer: MEDICARE

## 2024-07-27 DIAGNOSIS — N28.9 RENAL INSUFFICIENCY: ICD-10-CM

## 2024-07-27 DIAGNOSIS — E86.0 DEHYDRATION: Primary | ICD-10-CM

## 2024-07-27 LAB
ANION GAP SERPL CALC-SCNC: 12 MMOL/L (ref 0–18)
BASOPHILS # BLD AUTO: 0.02 X10(3) UL (ref 0–0.2)
BASOPHILS NFR BLD AUTO: 0.3 %
BILIRUB UR QL: NEGATIVE
BUN BLD-MCNC: 43 MG/DL (ref 9–23)
BUN/CREAT SERPL: 24.4 (ref 10–20)
CALCIUM BLD-MCNC: 8.9 MG/DL (ref 8.7–10.4)
CHLORIDE SERPL-SCNC: 101 MMOL/L (ref 98–112)
CK SERPL-CCNC: 3042 U/L
CO2 SERPL-SCNC: 19 MMOL/L (ref 21–32)
COLOR UR: YELLOW
CREAT BLD-MCNC: 1.76 MG/DL
DEPRECATED RDW RBC AUTO: 48.1 FL (ref 35.1–46.3)
EGFRCR SERPLBLD CKD-EPI 2021: 26 ML/MIN/1.73M2 (ref 60–?)
EOSINOPHIL # BLD AUTO: 0 X10(3) UL (ref 0–0.7)
EOSINOPHIL NFR BLD AUTO: 0 %
ERYTHROCYTE [DISTWIDTH] IN BLOOD BY AUTOMATED COUNT: 14.6 % (ref 11–15)
GLUCOSE BLD-MCNC: 131 MG/DL (ref 70–99)
GLUCOSE BLDC GLUCOMTR-MCNC: 136 MG/DL (ref 70–99)
GLUCOSE UR-MCNC: NORMAL MG/DL
HCT VFR BLD AUTO: 37.1 %
HGB BLD-MCNC: 12.9 G/DL
IMM GRANULOCYTES # BLD AUTO: 0.01 X10(3) UL (ref 0–1)
IMM GRANULOCYTES NFR BLD: 0.2 %
INR BLD: 2.56 (ref 0.8–1.2)
KETONES UR-MCNC: NEGATIVE MG/DL
LEUKOCYTE ESTERASE UR QL STRIP.AUTO: 500
LYMPHOCYTES # BLD AUTO: 1.18 X10(3) UL (ref 1–4)
LYMPHOCYTES NFR BLD AUTO: 19.4 %
MCH RBC QN AUTO: 31.2 PG (ref 26–34)
MCHC RBC AUTO-ENTMCNC: 34.8 G/DL (ref 31–37)
MCV RBC AUTO: 89.6 FL
MONOCYTES # BLD AUTO: 0.29 X10(3) UL (ref 0.1–1)
MONOCYTES NFR BLD AUTO: 4.8 %
NEUTROPHILS # BLD AUTO: 4.57 X10 (3) UL (ref 1.5–7.7)
NEUTROPHILS # BLD AUTO: 4.57 X10(3) UL (ref 1.5–7.7)
NEUTROPHILS NFR BLD AUTO: 75.3 %
NITRITE UR QL STRIP.AUTO: NEGATIVE
OSMOLALITY SERPL CALC.SUM OF ELEC: 287 MOSM/KG (ref 275–295)
PH UR: 7.5 [PH] (ref 5–8)
PLATELET # BLD AUTO: 208 10(3)UL (ref 150–450)
POTASSIUM SERPL-SCNC: 4.9 MMOL/L (ref 3.5–5.1)
PROT UR-MCNC: 30 MG/DL
PROTHROMBIN TIME: 29.1 SECONDS (ref 11.6–14.8)
RBC # BLD AUTO: 4.14 X10(6)UL
SODIUM SERPL-SCNC: 132 MMOL/L (ref 136–145)
SP GR UR STRIP: 1.01 (ref 1–1.03)
UROBILINOGEN UR STRIP-ACNC: NORMAL
WBC # BLD AUTO: 6.1 X10(3) UL (ref 4–11)
WBC #/AREA URNS AUTO: >50 /HPF

## 2024-07-27 PROCEDURE — 73502 X-RAY EXAM HIP UNI 2-3 VIEWS: CPT | Performed by: EMERGENCY MEDICINE

## 2024-07-27 PROCEDURE — 99223 1ST HOSP IP/OBS HIGH 75: CPT | Performed by: INTERNAL MEDICINE

## 2024-07-27 PROCEDURE — 72100 X-RAY EXAM L-S SPINE 2/3 VWS: CPT | Performed by: INTERNAL MEDICINE

## 2024-07-27 RX ORDER — SENNOSIDES 8.6 MG
17.2 TABLET ORAL NIGHTLY PRN
Status: DISCONTINUED | OUTPATIENT
Start: 2024-07-27 | End: 2024-07-29

## 2024-07-27 RX ORDER — ACETAMINOPHEN 500 MG
1000 TABLET ORAL ONCE
Status: COMPLETED | OUTPATIENT
Start: 2024-07-27 | End: 2024-07-27

## 2024-07-27 RX ORDER — FERROUS SULFATE 325(65) MG
325 TABLET ORAL
Status: DISCONTINUED | OUTPATIENT
Start: 2024-07-28 | End: 2024-07-27

## 2024-07-27 RX ORDER — HYDROCODONE BITARTRATE AND ACETAMINOPHEN 5; 325 MG/1; MG/1
2 TABLET ORAL EVERY 4 HOURS PRN
Status: DISCONTINUED | OUTPATIENT
Start: 2024-07-27 | End: 2024-07-29

## 2024-07-27 RX ORDER — WARFARIN SODIUM 2.5 MG/1
2.5 TABLET ORAL
Status: DISCONTINUED | OUTPATIENT
Start: 2024-07-27 | End: 2024-07-29

## 2024-07-27 RX ORDER — BISACODYL 10 MG
10 SUPPOSITORY, RECTAL RECTAL
Status: DISCONTINUED | OUTPATIENT
Start: 2024-07-27 | End: 2024-07-29

## 2024-07-27 RX ORDER — WARFARIN SODIUM 2.5 MG/1
2.5 TABLET ORAL NIGHTLY
Status: DISCONTINUED | OUTPATIENT
Start: 2024-07-27 | End: 2024-07-27

## 2024-07-27 RX ORDER — POLYETHYLENE GLYCOL 3350 17 G/17G
17 POWDER, FOR SOLUTION ORAL DAILY PRN
Status: DISCONTINUED | OUTPATIENT
Start: 2024-07-27 | End: 2024-07-29

## 2024-07-27 RX ORDER — ACETAMINOPHEN 325 MG/1
650 TABLET ORAL EVERY 4 HOURS PRN
Status: DISCONTINUED | OUTPATIENT
Start: 2024-07-27 | End: 2024-07-29

## 2024-07-27 RX ORDER — ONDANSETRON 2 MG/ML
4 INJECTION INTRAMUSCULAR; INTRAVENOUS EVERY 6 HOURS PRN
Status: DISCONTINUED | OUTPATIENT
Start: 2024-07-27 | End: 2024-07-29

## 2024-07-27 RX ORDER — HYDROCODONE BITARTRATE AND ACETAMINOPHEN 5; 325 MG/1; MG/1
1 TABLET ORAL EVERY 4 HOURS PRN
Status: DISCONTINUED | OUTPATIENT
Start: 2024-07-27 | End: 2024-07-29

## 2024-07-27 RX ORDER — SODIUM CHLORIDE 9 MG/ML
INJECTION, SOLUTION INTRAVENOUS CONTINUOUS
Status: DISCONTINUED | OUTPATIENT
Start: 2024-07-27 | End: 2024-07-29

## 2024-07-27 RX ORDER — ACETAMINOPHEN 500 MG
500 TABLET ORAL EVERY 4 HOURS PRN
Status: DISCONTINUED | OUTPATIENT
Start: 2024-07-27 | End: 2024-07-29

## 2024-07-27 RX ORDER — ROSUVASTATIN CALCIUM 5 MG/1
5 TABLET, COATED ORAL NIGHTLY
Status: DISCONTINUED | OUTPATIENT
Start: 2024-07-27 | End: 2024-07-29

## 2024-07-27 RX ORDER — WARFARIN SODIUM 7.5 MG/1
3.75 TABLET ORAL
Status: DISCONTINUED | OUTPATIENT
Start: 2024-07-29 | End: 2024-07-29

## 2024-07-27 NOTE — H&P
[facility]  part of [facility]  HISTORY AND PHYSICAL       [place] DANE Ramirez Patient Status:  Emergency    1929  89 year old CSN [id number]   Location  Attending Alejandro Orr MD     PCP Robert Narvaez MD         DATE OF ADMISSION: 2024     CHIEF COMPLAINT: Found down    HISTORY OF PRESENT ILLNESS  This is a 89 year oldfemale who was found on the ground by her family.  Unclear of exact circumstances surrounding events, but patient was found on the ground by her daughter.  Unclear about timeframe.  Patient states she was attempting to go to the bathroom when she believes her legs gave out on her.  Patient denied traumatic fall or head trauma.  Patient was unable to stand after the episode.  Patient was attempting to crawl to her phone, but was unable to reach it.  Daughter at bedside states she had not heard from her mother so she went to check on her.  She found her mother on the ground.  She was conscious, but appeared very weak to prompting visit to ED for further evaluation.  Upon further review, daughter states she noted her mother was weak for the past day or so.  Daughter describes patient with chronic diarrhea, but possible worsening over the past day or so.  Patient describes similar episode of legs giving out around 1 month ago.  She is also been describing low back pain.  At time of interview, patient denied current chest pain, shortness of breath, abdominal pain, nausea vomiting, fevers or chills    PAST MEDICAL HISTORY  Past Medical History:    Atrial fibrillation (HCC)    Per NG: cardioversion attempt    Breast cancer (HCC)    Per NG: lumpectomy and radiation 2011 LT    Breast lump    Per NG: excised--benign LT    Breast lump    Per NG : excised -- benign RT    Conjunctivitis    Per NG: right eye    Coronary atherosclerosis    Dermatochalasis of both eyelids    High blood pressure    High cholesterol    Mitral regurgitation    Ovarian cyst    Per NG: AH/BSO     Screening    Per NG:dexascan '02,  normal    Tonsillitis    Per NG: T&A        PAST SURGICAL HISTORY  Past Surgical History:   Procedure Laterality Date    Cataract extraction w/  intraocular lens implant Right 2012    RJM    Cataract extraction w/  intraocular lens implant Left 01/10/2006    RJM    Hysterectomy      age 68    Lumpectomy left  2011    Judy localization wire 1 site right (cpt=19281)        1950    Pregnancy - 36 week 3 lb(s) 15 oz Female       ,    pregnacy           Per NG: Pregnacy 40 week 7 lb(s) 14 oz Male          Per NG:  -breech  OUTCOME IS 40 week 9 lb(s) Female    Radiation left  2011    Yag capsulotomy - os - left eye Left 2007    RJM       ALLERGIES   Aldactone [spironolactone] and Ambrisentan    MEDICATIONS  Patient's Medications   New Prescriptions    No medications on file   Previous Medications    ACETAMINOPHEN 325 MG ORAL TAB    Take 2 tablets (650 mg total) by mouth every 4 (four) hours as needed.    CHOLECALCIFEROL (VITAMIN D) 50 MCG (2000 UT) ORAL CAP    Take by mouth.    FERROUS SULFATE 325 (65 FE) MG ORAL TAB    Take 1 tablet (325 mg total) by mouth daily with breakfast.    FERROUS SULFATE 325 (65 FE) MG ORAL TAB    Take 1 tablet (325 mg total) by mouth daily with breakfast.    LISINOPRIL 2.5 MG ORAL TAB    Take 1 tablet (2.5 mg total) by mouth daily.    LOPERAMIDE HCL OR    Take 30 mL by mouth.    ROSUVASTATIN 5 MG ORAL TAB    Take 1 tablet (5 mg total) by mouth nightly.    ROSUVASTATIN 5 MG ORAL TAB    Take 1 tablet (5 mg total) by mouth nightly.    SILDENAFIL 20 MG ORAL TAB    Take 0.5 tablets (10 mg total) by mouth 3 (three) times daily.    TORSEMIDE 10 MG ORAL TAB    Take 1 tablet (10 mg total) by mouth daily.    TORSEMIDE 10 MG ORAL TAB    Take 1 tablet (10 mg total) by mouth daily.    UPTRAVI 1600 MCG ORAL TAB    1,600 mcg Q12H.      WARFARIN 2.5 MG ORAL TAB    TAKE 1 TABLET BY MOUTH 6  DAYS PER WEEK AND 2  TABLETS BY MOUTH 1 DAY WEEKLY AS  DIRECTED BY COUMADIN CLINIC   Modified Medications    No medications on file   Discontinued Medications    No medications on file       SOCIAL HISTORY  Social History     Socioeconomic History    Marital status:    Tobacco Use    Smoking status: Never    Smokeless tobacco: Never   Substance and Sexual Activity    Alcohol use: Not Currently    Drug use: No   Other Topics Concern    Caffeine Concern Yes     Comment: per NG: Coffee, 2 cups       FAMILY HISTORY  Family History   Problem Relation Age of Onset    Prostate Cancer Father     Heart Disorder Mother         Per NG: Aortic Regurgitation    Prostate Cancer Brother     Breast Cancer Paternal Aunt         80    Breast Cancer Self 82    Diabetes Neg     Glaucoma Neg     Macular degeneration Neg        PHYSICAL EXAM  Vital signs:  /64   Pulse 67   Temp 98.3 °F (36.8 °C) (Oral)   Resp 21   Ht 5' 5\" (1.651 m)   Wt 110 lb (49.9 kg)   SpO2 96%   BMI 18.30 kg/m²      GENERAL: Thin, frail female.  Awake and alert, in no acute distress.  HEENT: Normocephalic.  Poor dentition  HEART:  Regular rhythm.  Regular rate   LUNGS:  Air entry was minimally decreased.  No crackles or wheezes   ABDOMEN: Soft and non-tender.    NEUROLOGICAL: Awake and alert, generalized weakness, but there was no new focal deficit.  PSYCHIATRIC: Normal mood      IMAGING  XR HIP W OR WO PELVIS 2 OR 3 VIEWS, RIGHT (CPT=73502)    Result Date: 7/27/2024  CONCLUSION:  1. No acute fracture or subluxation.    Dictated by (CST): Bello Maguire MD on 7/27/2024 at 4:44 PM     Finalized by (CST): Bello Maguire MD on 7/27/2024 at 4:46 PM           Data:  Recent Labs   Lab 07/27/24  1541   RBC 4.14   HGB 12.9   HCT 37.1   MCV 89.6   MCH 31.2   MCHC 34.8   RDW 14.6   NEPRELIM 4.57   WBC 6.1   .0     Recent Labs   Lab 07/27/24  1541   *   BUN 43*   CREATSERUM 1.76*   CA 8.9   *   K 4.9      CO2 19.0*       ASSESSMENT/PLAN    Generalized  weakness  -Unclear etiology  -Found down  -Patient believes her legs gave out when walking to  the bathroom.  -Patient with chronic low back pain  -Check lumbar x-ray  -Poss related to urinary tract infection  -UA with signs of infection  -IV antibiotics  -PT/OT  -Continue to monitor    Acute Kidney Injury on CKD  III   Rhabdomyolysis  -Patient on ground for unknown amount of time  -CK elevated  - Starting IVF  - Avoiding Nephrotoxic agents  -Recheck CK levels in a.m.  - Cont to monitor    Possible Acute cystitis  -Patient presenting with Gen weakness  -UA with signs of infection  -Starting broad-spectrum antibiotics and IV fluids  -Follow-up urine culture  -Continue to monitor    Hyponatremia  -Appears to be hypovolemic hyponatremia  -Patient with poor p.o. intake  -Starting IV fluids  -Encouraging p.o. intake  -Continue to monitor    History of atrial fibrillation  -Rate currently at goal  -On chronic anticoagulation with warfarin, will continue  -Monitoring INR      Plan of care discussed with patient and family at bedside.  Discussed with ED physician and RN. Decision made that pt needs hospitalization for further management/monitoring.      Leno Deluca MD    This note was prepared using Dragon Medical voice recognition dictation software. As a result errors may occur. When identified these errors have been corrected. While every attempt is made to correct errors during dictation discrepancies may still exist

## 2024-07-27 NOTE — ED INITIAL ASSESSMENT (HPI)
Patient arrived in private vehicle.Daughter reports diarrhea and weakness since yesterday. Patient daughter attempted to call patient for 2 hours and found patient on floor next to walker. + blood thinner. Patient reports hip and back pain, + head injury. Denies LOC.

## 2024-07-27 NOTE — CM/SW NOTE
Met with patient at [facility]  Patient lives alone roseanna lives in [place]  Daughter has walker at home  Patient c/o of being very weak and states will go stay with the daughter at time of discharge  Patient agreeable to home health care at [place]  Referrals started in [place]

## 2024-07-27 NOTE — ED QUICK NOTES
Orders for admission, patient is aware of plan and ready to go upstairs. Any questions, please call ED RN Mariana at extension [phone].     Patient Covid vaccination status: Fully vaccinated     COVID Test Ordered in ED: None    COVID Suspicion at Admission: N/A    Running Infusions:      Mental Status/LOC at time of transport: A&Ox3    Other pertinent information:   CIWA score: N/A   NIH score:  N/A

## 2024-07-28 PROBLEM — N17.9 ACUTE KIDNEY INJURY (HCC): Status: ACTIVE | Noted: 2024-07-28

## 2024-07-28 LAB
ANION GAP SERPL CALC-SCNC: 12 MMOL/L (ref 0–18)
BASOPHILS # BLD: 0 X10(3) UL (ref 0–0.2)
BASOPHILS NFR BLD: 0 %
BUN BLD-MCNC: 34 MG/DL (ref 9–23)
BUN/CREAT SERPL: 21.7 (ref 10–20)
C DIFF TOX B STL QL: NEGATIVE
CALCIUM BLD-MCNC: 8.1 MG/DL (ref 8.7–10.4)
CHLORIDE SERPL-SCNC: 109 MMOL/L (ref 98–112)
CK SERPL-CCNC: 1633 U/L
CO2 SERPL-SCNC: 17 MMOL/L (ref 21–32)
CREAT BLD-MCNC: 1.57 MG/DL
DEPRECATED RDW RBC AUTO: 51.2 FL (ref 35.1–46.3)
EGFRCR SERPLBLD CKD-EPI 2021: 30 ML/MIN/1.73M2 (ref 60–?)
EOSINOPHIL # BLD: 0 X10(3) UL (ref 0–0.7)
EOSINOPHIL NFR BLD: 0 %
ERYTHROCYTE [DISTWIDTH] IN BLOOD BY AUTOMATED COUNT: 14.9 % (ref 11–15)
GLUCOSE BLD-MCNC: 69 MG/DL (ref 70–99)
GLUCOSE BLDC GLUCOMTR-MCNC: 101 MG/DL (ref 70–99)
HCT VFR BLD AUTO: 34.7 %
HGB BLD-MCNC: 11.6 G/DL
LYMPHOCYTES NFR BLD: 0.85 X10(3) UL (ref 1–4)
LYMPHOCYTES NFR BLD: 16 %
MAGNESIUM SERPL-MCNC: 2 MG/DL (ref 1.6–2.6)
MCH RBC QN AUTO: 31.1 PG (ref 26–34)
MCHC RBC AUTO-ENTMCNC: 33.4 G/DL (ref 31–37)
MCV RBC AUTO: 93 FL
MONOCYTES # BLD: 0.11 X10(3) UL (ref 0.1–1)
MONOCYTES NFR BLD: 2 %
MORPHOLOGY: NORMAL
NEUTROPHILS # BLD AUTO: 3.87 X10 (3) UL (ref 1.5–7.7)
NEUTROPHILS NFR BLD: 68 %
NEUTS BAND NFR BLD: 14 %
NEUTS HYPERSEG # BLD: 4.35 X10(3) UL (ref 1.5–7.7)
OSMOLALITY SERPL CALC.SUM OF ELEC: 292 MOSM/KG (ref 275–295)
PLATELET # BLD AUTO: 163 10(3)UL (ref 150–450)
PLATELET MORPHOLOGY: NORMAL
POTASSIUM SERPL-SCNC: 3.6 MMOL/L (ref 3.5–5.1)
RBC # BLD AUTO: 3.73 X10(6)UL
SODIUM SERPL-SCNC: 138 MMOL/L (ref 136–145)
TOTAL CELLS COUNTED BLD: 100
WBC # BLD AUTO: 5.3 X10(3) UL (ref 4–11)

## 2024-07-28 PROCEDURE — 99233 SBSQ HOSP IP/OBS HIGH 50: CPT | Performed by: INTERNAL MEDICINE

## 2024-07-28 PROCEDURE — 99222 1ST HOSP IP/OBS MODERATE 55: CPT | Performed by: INTERNAL MEDICINE

## 2024-07-28 NOTE — ED PROVIDER NOTES
Patient Seen in: [facility] 5sw/se    History     Chief Complaint   Patient presents with    Altered Mental Status     Stated Complaint: fall, poss AMS    HPI    Patient complains of weakness.  Has had diarrhea for couple days.  Today ended up on floor could not get up.  Family found her on floor.  Lives alone.  Has bruising on all extremities of various ages.  Denies head injury or headache.  .    Alleviating factors: none  Exacerbating factors: none    Past Medical History:    Atrial fibrillation (HCC)    Per NG: cardioversion attempt    Breast cancer (HCC)    Per NG: lumpectomy and radiation 2011 LT    Breast lump    Per NG: excised--benign LT    Breast lump    Per NG : excised -- benign RT    Conjunctivitis    Per NG: right eye    Coronary atherosclerosis    Dermatochalasis of both eyelids    High blood pressure    High cholesterol    Mitral regurgitation    Ovarian cyst    Per NG: AH/BSO    Screening    Per NG:dexascan ,  normal    Tonsillitis    Per NG: T&A       Past Surgical History:   Procedure Laterality Date    Cataract extraction w/  intraocular lens implant Right 2012    RJM    Cataract extraction w/  intraocular lens implant Left 01/10/2006    RJM    Hysterectomy      age 68    Lumpectomy left  2011    Judy localization wire 1 site right (cpt=19281)            Pregnancy - 36 week 3 lb(s) 15 oz Female       195,195    pregnacy           Per NG: Pregnacy 40 week 7 lb(s) 14 oz Male          Per NG:  -breech  OUTCOME IS 40 week 9 lb(s) Female    Radiation left  2011    Yag capsulotomy - os - left eye Left 2007    RJ            Family History   Problem Relation Age of Onset    Prostate Cancer Father     Heart Disorder Mother         Per NG: Aortic Regurgitation    Prostate Cancer Brother     Breast Cancer Paternal Aunt         80    Breast Cancer Self 82    Diabetes Neg     Glaucoma Neg     Macular degeneration Neg        Social  History     Socioeconomic History    Marital status:    Tobacco Use    Smoking status: Never    Smokeless tobacco: Never   Substance and Sexual Activity    Alcohol use: Not Currently    Drug use: No   Other Topics Concern    Caffeine Concern Yes     Comment: per NG: Coffee, 2 cups     Social Determinants of Health     Food Insecurity: No Food Insecurity (7/27/2024)    Food Insecurity     Food Insecurity: Never true   Transportation Needs: No Transportation Needs (7/27/2024)    Transportation Needs     Lack of Transportation: No   Physical Activity: High Risk (3/12/2024)    [facility]    Exercise Vital Sign     On average, how many days per week do you engage in moderate to strenuous exercise (like a brisk walk)?: 0 days     On average, how many minutes do you engage in exercise at this level?: 0 min   Housing Stability: Low Risk  (7/27/2024)    Housing Stability     Housing Instability: No       Review of Systems    Positive for stated complaint: fall, poss AMS  Other systems are as noted in HPI.  Constitutional and vital signs reviewed.      All other systems reviewed and negative except as noted above.    PSFH elements reviewed from today and agreed except as otherwise stated in HPI.    Physical Exam     ED Triage Vitals [07/27/24 1520]   BP 97/54   Pulse 79   Resp 20   Temp 98.3 °F (36.8 °C)   Temp src Oral   SpO2 97 %   O2 Device None (Room air)       Current:/62 (BP Location: Right arm)   Pulse 58   Temp 97.9 °F (36.6 °C) (Oral)   Resp 18   Ht 165.1 cm (5' 5\")   Wt 49.2 kg   SpO2 97%   BMI 18.06 kg/m²    PULSE OX nl  GENERAL: actually awake alert oriented times 3 though cannot recall details of morning  HEAD: normocephalic, atraumatic,   EYES: PERRLA, EOMI, conj sclera clear  THROAT: mm dry, poor dentition with loose teeth  NECK: supple, no meningeal signs  LUNGS: no resp distress, cta bilateral  CARDIO: RRR without murmur  GI: abdomen is  soft and non tender, no masses, nl bowel sounds   EXTREMITIES: bruising on all ext mild tenderness r si lat hip was able to ambulate in ER with walker  NEURO: alert and oiented *3, 2-12 intact, no focal deficit noted  SKIN: multiple bruises b elbow and lower legs  PSYCH: calm, cooperative,    Differential includes:diarrhea with weakness and on floor for some time concern for rhabdo, r hip pain contusion vs. fx    ED Course     Labs Reviewed   BASIC METABOLIC PANEL (8) - Abnormal; Notable for the following components:       Result Value    Glucose 131 (*)     Sodium 132 (*)     CO2 19.0 (*)     BUN 43 (*)     Creatinine 1.76 (*)     BUN/CREA Ratio 24.4 (*)     eGFR-Cr 26 (*)     All other components within normal limits   URINALYSIS WITH CULTURE REFLEX - Abnormal; Notable for the following components:    Clarity Urine Turbid (*)     Blood Urine 1+ (*)     Protein Urine 30 (*)     Leukocyte Esterase Urine 500 (*)     WBC Urine >50 (*)     RBC Urine 3-5 (*)     Squamous Epi. Cells Few (*)     All other components within normal limits   PROTHROMBIN TIME (PT) - Abnormal; Notable for the following components:    PT 29.1 (*)     INR 2.56 (*)     All other components within normal limits   CK CREATINE KINASE (NOT CREATININE) - Abnormal; Notable for the following components:    CK 3,042 (*)     All other components within normal limits   POCT GLUCOSE - Abnormal; Notable for the following components:    POC Glucose  136 (*)     All other components within normal limits   CBC W/ DIFFERENTIAL - Abnormal; Notable for the following components:    RDW-SD 48.1 (*)     All other components within normal limits   CBC WITH DIFFERENTIAL WITH PLATELET    Narrative:     The following orders were created for panel order CBC With Differential With Platelet.  Procedure                               Abnormality         Status                     ---------                               -----------         ------                     CBC W/  DIFFERENTIAL[[id number]]          Abnormal            Final result                 Please view results for these tests on the individual orders.   SCAN SLIDE   BASIC METABOLIC PANEL (8)   MAGNESIUM   CBC WITH DIFFERENTIAL WITH PLATELET   CK CREATINE KINASE (NOT CREATININE)   URINE CULTURE, ROUTINE   C. DIFFICILE(TOXIGENIC)PCR       MDM       Cardiac Monitor:   Pulse Readings from Last 1 Encounters:   07/28/24 58   , sinus, 58  interpreted by me.    Radiology findings:   XR LUMBAR SPINE (MIN 2 VIEWS) (CPT=72100)    Result Date: 7/27/2024  CONCLUSION:  1. No acute fracture or subluxation.    Dictated by (CST): Bello Maguire MD on 7/27/2024 at 9:46 PM     Finalized by (CST): Bello Maguire MD on 7/27/2024 at 9:47 PM          XR HIP W OR WO PELVIS 2 OR 3 VIEWS, RIGHT (CPT=73502)    Result Date: 7/27/2024  CONCLUSION:  1. No acute fracture or subluxation.    Dictated by (CST): Bello Maguire MD on 7/27/2024 at 4:44 PM     Finalized by (CST): Bello Maguire MD on 7/27/2024 at 4:46 PM           I reviewed xray noted no fracture or dislocation          Medical Decision Making  Problems Addressed:  Dehydration: acute illness or injury     Details: Elevated cpk ivf re check  Renal insufficiency: acute illness or injury     Details: Dehydration ivf repeat bmp in a.m.    Amount and/or Complexity of Data Reviewed  Labs: ordered. Decision-making details documented in ED Course.  Radiology: ordered and independent interpretation performed. Decision-making details documented in ED Course.  Discussion of management or test interpretation with external provider(s): Spoke with Dr Deluca will admit, renal consulted.    Risk  Decision regarding hospitalization.        Disposition and Plan     Clinical Impression:  1. Dehydration    2. Renal insufficiency        Disposition:  Admit    Follow-up:  No follow-up provider specified.    Medications Prescribed:  Current Discharge Medication List          Hospital Problems       Present on  Admission  Date Reviewed: 7/2/2024            ICD-10-CM Noted POA    * (Principal) Dehydration E86.0 7/27/2024 Unknown    Renal insufficiency N28.9 7/27/2024 Unknown

## 2024-07-28 NOTE — PLAN OF CARE
Problem: Patient Centered Care  Goal: Patient preferences are identified and integrated in the patient's plan of care  Description: Interventions:  - What would you like us to know as we care for you?  - Provide timely, complete, and accurate information to patient/family  - Incorporate patient and family knowledge, values, beliefs, and cultural backgrounds into the planning and delivery of care  - Encourage patient/family to participate in care and decision-making at the level they choose  - Honor patient and family perspectives and choices  Outcome: Progressing    Problem: PAIN - ADULT  Goal: Verbalizes/displays adequate comfort level or patient's stated pain goal  Description: INTERVENTIONS:  - Encourage pt to monitor pain and request assistance  - Assess pain using appropriate pain scale  - Administer analgesics based on type and severity of pain and evaluate response  - Implement non-pharmacological measures as appropriate and evaluate response  - Consider cultural and social influences on pain and pain management  - Manage/alleviate anxiety  - Utilize distraction and/or relaxation techniques  - Monitor for opioid side effects  - Notify MD/LIP if interventions unsuccessful or patient reports new pain  - Anticipate increased pain with activity and pre-medicate as appropriate  Outcome: Progressing     Problem: RISK FOR INFECTION - ADULT  Goal: Absence of fever/infection during anticipated neutropenic period  Description: INTERVENTIONS  - Monitor WBC  - Administer growth factors as ordered  - Implement neutropenic guidelines  Outcome: Progressing     Problem: SAFETY ADULT - FALL  Goal: Free from fall injury  Description: INTERVENTIONS:  - Assess pt frequently for physical needs  - Identify cognitive and physical deficits and behaviors that affect risk of falls.  - [place] fall precautions as indicated by assessment.  - Educate pt/family on patient safety including physical limitations  - Instruct pt to call  for assistance with activity based on assessment  - Modify environment to reduce risk of injury  - Provide assistive devices as appropriate  - Consider OT/PT consult to assist with strengthening/mobility  - Encourage toileting schedule  Outcome: Progressing     Problem: GASTROINTESTINAL - ADULT  Goal: Minimal or absence of nausea and vomiting  Description: INTERVENTIONS:  - Maintain adequate hydration with IV or PO as ordered and tolerated  - Nasogastric tube to low intermittent suction as ordered  - Evaluate effectiveness of ordered antiemetic medications  - Provide nonpharmacologic comfort measures as appropriate  - Advance diet as tolerated, if ordered  - Obtain nutritional consult as needed  - Evaluate fluid balance  Outcome: Progressing  Goal: Maintains or returns to baseline bowel function  Description: INTERVENTIONS:  - Assess bowel function  - Maintain adequate hydration with IV or PO as ordered and tolerated  - Evaluate effectiveness of GI medications  - Encourage mobilization and activity  - Obtain nutritional consult as needed  - Establish a toileting routine/schedule  - Consider collaborating with pharmacy to review patient's medication profile  Outcome: Progressing    Patient alert, oriented x3. Following commands. Up to bathroom with assistance. On RA. Intermittent back pain. On TELE. Afib/SR. VSS. Tolerating diet. Multiple loose BMs. Stool send/pending results. R/o cdiff. Patient with bruises all throughout her body. IVF/abx overnight.

## 2024-07-28 NOTE — CONSULTS
[facility]  part of [facility]    Nephrology Consult Note    Date of Admission:  2024  Date of Consult:  2024   Reason for Consultation:   RACHELLE on CKD 3b    History of Present Illness:   Patient is a 89 year old female with past medical history of HTN, HLD, CKD 3b, A.fib (on warfarin), moderate to severe mitral regurgitation, moderate pulmonary hypertension, h/o breast cancer, and chronic low back pain, who was brought in by her family after she was found on the ground for a unknown time. Patient states she was trying to go the bathroom when her legs gave out so she lowered herself to the ground, but was not able to able to get up afterwards. She tried to reach to the phone, but could not. Her daughter found her later on the ground. Denies loss of consciousness. Pt has chronic diarrhea and possibly has worsened over the last few days.      Pt lives by herself.     Past Medical History  Past Medical History:    Atrial fibrillation (HCC)    Per NG: cardioversion attempt    Breast cancer (HCC)    Per NG: lumpectomy and radiation 2011 LT    Breast lump    Per NG: excised--benign LT    Breast lump    Per NG : excised -- benign RT    Conjunctivitis    Per NG: right eye    Coronary atherosclerosis    Dermatochalasis of both eyelids    High blood pressure    High cholesterol    Mitral regurgitation    Ovarian cyst    Per NG: AH/BSO    Screening    Per NG:dexascan '02, '06 normal    Tonsillitis    Per NG: T&A       Past Surgical History  Past Surgical History:   Procedure Laterality Date    Cataract extraction w/  intraocular lens implant Right 2012    RJM    Cataract extraction w/  intraocular lens implant Left 01/10/2006    RJM    Hysterectomy      age 68    Lumpectomy left  2011    Judy localization wire 1 site right (cpt=19281)            Pregnancy - 36 week 3 lb(s) 15 oz Female       ,    pregnacy           Per NG: Pregnacy 40 week 7 lb(s) 14  oz Male      89    Per NG:  -breech  OUTCOME IS 40 week 9 lb(s) Female    Radiation left  2011    Yag capsulotomy - os - left eye Left 2007    RJM       Family History  Family History   Problem Relation Age of Onset    Prostate Cancer Father     Heart Disorder Mother         Per NG: Aortic Regurgitation    Prostate Cancer Brother     Breast Cancer Paternal Aunt         80    Breast Cancer Self 82    Diabetes Neg     Glaucoma Neg     Macular degeneration Neg        Social History  Pediatric History   Patient Parents    Not on file     Other Topics Concern     Service Not Asked    Blood Transfusions Not Asked    Caffeine Concern Yes     Comment: per NG: Coffee, 2 cups    Occupational Exposure Not Asked    Hobby Hazards Not Asked    Sleep Concern Not Asked    Stress Concern Not Asked    Weight Concern Not Asked    Special Diet Not Asked    Back Care Not Asked    Exercise Not Asked    Bike Helmet Not Asked    Seat Belt Not Asked    Self-Exams Not Asked   Social History Narrative    Not on file           Current Medications:  Current Facility-Administered Medications   Medication Dose Route Frequency    rosuvastatin (Crestor) tab 5 mg  5 mg Oral Nightly    sodium chloride 0.9% infusion   Intravenous Continuous    acetaminophen (Tylenol Extra Strength) tab 500 mg  500 mg Oral Q4H PRN    acetaminophen (Tylenol) tab 650 mg  650 mg Oral Q4H PRN    Or    HYDROcodone-acetaminophen (Norco) 5-325 MG per tab 1 tablet  1 tablet Oral Q4H PRN    Or    HYDROcodone-acetaminophen (Norco) 5-325 MG per tab 2 tablet  2 tablet Oral Q4H PRN    ondansetron (Zofran) 4 MG/2ML injection 4 mg  4 mg Intravenous Q6H PRN    polyethylene glycol (PEG 3350) (Miralax) 17 g oral packet 17 g  17 g Oral Daily PRN    sennosides (Senokot) tab 17.2 mg  17.2 mg Oral Nightly PRN    bisacodyl (Dulcolax) 10 MG rectal suppository 10 mg  10 mg Rectal Daily PRN    ceFAZolin (Ancef) 2g in 10mL IV syringe premix  2 g Intravenous Q12H     Selexipag 1600mcg tablet  **PT. SUPPLIED**  1,600 mcg Oral 2 times per day    [START ON 7/29/2024] warfarin (Coumadin) tab 3.75 mg  3.75 mg Oral Once per day on Monday Wednesday Friday    warfarin (Coumadin) tab 2.5 mg  2.5 mg Oral Once per day on Sunday Tuesday Thursday Saturday       Allergies  Allergies   Allergen Reactions    Aldactone [Spironolactone] HYPERKALEMIA    Ambrisentan NAUSEA AND VOMITING     Pulmonary edema       Review of Systems:   Review of Systems   Constitutional:  Positive for fatigue. Negative for appetite change, chills and fever.   HENT:  Positive for hearing loss. Negative for ear pain, rhinorrhea, sore throat and trouble swallowing.    Eyes:  Negative for pain and discharge.   Respiratory:  Negative for cough, chest tightness and shortness of breath.    Cardiovascular:  Negative for chest pain and leg swelling.   Gastrointestinal:  Negative for abdominal pain, constipation, diarrhea and vomiting.   Genitourinary:  Negative for decreased urine volume, dysuria and flank pain.   Musculoskeletal:  Positive for arthralgias and back pain. Negative for gait problem and myalgias.   Skin:  Negative for rash.   Neurological:  Negative for dizziness, speech difficulty, weakness, numbness and headaches.   Psychiatric/Behavioral:  Negative for agitation and confusion.        Physical Exam:   Height: 5' 5\" (165.1 cm) (07/27 1520)  Weight: 108 lb 8 oz (49.2 kg) (07/27 1818)  BSA (Calculated - sq m): 1.53 sq meters (07/27 1520)  Pulse: 68 (07/28 0829)  BP: 104/52 (07/28 0829)  Temp: 97.4 °F (36.3 °C) (07/28 0829)  Do Not Use - Resp Rate: --  SpO2: 98 % (07/28 0829)  Temp:  [97.4 °F (36.3 °C)-98.3 °F (36.8 °C)] 97.4 °F (36.3 °C)  Pulse:  [34-79] 68  Resp:  [16-21] 18  BP: ()/(50-64) 104/52  SpO2:  [94 %-98 %] 98 %  SpO2: 98 %     Intake/Output Summary (Last 24 hours) at 7/28/2024 1259  Last data filed at 7/28/2024 0955  Gross per 24 hour   Intake 2058 ml   Output 2 ml   Net 2056 ml     Wt Readings  from Last 5 Encounters:   07/27/24 108 lb 8 oz (49.2 kg)   02/23/24 116 lb (52.6 kg)   11/03/23 109 lb (49.4 kg)   09/12/23 110 lb (49.9 kg)   06/26/23 110 lb (49.9 kg)       Physical Exam  Constitutional:       Appearance: Normal appearance.   HENT:      Head: Normocephalic and atraumatic.      Nose: Nose normal.   Eyes:      General: No scleral icterus.  Cardiovascular:      Rate and Rhythm: Normal rate and regular rhythm.      Heart sounds: Normal heart sounds. No murmur heard.  Pulmonary:      Effort: Pulmonary effort is normal.      Breath sounds: Normal breath sounds.   Abdominal:      General: Bowel sounds are normal. There is no distension.      Palpations: Abdomen is soft.   Musculoskeletal:         General: No tenderness. Normal range of motion.      Cervical back: Normal range of motion and neck supple.      Comments: Neg edema   Skin:     General: Skin is warm and dry.      Findings: No rash.   Neurological:      General: No focal deficit present.      Mental Status: She is alert and oriented to person, place, and time. Mental status is at baseline.   Psychiatric:         Mood and Affect: Mood normal.         Behavior: Behavior normal.         Thought Content: Thought content normal.         Results:     Laboratory Data:  Recent Labs   Lab 07/27/24  1541 07/28/24  0828   RBC 4.14 3.73*   HGB 12.9 11.6*   HCT 37.1 34.7*   MCV 89.6 93.0   NEPRELIM 4.57 3.87   WBC 6.1 5.3   .0 163.0     Recent Labs   Lab 07/27/24  1541 07/28/24  0828   * 69*   BUN 43* 34*   CREATSERUM 1.76* 1.57*   CA 8.9 8.1*   * 138   K 4.9 3.6    109   CO2 19.0* 17.0*     INR   Date Value Ref Range Status   07/27/2024 2.56 (H) 0.80 - 1.20 Final     Comment:     Therapeutic INR range for patients on warfarin:  2.0-3.0 for most medical conditions and surgical prophylaxis   2.5-3.5 for mechanical heart valves and recurrent thromboembolism    Direct thrombin inhibitors (e.g. argatroban, bivalirudin), factor Xa  inhibitors (e.g. apixaban, rivaroxaban), and conditions such as coagulation factor deficiency, vitamin K deficiency, and liver disease will prolong the prothrombin time/INR.    Unfractionated heparin and low molecular weight heparin do not affect the prothrombin time/INR up to a concentration of 1 IU/mL and 1.5 IU/mL, respectively.       07/02/2024 2.5 (A) 0.8 - 1.2 Final     No results for input(s): \"BNP\" in the last 168 hours.  Lab Results   Component Value Date    COLORUR Yellow 07/27/2024    CLARITY Turbid 07/27/2024    SPECGRAVITY 1.012 07/27/2024    GLUUR Normal 07/27/2024    BILUR Negative 07/27/2024    KETUR Negative 07/27/2024    BLOODURINE 1+ 07/27/2024    PHURINE 7.5 07/27/2024    PROUR 30 07/27/2024    UROBILINOGEN Normal 07/27/2024    NITRITE Negative 07/27/2024    LEUUR 500 07/27/2024       Imaging:  XR LUMBAR SPINE (MIN 2 VIEWS) (CPT=72100)    Result Date: 7/27/2024  CONCLUSION:  1. No acute fracture or subluxation.    Dictated by (CST): Bello Maguire MD on 7/27/2024 at 9:46 PM     Finalized by (CST): Bello Maguire MD on 7/27/2024 at 9:47 PM          XR HIP W OR WO PELVIS 2 OR 3 VIEWS, RIGHT (CPT=73502)    Result Date: 7/27/2024  CONCLUSION:  1. No acute fracture or subluxation.    Dictated by (CST): Bello Maguire MD on 7/27/2024 at 4:44 PM     Finalized by (CST): Bello Maguire MD on 7/27/2024 at 4:46 PM              Impression and Recommendations:     Patient is a 89 year old female with past medical history of HTN, HLD, CKD 3b, A.fib (on warfarin), moderate to severe mitral regurgitation, moderate pulmonary hypertension, h/o breast cancer, and chronic low back pain, who was brought in by her family after she was found on the ground for a unknown time.     RACHELLE likely pre-renal and due to rhabdomyolysis:   CK 3042 on admission and improved to 1633 today.   Cr 1.76 on admission and improved to 1.57 today. Baseline Cr 1.3-1.4  Will decrease NS to 50 ml/hr  Hold Crestor until CK improves.   Monitor  I/Os.   She follows with Dr Narvaez outpatient.     UTI:   On Cefazolin.     HTN:   Holding Lisinopril 2.5 mg and Torsemide 10 mg daily. May need to stop on dc.       Thank you for allowing me to participate in the care of your patient.      Jc Barr MD  Staff Nephrologist  7/28/2024

## 2024-07-28 NOTE — PROGRESS NOTES
[facility]  part of [facility]     Hospitalist Progress Note     Juli Ramirez Patient Status:  Inpatient    1929 MRN [id number]   Location [facility] 5SW/SE Attending Leno Deluca MD   Hosp Day # 1 PCP Robert Narvaez MD     Chief Complaint:   Chief Complaint   Patient presents with    Altered Mental Status        Subjective:     Patient seen sitting in chair.  Denies acute events overnight.  Continues to have pain in her back.  Believes weakness to be improving today.  Patient otherwise denies current chest pain, shortness of breath, abdominal pain, nausea or vomiting.    Objective:      Vital signs:  Vitals:    24 0616 24 0630 24 0631 24 0829   BP: 116/62   104/52   BP Location: Right arm   Right arm   Pulse: 62 (!) 34 58 68   Resp: 18   18   Temp: 97.9 °F (36.6 °C)   97.4 °F (36.3 °C)   TempSrc: Oral   Oral   SpO2: 97%   98%   Weight:       Height:           Intake/Output Summary (Last 24 hours) at 2024 0937  Last data filed at 2024 0600  Gross per 24 hour   Intake 1938 ml   Output 2 ml   Net 1936 ml           Physical Exam:    GENERAL: Thin, frail female.  Awake and alert, in no acute distress.  HEENT: Normocephalic.  Poor dentition  HEART:  Irregular rhythm.  Regular rate   LUNGS:  Air entry was minimally decreased.  No crackles or wheezes   ABDOMEN: Soft and non-tender.    PSYCHIATRIC: Normal mood    Diagnostic Data:    Labs:    Recent Labs   Lab 24  1541 24  0828   WBC 6.1 5.3   HGB 12.9 11.6*   MCV 89.6 93.0   .0 163.0   BAND  --  14   INR 2.56*  --        Recent Labs   Lab 24  1541 24  0828   * 69*   BUN 43* 34*   CREATSERUM 1.76* 1.57*   CA 8.9 8.1*   * 138   K 4.9 3.6    109   CO2 19.0* 17.0*           Estimated Creatinine Clearance: 16.6 mL/min (A) (based on SCr of 1.57 mg/dL (H)).    Recent Labs   Lab 24  1541   PTP 29.1*   INR 2.56*            COVID-19  Lab Results    Component Value Date    COVID19 Not Detected 06/11/2020       Pro-Calcitonin  No results for input(s): \"PCT\" in the last 168 hours.    Cardiac  No results for input(s): \"TROP\", \"PBNP\" in the last 168 hours.    Inflammatory Markers  No results for input(s): \"CRP\", \"KATHIE\", \"LDH\", \"DDIMER\" in the last 168 hours.    Culture:  No results found for this visit on 07/27/24.    XR LUMBAR SPINE (MIN 2 VIEWS) (CPT=72100)    Result Date: 7/27/2024  CONCLUSION:  1. No acute fracture or subluxation.    Dictated by (CST): Bello Maguire MD on 7/27/2024 at 9:46 PM     Finalized by (CST): Bello Maguire MD on 7/27/2024 at 9:47 PM          XR HIP W OR WO PELVIS 2 OR 3 VIEWS, RIGHT (CPT=73502)    Result Date: 7/27/2024  CONCLUSION:  1. No acute fracture or subluxation.    Dictated by (CST): Bello Maguire MD on 7/27/2024 at 4:44 PM     Finalized by (CST): Bello Maguire MD on 7/27/2024 at 4:46 PM                 Medications:    rosuvastatin  5 mg Oral Nightly    ceFAZolin  2 g Intravenous Q12H    Selexipag  1,600 mcg Oral 2 times per day    [START ON 7/29/2024] warfarin  3.75 mg Oral Once per day on Monday Wednesday Friday    warfarin  2.5 mg Oral Once per day on Sunday Tuesday Thursday Saturday       Assessment & Plan:       Generalized weakness  -Unclear etiology  -Found down  -Patient believes her legs gave out when walking to  the bathroom.  -Patient with chronic low back pain  -lumbar x-ray reviewed.  Noted degenerative disease, but no acute fractures.  -Poss related to urinary tract infection  -UA with signs of infection  -Complete course of IV antibiotics  -PT/OT  -Continue to monitor     Acute Kidney Injury on CKD  III   Rhabdomyolysis  -Patient on ground for unknown amount of time  -CK elevated, now downtrending  -Weaning IVF  - Avoiding Nephrotoxic agents  -Recheck CK levels in a.m.  - Cont to monitor     Acute cystitis  -Patient presenting with Gen weakness  -UA with signs of infection  -Continue broad-spectrum antibiotics  and IV fluids  -Urine culture prelim with E. coli, follow-up sensitivities.  -Continue to monitor     Hyponatremia  -Resolving  -Appears to be hypovolemic hyponatremia  -Patient with poor p.o. intake  -Weaning IV fluids  -Encouraging p.o. intake  -Continue to monitor     History of atrial fibrillation  -Rate currently at goal  -On chronic anticoagulation with warfarin, will continue  -Monitoring INR        Plan of care discussed with patient at bedside . Discussed management/test result(s) with Rn     Quality:  DVT Prophylaxis: Warfarin  CODE status: Full  Estimated date of discharge: TBD  Discharge is dependent on: clinical stability    52 minutes spent discussing with other providers, examining patient, obtaining history, reviewing medical records, interpreting and communicating test results/imaging, ordering tests/medications, discussing plan of care and documenting information.      Leno Deluca MD          This note was prepared using Dragon Medical voice recognition dictation software. As a result errors may occur. When identified these errors have been corrected. While every attempt is made to correct errors during dictation discrepancies may still exist

## 2024-07-28 NOTE — DIETARY NOTE
BRIEF DIETITIAN NOTE      Patient Status 07/28/24: Pt screened for Low BMI. Pt with MST of 0 at admission.  Pt admitted for dehydration. Visited pt today, daughter in the room. Pt and daughter reported fair/poor appetite PTA x 3 days. Pt with good po intake, consuming 100% of one meal per documentation. Weight relatively stable, pt endorsed #  \"for a long time\", CBW: 108#  Labs reviewed.  Added ONS to to help pt meet nutrient intakes. Will follow per protocol. Please consult RD if earlier nutrition intervention is needed.     Recent Labs     07/27/24  1541 07/28/24  0828   * 69*   BUN 43* 34*   CREATSERUM 1.76* 1.57*   CA 8.9 8.1*   MG  --  2.0   * 138   K 4.9 3.6    109   CO2 19.0* 17.0*   OSMOCALC 287 292       Percent Meals Eaten (last 6 days)       Date/Time Percent Meals Eaten (%)    07/28/24 0955 100 %             Patient Weight(s) for the past 336 hrs:   Weight   07/27/24 1818 49.2 kg (108 lb 8 oz)   07/27/24 1520 49.9 kg (110 lb)        Wt Readings from Last 6 Encounters:   07/27/24 49.2 kg (108 lb 8 oz)   02/23/24 52.6 kg (116 lb)   11/03/23 49.4 kg (109 lb)   09/12/23 49.9 kg (110 lb)   06/26/23 49.9 kg (110 lb)   03/07/23 50.8 kg (112 lb)        Medical Food Supplements-RD added CIB Shake (505 calories/ 17 g protein each) Daily Chocolate and Ensure Enlive (350 calories/ 20 g protein each) BID Chocolate. Rational/use of oral supplements discussed. Will monitor  for tolerance and adequacy.     F/u per protocol or as appropriate.       Rosemary Patel RD, LDN  Clinical Dietitian  Office: [phone]

## 2024-07-29 ENCOUNTER — PATIENT OUTREACH (OUTPATIENT)
Dept: CASE MANAGEMENT | Age: 89
End: 2024-07-29

## 2024-07-29 ENCOUNTER — TELEPHONE (OUTPATIENT)
Dept: NEPHROLOGY | Facility: CLINIC | Age: 89
End: 2024-07-29

## 2024-07-29 VITALS
HEIGHT: 65 IN | TEMPERATURE: 97 F | SYSTOLIC BLOOD PRESSURE: 130 MMHG | BODY MASS INDEX: 18.08 KG/M2 | OXYGEN SATURATION: 98 % | RESPIRATION RATE: 18 BRPM | DIASTOLIC BLOOD PRESSURE: 60 MMHG | WEIGHT: 108.5 LBS | HEART RATE: 71 BPM

## 2024-07-29 DIAGNOSIS — N17.9 AKI (ACUTE KIDNEY INJURY) (HCC): ICD-10-CM

## 2024-07-29 DIAGNOSIS — Z02.9 ENCOUNTERS FOR UNSPECIFIED ADMINISTRATIVE PURPOSE: Primary | ICD-10-CM

## 2024-07-29 DIAGNOSIS — N28.9 RENAL INSUFFICIENCY: ICD-10-CM

## 2024-07-29 DIAGNOSIS — E86.0 DEHYDRATION: ICD-10-CM

## 2024-07-29 LAB
ANION GAP SERPL CALC-SCNC: 10 MMOL/L (ref 0–18)
BASOPHILS # BLD: 0.06 X10(3) UL (ref 0–0.2)
BASOPHILS NFR BLD: 1 %
BUN BLD-MCNC: 43 MG/DL (ref 9–23)
BUN/CREAT SERPL: 27.7 (ref 10–20)
CALCIUM BLD-MCNC: 8.1 MG/DL (ref 8.7–10.4)
CHLORIDE SERPL-SCNC: 111 MMOL/L (ref 98–112)
CO2 SERPL-SCNC: 18 MMOL/L (ref 21–32)
CREAT BLD-MCNC: 1.55 MG/DL
DEPRECATED RDW RBC AUTO: 51.7 FL (ref 35.1–46.3)
EGFRCR SERPLBLD CKD-EPI 2021: 31 ML/MIN/1.73M2 (ref 60–?)
EOSINOPHIL # BLD: 0 X10(3) UL (ref 0–0.7)
EOSINOPHIL NFR BLD: 0 %
ERYTHROCYTE [DISTWIDTH] IN BLOOD BY AUTOMATED COUNT: 15.1 % (ref 11–15)
GLUCOSE BLD-MCNC: 86 MG/DL (ref 70–99)
HCT VFR BLD AUTO: 32.2 %
HGB BLD-MCNC: 10.8 G/DL
INR BLD: 3.91 (ref 0.8–1.2)
LYMPHOCYTES NFR BLD: 0.68 X10(3) UL (ref 1–4)
LYMPHOCYTES NFR BLD: 12 %
MCH RBC QN AUTO: 30.9 PG (ref 26–34)
MCHC RBC AUTO-ENTMCNC: 33.5 G/DL (ref 31–37)
MCV RBC AUTO: 92 FL
MONOCYTES # BLD: 0.06 X10(3) UL (ref 0.1–1)
MONOCYTES NFR BLD: 1 %
NEUTROPHILS # BLD AUTO: 4.34 X10 (3) UL (ref 1.5–7.7)
NEUTROPHILS NFR BLD: 85 %
NEUTS BAND NFR BLD: 1 %
NEUTS HYPERSEG # BLD: 4.9 X10(3) UL (ref 1.5–7.7)
OSMOLALITY SERPL CALC.SUM OF ELEC: 298 MOSM/KG (ref 275–295)
PLATELET # BLD AUTO: 168 10(3)UL (ref 150–450)
PLATELET MORPHOLOGY: NORMAL
POTASSIUM SERPL-SCNC: 3.5 MMOL/L (ref 3.5–5.1)
PROTHROMBIN TIME: 40.7 SECONDS (ref 11.6–14.8)
RBC # BLD AUTO: 3.5 X10(6)UL
SODIUM SERPL-SCNC: 139 MMOL/L (ref 136–145)
TOTAL CELLS COUNTED BLD: 100
WBC # BLD AUTO: 5.7 X10(3) UL (ref 4–11)

## 2024-07-29 PROCEDURE — 1111F DSCHRG MED/CURRENT MED MERGE: CPT

## 2024-07-29 PROCEDURE — 99239 HOSP IP/OBS DSCHRG MGMT >30: CPT | Performed by: INTERNAL MEDICINE

## 2024-07-29 RX ORDER — CEPHALEXIN 250 MG/1
250 CAPSULE ORAL 4 TIMES DAILY
Qty: 12 CAPSULE | Refills: 0 | Status: ON HOLD | OUTPATIENT
Start: 2024-07-29 | End: 2024-08-01

## 2024-07-29 RX ORDER — POTASSIUM CHLORIDE 20 MEQ/1
40 TABLET, EXTENDED RELEASE ORAL EVERY 4 HOURS
Status: DISCONTINUED | OUTPATIENT
Start: 2024-07-29 | End: 2024-07-29

## 2024-07-29 NOTE — PHYSICAL THERAPY NOTE
PHYSICAL THERAPY EVALUATION - INPATIENT     Room Number: 558/558-A  Evaluation Date: 7/29/2024  Type of Evaluation: Initial   Physician Order: PT Eval and Treat    Presenting Problem: Fall, dehydration  Co-Morbidities : Afib on Warfarin, HTN, HLD, MVR, CKD III, LBP, chronic diarrhea, breast cancer S/P L lumpectomy 2011  Reason for Therapy: Mobility Dysfunction and Discharge Planning    PHYSICAL THERAPY ASSESSMENT   Patient is a 89 year old female admitted 7/27/2024 for dehydration, fall.  Prior to admission, patient's baseline is Cam with rolling walker and rollator, independent with ADLs, assist from supportive daughter/family with IADLs.  Patient is currently functioning below baseline with bed mobility, gait, and stair negotiation.  Patient is requiring contact guard assist using a rolling walker as a result of the following impairments: decreased endurance/aerobic capacity, pain, decreased muscular endurance, and medical status.  Physical Therapy will continue to follow for duration of hospitalization.    Patient will benefit from continued skilled PT Services at discharge to promote prior level of function and safety with additional support and return home with home health PT.    PLAN  PT Treatment Plan: Bed mobility;Body mechanics;Endurance;Energy conservation;Patient education;Family education;Gait training;Strengthening;Stoop training;Transfer training;Balance training;Stair training;Neuromuscular re-educate  Rehab Potential : Good  Frequency (Obs): 5x/week    PHYSICAL THERAPY MEDICAL/SOCIAL HISTORY   Problem List  Principal Problem:    Dehydration  Active Problems:    Renal insufficiency    Acute kidney injury (HCC)      HOME SITUATION  Home Situation  Type of Home: House  Home Layout: One level  Stairs to Enter : 1  Lives With: Alone;Daughter (going to go stay with daughter in multilevel home but stays on main level with 3 MIYA, family assists with stairs)  Drives: No  Patient Owned Equipment:  Rollator;Rolling walker  Patient Regularly Uses: Hearing aides;Reading glasses     Prior Level of [place]: Cam with rolling walker and rollator, independent with ADLs, assist from supportive daughter/family with IADLs    SUBJECTIVE  \"This has been very hard on me, I know I'm not getting any better because I haven't been able to get up yet.\"    \"I'm good at doing the 6 minute walk test. I have pulmonary hypertension.\"    PHYSICAL THERAPY EXAMINATION   OBJECTIVE  Precautions: Bed/chair alarm;Hard of hearing;Limb alert - left  Fall Risk: High fall risk    WEIGHT BEARING RESTRICTION  Weight Bearing Restriction: None                PAIN ASSESSMENT  Ratin  Location: back  Management Techniques: Body mechanics;Activity promotion;Repositioning    COGNITION  Overall Cognitive Status:  WFL - within functional limits    RANGE OF MOTION AND STRENGTH ASSESSMENT  Upper extremity ROM and strength are within functional limits  BUEs  Lower extremity ROM is within functional limits  BLEs  Lower extremity strength is within functional limits  BLEs    BALANCE  Static Sitting: Good  Dynamic Sitting: Good  Static Standing: Good  Dynamic Standing: Fair +      ACTIVITY TOLERANCE  Pulse: 71 (at rest, 90s with activity)  Heart Rate Source: Monitor     BP: 130/60  BP Location: Right arm  BP Method: Automatic  Patient Position: Semi-[place]    O2 WALK  Oxygen Therapy  SPO2% on Room Air at Rest: 97  SPO2% Ambulation on Room Air: 94    AM-PAC '6-Clicks' INPATIENT SHORT FORM - BASIC MOBILITY  How much difficulty does the patient currently have...  Patient Difficulty: Turning over in bed (including adjusting bedclothes, sheets and blankets)?: A Little   Patient Difficulty: Sitting down on and standing up from a chair with arms (e.g., wheelchair, bedside commode, etc.): A Little   Patient Difficulty: Moving from lying on back to sitting on the side of the bed?: A Little   How much help from another person does the patient currently need...    Help from Another: Moving to and from a bed to a chair (including a wheelchair)?: A Little   Help from Another: Need to walk in hospital room?: A Little   Help from Another: Climbing 3-5 steps with a railing?: A Little     AM-PAC Score:  Raw Score: 18   Approx Degree of Impairment: 46.58%   Standardized Score (AM-PAC Scale): 43.63   CMS Modifier (G-Code): CK    FUNCTIONAL ABILITY STATUS  Functional Mobility/Gait Assessment  Gait Assistance: Contact guard assist  Distance (ft): 100'  Assistive Device: Rolling walker  Pattern: Within Functional Limits;Comment (step-through pattern, light-moderate support through RW, RLE internal rotation in stance phase noted, no pain with ambulation)  Rolling: minimal assist  Supine to Sit: minimal assist - log roll  Sit to Stand: contact guard assist    Exercise/Education Provided:  Bed mobility  Body mechanics  Energy conservation  Functional activity tolerated  Gait training  Posture  Transfer training    Skilled Therapy Provided: Pt tolerating household distances of ambulation using rolling walker. Pt very hard of hearing but cognition intact. Pt reports supportive family, anticipates she will go and stay at daughter's house.     Patient seen for evaluation in coordination with occupational therapy to maximize patient function and enhance communication with patient given degree of hearing loss. Patient received semi-fowlers in bed, agreeable to physical therapy evaluation. Vital signs monitored as noted above, no adverse symptoms and patient stable during session. Education with patient provided verbally on physical therapy plan of care and physiological benefits of out of bed mobility. Next session anticipate to progress transfers, gait, and stair negotiation.    Patient history and/or personal factors that may impact the plan of care include home accessibility concerns, history of falls, co-morbidities (Afib on Warfarin, HTN, HLD, MVR, CKD III, LBP, chronic diarrhea, breast  cancer S/P L lumpectomy 2011) affecting endurance, medical status, and longstanding history of pain. Based on the physical therapy examination of the noted systems and functional activity/participation limitations, the patient presentation is evolving given the patient demonstrates worsening of previously stable condition and demonstrates worsening of co-morbidities.      The patient's Approx Degree of Impairment: 46.58% has been calculated based on documentation in the [facility] '6 clicks' Inpatient Basic Mobility Short Form.  Research supports that patients with this level of impairment may benefit from home-health PT.  Final disposition will be made by interdisciplinary medical team.    Patient End of Session: Up in chair;Needs met;Call light within reach;RN aware of session/findings;All patient questions and concerns addressed;Alarm set    CURRENT GOALS  Goals to be met by: 8/10/24  Patient Goal Patient's self-stated goal is: return home safely   Goal #1 Patient is able to demonstrate supine - sit EOB @ level: modified independent     Goal #1   Current Status    Goal #2 Patient is able to demonstrate transfers EOB to/from [facility] at assistance level: modified independent with walker - rolling     Goal #2  Current Status    Goal #3 Patient is able to ambulate 150 feet with assist device: walker - rolling at assistance level: modified independent   Goal #3   Current Status    Goal #4 Patient will negotiate 3 stairs/one curb w/ assistive device and contact guard assistance   Goal #4   Current Status    Goal #5 Patient to demonstrate independence with home activity/exercise instructions provided to patient in preparation for discharge.   Goal #5   Current Status    Goal #6    Goal #6  Current Status      Patient Evaluation Complexity Level:  History High - 3 or more personal factors and/or co-morbidities   Examination of body systems Moderate - addressing a total of 3 or more elements   Clinical Presentation  Moderate -  Evolving   Clinical Decision Making  Moderate Complexity     Gait Training: 10 minutes  Therapeutic Activity:  14 minutes      Destini Kong, PT, DPT  [facility]  Rehab Services - Physical Therapy  [id number]

## 2024-07-29 NOTE — DISCHARGE INSTRUCTIONS
HOLD COUMADIN.... FOLLOW UP IN COUMADIN CLINIC IN 2 DAYS    Sometimes managing your health at home requires assistance.  The [facility] team has recognized your preference to use [facility].  They can be reached by phone at [phone].  The fax number for your reference is [phone].  A representative from the home health agency will contact you or your family to schedule your first visit.

## 2024-07-29 NOTE — TELEPHONE ENCOUNTER
[facility] called for verbal orders for home health.  Will Dr Narvaez sign orders?  Please call.  OK to leave detailed message.

## 2024-07-29 NOTE — DISCHARGE SUMMARY
[facility]  part of [facility]    Discharge Summary    Juli Ramirez Patient Status:  Inpatient    1929 MRN [id number]   Location [facility] 5SW/SE Attending Leno Deluca MD   Hosp Day # 2 PCP Robert Narvaez MD     Date of Admission: 2024     Date of Discharge: 24      Lace+ Score: 69  59-90 High Risk  29-58 Medium Risk  0-28   Low Risk.    TCM Follow-Up Recommendation:  {Care Managers will evaluate the need for follow-up for all patients ages 50+, and high/moderate risk patients ages 25-49. Low risk patients (LACE < 29) will only be evaluated if the \"Still recommend for TCM follow-up\" option is selected from this list.:[id number]}    DISCHARGE DX: Principal Problem:    Dehydration  Active Problems:    Renal insufficiency    Acute kidney injury (HCC)       The patient was seen and examined on day of discharge and this discharge summary is in conjunction with any daily progress note from day of discharge.    HPI per admitting physician: \"    Hospital Course:  Juli Ramirez is a 89 year old who p/w ***        Physical Exam:    Vitals:    24 0829 24 1632 24 1900 24 2125   BP: 104/52 97/48  118/44   BP Location: Right arm Left arm  Right arm   Pulse: 68 80 70 68   Resp: 18 20  18   Temp: 97.4 °F (36.3 °C) 98.2 °F (36.8 °C)  97.4 °F (36.3 °C)   TempSrc: Oral Oral  Oral   SpO2: 98% 97%  98%   Weight:       Height:         Patient Weight for the past 72 hrs:   Weight   24 1520 110 lb (49.9 kg)   24 1818 108 lb 8 oz (49.2 kg)       Intake/Output Summary (Last 24 hours) at 2024 0923  Last data filed at 2024 0955  Gross per 24 hour   Intake 120 ml   Output --   Net 120 ml         GENERAL:  Awake and alert, in no acute distress.  HEENT: Normocephalic.  Extraocular muscles were intact.  PERRL.  NECK:  Supple. Trach midline  CHEST:  Symmetrical movement on inspiration  HEART:  S1 and S2 heard.  RRR   LUNGS:  Air entry was good.   No crackles or wheezes   ABDOMEN: Soft and non-tender.  Bowel sounds were present.  MUSCULOSKELETAL:  No obviously abnormalities noted  EXTREMITIES: No edema appreciated  NEUROLOGICAL:  There was no new focal deficit.    SKIN:  Warm and well perfused  PSYCHIATRIC: Normal mood    CULTURE:   Hospital Encounter on 07/27/24   1. Urine Culture, Routine     Status: Abnormal    Collection Time: 07/27/24  3:45 PM    Specimen: Urine, clean catch   Result Value Ref Range    Urine Culture >100,000 CFU/ML Escherichia coli (A) N/A       Susceptibility    Escherichia coli -  (no method available)     Ampicillin >=32 Resistant      Ampicillin + Sulbactam 16 Intermediate      Cefazolin <=4 Sensitive      Ciprofloxacin <=0.25 Sensitive      Gentamicin >=16 Resistant      Meropenem <=0.25 Sensitive      Levofloxacin <=0.12 Sensitive      Nitrofurantoin <=16 Sensitive      Piperacillin + Tazobactam <=4 Sensitive      Tobramycin 4 Sensitive      Trimethoprim/Sulfa >=320 Resistant        IMAGING STUDIES: SOME MAY NEED FOLLOW UP WITH PCP   XR LUMBAR SPINE (MIN 2 VIEWS) (CPT=72100)    Result Date: 7/27/2024  CONCLUSION:  1. No acute fracture or subluxation.    Dictated by (CST): Bello Maguire MD on 7/27/2024 at 9:46 PM     Finalized by (CST): Bello Maguire MD on 7/27/2024 at 9:47 PM          XR HIP W OR WO PELVIS 2 OR 3 VIEWS, RIGHT (CPT=73502)    Result Date: 7/27/2024  CONCLUSION:  1. No acute fracture or subluxation.    Dictated by (CST): Bello Maguire MD on 7/27/2024 at 4:44 PM     Finalized by (CST): Bello Maguire MD on 7/27/2024 at 4:46 PM           LABS :     Lab Results   Component Value Date    WBC 5.7 07/29/2024    HGB 10.8 (L) 07/29/2024    HCT 32.2 (L) 07/29/2024    .0 07/29/2024    CREATSERUM 1.55 (H) 07/29/2024    BUN 43 (H) 07/29/2024     07/29/2024    K 3.5 07/29/2024     07/29/2024    CO2 18.0 (L) 07/29/2024    GLU 86 07/29/2024    CA 8.1 (L) 07/29/2024    ALB 4.0 02/08/2024    ALKPHO 62 02/08/2024     BILT 0.5 02/08/2024    TP 7.0 02/08/2024    AST 15 02/08/2024    ALT <7 (L) 02/08/2024    INR 3.91 (H) 07/29/2024    PT 21.1 (H) 06/09/2014    TSH 1.120 02/08/2024    DDIMER 0.72 06/29/2019    ESRML 43 (H) 01/08/2020    CRP 0.34 (H) 01/08/2020    MG 2.0 07/28/2024    PHOS 3.7 10/27/2023    TROP <0.045 06/29/2019    CK 1,633 (H) 07/28/2024    B12 250 08/17/2020       Recent Labs   Lab 07/27/24  1541 07/28/24  0828 07/29/24  0644   RBC 4.14 3.73* 3.50*   HGB 12.9 11.6* 10.8*   HCT 37.1 34.7* 32.2*   MCV 89.6 93.0 92.0   MCH 31.2 31.1 30.9   MCHC 34.8 33.4 33.5   RDW 14.6 14.9 15.1*   NEPRELIM 4.57 3.87 4.34   WBC 6.1 5.3 5.7   .0 163.0 168.0     Recent Labs   Lab 07/27/24  1541 07/28/24  0828 07/29/24  0644   * 69* 86   BUN 43* 34* 43*   CREATSERUM 1.76* 1.57* 1.55*   CA 8.9 8.1* 8.1*   * 138 139   K 4.9 3.6 3.5    109 111   CO2 19.0* 17.0* 18.0*     Lab Results   Component Value Date    PT 21.1 (H) 06/09/2014    PT 20.0 (H) 01/13/2014    PT 24.7 (H) 01/06/2012    INR 3.91 (H) 07/29/2024    INR 2.56 (H) 07/27/2024    INR 2.5 (A) 07/02/2024       Disposition: Discharge to Home***    Condition at Discharge: Stable ***    Discharge Medications:      Discharge Medications        START taking these medications        Instructions Prescription details   cephalexin 250 MG Caps  Commonly known as: Keflex      Take 1 capsule (250 mg total) by mouth 4 (four) times daily for 3 days.   Stop taking on: August 1, 2024  Quantity: 12 capsule  Refills: 0            CHANGE how you take these medications        Instructions Prescription details   rosuvastatin 5 MG Tabs  Commonly known as: Crestor  What changed: Another medication with the same name was removed. Continue taking this medication, and follow the directions you see here.      Take 1 tablet (5 mg total) by mouth nightly.   Quantity: 90 tablet  Refills: 1            CONTINUE taking these medications        Instructions Prescription details    acetaminophen 325 MG Tabs  Commonly known as: Tylenol      Take 2 tablets (650 mg total) by mouth every 4 (four) hours as needed.   Refills: 0     LOPERAMIDE HCL OR      Take 30 mL by mouth.   Refills: 0     sildenafil 20 MG Tabs  Commonly known as: Revatio      Take 0.5 tablets (10 mg total) by mouth 3 (three) times daily.   Quantity: 90 tablet  Refills: 0     Uptravi 1600 MCG Tabs  Generic drug: Selexipag      1,600 mcg Q12H.   Refills: 0     Vitamin D 50 MCG (2000 UT) Caps      Take by mouth.   Refills: 0            STOP taking these medications      Ferrous Sulfate 325 (65 Fe) MG Tabs        lisinopril 2.5 MG Tabs  Commonly known as: Prinivil; Zestril        torsemide 10 MG Tabs  Commonly known as: DEMADEX        warfarin 2.5 MG Tabs  Commonly known as: Coumadin                  Where to Get Your Medications        These medications were sent to [id number] - [facility] [address] AT SEC OF [facility], [phone], [phone]  [address] [place] [id number]      Phone: [phone]   cephalexin 250 MG Caps         Follow up Visits  No follow-up provider specified.  Robert Narvaez MD    Consultants         Provider   Role Specialty     Robert Narvaez MD      Consulting Physician NEPHROLOGY              Other Discharge Instructions:       ----------------------------------------------------  *** MIN SPENT ON THIS DC   Leno Deluca MD    7/29/2024     0     LOPERAMIDE HCL OR      Take 30 mL by mouth.   Refills: 0     sildenafil 20 MG Tabs  Commonly known as: Revatio      Take 0.5 tablets (10 mg total) by mouth 3 (three) times daily.   Quantity: 90 tablet  Refills: 0     Uptravi 1600 MCG Tabs  Generic drug: Selexipag      1,600 mcg Q12H.   Refills: 0     Vitamin D 50 MCG (2000 UT) Caps      Take by mouth.   Refills: 0            STOP taking these medications      Ferrous Sulfate 325 (65 Fe) MG Tabs        lisinopril 2.5 MG Tabs  Commonly known as: Prinivil; Zestril        torsemide 10 MG Tabs  Commonly known as: DEMADEX        warfarin 2.5 MG Tabs  Commonly known as: Coumadin                  Where to Get Your Medications        These medications were sent to [id number] - [facility] [address] AT SEC OF [facility], [phone], [phone] [address] [facility] [address]      Phone: [phone]   cephalexin 250 MG Caps         Follow up Visits  No follow-up provider specified.  Robert Narvaez MD    Consultants         Provider   Role Specialty     Robert Narvaez MD      Consulting Physician NEPHROLOGY              Other Discharge Instructions:       ----------------------------------------------------  37 MIN SPENT ON THIS DC   Leno Deluca MD    7/29/2024

## 2024-07-29 NOTE — CM/SW NOTE
07/29/24 1100   CM/SW Referral Data   Referral Source Social Work (self-referral)   Reason for Referral Discharge planning   Informant Patient;Daughter   Medical Hx   Does patient have an established PCP? Yes   Patient Info   Patient's Current Mental Status at Time of Assessment Alert;Oriented   Patient's Home Environment House   Patient lives with Alone   Patient Status Prior to Admission   Independent with ADLs and Mobility Yes   Services in place prior to admission DME/Supplies at home   Type of DME/Supplies Wheeled Walker   Discharge Needs   Anticipated D/C needs Home health care   Choice of Post-Acute Provider   Informed patient of right to choose their preferred provider Yes   List of appropriate post-acute services provided to patient/family with quality data Yes     SW initiated self referral for discharge. SW called daughter, due to patient being [place]. Daughter provided above. Daughter confirmed that patient lives at address on file, but will be temporarily saying at daughter's home in [place]. Daughter reports that patient has a walker at home. Pt has hx of ALLA and HH. Anticipated therapy need: Home with Home Healthcare. Daughter was given HH list.  Daughter wishes know why patient is unable to go to [facility]. MICHAEL explained to daughter of therapy needs. SW informed daughter of therapy recs as of this morning. SW will follow up with daughter in regard to HH choice.     219pm- daughter wishes to go with [facility]. SW reserved [facility] via aidin. SW informed [facility] liaison of choice.     Plan: Pending medical clearance, DC to Home with [facility]    SW/RICARDO to remain available for support and/or discharge planning.     Cat Dee, MSW, LSW   x [phone]

## 2024-07-29 NOTE — OCCUPATIONAL THERAPY NOTE
OCCUPATIONAL THERAPY EVALUATION - INPATIENT     Room Number: 558/558-A  Evaluation Date: 7/29/2024  Type of Evaluation: Initial  Presenting Problem: diarrhea and weakness, fall    Physician Order: IP Consult to Occupational Therapy  Reason for Therapy: ADL/IADL Dysfunction and Discharge Planning    OCCUPATIONAL THERAPY ASSESSMENT   Patient is a 89 year old female admitted 7/27/2024 for diarrhea and weakness.  Prior to admission, patient's baseline is Mod Ind with ADLs and functional mobility with rollator.  Patient is currently functioning near baseline with ADLs and functional mobility.  Patient is requiring CGA, bouts of Mod A( to manage LE clothing) as a result of the following impairments: pain , generalized weakness. Occupational Therapy will continue to follow for duration of hospitalization.    Patient will benefit from continued skilled OT Services at discharge to promote prior level of function and safety with additional support and return home with home health OT    PLAN  OT Treatment Plan: Energy conservation/work simplification techniques;ADL training;Balance activities  OT Device Recommendations: Shower chair    OCCUPATIONAL THERAPY MEDICAL/SOCIAL HISTORY   Problem List  Principal Problem:    Dehydration  Active Problems:    Renal insufficiency    Acute kidney injury (HCC)    HOME SITUATION  Type of Home: House  Home Layout: One level  Lives With: Alone; Daughter (going to go stay with daughter in multilevel home but stays on main level with 3 MIYA, family assists with stairs)  Drives: No  Patient Regularly Uses: Hearing aides; Reading glasses      SUBJECTIVE  \"I've been happy in my home, but maybe it's time I move in with my daughter.. she's a senior, too, you know!\"     OCCUPATIONAL THERAPY EXAMINATION    OBJECTIVE  Precautions: Bed/chair alarm; Hard of hearing; Limb alert - left  Fall Risk: High fall risk    PAIN ASSESSMENT  Rating: Unable to rate  Location: tailbone with supine<>sit    ACTIVITY  TOLERANCE  Pulse: 71                      O2 SATURATIONS  Oxygen Therapy  SPO2% on Room Air at Rest: 94  SPO2% Ambulation on Room Air: 94    COGNITION  WFL    RANGE OF MOTION   Upper extremity ROM is within functional limits     STRENGTH ASSESSMENT  Upper extremity strength is within functional limits     COORDINATION  Gross Motor: WFL   Fine Motor: WFL     ACTIVITIES OF DAILY LIVING ASSESSMENT  AM-PAC ‘6-Clicks’ Inpatient Daily Activity Short Form  How much help from another person does the patient currently need…  -   Putting on and taking off regular lower body clothing?: A Lot  -   Bathing (including washing, rinsing, drying)?: A Little  -   Toileting, which includes using toilet, bedpan or urinal? : A Little  -   Putting on and taking off regular upper body clothing?: A Little  -   Taking care of personal grooming such as brushing teeth?: A Little  -   Eating meals?: None    AM-PAC Score:  Score: 18  Approx Degree of Impairment: 46.65%  Standardized Score (AM-PAC Scale): 38.66  CMS Modifier (G-Code): CK    FUNCTIONAL TRANSFER ASSESSMENT  Sit to Stand: Edge of Bed  Edge of Bed: Contact Guard Assist    BED MOBILITY  Supine to Sit : Minimal Assist    FUNCTIONAL ADL ASSESSMENT  Grooming Standing: Contact Guard Assist  LB Dressing Seated: Moderate Assist    Skilled Therapy Provided: pacing, activity promotion , discharge discussion  No loss of balance with activity..    EDUCATION PROVIDED  Patient: Role of Occupational Therapy; Plan of Care; Discharge Recommendations; Functional Transfer Techniques; Compensatory ADL Techniques; Other; DME Recommendations (pt planning to discharge home with daughter, will have assist as needed. discussed initial sponge bathing as pt worried she won't be able to use shower chair in the shower that is accessible to her at her daughter's home)  Patient's Response to Education: Verbalized Understanding; Returned Demonstration    The patient's Approx Degree of Impairment: 46.65% has been  calculated based on documentation in the [facility] '6 clicks' Inpatient Daily Activity Short Form.  Research supports that patients with this level of impairment may benefit from HHOT.  Final disposition will be made by interdisciplinary medical team.     Patient End of Session: Up in chair;Needs met;Call light within reach;RN aware of session/findings;Alarm set    OT Goals  Patients self stated goal is: home     Patient will complete functional transfer with Neha   Comment:     Patient will complete LE dressing with Mod I  Comment:     Patient will tolerate standing for 4 minutes in prep for adls with Mod I   Comment:    Patient will complete item retrieval with mod I  Comment:          Goals  on: 24  Frequency: 3-5x/w    Patient Evaluation Complexity Level:   Occupational Profile/Medical History LOW - Brief history including review of medical or therapy records    Specific performance deficits impacting engagement in ADL/IADL LOW  1 - 3 performance deficits    Client Assessment/Performance Deficits LOW - No comorbidities nor modifications of tasks    Clinical Decision Making LOW - Analysis of occupational profile, problem-focused assessments, limited treatment options    Overall Complexity LOW     OT Session Time: 15 minutes  Self-Care Home Management: 15 minutes

## 2024-07-29 NOTE — PLAN OF CARE
Problem: Patient Centered Care  Goal: Patient preferences are identified and integrated in the patient's plan of care  Description: Interventions:  - What would you like us to know as we care for you?   - Provide timely, complete, and accurate information to patient/family  - Incorporate patient and family knowledge, values, beliefs, and cultural backgrounds into the planning and delivery of care  - Encourage patient/family to participate in care and decision-making at the level they choose  - Honor patient and family perspectives and choices  Outcome: Progressing     Problem: Patient/Family Goals  Goal: Patient/Family Long Term Goal  Description: Patient's Long Term Goal:     Interventions:  -   - See additional Care Plan goals for specific interventions  Outcome: Progressing  Goal: Patient/Family Short Term Goal  Description: Patient's Short Term Goal:     Interventions:   -   - See additional Care Plan goals for specific interventions  Outcome: Progressing     Problem: PAIN - ADULT  Goal: Verbalizes/displays adequate comfort level or patient's stated pain goal  Description: INTERVENTIONS:  - Encourage pt to monitor pain and request assistance  - Assess pain using appropriate pain scale  - Administer analgesics based on type and severity of pain and evaluate response  - Implement non-pharmacological measures as appropriate and evaluate response  - Consider cultural and social influences on pain and pain management  - Manage/alleviate anxiety  - Utilize distraction and/or relaxation techniques  - Monitor for opioid side effects  - Notify MD/LIP if interventions unsuccessful or patient reports new pain  - Anticipate increased pain with activity and pre-medicate as appropriate  Outcome: Progressing     Problem: RISK FOR INFECTION - ADULT  Goal: Absence of fever/infection during anticipated neutropenic period  Description: INTERVENTIONS  - Monitor WBC  - Administer growth factors as ordered  - Implement neutropenic  guidelines  Outcome: Progressing     Problem: SAFETY ADULT - FALL  Goal: Free from fall injury  Description: INTERVENTIONS:  - Assess pt frequently for physical needs  - Identify cognitive and physical deficits and behaviors that affect risk of falls.  - [place] fall precautions as indicated by assessment.  - Educate pt/family on patient safety including physical limitations  - Instruct pt to call for assistance with activity based on assessment  - Modify environment to reduce risk of injury  - Provide assistive devices as appropriate  - Consider OT/PT consult to assist with strengthening/mobility  - Encourage toileting schedule  Outcome: Progressing     Problem: GASTROINTESTINAL - ADULT  Goal: Minimal or absence of nausea and vomiting  Description: INTERVENTIONS:  - Maintain adequate hydration with IV or PO as ordered and tolerated  - Nasogastric tube to low intermittent suction as ordered  - Evaluate effectiveness of ordered antiemetic medications  - Provide nonpharmacologic comfort measures as appropriate  - Advance diet as tolerated, if ordered  - Obtain nutritional consult as needed  - Evaluate fluid balance  Outcome: Progressing  Goal: Maintains or returns to baseline bowel function  Description: INTERVENTIONS:  - Assess bowel function  - Maintain adequate hydration with IV or PO as ordered and tolerated  - Evaluate effectiveness of GI medications  - Encourage mobilization and activity  - Obtain nutritional consult as needed  - Establish a toileting routine/schedule  - Consider collaborating with pharmacy to review patient's medication profile  Outcome: Progressing

## 2024-07-29 NOTE — PLAN OF CARE
Problem: Patient Centered Care  Goal: Patient preferences are identified and integrated in the patient's plan of care  Description: Interventions:  - What would you like us to know as we care for you?     - Provide timely, complete, and accurate information to patient/family  - Incorporate patient and family knowledge, values, beliefs, and cultural backgrounds into the planning and delivery of care  - Encourage patient/family to participate in care and decision-making at the level they choose  - Honor patient and family perspectives and choices  Outcome: Adequate for Discharge     Problem: Patient/Family Goals  Goal: Patient/Family Long Term Goal  Description: Patient's Long Term Goal:       Interventions:  -     - See additional Care Plan goals for specific interventions  Outcome: Adequate for Discharge  Goal: Patient/Family Short Term Goal  Description: Patient's Short Term Goal:       Interventions:   -     - See additional Care Plan goals for specific interventions  Outcome: Adequate for Discharge     Problem: PAIN - ADULT  Goal: Verbalizes/displays adequate comfort level or patient's stated pain goal  Description: INTERVENTIONS:  - Encourage pt to monitor pain and request assistance  - Assess pain using appropriate pain scale  - Administer analgesics based on type and severity of pain and evaluate response  - Implement non-pharmacological measures as appropriate and evaluate response  - Consider cultural and social influences on pain and pain management  - Manage/alleviate anxiety  - Utilize distraction and/or relaxation techniques  - Monitor for opioid side effects  - Notify MD/LIP if interventions unsuccessful or patient reports new pain  - Anticipate increased pain with activity and pre-medicate as appropriate  Outcome: Adequate for Discharge     Problem: RISK FOR INFECTION - ADULT  Goal: Absence of fever/infection during anticipated neutropenic period  Description: INTERVENTIONS  - Monitor WBC  -  Administer growth factors as ordered  - Implement neutropenic guidelines  Outcome: Adequate for Discharge     Problem: SAFETY ADULT - FALL  Goal: Free from fall injury  Description: INTERVENTIONS:  - Assess pt frequently for physical needs  - Identify cognitive and physical deficits and behaviors that affect risk of falls.  - [place] fall precautions as indicated by assessment.  - Educate pt/family on patient safety including physical limitations  - Instruct pt to call for assistance with activity based on assessment  - Modify environment to reduce risk of injury  - Provide assistive devices as appropriate  - Consider OT/PT consult to assist with strengthening/mobility  - Encourage toileting schedule  Outcome: Adequate for Discharge     Problem: GASTROINTESTINAL - ADULT  Goal: Minimal or absence of nausea and vomiting  Description: INTERVENTIONS:  - Maintain adequate hydration with IV or PO as ordered and tolerated  - Nasogastric tube to low intermittent suction as ordered  - Evaluate effectiveness of ordered antiemetic medications  - Provide nonpharmacologic comfort measures as appropriate  - Advance diet as tolerated, if ordered  - Obtain nutritional consult as needed  - Evaluate fluid balance  Outcome: Adequate for Discharge  Goal: Maintains or returns to baseline bowel function  Description: INTERVENTIONS:  - Assess bowel function  - Maintain adequate hydration with IV or PO as ordered and tolerated  - Evaluate effectiveness of GI medications  - Encourage mobilization and activity  - Obtain nutritional consult as needed  - Establish a toileting routine/schedule  - Consider collaborating with pharmacy to review patient's medication profile  Outcome: Adequate for Discharge

## 2024-07-29 NOTE — HOME CARE LIAISON
Met with patient and dr Somers at the bedside. Patient is agreeable to [facility]. [facility]idential brochure provided with contact information. All questions addressed and answered. MICHAEL Shelton updated.

## 2024-07-30 ENCOUNTER — TELEPHONE (OUTPATIENT)
Facility: CLINIC | Age: 89
End: 2024-07-30

## 2024-07-30 ENCOUNTER — TELEPHONE (OUTPATIENT)
Dept: NEPHROLOGY | Facility: CLINIC | Age: 89
End: 2024-07-30

## 2024-07-30 ENCOUNTER — ANTI-COAG VISIT (OUTPATIENT)
Dept: ANTICOAGULATION | Facility: CLINIC | Age: 89
End: 2024-07-30

## 2024-07-30 DIAGNOSIS — Z79.01 LONG TERM (CURRENT) USE OF ANTICOAGULANTS: ICD-10-CM

## 2024-07-30 DIAGNOSIS — I27.20 PULMONARY HYPERTENSION (HCC): Primary | ICD-10-CM

## 2024-07-30 DIAGNOSIS — R52 PAIN: Primary | ICD-10-CM

## 2024-07-30 DIAGNOSIS — I48.20 CHRONIC ATRIAL FIBRILLATION (HCC): ICD-10-CM

## 2024-07-30 DIAGNOSIS — Z79.01 MONITORING FOR LONG-TERM ANTICOAGULANT USE: ICD-10-CM

## 2024-07-30 DIAGNOSIS — Z51.81 MONITORING FOR LONG-TERM ANTICOAGULANT USE: ICD-10-CM

## 2024-07-30 NOTE — TELEPHONE ENCOUNTER
Patient daughter states that the patient was discharged yesterday from [facility]  She states that she was discharged without instructions or orders.  She is requesting to speak to RN to find out if there is anything patient needs to do.  Patient currently complains lethargic and extreme back and leg pain.  Please call

## 2024-07-30 NOTE — TELEPHONE ENCOUNTER
Daughter, had to cancel the appointment for the patient that was scheduled for tomorrow. Daughter, would like to speak with the coumadin nurse today.

## 2024-07-30 NOTE — TELEPHONE ENCOUNTER
Dr Narvaez patient daughter Lizbet reported pt was discharged from the hospital last week but still in a lot of pain,specially when patient is getting out from bed ,is working with Physical therapist at home.asking if you can review the xrays that was done in the hospital.Taking Tylenol for pain,Advised if pain gets worse to go back to ER for further evaluation and treatment,Offered to schedule a follow up but cannot at this time ,having trouble getting in and out of the house due weakness and pain.Advised to call for any concerns,patient daughter verbalized understanding.

## 2024-07-30 NOTE — PROGRESS NOTES
POST INPATIENT FOLLOW UP DOCUMENTATION ONLY:    Patient fell at home and daughter brought her to ER. Dx UTI. > discharged yesterday off warfarin. C RN will come Wednesday and check INR. Advised to call [facility].     Of Note; watch hemoglobin. Pt also has/had mustard colored diarrhea. C diff negative.

## 2024-07-31 ENCOUNTER — ANTI-COAG VISIT (OUTPATIENT)
Dept: ANTICOAGULATION | Facility: CLINIC | Age: 89
End: 2024-07-31

## 2024-07-31 ENCOUNTER — TELEPHONE (OUTPATIENT)
Dept: NEPHROLOGY | Facility: CLINIC | Age: 89
End: 2024-07-31

## 2024-07-31 DIAGNOSIS — I27.20 PULMONARY HYPERTENSION (HCC): Primary | ICD-10-CM

## 2024-07-31 DIAGNOSIS — I48.20 CHRONIC ATRIAL FIBRILLATION (HCC): ICD-10-CM

## 2024-07-31 DIAGNOSIS — Z79.01 LONG TERM (CURRENT) USE OF ANTICOAGULANTS: ICD-10-CM

## 2024-07-31 DIAGNOSIS — Z79.01 MONITORING FOR LONG-TERM ANTICOAGULANT USE: ICD-10-CM

## 2024-07-31 DIAGNOSIS — Z51.81 MONITORING FOR LONG-TERM ANTICOAGULANT USE: ICD-10-CM

## 2024-07-31 LAB — INR: 4.2 (ref 0.8–1.2)

## 2024-07-31 NOTE — TELEPHONE ENCOUNTER
Rn spoke to Yvette to have wound nurse evaluate for the appropriate treatment and let our office know .

## 2024-07-31 NOTE — TELEPHONE ENCOUNTER
Yvette/[facility] states that patient has pressure ulcer on her bottom and Yvette is looking for orders to treat.  Patient was admitted to home health today and physical therapy orders to follow.  Please call.

## 2024-07-31 NOTE — PROGRESS NOTES
NCM placed call to patient's daughter Lizbet (ok per MATILDA) for TCM call, LM requesting a call back to [phone] with a condition update.

## 2024-07-31 NOTE — PROGRESS NOTES
NCM attempted to reach patient to complete a TCM call, message received: I'm sorry but the person you have called has a voice mailbox that has not been set up yet. NCM unable to leave a , NCM will call back at a later time.     Discharge Dx:   Principal Problem:    Dehydration  Active Problems:    Renal insufficiency    Acute kidney injury (HCC)

## 2024-07-31 NOTE — PROGRESS NOTES
INR result received from [facility].     Spoke with Cristian from [facility] and patients daughter Lizbet who was at the patient home today.     Per previous note- Virginia fell at home last week and was unable to get up due to weakness from diarrhea. Started antibiotics for a UTI- cephalexin for 3 days- she has 2 pills left to take. Daughter reports that Virginia is taking Tylenol twice a day for pain related to the fall and continues to have mild diarrhea.     Per protocol, hold 2 doses of warfarin and recheck INR this Friday 8/2.     7/31: Hold; 8/1: Hold; Otherwise 3.75 mg every Mon, Wed, Fri; 2.5 mg all other days

## 2024-08-01 ENCOUNTER — TELEPHONE (OUTPATIENT)
Dept: NEPHROLOGY | Facility: CLINIC | Age: 89
End: 2024-08-01

## 2024-08-01 ENCOUNTER — TELEPHONE (OUTPATIENT)
Dept: INTERNAL MEDICINE UNIT | Facility: HOSPITAL | Age: 89
End: 2024-08-01

## 2024-08-01 RX ORDER — SILDENAFIL CITRATE 20 MG/1
10 TABLET ORAL 3 TIMES DAILY
Qty: 45 TABLET | Refills: 3 | Status: SHIPPED | OUTPATIENT
Start: 2024-08-01

## 2024-08-01 RX ORDER — HYDROCODONE BITARTRATE AND ACETAMINOPHEN 5; 325 MG/1; MG/1
1 TABLET ORAL EVERY 8 HOURS PRN
Qty: 10 TABLET | Refills: 0 | Status: ON HOLD | OUTPATIENT
Start: 2024-08-01

## 2024-08-01 NOTE — TELEPHONE ENCOUNTER
Lizbet reports that the HH nurse was out yesterday and the physical therapist was out today to see [place]. Lizbet states she would like to have a  and a bath aid as well.     Clinical staff: Please ask Dr Narvaez if he would place an order with [facility] for a  to visit and a bath aid. Lizbet states she is not able to get her mother up in the shower and she needs assistance.    Thank You!

## 2024-08-01 NOTE — TELEPHONE ENCOUNTER
Dr Ghotra informed patient daughter of Rx sent but missed to tell Rn a different pharmacy closer to their house,please sign pending order .

## 2024-08-01 NOTE — TELEPHONE ENCOUNTER
[facility] calling to request verbal order for patient to began home health.   Patient was discharged 7/29 and awaiting call at [phone], ok to leave message on confidential line, thanks.  *see closed telephone encounter 7/31

## 2024-08-01 NOTE — TELEPHONE ENCOUNTER
Dr Ghotra patient of Dr Narvaez received a call from patient daughter Lizbet,reported since discharged patient still in so much pain having trouble getting out of bed, takes tylenol 500 mg as needed for pain but is not helping ,working with home health PT/RN. Asking can get stronger medication was given Norco5-325 mg while in the hospital.

## 2024-08-01 NOTE — TELEPHONE ENCOUNTER
Patient's daughter Lizbet (on MATILDA) is requesting something for pain for her mother. The tylenol is not helping. Lizbet states her mother has been in excruciating pain. She believes that she had received [place] in the hospital which seemed to help.    Clinical staff: Please ask Dr Narvaez if patient can have something else for pain, see above.    Thank you!

## 2024-08-01 NOTE — PROGRESS NOTES
Initial Post Discharge Follow Up   Discharge Date: 7/29/24  Contact Date: 7/29/2024    Consent Verification:  Assessment Completed With: Other: Lizbet Permission received per patient?  written  HIPAA Verified?  Yes    Discharge Dx:   Principal Problem:    Dehydration  Active Problems:    Renal insufficiency    Acute kidney injury (HCC)    General:   How have you been since your discharge from the hospital? Lizbet states her mother does not have a fever/chills, no shortness of breath, no chest pain, no pain radiating from chest to neck, jaw, shoulders, arms or upper back. Lizbet states that her mother does not have any abdominal pain, no nausea/vomiting, does have diarrhea. Lizbet states that her mother has a sore on her bottom. Lizbet reports that the  nurse was out yesterday and the physical therapist was out today to see [place]. Lizbet states she would like to have a  and a bath aid as well. Message sent to PCP. [facility] instructed Lizbet to help her mother change positions frequently, rest when needed, stay hydrated up to any fluid restriction and continue to take up to ten deep breaths an hour while awake. Lizbet denies any further questions or needs at this time.   Do you have any pain since discharge?  Yes  Where: Lizbet states her mother is crying out in pain when she has to help her move.    Rating on pain scale 1-10, 10 being the worst pain you have ever experienced, what is current pain: not rated  Alleviating factors: no  Aggravating factors: movement  Is the pain manageable at home? No    When you were leaving the hospital were your discharge instructions reviewed with you? Yes  Do you have any questions about your discharge instructions?  No  Before leaving the hospital was your diagnoses explained to you? Yes  Do you have any questions about your diagnoses? No  Are you able to perform normal daily activities of living as you have prior to your hospital stay (dressing, bathing, ambulating to  the bathroom, etc)? no  If No, What are some barriers or concerns?  Patient needs assistance with ADL's at this time.  (NCM) Was patient given a different diet per AVS? no      Medications:   STOP taking:  Ferrous Sulfate 325 (65 Fe) MG Tabs  lisinopril 2.5 MG Tabs (Prinivil; Zestril)  torsemide 10 MG Tabs (DEMADEX)  warfarin 2.5 MG Tabs (Coumadin)  Review your updated medication list below.  Current Outpatient Medications   Medication Sig Dispense Refill    cephalexin 250 MG Oral Cap Take 1 capsule (250 mg total) by mouth 4 (four) times daily for 3 days. 12 capsule 0    rosuvastatin 5 MG Oral Tab Take 1 tablet (5 mg total) by mouth nightly. 90 tablet 1    acetaminophen 325 MG Oral Tab Take 2 tablets (650 mg total) by mouth every 4 (four) hours as needed.      sildenafil 20 MG Oral Tab Take 0.5 tablets (10 mg total) by mouth 3 (three) times daily. 90 tablet 0    Cholecalciferol (VITAMIN D) 50 MCG (2000 UT) Oral Cap Take by mouth.      LOPERAMIDE HCL OR Take 30 mL by mouth.      UPTRAVI 1600 MCG Oral Tab 1,600 mcg Q12H.         Were there any changes to your current medication(s) noted on the AVS? Yes  If so, were these medication changes discussed with you prior to leaving the hospital? Yes  If a new medication was prescribed:    Was the new medication's purpose & side effects reviewed? Yes  Do you have any questions about your new medication? No  Did you  your discharge medications when you left the hospital? Yes  Let's go over your medications together to make sure we are not missing anything. Medications Reviewed  Are there any reasons that keep you from taking your medication as prescribed? No  Are you having any concerns with constipation? No      Discharge medications reviewed/discussed/and reconciled against outpatient medications with patient.  Any changes or updates to medications sent to PCP.  Patient Acknowledged     Referrals/orders at D/C:  Referrals/orders placed at D/C? yes  What services:    Home health, PT, and OT   (If HH was ordered) Has HH been set up?  Yes, Lizbet would like to have a  come out and a bath aid if possible.    If Yes: With Whom: [facility]  NCM sent a request to PCP to place an order with [facility] for a  to visit and a bath aid.     DME ordered at D/C? No      Discharge orders, AVS reviewed and discussed with patient. Any changes or updates to orders sent to PCP.  Patient Acknowledged      SDOH:   Transportation:   Transportation Needs: No Transportation Needs (7/27/2024)    Transportation Needs     Lack of Transportation: No     Car Seat: Not on file       Financial Strain:   Financial Resource Strain: Low Risk  (8/1/2024)    Financial Resource Strain     Difficulty of Paying Living Expenses: Not on file     Med Affordability: No        Diagnosis specifics:   Warfarin/Coumadin:   Next PT/INR Date: 8/2/2024   Current Warfarin Dose: Warfarin on hold   Does patient know who to follow-up with Warfarin/Coumadin management?  yes     Who manages Warfarin/Coumadin? Coumadin clinic    Who is monitoring PT/INR?  (Coumadin Clinic, , PCP, cardiology, etc) Coumadin clinic  NCM Reviewed next PT/INR appointment with pt:  Yes        Follow up appointments:      TCC  Was TCC ordered: No    PCP (If no TCC appointment)  Does patient already have a PCP appointment scheduled? No  NCM Sent a message to PCP office to request assistance scheduling TCM appointment with patient.      Specialist    Does the patient have any other follow up appointment(s) needing to be scheduled? No  Does the patient need assistance scheduling appointment(s): No    Is there any reason as to why you cannot make your appointment(s)?  No     Needs post D/C:   Now that you are home, are there any needs or concerns you need addressed before your next visit with your PCP?  (DME, meds, questions, etc.): No    Interventions by NCM:   NCM reviewed medications, discharge instructions, S&S of  infection/blood clots. Lizbet instructed to report any new or worsening symptoms, when to call the doctor and when to call 911. Lizbet verbalized understanding of these. NCM instructed Lizbet to call her mother's PCP with any questions or needs, she states she will.      [facility] referral placed:  No, patient refused    BOOK BY DATE: 8/12/2024

## 2024-08-01 NOTE — TELEPHONE ENCOUNTER
Daughter is requesting for medicine HYDROcodone-acetaminophen 5-325 MG Oral Tab sent to [facility] [address] [place] ph [phone] it is urgent and please call daughter

## 2024-08-01 NOTE — TELEPHONE ENCOUNTER
Received call from patient daughter, states her mom was discharged home on Sunday. Daughter states she feels her mom should have gone to rehab, upset that she was discharged home and was not involved with the plan. This RN reviewed notes from . Called  and discussed above. Daughter verbalized that she feels her mom needs to go to rehab. This RN confirmed with  that she would have to be readmitted to hospital in order to go to rehab facility, informed daughter of this. Daughter states she would like to speak to someone from social work department to voice her concerns, phone number to social work department provided.

## 2024-08-02 ENCOUNTER — ANTI-COAG VISIT (OUTPATIENT)
Dept: ANTICOAGULATION | Facility: CLINIC | Age: 89
End: 2024-08-02

## 2024-08-02 DIAGNOSIS — Z79.01 LONG TERM (CURRENT) USE OF ANTICOAGULANTS: ICD-10-CM

## 2024-08-02 DIAGNOSIS — Z79.01 MONITORING FOR LONG-TERM ANTICOAGULANT USE: ICD-10-CM

## 2024-08-02 DIAGNOSIS — Z51.81 MONITORING FOR LONG-TERM ANTICOAGULANT USE: ICD-10-CM

## 2024-08-02 DIAGNOSIS — I27.20 PULMONARY HYPERTENSION (HCC): Primary | ICD-10-CM

## 2024-08-02 DIAGNOSIS — I48.20 CHRONIC ATRIAL FIBRILLATION (HCC): ICD-10-CM

## 2024-08-02 LAB — INR: 3.4 (ref 0.8–1.2)

## 2024-08-02 NOTE — PROGRESS NOTES
TTR 79.6%    INR result received from [facility].      Spoke with Cristian from [facility] and patients daughter Lizbet who was at the patient home today.      Per previous note- Virginia fell at home last week and was unable to get up due to weakness from diarrhea. She finished taking antibiotics yesterday (Thursday) morning for a UTI- took cephalexin for 3 days.     Daughter reports that Virginia was taking Tylenol but it was switched today to Norco 1 tablet every 8 hours as needed for pain. Per [employer], when taken with warfarin, Norco may increase risk of bleeding/elevate INR- similar to effect of taking with Tylenol as Norco is hydrocodone plus acetaminophen.     Reports that diarrhea has resolved but Virginia continues to take immodium once every morning as the Selexipag she takes can cause loose stools. Reports that appetite remains fair.     Reports that lasix and lisinopril have been on hold since discharge to home- advised to discuss with primary provider/cardiology on resuming those meds.     Advised there are several reasons for fluctuations/elevation in INR including diet and med changes- RN will monitor closely. Daughter verbalized understanding of above discussion.      Per protocol, hold 1 dose of warfarin tonight, then resume usual dose and recheck INR on Tuesday 8/6.      8/2: Hold; Otherwise 3.75 mg every Mon, Wed, Fri; 2.5 mg all other days

## 2024-08-02 NOTE — TELEPHONE ENCOUNTER
Rn spoke to [facility] of note below stated patient has PT,OT ,RN on going and can add aide and  to visit ,pt daughter made aware,advised for any concerns to call our office ,pt daughter verbalized understanding.will inform Dr Narvaez .

## 2024-08-02 NOTE — TELEPHONE ENCOUNTER
Please see encounter on 7/30/24 7/30/24  1:35 PM  Note     Dr Narvaez patient daughter Lizbet reported pt was discharged from the hospital last week but still in a lot of pain,specially when patient is getting out from bed ,is working with Physical therapist at home.asking if you can review the xrays that was done in the hospital.Taking Tylenol for pain,Advised if pain gets worse to go back to ER for further evaluation and treatment,Offered to schedule a follow up but cannot at this time ,having trouble getting in and out of the house due weakness and pain.Advised to call for any concerns,patient daughter verbalized understanding.

## 2024-08-05 ENCOUNTER — HOSPITAL ENCOUNTER (INPATIENT)
Facility: HOSPITAL | Age: 89
LOS: 1 days | Discharge: HOME HEALTH | End: 2024-08-07
Attending: EMERGENCY MEDICINE | Admitting: HOSPITALIST
Payer: MEDICARE

## 2024-08-05 ENCOUNTER — TELEPHONE (OUTPATIENT)
Dept: NEPHROLOGY | Facility: CLINIC | Age: 89
End: 2024-08-05

## 2024-08-05 ENCOUNTER — APPOINTMENT (OUTPATIENT)
Dept: GENERAL RADIOLOGY | Facility: HOSPITAL | Age: 89
End: 2024-08-05
Attending: EMERGENCY MEDICINE
Payer: MEDICARE

## 2024-08-05 DIAGNOSIS — L98.429 SKIN ULCER OF SACRUM, UNSPECIFIED ULCER STAGE (HCC): ICD-10-CM

## 2024-08-05 DIAGNOSIS — R06.02 SHORTNESS OF BREATH ON EXERTION: Primary | ICD-10-CM

## 2024-08-05 PROBLEM — R53.1 WEAKNESS: Status: ACTIVE | Noted: 2024-08-05

## 2024-08-05 LAB
ALBUMIN SERPL-MCNC: 3.5 G/DL (ref 3.2–4.8)
ALP LIVER SERPL-CCNC: 57 U/L
ALT SERPL-CCNC: <7 U/L
ANION GAP SERPL CALC-SCNC: 8 MMOL/L (ref 0–18)
AST SERPL-CCNC: 18 U/L (ref ?–34)
BASOPHILS # BLD AUTO: 0.04 X10(3) UL (ref 0–0.2)
BASOPHILS NFR BLD AUTO: 0.5 %
BILIRUB DIRECT SERPL-MCNC: 0.2 MG/DL (ref ?–0.3)
BILIRUB SERPL-MCNC: 0.5 MG/DL (ref 0.2–0.9)
BILIRUB UR QL: NEGATIVE
BNP SERPL-MCNC: 355 PG/ML
BUN BLD-MCNC: 33 MG/DL (ref 9–23)
BUN/CREAT SERPL: 27 (ref 10–20)
CALCIUM BLD-MCNC: 8.5 MG/DL (ref 8.7–10.4)
CHLORIDE SERPL-SCNC: 110 MMOL/L (ref 98–112)
CLARITY UR: CLEAR
CO2 SERPL-SCNC: 20 MMOL/L (ref 21–32)
CREAT BLD-MCNC: 1.22 MG/DL
DEPRECATED RDW RBC AUTO: 54.9 FL (ref 35.1–46.3)
EGFRCR SERPLBLD CKD-EPI 2021: 41 ML/MIN/1.73M2 (ref 60–?)
EOSINOPHIL # BLD AUTO: 0.06 X10(3) UL (ref 0–0.7)
EOSINOPHIL NFR BLD AUTO: 0.8 %
ERYTHROCYTE [DISTWIDTH] IN BLOOD BY AUTOMATED COUNT: 15.9 % (ref 11–15)
FLUAV + FLUBV RNA SPEC NAA+PROBE: NEGATIVE
FLUAV + FLUBV RNA SPEC NAA+PROBE: NEGATIVE
GLUCOSE BLD-MCNC: 105 MG/DL (ref 70–99)
GLUCOSE UR-MCNC: NORMAL MG/DL
HCT VFR BLD AUTO: 27.9 %
HGB BLD-MCNC: 9.4 G/DL
IMM GRANULOCYTES # BLD AUTO: 0.04 X10(3) UL (ref 0–1)
IMM GRANULOCYTES NFR BLD: 0.5 %
INR BLD: 2.52 (ref 0.8–1.2)
KETONES UR-MCNC: NEGATIVE MG/DL
LEUKOCYTE ESTERASE UR QL STRIP.AUTO: 25
LYMPHOCYTES # BLD AUTO: 2.04 X10(3) UL (ref 1–4)
LYMPHOCYTES NFR BLD AUTO: 27.8 %
MCH RBC QN AUTO: 31.4 PG (ref 26–34)
MCHC RBC AUTO-ENTMCNC: 33.7 G/DL (ref 31–37)
MCV RBC AUTO: 93.3 FL
MONOCYTES # BLD AUTO: 0.46 X10(3) UL (ref 0.1–1)
MONOCYTES NFR BLD AUTO: 6.3 %
NEUTROPHILS # BLD AUTO: 4.69 X10 (3) UL (ref 1.5–7.7)
NEUTROPHILS # BLD AUTO: 4.69 X10(3) UL (ref 1.5–7.7)
NEUTROPHILS NFR BLD AUTO: 64.1 %
NITRITE UR QL STRIP.AUTO: NEGATIVE
OSMOLALITY SERPL CALC.SUM OF ELEC: 294 MOSM/KG (ref 275–295)
PH UR: 5.5 [PH] (ref 5–8)
PLATELET # BLD AUTO: 239 10(3)UL (ref 150–450)
POTASSIUM SERPL-SCNC: 4.6 MMOL/L (ref 3.5–5.1)
PROT SERPL-MCNC: 6.5 G/DL (ref 5.7–8.2)
PROTHROMBIN TIME: 28.7 SECONDS (ref 11.6–14.8)
Q-T INTERVAL: 420 MS
QRS DURATION: 110 MS
QTC CALCULATION (BEZET): 415 MS
R AXIS: 73 DEGREES
RBC # BLD AUTO: 2.99 X10(6)UL
RSV RNA SPEC NAA+PROBE: NEGATIVE
SARS-COV-2 RNA RESP QL NAA+PROBE: NOT DETECTED
SODIUM SERPL-SCNC: 138 MMOL/L (ref 136–145)
SP GR UR STRIP: 1.02 (ref 1–1.03)
T AXIS: 61 DEGREES
TROPONIN I SERPL HS-MCNC: 26 NG/L
UROBILINOGEN UR STRIP-ACNC: NORMAL
VENTRICULAR RATE: 59 BPM
WBC # BLD AUTO: 7.3 X10(3) UL (ref 4–11)

## 2024-08-05 PROCEDURE — 71045 X-RAY EXAM CHEST 1 VIEW: CPT | Performed by: EMERGENCY MEDICINE

## 2024-08-05 PROCEDURE — 99222 1ST HOSP IP/OBS MODERATE 55: CPT | Performed by: HOSPITALIST

## 2024-08-05 RX ORDER — ACETAMINOPHEN 500 MG
500 TABLET ORAL EVERY 4 HOURS PRN
Status: DISCONTINUED | OUTPATIENT
Start: 2024-08-05 | End: 2024-08-07

## 2024-08-05 RX ORDER — WARFARIN SODIUM 3 MG/1
1.5 TABLET ORAL NIGHTLY
Status: DISCONTINUED | OUTPATIENT
Start: 2024-08-05 | End: 2024-08-07

## 2024-08-05 RX ORDER — METOCLOPRAMIDE HYDROCHLORIDE 5 MG/ML
5 INJECTION INTRAMUSCULAR; INTRAVENOUS EVERY 8 HOURS PRN
Status: DISCONTINUED | OUTPATIENT
Start: 2024-08-05 | End: 2024-08-07

## 2024-08-05 RX ORDER — WARFARIN SODIUM 1 MG/1
1.5 TABLET ORAL NIGHTLY
COMMUNITY

## 2024-08-05 RX ORDER — ONDANSETRON 2 MG/ML
4 INJECTION INTRAMUSCULAR; INTRAVENOUS EVERY 6 HOURS PRN
Status: DISCONTINUED | OUTPATIENT
Start: 2024-08-05 | End: 2024-08-07

## 2024-08-05 RX ORDER — WARFARIN SODIUM 2.5 MG/1
2.5 TABLET ORAL NIGHTLY
COMMUNITY

## 2024-08-05 RX ORDER — SILDENAFIL CITRATE 20 MG/1
10 TABLET ORAL 3 TIMES DAILY
Status: DISCONTINUED | OUTPATIENT
Start: 2024-08-05 | End: 2024-08-07

## 2024-08-05 RX ORDER — ROSUVASTATIN CALCIUM 5 MG/1
5 TABLET, COATED ORAL NIGHTLY
Status: DISCONTINUED | OUTPATIENT
Start: 2024-08-05 | End: 2024-08-07

## 2024-08-05 RX ORDER — HYDROCODONE BITARTRATE AND ACETAMINOPHEN 5; 325 MG/1; MG/1
1 TABLET ORAL EVERY 8 HOURS PRN
Status: DISCONTINUED | OUTPATIENT
Start: 2024-08-05 | End: 2024-08-07

## 2024-08-05 NOTE — TELEPHONE ENCOUNTER
Rn spoke to Mary Beth from [facility] and informed that Dr Narvaez  agreed with the HH orders .(PT,OT,RN ,AIDE and .)

## 2024-08-05 NOTE — TELEPHONE ENCOUNTER
Patient's daughter calling states requesting Frances to call back, please call. (See telephone encounter from 8/1/24)

## 2024-08-05 NOTE — ED QUICK NOTES
Orders for admission, patient is aware of plan and ready to go upstairs. Any questions, please call ED RN Chrissie at extension [phone].     Patient Covid vaccination status: Fully vaccinated     COVID Test Ordered in ED: SARS-CoV-2/Flu A and B/RSV by PCR (GeneXpert)    COVID Suspicion at Admission: N/A    Running Infusions:  None    Mental Status/LOC at time of transport: A&Ox4, hard of hearing      Other pertinent information:   CIWA score: N/A   NIH score:  N/A

## 2024-08-05 NOTE — H&P
[facility]    PATIENT'S NAME: MANUELA PEARCE S   ATTENDING PHYSICIAN: Cheli Lazo DO   PATIENT ACCOUNT#:   [id number]    LOCATION:  [address]  MEDICAL RECORD #:   [id number]       YOB: 1929  ADMISSION DATE:       08/05/2024    HISTORY AND PHYSICAL EXAMINATION    CHIEF COMPLAINT:  Generalized weakness.    HISTORY OF PRESENT ILLNESS:  The patient is a 89-year-old  female who was hospitalized last week with diarrhea and generalized weakness, dehydration.  She was admitted to the hospital and given IV fluids.  Her kidney function improved, and the patient was able to walk a few steps with physical therapy with the help of a walker.  C difficile testing was negative.  She was discharged home.  Today her daughter brought her back for progressive weakness, inability to ambulate on her own.  CBC showed hemoglobin of 9.4 which is at baseline.  B-natriuretic peptide 355.  INR therapeutic at 2.52.  Chemistry showed bicarb 20, BUN and creatinine of 23 and 1.22, GFR of 41.  GeneXpert viral panel was negative.  Chest x-ray showed no acute infiltrates.      PAST MEDICAL HISTORY:  Heart failure, preserved ejection fraction with moderate to severe mitral regurgitation and moderate to severe pulmonary artery hypertension on vasodilators, Uptravi and sildenafil.  She has a history of hypertension, chronic kidney disease stage 3, hyperlipidemia, osteoarthritis, osteoporosis, chronic atrial fibrillation anticoagulated with Coumadin, anemia of chronic kidney disease.    PAST SURGICAL HISTORY:  Left breast lumpectomy for breast cancer plus radiation therapy, cataract procedure, and hysterectomy.      MEDICATIONS:  Please see medication reconciliation list.     ALLERGIES:  No known drug allergies.  She had side effects to ambrisentan.    FAMILY HISTORY:  Mother had valvular heart disease.  Father had prostate cancer.      SOCIAL HISTORY:  No tobacco, alcohol, or drug use.  Currently lives with  her daughter.  Requires assistance in her basic activities of daily living.    REVIEW OF SYSTEMS:  Generalized weakness.  New development of sacral wound.  Also the patient has been complaining about back pain radiating to both lower extremities and difficulty to stand up or ambulate on her own.  Her daughter has been helping her with all her basic functions.  Prior to recent hospitalization the patient was living on her own.  Diarrhea has resolved per the daughter.        PHYSICAL EXAMINATION:    GENERAL:  The patient appears frail.  No acute distress.    VITAL SIGNS:  Temperature 98.3, pulse 53, respiratory rate 17, blood pressure 136/57, pulse ox 96% on room air.  HEENT:  Atraumatic.  Oropharynx clear.  Dry mucous membranes.  Ears and nose normal.  Eyes:  Anicteric sclerae.  NECK:  Supple.  No lymphadenopathy.  Trachea midline.  No significant jugular venous distention.  LUNGS:  Diminished breathing sounds, both lung bases.  No crackles.  No wheezing.  HEART:  Irregular rhythm.  S1 and S2 auscultated.  No murmur.  ABDOMEN:  Soft, nondistended.  No tenderness.  Positive bowel sounds.  EXTREMITIES:  No significant peripheral edema, clubbing, or cyanosis.  Sacral area stage 2 pressure ulcer.  NEUROLOGIC:  Motor and sensory intact.       ASSESSMENT:    1.   Severe pulmonary artery hypertension.  2.   Musculoskeletal deconditioning.  3.   Sacral decubitus ulcer, stage 2.  4.   Back pain with underlying lumbar radiculopathy.    PLAN:  The patient will be admitted to general medical floor.  Fall precautions.  Physical and occupational therapy.   to evaluate the patient for possible subacute rehab placement.  Wound Service consult.  Further recommendations to follow.      Dictated By Jeane Negrete MD  d: 08/05/2024 17:01:30  t: 08/05/2024 17:50:55  Job [id number]  FB/    cc: Chlei Lazo DO

## 2024-08-05 NOTE — ED INITIAL ASSESSMENT (HPI)
Per daughter, patient complains of exertional shortness of breath. Also reporting pain related to fall last week. Pain currently located to lower back and sacrum, radiates to legs

## 2024-08-05 NOTE — CONSULTS
Cardiology Consult Note    Juli Ramirez Patient Status:  Emergency    1929 MRN [id number]   Location [facility] EMERGENCY DEPARTMENT Attending Cheli Lazo,    Hosp Day # 0 PCP MD PRO Carnes.  Juli Ramirez is a 89 year old female with a history of atrial fibrillation, breast cancer, pulm hypertension, CKD recently discharged from the hospital after being found down at home and treated for cystitis, RACHELLE and generalized weakness now presenting with worsening shortness of breath.  Patient's daughter provides some of the history, patient is followed by Dr. Higuera at [facility].  Last visit was 2024-patient has history of right heart catheterization with negative vasodilator challenge previously however she is on sildenafil and Uptravi and previously documented intolerance to the letaris.  Per last cardiology note, echo  showed EF 60%, normal RV function, severe tricuspid valve regurgitation, moderate to severe mitral valve regurgitation, RVSP 119 mmHg.  Last right heart catheterization in  showed PA pressure 109/28 mmHg, PVR 67 Wood units    Triage vitals pertinent for blood pressure 149/58 mmHg, SpO2 96%, respiratory rate 18, pulse 62.  Lab work pertinent for creatinine 1.22, INR 2.52, hemoglobin 9.4.       Prior cardiac workup  Echocardiogram 2021.   Left ventricle: The cavity size was normal. Wall thickness was     normal. Systolic function was normal. The estimated ejection     fraction was 65-70%, by visual assessment. Wall motion was     normal; there were no regional wall motion abnormalities. The     study is not technically sufficient to allow evaluation of LV     diastolic function.  2. Mitral valve: Moderate to severe regurgitation.  3. Left atrium: The atrium was severely dilated.  4. Right ventricle: The cavity size was normal. Systolic function     was normal. RV systolic pressure (S, est): 59mm Hg.  5. Right atrium: The atrium was  dilated.  6. Tricuspid valve: Moderate-severe regurgitation.  7. Pulmonary arteries: Systolic pressure was moderately increased.        --------------------------------------------------------------------------------------------------------------------------------  ROS 10 systems reviewed, pertinent findings above.  ROS    History:  Past Medical History:    Atrial fibrillation (HCC)    Per NG: cardioversion attempt    Breast cancer (HCC)    Per NG: lumpectomy and radiation 2011 LT    Breast lump    Per NG: excised--benign LT    Breast lump    Per NG : excised -- benign RT    Conjunctivitis    Per NG: right eye    Coronary atherosclerosis    Dermatochalasis of both eyelids    High blood pressure    High cholesterol    Mitral regurgitation    Ovarian cyst    Per NG: AH/BSO    Screening    Per NG:dexascan '02, '06 normal    Tonsillitis    Per NG: T&A     Past Surgical History:   Procedure Laterality Date    Cataract extraction w/  intraocular lens implant Right 2012    [facility]    Cataract extraction w/  intraocular lens implant Left 01/10/2006    [facility]    Hysterectomy      age 68    Lumpectomy left  2011    Judy localization wire 1 site right (cpt=19281)            Pregnancy - 36 week 3 lb(s) 15 oz Female       ,    pregnacy           Per NG: Pregnacy 40 week 7 lb(s) 14 oz Male          Per NG:  -breech  OUTCOME IS 40 week 9 lb(s) Female    Radiation left  2011    Yag capsulotomy - os - left eye Left 2007    [facility]     Family History   Problem Relation Age of Onset    Prostate Cancer Father     Heart Disorder Mother         Per NG: Aortic Regurgitation    Prostate Cancer Brother     Breast Cancer Paternal Aunt         80    Breast Cancer Self 82    Diabetes Neg     Glaucoma Neg     Macular degeneration Neg       reports that she has never smoked. She has never used smokeless tobacco. She reports that she does not currently use alcohol. She reports that she does  not use drugs.    Objective:   Temp: 98.3 °F (36.8 °C)  Pulse: 49  Resp: 13  BP: 141/55    Intake/Output:   No intake or output data in the 24 hours ending 08/05/24 1748    Physical Exam:     General: Alert and oriented times  HEENT: Normocephalic, anicteric sclera, neck supple.  Neck: No JVD, carotids 2+, no bruits.  Cardiac: Regular rate and rhythm. S1, S2 normal. No murmur, pericardial rub, S3.  Lungs: Clear without wheezes, rales, rhonchi or dullness.  Normal excursions and effort.  Abdomen: Soft, non-tender. BS-present.  Extremities: Without clubbing, cyanosis or edema.  Peripheral pulses are 2+.  Neurologic: Non-focal  Skin: Warm and dry.       Assessment:    Shortness of breath  Severe pulm hypertension  Chronic atrial fibrillation  Chronic anticoagulation  History of breast cancer  CKD  Chronic anemia      Plan:  89-year-old female with above history presenting with worsening shortness of breath.  Patient with no evidence of volume overload on examination.  She does have fairly severe pulm hypertension and is on dual therapy with sildenafil and Uptravi  Avoid diuretics at this time  Check echocardiogram to assess RV function and pulm atrial pressures    Thank you for allowing me to take part in the care of Juli Ramirez. Please call with any questions of concerns.      Level of care: C5    Dr. Amalia Jesus,   August 05, 2024  5:48 PM

## 2024-08-05 NOTE — TELEPHONE ENCOUNTER
Rn spoke to patient daughter Lizbet reported patient is still in a lot of  pain ,medication is helping for a short while ,can hardly moved and need a lot of assistance advised can bring patient back to emergency and verbalized understanding Dr Narvaez made aware.

## 2024-08-05 NOTE — ED PROVIDER NOTES
[place] Emergency Department Note  Patient: Juli Ramirez Age: 89 year old Sex: female      MRN: [id number]  : 1929    Patient Seen in: [facility] Emergency Department    History     Chief Complaint   Patient presents with    Difficulty Breathing     Stated Complaint: pain    History obtained from: patient, daughter cartside     This is a 89-year-old history of A-fib currently on Coumadin, mitral regurg, hypertension, hyperlipidemia, CAD, pulmonary hypertension, CKD, presents to the ER with her daughter due to persistent shortness of breath, generalized weakness, difficulty ambulating, and concerns for her safety at home.  Daughter provides majority of history.  She states the patient had been admitted here about a week ago after she had been found on the ground and had been admitted after concern for a fall.  At that time she was treated for urinary tract infection and completed a course of antibiotics for E. coli.  Daughter states patient was seen by physical therapy and ultimately cleared to go home but daughter is very concerned that the patient is in fact not stable at home and she is having a very difficult time caring for her.  She states that the patient gets severely short of breath with any minimal movement, position changes, and cannot really get around at all at home.  Daughter is taking care of her primarily and is having a hard time doing this.  They are supposed to get physical therapy once or twice a week but daughter feels the patient is too weak and the patient agrees she feels incredibly weak at home.  Currently, patient mostly complains of pain near a wound on her sacrum which was seen on her recent admission.  She otherwise has not had fever, cough, vomiting, diarrhea, bloody stool, tarry stool, abdominal pain, dysuria, chest pain.  She denies numbness weakness or tingling in her extremities.  She denies leg swelling or inability to lay flat at night.  Patient's daughter does  note positive sick contact at home with what seems like a viral and diarrheal illness.  Patient does note she has not had a bowel movement in 4 days and was recently started on Norco for pain near her sacrum by her primary doctor as she was not discharged on any pain medicines from the hospital recently.  She has not missed any doses of her Coumadin.  She has not had any recurrent falls.      Review of Systems:  Review of Systems  Positive for stated complaint: pain. Constitutional and vital signs reviewed. All other systems reviewed and negative except as noted above.    Patient History:  Past Medical History:    Atrial fibrillation (HCC)    Per NG: cardioversion attempt    Breast cancer (HCC)    Per NG: lumpectomy and radiation 2011 LT    Breast lump    Per NG: excised--benign LT    Breast lump    Per NG : excised -- benign RT    Conjunctivitis    Per NG: right eye    Coronary atherosclerosis    Dermatochalasis of both eyelids    High blood pressure    High cholesterol    Mitral regurgitation    Ovarian cyst    Per NG: AH/BSO    Screening    Per NG:dexascan '02, '06 normal    Tonsillitis    Per NG: T&A       Past Surgical History:   Procedure Laterality Date    Cataract extraction w/  intraocular lens implant Right 2012    [facility]    Cataract extraction w/  intraocular lens implant Left 01/10/2006    [facility]    Hysterectomy      age 68    Lumpectomy left  2011    Judy localization wire 1 site right (cpt=19281)            Pregnancy - 36 week 3 lb(s) 15 oz Female       195,195    pregnacy      195     Per NG: Pregnacy 40 week 7 lb(s) 14 oz Male          Per NG:  -breech  OUTCOME IS 40 week 9 lb(s) Female    Radiation left  2011    Yag capsulotomy - os - left eye Left 2007    [facility]        Family History   Problem Relation Age of Onset    Prostate Cancer Father     Heart Disorder Mother         Per NG: Aortic Regurgitation    Prostate Cancer Brother     Breast Cancer  Paternal Aunt         80    Breast Cancer Self 82    Diabetes Neg     Glaucoma Neg     Macular degeneration Neg        Specific Social Determinants of Health:   Social History     Socioeconomic History    Marital status:    Tobacco Use    Smoking status: Never    Smokeless tobacco: Never   Substance and Sexual Activity    Alcohol use: Not Currently    Drug use: No   Other Topics Concern    Caffeine Concern Yes     Comment: per NG: Coffee, 2 cups     Social Determinants of Health     Financial Resource Strain: Low Risk  (8/1/2024)    Financial Resource Strain     Med Affordability: No   Food Insecurity: No Food Insecurity (7/27/2024)    Food Insecurity     Food Insecurity: Never true   Transportation Needs: No Transportation Needs (7/27/2024)    Transportation Needs     Lack of Transportation: No   Physical Activity: High Risk (3/12/2024)    Received from [facility] [employer], [facility] [employer]    Exercise Vital Sign     On average, how many days per week do you engage in moderate to strenuous exercise (like a brisk walk)?: 0 days     On average, how many minutes do you engage in exercise at this level?: 0 min   Housing Stability: Low Risk  (7/27/2024)    Housing Stability     Housing Instability: No           PSFH elements reviewed from today and agreed except as otherwise stated in HPI.    Physical Exam     ED Triage Vitals [08/05/24 1440]   /58   Pulse 62   Resp 18   Temp 98.3 °F (36.8 °C)   Temp src Temporal   SpO2 96 %   O2 Device None (Room air)       Current:/67 (BP Location: Right arm)   Pulse 62   Temp 97.5 °F (36.4 °C) (Oral)   Resp 18   Wt 51.1 kg   SpO2 96%   BMI 18.74 kg/m²         Physical Exam  Vitals and nursing note reviewed.   Constitutional:       General: She is not in acute distress.     Appearance: She is not ill-appearing.   HENT:      Head: Normocephalic and atraumatic.      Right Ear: External ear normal.      Left Ear: External ear normal.      Nose: Nose  normal.      Mouth/Throat:      Mouth: Mucous membranes are moist.   Eyes:      Conjunctiva/sclera: Conjunctivae normal.   Neck:      Vascular: No JVD.   Cardiovascular:      Rate and Rhythm: Normal rate and regular rhythm.      Heart sounds: No murmur heard.  Pulmonary:      Effort: Pulmonary effort is normal. No respiratory distress.      Breath sounds: No wheezing, rhonchi or rales.      Comments: Tachpnea with position changes but at rest no resp distress able to speak in full sentences   Abdominal:      General: There is no distension.      Palpations: Abdomen is soft.      Tenderness: There is no abdominal tenderness. There is no guarding or rebound.   Musculoskeletal:         General: No deformity.      Right lower leg: No tenderness. Edema present.      Left lower leg: No tenderness. Edema present.      Comments: Trace edema bilat distal lower extremities. Scattered ecchymosis to bilateral distal lower legs. No midline c/t/l spine ttp. No stepoff or deformity.    Skin:     General: Skin is warm and dry.      Capillary Refill: Capillary refill takes less than 2 seconds.   Neurological:      General: No focal deficit present.      Mental Status: She is alert and oriented to person, place, and time.      Cranial Nerves: No cranial nerve deficit.      Comments: Strength 5/5 bilat UE and LE prox and distally with SILT bilat UE and LE prox and distally.          ED Course   Labs:   Labs Reviewed   BASIC METABOLIC PANEL (8) - Abnormal; Notable for the following components:       Result Value    Glucose 105 (*)     CO2 20.0 (*)     BUN 33 (*)     Creatinine 1.22 (*)     BUN/CREA Ratio 27.0 (*)     Calcium, Total 8.5 (*)     eGFR-Cr 41 (*)     All other components within normal limits   HEPATIC FUNCTION PANEL (7) - Abnormal; Notable for the following components:    ALT <7 (*)     All other components within normal limits   BNP (B TYPE NATRIURETIC PEPTIDE) - Abnormal; Notable for the following components:    Beta  Natriuretic Peptide 355 (*)     All other components within normal limits   PROTHROMBIN TIME (PT) - Abnormal; Notable for the following components:    PT 28.7 (*)     INR 2.52 (*)     All other components within normal limits   URINALYSIS WITH CULTURE REFLEX - Abnormal; Notable for the following components:    Blood Urine 1+ (*)     Protein Urine Trace (*)     Leukocyte Esterase Urine 25 (*)     RBC Urine 3-5 (*)     Bacteria Urine Rare (*)     Squamous Epi. Cells Few (*)     All other components within normal limits   CBC W/ DIFFERENTIAL - Abnormal; Notable for the following components:    RBC 2.99 (*)     HGB 9.4 (*)     HCT 27.9 (*)     RDW-SD 54.9 (*)     RDW 15.9 (*)     All other components within normal limits   TROPONIN I HIGH SENSITIVITY - Normal   SARS-COV-2/FLU A AND B/RSV BY PCR (GENEXPERT) - Normal    Narrative:     This test is intended for the qualitative detection and differentiation of SARS-CoV-2, influenza A, influenza B, and respiratory syncytial virus (RSV) viral RNA in nasopharyngeal or nares swabs from individuals suspected of respiratory viral infection consistent with COVID-19 by their healthcare provider. Signs and symptoms of respiratory viral infection due to SARS-CoV-2, influenza, and RSV can be similar.    Test performed using the Xpert Xpress SARS-CoV-2/FLU/RSV (real time RT-PCR)  assay on the GeneXpert instrument, [employer], [place] [address].   This test is being used under the Food and Drug Administration's Emergency Use Authorization.    The authorized Fact Sheet for Healthcare Providers for this assay is available upon request from the laboratory.   CBC WITH DIFFERENTIAL WITH PLATELET    Narrative:     The following orders were created for panel order CBC With Differential With Platelet.  Procedure                               Abnormality         Status                     ---------                               -----------         ------                     CBC W/  DIFFERENTIAL[[id number]]          Abnormal            Final result                 Please view results for these tests on the individual orders.   BASIC METABOLIC PANEL (8)   CBC, PLATELET; NO DIFFERENTIAL   PROTHROMBIN TIME (PT)   URINE CULTURE, ROUTINE     Radiology findings:  I personally reviewed the images.   XR CHEST AP PORTABLE  (CPT=71045)    Result Date: 8/5/2024  CONCLUSION:   Moderate interstitial edema.  elm-remote  Dictated by (CST): Drew Babb MD on 8/05/2024 at 5:03 PM     Finalized by (CST): Drew Babb MD on 8/05/2024 at 5:05 PM           EKG as interpreted by me: Atrial fibrillation with slow ventricular response rate 59 beats minute, incomplete right bundle rash block, T wave inversion noted in V3 without reciprocal changes    Similar to prior from October 2022    Cardiac Monitor: Interpreted by me.   Pulse Readings from Last 1 Encounters:   08/05/24 62   , sinus,     External non-ED records reviewed independently by me: Discharge summary from 7/29/2024, patient had been admitted at that time for generalized weakness, found on the ground, found to have RACHELLE on CKD, with rhabdomyolysis, also found to have UTI and given antibiotics, ultimately discharged home.    MDM   This patient presents with persistent shortness of breath on minimal exertion, difficulty with functioning at home per daughter, and concern for persistent dyspnea and deconditioning     Differential diagnoses considered includes, but is not limited to:   Viral syndrome  Pneumonia  Chf exacerbation  ACS  Uti  Electrolyte abnormality  RACHELLE   Lower suspicion PE given AC on coumadin, lack of hypoxia or tachycardia     HEART score moderate: 5 (+2 age, +2 risk factor, +1 ecg)     Will obtain the following tests: cbc, cmp, bnp, inr, cov swab, cxr, ecg   Will administer the following medications/therapies: n/a for now     Please see ED course for my independent review of these tests/imaging results.    Chronic conditions affecting care:  afib, htn, hld, pulm HTN, CAD       Anticipate admisison       ED Course as of 08/06/24 0024  ------------------------------------------------------------  Time: 08/05 1629  Value: CREATININE(!): 1.22  Comment: Baseline. Cov flu rsv negative. Trp normal.   ------------------------------------------------------------  Time: 08/05 1629  Value: BETA NATRIURETIC PEPTIDE(!): 355  Comment: Uptrend from 2 months ago   ------------------------------------------------------------  Time: 08/05 1642  Comment: Hemoglobin has slightly down trended though platelets and white blood cell count are normal.  Case discussed with hospitalist accepts for observation.  Case discussed with cardiology team accepts consult.  ------------------------------------------------------------  Time: 08/05 1720  Value: XR CHEST AP PORTABLE  (CPT=71045)  Comment:   CONCLUSION:     Moderate interstitial edema.       Case d/w hospitalist Dr. Negrete accepts admission sts pt likely does need rehab             Procedures:  Procedures      Disposition and Plan     Clinical Impression:  1. Shortness of breath on exertion    2. Skin ulcer of sacrum, unspecified ulcer stage (HCC)        Disposition:  Admit    Hospital Problems       Present on Admission  Date Reviewed: 7/2/2024            ICD-10-CM Noted POA    * (Principal) Shortness of breath on exertion R06.02 8/5/2024 Unknown    Weakness R53.1 8/5/2024 Unknown        This note may have been created using voice dictation technology and may include inadvertent errors.      Cheli Lazo DO  Attending Physician   Emergency Medicine

## 2024-08-06 ENCOUNTER — APPOINTMENT (OUTPATIENT)
Dept: CV DIAGNOSTICS | Facility: HOSPITAL | Age: 89
End: 2024-08-06
Attending: STUDENT IN AN ORGANIZED HEALTH CARE EDUCATION/TRAINING PROGRAM
Payer: MEDICARE

## 2024-08-06 LAB
ANION GAP SERPL CALC-SCNC: 8 MMOL/L (ref 0–18)
BUN BLD-MCNC: 34 MG/DL (ref 9–23)
BUN/CREAT SERPL: 32.7 (ref 10–20)
CALCIUM BLD-MCNC: 8.7 MG/DL (ref 8.7–10.4)
CHLORIDE SERPL-SCNC: 112 MMOL/L (ref 98–112)
CO2 SERPL-SCNC: 22 MMOL/L (ref 21–32)
CREAT BLD-MCNC: 1.04 MG/DL
DEPRECATED RDW RBC AUTO: 54 FL (ref 35.1–46.3)
EGFRCR SERPLBLD CKD-EPI 2021: 49 ML/MIN/1.73M2 (ref 60–?)
ERYTHROCYTE [DISTWIDTH] IN BLOOD BY AUTOMATED COUNT: 15.9 % (ref 11–15)
GLUCOSE BLD-MCNC: 85 MG/DL (ref 70–99)
HCT VFR BLD AUTO: 28.7 %
HGB BLD-MCNC: 9.4 G/DL
INR BLD: 2.82 (ref 0.8–1.2)
MCH RBC QN AUTO: 30.3 PG (ref 26–34)
MCHC RBC AUTO-ENTMCNC: 32.8 G/DL (ref 31–37)
MCV RBC AUTO: 92.6 FL
OSMOLALITY SERPL CALC.SUM OF ELEC: 301 MOSM/KG (ref 275–295)
PLATELET # BLD AUTO: 254 10(3)UL (ref 150–450)
POTASSIUM SERPL-SCNC: 4.2 MMOL/L (ref 3.5–5.1)
PROTHROMBIN TIME: 31.4 SECONDS (ref 11.6–14.8)
RBC # BLD AUTO: 3.1 X10(6)UL
SODIUM SERPL-SCNC: 142 MMOL/L (ref 136–145)
WBC # BLD AUTO: 7.1 X10(3) UL (ref 4–11)

## 2024-08-06 PROCEDURE — 99233 SBSQ HOSP IP/OBS HIGH 50: CPT | Performed by: STUDENT IN AN ORGANIZED HEALTH CARE EDUCATION/TRAINING PROGRAM

## 2024-08-06 PROCEDURE — 93306 TTE W/DOPPLER COMPLETE: CPT | Performed by: STUDENT IN AN ORGANIZED HEALTH CARE EDUCATION/TRAINING PROGRAM

## 2024-08-06 RX ORDER — FUROSEMIDE 10 MG/ML
20 INJECTION INTRAMUSCULAR; INTRAVENOUS 3 TIMES DAILY
Status: DISCONTINUED | OUTPATIENT
Start: 2024-08-06 | End: 2024-08-07

## 2024-08-06 NOTE — CM/SW NOTE
08/06/24 1100   CM/SW Referral Data   Referral Source Physician   Reason for Referral Discharge planning  (\"subacute rehab\")   Informant Patient;Daughter  (dtr Lizbet)   Readmission Assessment   Factors that patient feels contributed to this readmission Acute/Chronic Clinical Presentation   Pt's living situation prior to admission? Home with family  (staying w/ dtr the past week)   Pt's level of independence at discharge? Some assist (mod)   Was full assessment completed by SW/CM on prior admission? Yes   Was the recommended discharge plan achieved? Yes   Was pt. discharged w/out services? No   Medical Hx   Does patient have an established PCP? Yes  (Robert Narvaez)   Patient Info   Patient's Current Mental Status at Time of Assessment Alert;Oriented;Memory Impairments;Confused or unable to complete assessment  (slightly confused - but understood a lot of SW's questions)   Patient's Home Environment House   Patient lives with Daughter  (only the past week - usually home alone)   Patient Status Prior to Admission   Independent with ADLs and Mobility No   Pt. requires assistance with Housework;Driving;Meals;Bathing;Ambulating   Services in place prior to admission Home Health Care;DME/Supplies at home   Home Health Provider Info [facility]  (RN/PT/HHA - just added F2F to include SW/LSW)   Type of DME/Supplies Standard Walker;Rollator Walker;Shower Chair;Grab Bars   Discharge Needs   Anticipated D/C needs Home health care;Caregiver services;Subacute rehab;To be determined   Services Requested   Submitted to [facility] Yes   PASRR Level 1 Submitted Yes   Choice of Post-Acute Provider   Informed patient of right to choose their preferred provider Yes   List of appropriate post-acute services provided to patient/family with quality data   (very tentative ALLA referral pending - needs f/up)       11:15AM  MICHAEL received MDO for DC Planning: \"subacute rehab.\"    Per chart, pt is OBSERVATION status. Currently, pt would NOT  qualify for ALLA under Medicare. SW reviewed pt's chart. Pt was Dc'd from [facility] on 7/29/24. During that Admission, pt was only here for 2 midnights (7/27 to 7/29) - this is also NOT a qualifying stay for ALLA.    SW met w/ pt at bedside for general assessment and information. SW presented to room w/ PCT after both noticed pt yelling out as if someone was in the room w/ her. When arrived, no one was there w/ pt.     SW inquired about pt's needs/concerns. Pt kept stating \"this company is not a great fit for me.\" SW requested pt explain further. Pt continued on about \"this company\" and then stated it was set up by [facility] for \"physical help.\" SW was then able to confirm, pt was discussing her HH via [facility]. Pt then stating she needs to go somewhere for rehab and cannot go home.    Pt stating she was 45 yrs old after multiple f/ups to verify by MICHAEL. Pt then stating her dtr was in her 70's.     Pt able to confirm she has a walker and a walker w/ a seat. Pt also stating she has grab bars in her home.    RN Yoon from wound care then presented.    SW f/up'd w/ a phone call to pt's dtr Lizbet. Verified pt has been staying w/ her dtr the past week at: [address] [place] [address].    Pt's dtr confirmed pt has a walker at her house and a shower bench. Pt's dtr stating she has [facility] RN, PT, and HH Aide. Pt's dtr confirmed they were in the process of getting a SW on board for additional help.    Pt's dtr is very upset about how pt's treatment and planning was handled last admission. She is adamant  that pt be Dc'd to [facility].    MICHAEL explained that pt is currently Observation status and explained pt would not qualify from her last admission. Pt's dtr is not happy w/ this.     MICHAEL informed her that UR will review chart to see if pt qualifies for inpatient status. MICHAEL also confirmed MD will be asked to update her/Lizbet of pt's needs/condition. SW informed pt's dtr that status may change to inpatient BUT it also  might stay observation.    Pts' dtr agreed to take process one step at a time.    Per chart, PT eval completed and Anticipated therapy need: Gradual Rehabilitative Therapy vs Home w/ Home Health.    Very tentative ALLA referrals sent. [facility].    F2F entered for RN, PT, HH Aide, and MICHAEL/OMA - Mary Beth w/ [facility] notified.    *Due to nature and course of conversation, SW did not discuss private duty CG's w/ pt's dtr at this time. If pt does not qualify for inpatient status of 3 midnights for ALLA, SW to discuss then and provide resources.    02:40PM  Pt's dtr waiting outside SW's office to speak w/ SW.  Requested pt's dtr wait by pt's room to be seen.    SW met w/ pt's dtr outside pt's room. Pt's dtr became tearful. SW brought Lizbet to RN rounds room.     Pt's dtr brought up past admission. Again, SW explained that can not be changed. SW confirmed this SW was not involved and can not speak to the circumstance/situation then.    SW discussed that this Admission, pt is still Observation status. Explained that pt must meet certain criteria to be Admitted Inpatient under Medicare.     MICHAEL began discussing options for Caregivers or paying for ALLA/NH. Pt's dtr immediately stating No to CG's at her home. Per Lizbet, her  is sick and cannot have someone coming in/out. SW expressed understanding and inquired about a CG at pt's home. Per dtr, \"no she wouldn't want that.\"    MICHAEL then informed Lizbet, that pt also stated earlier wanting to go home. MICHAEL asked dtr to acknowledge if pt would do well at a facility or not - if pt would want to be there etc.    Pt's dtr then stating she will talk to pt about it and see if she is OK w/ a CG coming to her (pt's home).     MICHAEL informed her that ALLA referral was sent earlier but is pending facility responses w/ OOP costs.     Information for [facility] and private duty CG's provided to pt's dtr.    Per her request, MICHAEL spoke to Rehab Dept and  asked that they see pt tomorrow 8/7 around 11AM when pt's dtr is there to see how she does. Per Lizbet, pt's legs buckle out of nowhere and she does not feel one session at the hospital is enough to determine what pt needs.    SW again informed pt's dtr that even if PT/OT confirm pt is appropriate for ALLA, pt does NOT have inpatient status and 3 midnight qualifications.    Pt's dtr agreed to leave conversation w/ resources provided and f/up planned w/ PT/OT tomorrow AM.    SW to provide NH list w/ OOP costs tomorrow AM as well.    F/up message sent to UR RICARDO LOPEZ to review on whether pt qualifies for inpatient status.    UPDATE: Per UR KARINE Sheridan - pt does not meet inpatient criteria at this time.        PLAN: TBD: ALLA/NH under private pay vs Home w/ [facility] & CG's - pending NH list/choice (w/ OOP rates), clinical course        SW/CM to remain available for support and/or discharge planning.         Katrina, MSW, LSW [phone]

## 2024-08-06 NOTE — PROGRESS NOTES
Cardiology Progress Note    Juli Ramirez Patient Status:  Observation    1929 MRN [id number]   Location [facility] 3W/SW Attending Randi Molina MD   Hosp Day # 0 PCP Robert Narvaez MD     Interval Note:  Pt seen and examined  No new complaints today - she has not been ambulating today  Echo scheduled for this afternoon      --------------------------------------------------------------------------------------------------------------------------------  ROS 12 systems reviewed, pertinent findings above.  ROS    History:  Past Medical History:    Atrial fibrillation (HCC)    Per NG: cardioversion attempt    Breast cancer (HCC)    Per NG: lumpectomy and radiation 2011 LT    Breast lump    Per NG: excised--benign LT    Breast lump    Per NG : excised -- benign RT    Conjunctivitis    Per NG: right eye    Coronary atherosclerosis    Dermatochalasis of both eyelids    High blood pressure    High cholesterol    Mitral regurgitation    Ovarian cyst    Per NG: AH/BSO    Screening    Per NG:dexascan '02, '06 normal    Tonsillitis    Per NG: T&A     Past Surgical History:   Procedure Laterality Date    Cataract extraction w/  intraocular lens implant Right 2012    [facility]    Cataract extraction w/  intraocular lens implant Left 01/10/2006    [facility]    Hysterectomy      age 68    Lumpectomy left  2011    Judy localization wire 1 site right (cpt=19281)        1950    Pregnancy - 36 week 3 lb(s) 15 oz Female       ,    pregnacy           Per NG: Pregnacy 40 week 7 lb(s) 14 oz Male          Per NG:  -breech  OUTCOME IS 40 week 9 lb(s) Female    Radiation left  2011    Yag capsulotomy - os - left eye Left 2007    [facility]     Family History   Problem Relation Age of Onset    Prostate Cancer Father     Heart Disorder Mother         Per NG: Aortic Regurgitation    Prostate Cancer Brother     Breast Cancer Paternal Aunt         80    Breast Cancer Self 82     Diabetes Neg     Glaucoma Neg     Macular degeneration Neg       reports that she has never smoked. She has never used smokeless tobacco. She reports that she does not currently use alcohol. She reports that she does not use drugs.    Objective:   Temp: 98.2 °F (36.8 °C)  Pulse: 64  Resp: 18  BP: 155/78    Intake/Output:     Intake/Output Summary (Last 24 hours) at 8/6/2024 1339  Last data filed at 8/6/2024 0900  Gross per 24 hour   Intake 690 ml   Output 150 ml   Net 540 ml       Physical Exam:    General: AOx3  HEENT: Normocephalic, anicteric sclera, neck supple.  Neck: No JVD, carotids 2+, no bruits.  Cardiac: Regular rate and rhythm. S1, S2 normal. No murmur, pericardial rub, S3.  Lungs: Clear without wheezes, rales, rhonchi or dullness.  Normal excursions and effort.  Abdomen: Soft, non-tender. BS-present.  Extremities: Without clubbing, cyanosis or edema.  Peripheral pulses are 2+.  Neurologic: Non-focal  Skin: Warm and dry.       Assessment:    Shortness of breath  Severe pulm hypertension  Chronic atrial fibrillation  Chronic anticoagulation  History of breast cancer  CKD  Chronic anemia        Plan:  - Cont pulmonary vasodilators  - Follow up on echo to assess RV function and PA pressures  - If any significant changes then will touch base with patients primary cardiologist for recommendations on med changes    Thank you for allowing me to take part in the care of Juli Ramirez. Please call with any questions of concerns.    Level of care: L3    Amalia Jesus DO  [facility]   Interventional Cardiac and Vascular Services      Amalia Jesus DO  August 06, 2024  1:39 PM    Addendum:  Echo shows Normal LV function, mild decreased in RV function. Moderate MR/TR and severe elevation in pulmonary pressures with RVSP 77mmHg as well as dilated IVC. Recommend starting diureses with Lasix 20mg IVP Q8 hrs. Cont pulmonary vasodilators revatio and uptavi.    August 06, 2024  4:05 PM

## 2024-08-06 NOTE — PHYSICAL THERAPY NOTE
PHYSICAL THERAPY EVALUATION - INPATIENT     Room Number: 318/318-A  Evaluation Date: 8/6/2024  Type of Evaluation: Initial   Physician Order: PT Eval and Treat    Presenting Problem: SOB and exertion, recent fall and admission     Reason for Therapy: Mobility Dysfunction and Discharge Planning    PHYSICAL THERAPY ASSESSMENT   Patient is a 89 year old female admitted 8/5/2024 for SOB with exertion, recent admission after a fall.  Prior to admission, patient's baseline is independent at home, was staying with dtr who per chart reports inability to assist at pt current level.  Patient is currently functioning below baseline with bed mobility, transfers, gait, and stair negotiation.  Patient is requiring stand-by assist and contact guard assist as a result of the following impairments: decreased functional strength, decreased endurance/aerobic capacity, and medical status.  Physical Therapy will continue to follow for duration of hospitalization.    Patient will benefit from continued skilled PT Services to promote return to prior level of function and safety with continuous assistance and gradual rehabilitative therapy vs HH pending progress, dtr per chart states she cannot assist pt at her current status.     PLAN  PT Treatment Plan: Bed mobility;Body mechanics;Endurance;Energy conservation;Family education;Gait training;Range of motion;Stoop training;Transfer training;Balance training  Rehab Potential : Good  Frequency (Obs): 5x/week    PHYSICAL THERAPY MEDICAL/SOCIAL HISTORY   History related to current admission: SOB and weakness     Problem List  Principal Problem:    Shortness of breath on exertion  Active Problems:    Weakness      HOME SITUATION  Home Situation  Type of Home: House  Home Layout: One level  Lives With: Alone;Daughter (most recently staying with daughter in multilevel home but stays on main level with 3 MIYA, family assists with stairs)  Patient Owned Equipment: Rolling walker;Rollator  Patient  Regularly Uses: Hearing aides     SUBJECTIVE  \"My daughter's  is also sick.\"     PHYSICAL THERAPY EXAMINATION   OBJECTIVE  Precautions: Bed/chair alarm  Fall Risk: High fall risk    WEIGHT BEARING RESTRICTION  Weight Bearing Restriction: None                PAIN ASSESSMENT     Location: sacrum pain unrated       COGNITION  Overall Cognitive Status:  WFL - within functional limits    BALANCE  Static Sitting: Fair +  Dynamic Sitting: Fair  Static Standing: Fair  Dynamic Standing: Fair -    AM-PAC '6-Clicks' INPATIENT SHORT FORM - BASIC MOBILITY  How much difficulty does the patient currently have...  Patient Difficulty: Turning over in bed (including adjusting bedclothes, sheets and blankets)?: A Little   Patient Difficulty: Sitting down on and standing up from a chair with arms (e.g., wheelchair, bedside commode, etc.): A Little   Patient Difficulty: Moving from lying on back to sitting on the side of the bed?: A Little   How much help from another person does the patient currently need...   Help from Another: Moving to and from a bed to a chair (including a wheelchair)?: A Little   Help from Another: Need to walk in hospital room?: A Little   Help from Another: Climbing 3-5 steps with a railing?: A Little     AM-PAC Score:  Raw Score: 18   Approx Degree of Impairment: 46.58%   Standardized Score (AM-PAC Scale): 43.63   CMS Modifier (G-Code): CK    FUNCTIONAL ABILITY STATUS  Functional Mobility/Gait Assessment  Gait Assistance: Contact guard assist (SBA)  Distance (ft): 25' x 2  Assistive Device: Rolling walker  Pattern:  (mild forward trunk)  Sit to Stand: stand-by assist    Exercise/Education Provided:  Bed mobility  Body mechanics  Functional activity tolerated  Gait training  Transfer training    The patient's Approx Degree of Impairment: 46.58% has been calculated based on documentation in the [facility] '6 clicks' Inpatient Basic Mobility Short Form.  Research supports that patients with this level of  impairment may benefit from home with home care.  Final disposition will be made by interdisciplinary medical team.    Patient End of Session: Up in chair;Needs met;Call light within reach;RN aware of session/findings;Alarm set;With  staff    CURRENT GOALS  Goals to be met by:   Patient Goal Patient's self-stated goal is: to go to rehab   Goal #1 Patient is able to demonstrate supine - sit EOB @ level: supervision     Goal #1   Current Status    Goal #2 Patient is able to demonstrate transfers Sit to/from Stand at assistance level: supervision with walker - rolling     Goal #2  Current Status    Goal #3 Patient is able to ambulate 150 feet with assist device: walker - rolling at assistance level: supervision   Goal #3   Current Status    Goal #4 Patient will negotiate 3 stairs/one curb w/ assistive device and supervision   Goal #4   Current Status    Goal #5 Patient to demonstrate independence with home activity/exercise instructions provided to patient in preparation for discharge.   Goal #5   Current Status    Goal #6    Goal #6  Current Status      Patient Evaluation Complexity Level:  History Moderate - 1 or 2 personal factors and/or co-morbidities   Examination of body systems Moderate - addressing a total of 3 or more elements   Clinical Presentation  Moderate - Evolving   Clinical Decision Making  Moderate Complexity     Gait Trainin minutes

## 2024-08-06 NOTE — DISCHARGE INSTRUCTIONS
Resume services with [facility]  [phone]    Cardiology Discharge Instructions  Take torsemide 10mg once daily for three days. Make follow up visit with Dr. Higuera within one week for repeat labs and dose adjustment as needed. Monitor for increased shortness of breath, weight gain, increased swelling. Continue current dosing regiment of warfarin. Recheck INR with anticoagulation clinic. Continue pulmonary hypertension medications as prescribed.     Contact Dr. Higuera with concerns prior to office visit.

## 2024-08-06 NOTE — OCCUPATIONAL THERAPY NOTE
OCCUPATIONAL THERAPY EVALUATION - INPATIENT     Room Number: 318/318-A  Evaluation Date: 8/6/2024  Type of Evaluation: Initial  Presenting Problem: shortness of breath (hx of fall, recent admission)    Physician Order: IP Consult to Occupational Therapy  Reason for Therapy: ADL/IADL Dysfunction and Discharge Planning    OCCUPATIONAL THERAPY ASSESSMENT   Patient is a 89 year old female admitted 8/5/2024 for shortness of breath ( hx of fall, recent admission).  Prior to admission, patient's baseline -- staying with her daughter following last admission, prior to July 2024, pt was home alone and ind; pt walks with her rollator.  Patient is currently functioning near baseline with ADLs and functional mobility.  Patient is requiring CGA-SBA as a result of the following impairments: decreased dynamic standing balance. Occupational Therapy will continue to follow for duration of hospitalization.    Patient will benefit from continued skilled OT Services while in-house. Per pt and EMR -- family is concerned about ability to assist pt at her current functional status. HHOT w/ increased supervision vs GR.     PLAN  OT Treatment Plan: Balance activities;Energy conservation/work simplification techniques;ADL training;Functional transfer training;Endurance training;Equipment eval/education;Compensatory technique education  OT Device Recommendations: TBD    OCCUPATIONAL THERAPY MEDICAL/SOCIAL HISTORY   Problem List  Principal Problem:    Shortness of breath on exertion  Active Problems:    Weakness    HOME SITUATION  Type of Home: House  Home Layout: One level  Lives With: Alone; Daughter (most recently staying with daughter in multilevel home but stays on main level with 3 MIYA, family assists with stairs)  Patient Regularly Uses: Hearing aides    SUBJECTIVE  \"My daughter's  needs help, too. It's a lot for my dtr -she helps me and her \"     OCCUPATIONAL THERAPY EXAMINATION    OBJECTIVE  Precautions: Bed/chair  alarm  Fall Risk: High fall risk    PAIN ASSESSMENT  Rating: Unable to rate  Location: bottom  Management Techniques: Activity promotion; Repositioning (has egg crate cushion on chair)    ACTIVITY TOLERANCE  Room air  Denied dizziness    COGNITION  Followed one-step cues  Alert and oriented x 3    RANGE OF MOTION   Upper extremity ROM is within functional limits     STRENGTH ASSESSMENT  Upper extremity strength is within functional limits     COORDINATION  Gross Motor: WFL   Fine Motor: WFL     ACTIVITIES OF DAILY LIVING ASSESSMENT  AM-PAC ‘6-Clicks’ Inpatient Daily Activity Short Form  How much help from another person does the patient currently need…  -   Putting on and taking off regular lower body clothing?: A Little  -   Bathing (including washing, rinsing, drying)?: A Little  -   Toileting, which includes using toilet, bedpan or urinal? : A Little  -   Putting on and taking off regular upper body clothing?: A Little  -   Taking care of personal grooming such as brushing teeth?: A Little  -   Eating meals?: A Little    AM-PAC Score:  Score: 18  Approx Degree of Impairment: 46.65%  Standardized Score (AM-PAC Scale): 38.66  CMS Modifier (G-Code): CK    FUNCTIONAL TRANSFER ASSESSMENT  Sit to Stand: Chair  Chair: Contact Guard Assist  Toilet Transfer: Stand-by Assist    BED MOBILITY  Supine to Sit : Not tested    FUNCTIONAL ADL ASSESSMENT  Grooming Standing: Contact Guard Assist  Toileting Seated: Independent    Skilled Therapy Provided: RN cleared pt for participation in occupational therapy session. Upon arrival, pt was seated in bedside chair and agreeable to activity. No family was present during session. Pt benefited from additional time, verbal cues, RW to maximize participation.    Pt completed toileting routine including cat care and briefs management with overall CGA - SBA , no loss of balance. Pt completed grooming task at the sink with SBA. Transfers from the toilet with SBA.     EDUCATION  PROVIDED  Patient: Role of Occupational Therapy; Plan of Care  Patient's Response to Education: Verbalized Understanding; Returned Demonstration    The patient's Approx Degree of Impairment: 46.65% has been calculated based on documentation in the [facility] '6 clicks' Inpatient Daily Activity Short Form.  Research supports that patients with this level of impairment may benefit from HHOT.  Final disposition will be made by interdisciplinary medical team.     Patient End of Session: Up in chair;Needs met;Call light within reach;RN aware of session/findings;With  staff;Alarm set    OT Goals  Patients self stated goal is: rehab      Patient will complete functional transfer with Mod I  Comment:     Patient will complete LE dressing with Mod I  Comment:     Patient will tolerate standing for 4 minutes in prep for adls with Mod I   Comment:    Patient will complete item retrieval with Mod I   Comment:          Goals  on: 24  Frequency: 3-5x.w    Patient Evaluation Complexity Level:   Occupational Profile/Medical History LOW - Brief history including review of medical or therapy records    Specific performance deficits impacting engagement in ADL/IADL LOW  1 - 3 performance deficits    Client Assessment/Performance Deficits LOW - No comorbidities nor modifications of tasks    Clinical Decision Making LOW - Analysis of occupational profile, problem-focused assessments, limited treatment options    Overall Complexity LOW     OT Session Time: 15 minutes  Self-Care Home Management: 15 minutes

## 2024-08-06 NOTE — PLAN OF CARE
Patient alert and oriented x3/4 forgetful on room air with no pain overnight. Hearing aids broke by patient overnight- RN passed to dayshift and made daughter aware. Call light in reach and no needs noted at this time. Pt/ot to see patient  Problem: PAIN - ADULT  Goal: Verbalizes/displays adequate comfort level or patient's stated pain goal  Description: INTERVENTIONS:  - Encourage pt to monitor pain and request assistance  - Assess pain using appropriate pain scale  - Administer analgesics based on type and severity of pain and evaluate response  - Implement non-pharmacological measures as appropriate and evaluate response  - Consider cultural and social influences on pain and pain management  - Manage/alleviate anxiety  - Utilize distraction and/or relaxation techniques  - Monitor for opioid side effects  - Notify MD/LIP if interventions unsuccessful or patient reports new pain  - Anticipate increased pain with activity and pre-medicate as appropriate  Outcome: Progressing     Problem: SAFETY ADULT - FALL  Goal: Free from fall injury  Description: INTERVENTIONS:  - Assess pt frequently for physical needs  - Identify cognitive and physical deficits and behaviors that affect risk of falls.  - Anderson fall precautions as indicated by assessment.  - Educate pt/family on patient safety including physical limitations  - Instruct pt to call for assistance with activity based on assessment  - Modify environment to reduce risk of injury  - Provide assistive devices as appropriate  - Consider OT/PT consult to assist with strengthening/mobility  - Encourage toileting schedule  Outcome: Progressing     Problem: CARDIOVASCULAR - ADULT  Goal: Maintains optimal cardiac output and hemodynamic stability  Description: INTERVENTIONS:  - Monitor vital signs, rhythm, and trends  - Monitor for bleeding, hypotension and signs of decreased cardiac output  - Evaluate effectiveness of vasoactive medications to optimize hemodynamic  stability  - Monitor arterial and/or venous puncture sites for bleeding and/or hematoma  - Assess quality of pulses, skin color and temperature  - Assess for signs of decreased coronary artery perfusion - ex. Angina  - Evaluate fluid balance, assess for edema, trend weights  Outcome: Progressing  Goal: Absence of cardiac arrhythmias or at baseline  Description: INTERVENTIONS:  - Continuous cardiac monitoring, monitor vital signs, obtain 12 lead EKG if indicated  - Evaluate effectiveness of antiarrhythmic and heart rate control medications as ordered  - Initiate emergency measures for life threatening arrhythmias  - Monitor electrolytes and administer replacement therapy as ordered  Outcome: Progressing     Problem: Patient Centered Care  Goal: Patient preferences are identified and integrated in the patient's plan of care  Description: Interventions:  - What would you like us to know as we care for you?   - Provide timely, complete, and accurate information to patient/family  - Incorporate patient and family knowledge, values, beliefs, and cultural backgrounds into the planning and delivery of care  - Encourage patient/family to participate in care and decision-making at the level they choose  - Honor patient and family perspectives and choices  Outcome: Progressing     Problem: Patient/Family Goals  Goal: Patient/Family Long Term Goal  Description: Patient's Long Term Goal:     Interventions:  - See additional Care Plan goals for specific interventions  Outcome: Progressing

## 2024-08-06 NOTE — PROGRESS NOTES
Progress Note     Juli Ramirez Patient Status:  Observation    1929 MRN [id number]   Location [facility] 3W/SW Attending Randi Molina MD   Hosp Day # 0 PCP Robert Narvaez MD     Subjective:   S: Patient admitted overnight for weakness. Daughter closely involved in care. Discussed with her over the phone discharge planning.     Review of Systems:   10 point ROS completed and was negative, except for pertinent positive and negatives stated in subjective.    Objective:   Vital signs:  Temp:  [97.5 °F (36.4 °C)-98.2 °F (36.8 °C)] 98.2 °F (36.8 °C)  Pulse:  [49-67] 66  Resp:  [13-18] 18  BP: (131-182)/(55-82) 169/61  SpO2:  [95 %-99 %] 98 %    Wt Readings from Last 6 Encounters:   24 114 lb (51.7 kg)   24 108 lb 8 oz (49.2 kg)   24 116 lb (52.6 kg)   23 109 lb (49.4 kg)   23 110 lb (49.9 kg)   23 110 lb (49.9 kg)           Physical Exam:    General: Thin, Elderly female, stated age.  Respiratory: Clear to auscultation bilaterally.   Cardiovascular: RRR  Abdomen: Soft, nontender, nondistended.  Positive bowel sounds. No rebound or guarding.  Neurologic: No focal neurological deficits.   Musculoskeletal: Moves all extremities.  Extremities: No edema.    Results:   Diagnostic Data:      Labs:    Labs Last 24 Hours:   BMP     CBC    Other     Na 142 Cl 112 BUN 34 Glu 85   Hb 9.4   PTT - Procal -   K 4.2 CO2 22.0 Cr 1.04   WBC 7.1 >< .0  INR 2.82 CRP -   Renal Lytes Endo    Hct 28.7   Trop - D dim -   eGFR - Ca 8.7 POC Gluc  -    LFT   pBNP - Lactic -   eGFR AA - PO4 - A1c -   AST - APk - Prot -  LDL -     Mg - TSH -   ALT - T dena - Alb -        COVID-19 Lab Results    COVID-19  Lab Results   Component Value Date    COVID19 Not Detected 2024    COVID19 Not Detected 2020       Pro-Calcitonin  No results for input(s): \"PCT\" in the last 168 hours.    Cardiac  No results for input(s): \"TROP\", \"PBNP\" in the last 168 hours.    Creatinine Kinase  No  results for input(s): \"CK\" in the last 168 hours.    Inflammatory Markers  No results for input(s): \"CRP\", \"KATHIE\", \"LDH\", \"DDIMER\" in the last 168 hours.    Imaging: Imaging data reviewed in Epic.    Medications:    collagenase   Topical BID    furosemide  20 mg Intravenous TID    rosuvastatin  5 mg Oral Nightly    sildenafil  10 mg Oral TID    Selexipag  1,600 mcg Oral BID    warfarin  1.5 mg Oral Nightly       Assessment & Plan:   ASSESSMENT / PLAN:     #Weakness  #Musculoskeletal deconditioning  - hx of recent hospitalization and falls at home  - likely overall debility, along with medical co-morbidities  - PT/OT, fall precautions     #Severe pulmonary artery hypertension  - cardiology consulted  - ECHO 8/6 shows Normal LV function, mild decreased in RV function. Moderate MR/TR and severe elevation in pulmonary pressures with RVSP 77mmHg as well as dilated IVC  - start Lasix 20mg IV Q8 hrs  - cont pulmonary vasodilators revatio and uptavi.     #Sacral decubitus ulcer, stage 2  - wound care    #Back pain with underlying lumbar radiculopathy.       Dispo: Discussed with daughter discharge options.  CM to evaluate NH vs other options        MDM: Moderate  I personally spent time on chart/note review, review of labs/imaging, discussion with patient, physical exam, discussion with staff, consultants, coordinating care, writing progress note, and discussion of plan of care.      Randi Molina MD    Supplementary Documentation:

## 2024-08-06 NOTE — PROGRESS NOTES
24 1029   Wound 24 Coccyx Mid   Date First Assessed/Time First Assessed: 24 1935   Present on Original Admission: Yes  Primary Wound Type: Pressure Injury  Location: Coccyx  Wound Location Orientation: Mid  Wound Description (Comments): unstageable   Wound Image   WOUND CARE NOTE    History:  Past Medical History:    Atrial fibrillation (HCC)    Per NG: cardioversion attempt    Breast cancer (HCC)    Per NG: lumpectomy and radiation 2011 LT    Breast lump    Per NG: excised--benign LT    Breast lump    Per NG : excised -- benign RT    Conjunctivitis    Per NG: right eye    Coronary atherosclerosis    Dermatochalasis of both eyelids    High blood pressure    High cholesterol    Mitral regurgitation    Ovarian cyst    Per NG: AH/BSO    Screening    Per NG:dexascan ,  normal    Tonsillitis    Per NG: T&A     Past Surgical History:   Procedure Laterality Date    Cataract extraction w/  intraocular lens implant Right 2012    RJ    Cataract extraction w/  intraocular lens implant Left 01/10/2006    RJM    Hysterectomy      age 68    Lumpectomy left  2011    Judy localization wire 1 site right (cpt=19281)        1950    Pregnancy - 36 week 3 lb(s) 15 oz Female       195,195    pregnacy           Per NG: Pregnacy 40 week 7 lb(s) 14 oz Male          Per NG:  -breech  OUTCOME IS 40 week 9 lb(s) Female    Radiation left  2011    Yag capsulotomy - os - left eye Left 2007    RJ      Social History     Socioeconomic History    Marital status:    Tobacco Use    Smoking status: Never    Smokeless tobacco: Never   Substance and Sexual Activity    Alcohol use: Not Currently    Drug use: No   Other Topics Concern    Caffeine Concern Yes     Comment: per NG: Coffee, 2 cups     Social Determinants of Health     Financial Resource Strain: Low Risk  (2024)    Financial Resource Strain     Med Affordability: No   Food Insecurity: No Food  Insecurity (8/6/2024)    Food Insecurity     Food Insecurity: Never true   Transportation Needs: No Transportation Needs (8/6/2024)    Transportation Needs     Lack of Transportation: No   Physical Activity: High Risk (3/12/2024)    [facility], [facility]    Exercise Vital Sign     On average, how many days per week do you engage in moderate to strenuous exercise (like a brisk walk)?: 0 days     On average, how many minutes do you engage in exercise at this level?: 0 min   Housing Stability: Low Risk  (8/6/2024)    Housing Stability     Housing Instability: No       Lower Extremity Assessment: BLE 2+ edema    PLAN   Recommendations:  CV currently on consult  PT and OT are on consult  Dietary consult for recommendations for nutrition to optimize wound healing  Turn schedules  Heels elevated using pillows, heel wedge or heel boots to offload heels  To prevent sliding: decrease head of bed and elevate foot of bed as medical condition tolerates  Prompt incontinence care  Moisture barrier for incontinence. May consider Silicone skin barrier to buttocks and coccyx cat wound  Glucose control to help promote wound healing    Wound(s)  Location: Coccyx  Cleansing  Saline   Topical Santyl  Dressings damp saline 2x2 gauze  Secure with dry gauze and sacral foam  Frequency Q 12 hrs     Discharge Recommendations:    is assisting the pt.       OBJECTIVE   MD Consult new Coccyx ulcer      ASSESSMENT   Codey Score:  Codey Scale Score: 17    Chart Reviewed: yes    Wound(s):  The pt. is up in the chair at the bedside, she is Hard of hearing and broke her hearing aide. BLE edema, the pt. is agreeable for the coccyx assessment, per the pt, she has had  it a few weeks. Pt. sits a lot she noted. Nurse had obtained a pink foam cushion, I placed that on her chair. She stood for assessment and held onto the walker. Coccyx injury cleansed and dressed. Pt. sat back down. Call light in  reach. Elevated her legs in the chair. Spoke with the nurse and pct.      Allergies: Aldactone [spironolactone] and Ambrisentan    Labs:   Lab Results   Component Value Date    WBC 7.1 08/06/2024    HGB 9.4 (L) 08/06/2024    HCT 28.7 (L) 08/06/2024    .0 08/06/2024    CREATSERUM 1.04 (H) 08/06/2024    BUN 34 (H) 08/06/2024     08/06/2024    K 4.2 08/06/2024     08/06/2024    CO2 22.0 08/06/2024    GLU 85 08/06/2024    CA 8.7 08/06/2024    ALB 3.5 08/05/2024    ALKPHO 57 08/05/2024    BILT 0.5 08/05/2024    TP 6.5 08/05/2024    AST 18 08/05/2024    ALT <7 (L) 08/05/2024    MG 2.0 07/28/2024    PHOS 3.7 10/27/2023     No results found for: \"PREALBUMIN\"      Time Spent 15 Minutes.    Yoon Resendiz RN  [facility]  [facility]  [phone]     Site Assessment Moist;Painful;Pale;Pink;Fragile;Yellow   Drainage Amount Small   Drainage Description Yellow;Serous   Treatments Cleansed;Saline;Site Care   Dressing 2x2s;Aquacel Foam  (damp saline gauze, sacral foam, will obtain orders)   Dressing Changed Changed   Dressing Status Clean;Dry;Intact;Dressing Changed   Wound Depth (cm)   (adherent slough tissue)   Mattie-wound Assessment Fragile;Pink;Swelling;Painful   Wound Granulation Tissue Pink   Wound Bed Granulation (%) 20 %   Wound Bed Slough (%) 80 %   State of Healing Non-healing   Wound Odor None   Pressure Injury Stage U   Wound Follow Up   Follow up needed Yes

## 2024-08-06 NOTE — PLAN OF CARE
Patient is A&Ox3/4, room air, 1 assist with walker. Med rec completed. Call light within reach and fall precautions in place.     Problem: PAIN - ADULT  Goal: Verbalizes/displays adequate comfort level or patient's stated pain goal  Description: INTERVENTIONS:  - Encourage pt to monitor pain and request assistance  - Assess pain using appropriate pain scale  - Administer analgesics based on type and severity of pain and evaluate response  - Implement non-pharmacological measures as appropriate and evaluate response  - Consider cultural and social influences on pain and pain management  - Manage/alleviate anxiety  - Utilize distraction and/or relaxation techniques  - Monitor for opioid side effects  - Notify MD/LIP if interventions unsuccessful or patient reports new pain  - Anticipate increased pain with activity and pre-medicate as appropriate  Outcome: Progressing     Problem: SAFETY ADULT - FALL  Goal: Free from fall injury  Description: INTERVENTIONS:  - Assess pt frequently for physical needs  - Identify cognitive and physical deficits and behaviors that affect risk of falls.  - [place] fall precautions as indicated by assessment.  - Educate pt/family on patient safety including physical limitations  - Instruct pt to call for assistance with activity based on assessment  - Modify environment to reduce risk of injury  - Provide assistive devices as appropriate  - Consider OT/PT consult to assist with strengthening/mobility  - Encourage toileting schedule  Outcome: Progressing     Problem: DISCHARGE PLANNING  Goal: Discharge to home or other facility with appropriate resources  Description: INTERVENTIONS:  - Identify barriers to discharge w/pt and caregiver  - Include patient/family/discharge partner in discharge planning  - Arrange for needed discharge resources and transportation as appropriate  - Identify discharge learning needs (meds, wound care, etc)  - Arrange for interpreters to assist at discharge as  needed  - Consider post-discharge preferences of patient/family/discharge partner  - Complete POLST form as appropriate  - Assess patient's ability to be responsible for managing their own health  - Refer to Case Management Department for coordinating discharge planning if the patient needs post-hospital services based on physician/LIP order or complex needs related to functional status, cognitive ability or social support system  Outcome: Progressing     Problem: CARDIOVASCULAR - ADULT  Goal: Maintains optimal cardiac output and hemodynamic stability  Description: INTERVENTIONS:  - Monitor vital signs, rhythm, and trends  - Monitor for bleeding, hypotension and signs of decreased cardiac output  - Evaluate effectiveness of vasoactive medications to optimize hemodynamic stability  - Monitor arterial and/or venous puncture sites for bleeding and/or hematoma  - Assess quality of pulses, skin color and temperature  - Assess for signs of decreased coronary artery perfusion - ex. Angina  - Evaluate fluid balance, assess for edema, trend weights  Outcome: Progressing  Goal: Absence of cardiac arrhythmias or at baseline  Description: INTERVENTIONS:  - Continuous cardiac monitoring, monitor vital signs, obtain 12 lead EKG if indicated  - Evaluate effectiveness of antiarrhythmic and heart rate control medications as ordered  - Initiate emergency measures for life threatening arrhythmias  - Monitor electrolytes and administer replacement therapy as ordered  Outcome: Progressing

## 2024-08-07 VITALS
RESPIRATION RATE: 20 BRPM | BODY MASS INDEX: 18 KG/M2 | OXYGEN SATURATION: 95 % | SYSTOLIC BLOOD PRESSURE: 139 MMHG | TEMPERATURE: 98 F | DIASTOLIC BLOOD PRESSURE: 89 MMHG | WEIGHT: 108.81 LBS | HEART RATE: 60 BPM

## 2024-08-07 LAB
ANION GAP SERPL CALC-SCNC: 8 MMOL/L (ref 0–18)
BASOPHILS # BLD AUTO: 0.05 X10(3) UL (ref 0–0.2)
BASOPHILS NFR BLD AUTO: 0.5 %
BUN BLD-MCNC: 29 MG/DL (ref 9–23)
BUN/CREAT SERPL: 27.6 (ref 10–20)
CALCIUM BLD-MCNC: 8.8 MG/DL (ref 8.7–10.4)
CHLORIDE SERPL-SCNC: 111 MMOL/L (ref 98–112)
CO2 SERPL-SCNC: 23 MMOL/L (ref 21–32)
CREAT BLD-MCNC: 1.05 MG/DL
DEPRECATED RDW RBC AUTO: 55.3 FL (ref 35.1–46.3)
EGFRCR SERPLBLD CKD-EPI 2021: 49 ML/MIN/1.73M2 (ref 60–?)
EOSINOPHIL # BLD AUTO: 0.02 X10(3) UL (ref 0–0.7)
EOSINOPHIL NFR BLD AUTO: 0.2 %
ERYTHROCYTE [DISTWIDTH] IN BLOOD BY AUTOMATED COUNT: 16 % (ref 11–15)
GLUCOSE BLD-MCNC: 79 MG/DL (ref 70–99)
HCT VFR BLD AUTO: 28.6 %
HGB BLD-MCNC: 9.3 G/DL
IMM GRANULOCYTES # BLD AUTO: 0.04 X10(3) UL (ref 0–1)
IMM GRANULOCYTES NFR BLD: 0.4 %
LYMPHOCYTES # BLD AUTO: 1.9 X10(3) UL (ref 1–4)
LYMPHOCYTES NFR BLD AUTO: 20.3 %
MCH RBC QN AUTO: 30.8 PG (ref 26–34)
MCHC RBC AUTO-ENTMCNC: 32.5 G/DL (ref 31–37)
MCV RBC AUTO: 94.7 FL
MONOCYTES # BLD AUTO: 0.44 X10(3) UL (ref 0.1–1)
MONOCYTES NFR BLD AUTO: 4.7 %
NEUTROPHILS # BLD AUTO: 6.91 X10 (3) UL (ref 1.5–7.7)
NEUTROPHILS # BLD AUTO: 6.91 X10(3) UL (ref 1.5–7.7)
NEUTROPHILS NFR BLD AUTO: 73.9 %
OSMOLALITY SERPL CALC.SUM OF ELEC: 299 MOSM/KG (ref 275–295)
PLATELET # BLD AUTO: 261 10(3)UL (ref 150–450)
POTASSIUM SERPL-SCNC: 4 MMOL/L (ref 3.5–5.1)
RBC # BLD AUTO: 3.02 X10(6)UL
SODIUM SERPL-SCNC: 142 MMOL/L (ref 136–145)
WBC # BLD AUTO: 9.4 X10(3) UL (ref 4–11)

## 2024-08-07 PROCEDURE — 99239 HOSP IP/OBS DSCHRG MGMT >30: CPT | Performed by: STUDENT IN AN ORGANIZED HEALTH CARE EDUCATION/TRAINING PROGRAM

## 2024-08-07 RX ORDER — MELATONIN
6 NIGHTLY PRN
Status: DISCONTINUED | OUTPATIENT
Start: 2024-08-07 | End: 2024-08-07

## 2024-08-07 RX ORDER — SULFAMETHOXAZOLE AND TRIMETHOPRIM 800; 160 MG/1; MG/1
1 TABLET ORAL 2 TIMES DAILY
Qty: 10 TABLET | Refills: 0 | Status: SHIPPED | OUTPATIENT
Start: 2024-08-07 | End: 2024-08-12 | Stop reason: ALTCHOICE

## 2024-08-07 RX ORDER — TORSEMIDE 10 MG/1
10 TABLET ORAL DAILY
Qty: 30 TABLET | Status: SHIPPED | COMMUNITY
Start: 2024-08-07 | End: 2024-08-12

## 2024-08-07 RX ORDER — MELATONIN
6 NIGHTLY
Status: DISCONTINUED | OUTPATIENT
Start: 2024-08-07 | End: 2024-08-07

## 2024-08-07 NOTE — PLAN OF CARE
Problem: PAIN - ADULT  Goal: Verbalizes/displays adequate comfort level or patient's stated pain goal  Description: INTERVENTIONS:  - Encourage pt to monitor pain and request assistance  - Assess pain using appropriate pain scale  - Administer analgesics based on type and severity of pain and evaluate response  - Implement non-pharmacological measures as appropriate and evaluate response  - Consider cultural and social influences on pain and pain management  - Manage/alleviate anxiety  - Utilize distraction and/or relaxation techniques  - Monitor for opioid side effects  - Notify MD/LIP if interventions unsuccessful or patient reports new pain  - Anticipate increased pain with activity and pre-medicate as appropriate  8/7/2024 1445 by Susy Guillen RN  Outcome: Adequate for Discharge  8/7/2024 1239 by Susy Guillen RN  Outcome: Progressing     Problem: SAFETY ADULT - FALL  Goal: Free from fall injury  Description: INTERVENTIONS:  - Assess pt frequently for physical needs  - Identify cognitive and physical deficits and behaviors that affect risk of falls.  - [place] fall precautions as indicated by assessment.  - Educate pt/family on patient safety including physical limitations  - Instruct pt to call for assistance with activity based on assessment  - Modify environment to reduce risk of injury  - Provide assistive devices as appropriate  - Consider OT/PT consult to assist with strengthening/mobility  - Encourage toileting schedule  8/7/2024 1445 by Susy Guillen RN  Outcome: Adequate for Discharge  8/7/2024 1239 by Susy Guillen RN  Outcome: Progressing     Problem: DISCHARGE PLANNING  Goal: Discharge to home or other facility with appropriate resources  Description: INTERVENTIONS:  - Identify barriers to discharge w/pt and caregiver  - Include patient/family/discharge partner in discharge planning  - Arrange for needed discharge resources and transportation as appropriate  -  Identify discharge learning needs (meds, wound care, etc)  - Arrange for interpreters to assist at discharge as needed  - Consider post-discharge preferences of patient/family/discharge partner  - Complete POLST form as appropriate  - Assess patient's ability to be responsible for managing their own health  - Refer to Case Management Department for coordinating discharge planning if the patient needs post-hospital services based on physician/LIP order or complex needs related to functional status, cognitive ability or social support system  8/7/2024 1445 by Susy Guillen RN  Outcome: Adequate for Discharge  8/7/2024 1239 by Susy Guillen RN  Outcome: Progressing     Problem: CARDIOVASCULAR - ADULT  Goal: Maintains optimal cardiac output and hemodynamic stability  Description: INTERVENTIONS:  - Monitor vital signs, rhythm, and trends  - Monitor for bleeding, hypotension and signs of decreased cardiac output  - Evaluate effectiveness of vasoactive medications to optimize hemodynamic stability  - Monitor arterial and/or venous puncture sites for bleeding and/or hematoma  - Assess quality of pulses, skin color and temperature  - Assess for signs of decreased coronary artery perfusion - ex. Angina  - Evaluate fluid balance, assess for edema, trend weights  8/7/2024 1445 by Susy Guillen RN  Outcome: Adequate for Discharge  8/7/2024 1239 by Susy Guillen RN  Outcome: Progressing  Goal: Absence of cardiac arrhythmias or at baseline  Description: INTERVENTIONS:  - Continuous cardiac monitoring, monitor vital signs, obtain 12 lead EKG if indicated  - Evaluate effectiveness of antiarrhythmic and heart rate control medications as ordered  - Initiate emergency measures for life threatening arrhythmias  - Monitor electrolytes and administer replacement therapy as ordered  8/7/2024 1445 by Susy Guillen RN  Outcome: Adequate for Discharge  8/7/2024 1239 by Susy Guillen RN  Outcome:  Progressing     Problem: Patient Centered Care  Goal: Patient preferences are identified and integrated in the patient's plan of care  Description: Interventions:  - What would you like us to know as we care for you?   - Provide timely, complete, and accurate information to patient/family  - Incorporate patient and family knowledge, values, beliefs, and cultural backgrounds into the planning and delivery of care  - Encourage patient/family to participate in care and decision-making at the level they choose  - Honor patient and family perspectives and choices  8/7/2024 1445 by Susy Guillen, RN  Outcome: Adequate for Discharge  8/7/2024 1239 by Susy Guillen, RN  Outcome: Progressing       Patient medically cleared to discharge per Md. Iv removed.

## 2024-08-07 NOTE — PROGRESS NOTES
Progress Note  Juli Ramirez Patient Status:  Inpatient    1929 MRN [id number]   Location [facility] 3W/SW Attending Randi Molina MD   Hosp Day # 0 PCP Robert Narvaez MD     Subjective:  Denies cardiac complaints, states she started to get sob when ambulating with PT but O2 sats were stable, daughter at bedside, both women have multiple concerns regarding her past admission and treatment received    Objective:  /79 (BP Location: Left arm)   Pulse 53   Temp 98.3 °F (36.8 °C) (Oral)   Resp 20   Wt 108 lb 12.8 oz (49.4 kg)   SpO2 95%   BMI 18.11 kg/m²     Telemetry: AF, rates controlled 50s-60s    Intake/Output:    Intake/Output Summary (Last 24 hours) at 2024 1108  Last data filed at 2024 1042  Gross per 24 hour   Intake 150 ml   Output 925 ml   Net -775 ml     Last 3 Weights   24 0400 108 lb 12.8 oz (49.4 kg)   24 0326 114 lb (51.7 kg)   24 1901 112 lb 9.6 oz (51.1 kg)   24 1440 108 lb (49 kg)   24 1818 108 lb 8 oz (49.2 kg)   24 1520 110 lb (49.9 kg)   24 1305 116 lb (52.6 kg)     Labs:  Recent Labs   Lab 24  1532 24  0749 24  0749   * 85 79   BUN 33* 34* 29*   CREATSERUM 1.22* 1.04* 1.05*   EGFRCR 41* 49* 49*   CA 8.5* 8.7 8.8    142 142   K 4.6 4.2 4.0    112 111   CO2 20.0* 22.0 23.0     Recent Labs   Lab 24  1532 24  0749 24  0749   RBC 2.99* 3.10* 3.02*   HGB 9.4* 9.4* 9.3*   HCT 27.9* 28.7* 28.6*   MCV 93.3 92.6 94.7   MCH 31.4 30.3 30.8   MCHC 33.7 32.8 32.5   RDW 15.9* 15.9* 16.0*   NEPRELIM 4.69  --  6.91   WBC 7.3 7.1 9.4   .0 254.0 261.0     Recent Labs   Lab 24  1532   TROPHS 26     Lab Results   Component Value Date/Time    HDL 50 2024 11:38 AM    LDL 72 2024 11:38 AM    TRIG 53 2024 11:38 AM     Lab Results   Component Value Date    DDIMER 0.72 2019     Lab Results   Component Value Date    TSH 1.120 2024     Review of  Systems:   Constitutional: No fevers, chills, fatigue or night sweats.  ENT: No mouth pain, neck pain, running nose, headaches or swollen glands.  Skin: No rashes, pruritus or skin changes,  Respiratory: Denies cough, wheezing or shortness of breath.  CV: Denies chest pain, palpitations, orthopnea, PND or dizziness.  Musculoskeletal: No joint pain, stiffness or swelling.  GI: No nausea, vomiting or diarrhea. No blood in stools.  Neurologic: No seizures, tremors, weakness or numbness.     Physical Exam:  General: Alert, cooperative, no distress, appears stated age.  Neck: Supple, symmetrical, trachea midline, no adenopathy, thyroid: no enlargment/tenderness/nodules, no carotid bruit and no JVD.  Lungs: clear to auscultation bilaterally  Chest wall: No tenderness or deformity.  Heart: Irregular rate and rhythm, S1, S2 normal, no murmur, click, rub or gallop.  Abdomen: Soft, non-tender. Bowel sounds normal. No masses,  No organomegaly.  Extremities: Extremities normal, atraumatic, no cyanosis, mild BLE edema.  Pulses: 2+ and symmetric all extremities.  Neurologic: Grossly intact.    Diagnostics:  Echo: 8/6/24  Conclusions:     1. Left ventricle: The cavity size was normal. Wall thickness was normal.      Systolic function was normal. The estimated ejection fraction was 55-60%,      by visual assessment. No diagnostic evidence for regional wall motion      abnormalities. Unable to assess LV diastolic function due to heart      rhythm.   2. Right ventricle: The cavity size was moderately increased. Systolic      function was mildly reduced.   3. Left atrium: The atrium was markedly dilated.   4. Right atrium: The atrium was dilated.   5. Mitral valve: There was moderate regurgitation. The mean diastolic      gradient was 3mm Hg.   6. Tricuspid valve: There was moderate regurgitation.   7. Pulmonary arteries: Systolic pressure was markedly increased, estimated      to be 77mm Hg.   8. Systemic veins: Central venous  respirophasic diameter changes are blunted      (< 50%).   9. Inferior vena cava: The IVC was dilated.   Impressions:  This study is compared with previous dated 09/13/2021:   *   Medications:   collagenase   Topical BID    furosemide  20 mg Intravenous TID    rosuvastatin  5 mg Oral Nightly    sildenafil  10 mg Oral TID    Selexipag  1,600 mcg Oral BID    warfarin  1.5 mg Oral Nightly     Assessment:    Shortness of breath  Admitted with worsening sob  -recent admission for cystitis  -hx severe pHTN on sildenafil and uptravi  -last echo in 2022 showed normal LV function, normal RV function, severe tricuspid regurgitation, moderate to severe mitral regurgitation, RSVP 119mmHg  -last RHC 2023 /28, PVR 67  -echo this admission shows normal LV function, mildly reduced RV function with increased cavity size, PASP 77mmHg, with IVC dilation, moderate mitral regurgitation, moderate tricupsid regurgitation  - on admission  -IV lasix TID for diuresis, home dose of diuretics was torsemide 10mg daily, stopped at last admission for RACHELLE  -I/Os not accurately documented, however weight down 6# overnight via bed scale, not requiring supplemental O2, baseline weight 110# per daughter    Severe pulmonary HTN  As above  -sildenafil, uptavi  [facility], will transition to home PO dose torsemide    Permanent atrial fibrillation   Rates controlled 50s-60s  -OAC with warfarin  -INR at goal 2.82    CKD  Cr 1.05  -up to 1.7 on last admission     HLD-statin    Chronic Anemia  Hgb 9, previously 12.9 at last admission   -stable this admission     Hx breast cancer    Hx fall  2/2 dehydration, treated last admission for dehydration and cystitis  -daughter states she did not have a head CT and they are unsure if she hit her head when she feel as she was found down on the bathroom floor and had been there for awhile      Plan:  -Stop IV lasix, resume home torsemide 10mg  daily, discharge with daily dosing x3 days, follow up with primary cardiologist for repeat BMP and dose adjustment if needed  -Continue sildenafil, uptavi  -Continue warfarin 1.5mg nightly  -Continue statin  -Monitor strict I/Os, daily weights, daily BMP  -Stable for discharge from cardiac standpoint if cleared by primary due to concerns for mobility with patient and her daughter at this  time      Plan of care discussed with patient, RN.        Ella Rivera, ROMIE  8/7/2024  11:08 AM  [phone] [place]  [phone] [place]        Cardiologist Addendum:  Juli Ramirez was seen and examined independently and I agree with the above documentation provided by JOELLEN Wiggins.  Patient resting comfortably in bed.  Attempted diuresis yesterday after echo showed dilated IVC however  unsure if adequate urine output documented.  Will recommend ambulating, continue oral diuretics and follow-up with patient's primary cardiologist [facility].    Thank you for allowing me to take part in the care of Juli Ramirez. Please call with any questions of concerns.    L3    Amalia Jesus DO  [facility]   Interventional Cardiac and Vascular Services      August 07, 2024  1:36 PM

## 2024-08-07 NOTE — CM/SW NOTE
08/07/24 1400   Discharge disposition   Expected discharge disposition Home-Health   Post Acute Care Provider Residential   Discharge transportation Private car       Per chart, pt is cleared for DC today.    SW spoke to pt's dtr via phone - confirmed she is aware of DC today. Discussed home w/ HHC - she is agreeable to DC home w/ Residential [facility]. Pt's dtr acknowledged that pt did well w/ PT today.    SW discussed facilities and private pay options. Per dtr's request, list of facilities left in pt's chart. SW also encouraged pt's dtr to use [facility] as contact for further assistance w/ CG's and/or placement in the future. Pt's dtr expressed understanding and appreciation.    Lisa w/ [facility] notified of plan to DC later today.    Pt is cleared from SW/CM stand point. KARINE Villarreal & DC KARINE Del Toro updated.    PLAN: Home w/ Residential HHC & CG information/resources        Katrina, MSW, LSW [phone]

## 2024-08-07 NOTE — DISCHARGE SUMMARY
[facility]  part of [facility]    Discharge Summary    Juli Ramirez Patient Status:  Inpatient    1929 MRN [id number]   Location [facility] 3W/SW Attending Randi Molina MD   Hosp Day # 0 PCP Robert Narvaez MD     Date of Admission: 2024   Date of Discharge: 2024    Admitting Diagnosis: Shortness of breath on exertion [R06.02]    Disposition: Home with daughter     Discharge Diagnosis: .Principal Problem:    Shortness of breath on exertion  Active Problems:    Weakness      Hospital Course:   Reason for Admission: Generalized weakness    Discharge Physical Exam:     General: Thin, Elderly female, stated age.  Respiratory: Clear to auscultation bilaterally.   Cardiovascular: RRR  Abdomen: Soft, nontender, nondistended.  Positive bowel sounds. No rebound or guarding.  Neurologic: No focal neurological deficits.   Musculoskeletal: Moves all extremities.  Extremities: No edema.    Hospital Course:     The patient is a 89-year-old  female who was hospitalized last week with diarrhea (acute on chronic), generalized weakness and dehydration 2/2 UTI, returns today with ongoing weakness, inability to walk. Initial vitals and imaging unremarkable. BNP elevated. She was admitted for weakness evaluation. Cardiology was consulted for her severe pulmonary hypertension, and she was treated with a few doses of IV lasix, and subsequently her volume status was normalized. Later patient endorsed dysuria, and her urine culture grew enterococcus. She was discharged to complete a short course of antibiotics. This was a different bug than her previous UTI, so it was assumed to be a different UTI.  Patient worked with PT during hospitalization, and was noted to not qualify for [facility]. Extensive discussions were had with patient's daughter, who was not satisfied with prior hospitalization, and this time also requested rehab placement.  Patient did not qualify for inpatient rehab  this admission.  discussed with daughter discharge options, and that [facility] and NH would need to be done outpatient. Daughter frequently checks on mother, who lives alone. Home health ordered. Daughter to investigate [facility] facilities, knowing that at this elder not appropriate for mom to live alone anymore.   Patient will need close cariology follow up with Dr Higuera. Clinic information provided. ER precautions reviewed.       Complications: None    Consultants         Provider   Role Specialty     Renetta Edwards APRN      Consulting Physician Nurse Practitioner            Pending Labs       Order Current Status    Urine Culture, Routine Preliminary result            Discharge Plan:   Discharge Condition: Stable    Current Discharge Medication List        New Orders    Details   sulfamethoxazole-trimethoprim -160 MG Oral Tab per tablet Take 1 tablet by mouth 2 (two) times daily for 5 days.           Home Meds - Unchanged    Details   torsemide 10 MG Oral Tab Take 1 tablet (10 mg total) by mouth daily.      !! warfarin 1 MG Oral Tab Take 1.5 tablets (1.5 mg total) by mouth nightly. Based of on INR levels      !! HYDROcodone-acetaminophen 5-325 MG Oral Tab Take 1 tablet by mouth every 8 (eight) hours as needed for Pain.      !! HYDROcodone-acetaminophen 5-325 MG Oral Tab Take 1 tablet by mouth every 8 (eight) hours as needed for Pain.      rosuvastatin 5 MG Oral Tab Take 1 tablet (5 mg total) by mouth nightly.      sildenafil 20 MG Oral Tab Take 0.5 tablets (10 mg total) by mouth 3 (three) times daily.      Cholecalciferol (VITAMIN D) 50 MCG (2000 UT) Oral Cap Take by mouth.      LOPERAMIDE HCL OR Take 30 mL by mouth.      UPTRAVI 1600 MCG Oral Tab Take 1,600 mcg by mouth in the morning and 1,600 mcg before bedtime.      !! warfarin 2.5 MG Oral Tab Take 1 tablet (2.5 mg total) by mouth nightly.      acetaminophen 325 MG Oral Tab Take 2 tablets (650 mg total) by mouth every 4 (four) hours as needed.        !! - Potential duplicate medications found. Please discuss with provider.              Discharge Diet: As tolerated    Discharge Activity: As tolerated    Follow up:      Follow-up Information       Robert Narvaez MD. Go in 2 day(s).    Specialty: NEPHROLOGY  Why: Please follow up with your primary care team for hospital discharge follow up.  Contact information:  [address] [facility] [address]  [phone]                                 Other Discharge Instructions:         Resume services with [facility]  [phone]    Cardiology Discharge Instructions  Take torsemide 10mg once daily for three days. Make follow up visit with Dr. Higuera within one week for repeat labs and dose adjustment as needed. Monitor for increased shortness of breath, weight gain, increased swelling. Continue current dosing regiment of warfarin. Recheck INR with anticoagulation clinic. Continue pulmonary hypertension medications as prescribed.     Contact Dr. Higuera with concerns prior to office visit.               >30 minutes spent on discharge orders, coordination, and planning.     Randi Molina MD  8/7/2024

## 2024-08-07 NOTE — PLAN OF CARE
Patient alert and oriented on room air with no complaints of pain. Good urine output overnight. Call light in reach and no needs noted at this time  Problem: PAIN - ADULT  Goal: Verbalizes/displays adequate comfort level or patient's stated pain goal  Description: INTERVENTIONS:  - Encourage pt to monitor pain and request assistance  - Assess pain using appropriate pain scale  - Administer analgesics based on type and severity of pain and evaluate response  - Implement non-pharmacological measures as appropriate and evaluate response  - Consider cultural and social influences on pain and pain management  - Manage/alleviate anxiety  - Utilize distraction and/or relaxation techniques  - Monitor for opioid side effects  - Notify MD/LIP if interventions unsuccessful or patient reports new pain  - Anticipate increased pain with activity and pre-medicate as appropriate  Outcome: Progressing     Problem: SAFETY ADULT - FALL  Goal: Free from fall injury  Description: INTERVENTIONS:  - Assess pt frequently for physical needs  - Identify cognitive and physical deficits and behaviors that affect risk of falls.  - [place] fall precautions as indicated by assessment.  - Educate pt/family on patient safety including physical limitations  - Instruct pt to call for assistance with activity based on assessment  - Modify environment to reduce risk of injury  - Provide assistive devices as appropriate  - Consider OT/PT consult to assist with strengthening/mobility  - Encourage toileting schedule  Outcome: Progressing     Problem: DISCHARGE PLANNING  Goal: Discharge to home or other facility with appropriate resources  Description: INTERVENTIONS:  - Identify barriers to discharge w/pt and caregiver  - Include patient/family/discharge partner in discharge planning  - Arrange for needed discharge resources and transportation as appropriate  - Identify discharge learning needs (meds, wound care, etc)  - Arrange for interpreters to  assist at discharge as needed  - Consider post-discharge preferences of patient/family/discharge partner  - Complete POLST form as appropriate  - Assess patient's ability to be responsible for managing their own health  - Refer to Case Management Department for coordinating discharge planning if the patient needs post-hospital services based on physician/LIP order or complex needs related to functional status, cognitive ability or social support system  Outcome: Progressing     Problem: CARDIOVASCULAR - ADULT  Goal: Maintains optimal cardiac output and hemodynamic stability  Description: INTERVENTIONS:  - Monitor vital signs, rhythm, and trends  - Monitor for bleeding, hypotension and signs of decreased cardiac output  - Evaluate effectiveness of vasoactive medications to optimize hemodynamic stability  - Monitor arterial and/or venous puncture sites for bleeding and/or hematoma  - Assess quality of pulses, skin color and temperature  - Assess for signs of decreased coronary artery perfusion - ex. Angina  - Evaluate fluid balance, assess for edema, trend weights  Outcome: Progressing  Goal: Absence of cardiac arrhythmias or at baseline  Description: INTERVENTIONS:  - Continuous cardiac monitoring, monitor vital signs, obtain 12 lead EKG if indicated  - Evaluate effectiveness of antiarrhythmic and heart rate control medications as ordered  - Initiate emergency measures for life threatening arrhythmias  - Monitor electrolytes and administer replacement therapy as ordered  Outcome: Progressing     Problem: Patient Centered Care  Goal: Patient preferences are identified and integrated in the patient's plan of care  Description: Interventions:  - What would you like us to know as we care for you?   - Provide timely, complete, and accurate information to patient/family  - Incorporate patient and family knowledge, values, beliefs, and cultural backgrounds into the planning and delivery of care  - Encourage patient/family  to participate in care and decision-making at the level they choose  - Honor patient and family perspectives and choices  Outcome: Progressing     Problem: Patient/Family Goals  Goal: Patient/Family Long Term Goal  Description: Patient's Long Term Goal:     Interventions:  - See additional Care Plan goals for specific interventions  Outcome: Progressing  Goal: Patient/Family Short Term Goal  Description: Patient's Short Term Goal:     Interventions:  - See additional Care Plan goals for specific interventions  Outcome: Progressing

## 2024-08-07 NOTE — CONGREGATE LIVING REVIEW
Congregate Living Authorization    The [facility] Living Review Committee (CLRC) has reviewed this case and the committee DOES NOT RECOMMEND discharge to a skilled nursing facility under skilled care.    The CLRC recommends:  Home with home health and any other appropriate caregiver assistance or medical equipment as needed vs respite in SNF.     Does not appear to have skilled services for ALLA, but additional home support appears to be needed.    For questions regarding CLRC approval process, please contact the CM assigned to the case.  For questions regarding RN discharge workflow, please contact the unit Clinical Leader.

## 2024-08-07 NOTE — PHYSICAL THERAPY NOTE
PHYSICAL THERAPY TREATMENT NOTE - INPATIENT     Room Number: 318/318-A       Presenting Problem: SOB and exertion, recent fall and admission       Problem List  Principal Problem:    Shortness of breath on exertion  Active Problems:    Weakness      PHYSICAL THERAPY ASSESSMENT   Patient demonstrates fair progress this session, goals  remain in progress.      Patient is requiring stand-by assist and minimal assist as a result of the following impairments: decreased functional strength, decreased endurance/aerobic capacity, pain, and medical status.     Patient continues to function below baseline with bed mobility, transfers, gait, stair negotiation, and performing household tasks.  Next session anticipate patient to progress bed mobility, transfers, and gait.  Physical Therapy will continue to follow patient for duration of hospitalization.    Patient continues to benefit from continued skilled PT services: to promote return to prior level of function and safety with increased assistance and continued rehabilitative therapy at discharge. Daughter states she cannot support patient at current mobility status, requesting pursuit of ALLA. Anticipate patient would benefit long term from a more supportive living environment.     PLAN  PT Treatment Plan: Bed mobility;Body mechanics;Endurance;Energy conservation;Family education;Gait training;Range of motion;Stoop training;Transfer training;Balance training  Frequency (Obs): 5x/week    SUBJECTIVE  \"I get tired after I walk.\"     OBJECTIVE  Precautions: Bed/chair alarm    WEIGHT BEARING RESTRICTION      No restrictions           PAIN ASSESSMENT      Location: low back pain unrated       BALANCE  Static Sitting: Fair +  Dynamic Sitting: Fair  Static Standing: Fair -  Dynamic Standing: Fair -    ACTIVITY TOLERANCE  Pulse: 74  Heart Rate Source: Monitor     BP: 125/64  BP Location: Left arm  BP Method: Automatic  Patient Position: Sitting     O2 WALK  Oxygen Therapy  SPO2% on  Room Air at Rest: 96    AM-PAC '6-Clicks' INPATIENT SHORT FORM - BASIC MOBILITY  How much difficulty does the patient currently have...  Patient Difficulty: Turning over in bed (including adjusting bedclothes, sheets and blankets)?: A Little   Patient Difficulty: Sitting down on and standing up from a chair with arms (e.g., wheelchair, bedside commode, etc.): A Little   Patient Difficulty: Moving from lying on back to sitting on the side of the bed?: A Little   How much help from another person does the patient currently need...   Help from Another: Moving to and from a bed to a chair (including a wheelchair)?: A Little   Help from Another: Need to walk in hospital room?: A Little   Help from Another: Climbing 3-5 steps with a railing?: A Little     AM-PAC Score:  Raw Score: 18   Approx Degree of Impairment: 46.58%   Standardized Score (AM-PAC Scale): 43.63   CMS Modifier (G-Code): CK    FUNCTIONAL ABILITY STATUS  Functional Mobility/Gait Assessment  Gait Assistance: Contact guard assist (SBA - wating for buckling per reported history, none this date)  Distance (ft): 25' x 2, 150'  Assistive Device: Rolling walker  Pattern: Shuffle  Rolling: minimal assist  Supine to Sit: minimal assist  Sit to Supine: minimal assist  Sit to Stand: contact guard assist    The patient's Approx Degree of Impairment: 46.58% has been calculated based on documentation in the [facility] '6 clicks' Inpatient Daily Activity Short Form.  Research supports that patients with this level of impairment may benefit from home with home care.  Final disposition will be made by interdisciplinary medical team.    Patient End of Session: Up in chair;Needs met;Call light within reach;RN aware of session/findings;All patient questions and concerns addressed;Alarm set;Family present;Discussed recommendations with /;With  staff    CURRENT GOALS   Goals to be met by: 8/30  Patient Goal Patient's self-stated goal is: to go to rehab    Goal #1 Patient is able to demonstrate supine - sit EOB @ level: supervision      Goal #1   Current Status  progressing    Goal #2 Patient is able to demonstrate transfers Sit to/from Stand at assistance level: supervision with walker - rolling      Goal #2  Current Status  progressing    Goal #3 Patient is able to ambulate 150 feet with assist device: walker - rolling at assistance level: supervision   Goal #3   Current Status  progressing    Goal #4 Patient will negotiate 3 stairs/one curb w/ assistive device and supervision   Goal #4   Current Status  NT    Goal #5 Patient to demonstrate independence with home activity/exercise instructions provided to patient in preparation for discharge.   Goal #5   Current Status  progressing    Goal #6     Goal #6  Current Status       Gait Training: 15 minutes  Therapeutic Activity: 15 minutes

## 2024-08-08 ENCOUNTER — PATIENT OUTREACH (OUTPATIENT)
Dept: CASE MANAGEMENT | Age: 89
End: 2024-08-08

## 2024-08-08 ENCOUNTER — TELEPHONE (OUTPATIENT)
Dept: NEPHROLOGY | Facility: CLINIC | Age: 89
End: 2024-08-08

## 2024-08-08 ENCOUNTER — ANTI-COAG VISIT (OUTPATIENT)
Dept: ANTICOAGULATION | Facility: CLINIC | Age: 89
End: 2024-08-08

## 2024-08-08 DIAGNOSIS — I48.11 LONGSTANDING PERSISTENT ATRIAL FIBRILLATION (HCC): ICD-10-CM

## 2024-08-08 DIAGNOSIS — I11.0 BENIGN HYPERTENSIVE HEART DISEASE WITH HEART FAILURE (HCC): ICD-10-CM

## 2024-08-08 DIAGNOSIS — R53.1 WEAKNESS: Primary | ICD-10-CM

## 2024-08-08 DIAGNOSIS — Z79.01 MONITORING FOR LONG-TERM ANTICOAGULANT USE: ICD-10-CM

## 2024-08-08 DIAGNOSIS — I27.21 PAH (PULMONARY ARTERY HYPERTENSION) (HCC): ICD-10-CM

## 2024-08-08 DIAGNOSIS — I48.20 CHRONIC ATRIAL FIBRILLATION (HCC): ICD-10-CM

## 2024-08-08 DIAGNOSIS — I48.21 PERMANENT ATRIAL FIBRILLATION (HCC): ICD-10-CM

## 2024-08-08 DIAGNOSIS — N18.32 STAGE 3B CHRONIC KIDNEY DISEASE (HCC): ICD-10-CM

## 2024-08-08 DIAGNOSIS — Z51.81 MONITORING FOR LONG-TERM ANTICOAGULANT USE: ICD-10-CM

## 2024-08-08 DIAGNOSIS — I27.20 PULMONARY HYPERTENSION (HCC): Primary | ICD-10-CM

## 2024-08-08 DIAGNOSIS — I50.33 ACUTE ON CHRONIC HEART FAILURE WITH PRESERVED EJECTION FRACTION (HFPEF) (HCC): ICD-10-CM

## 2024-08-08 DIAGNOSIS — Z79.01 LONG TERM (CURRENT) USE OF ANTICOAGULANTS: ICD-10-CM

## 2024-08-08 DIAGNOSIS — Z02.9 ENCOUNTERS FOR UNSPECIFIED ADMINISTRATIVE PURPOSE: ICD-10-CM

## 2024-08-08 PROCEDURE — 1111F DSCHRG MED/CURRENT MED MERGE: CPT

## 2024-08-08 RX ORDER — TORSEMIDE 10 MG/1
10 TABLET ORAL DAILY
Qty: 90 TABLET | Refills: 0 | Status: SHIPPED | OUTPATIENT
Start: 2024-08-08

## 2024-08-08 RX ORDER — ROSUVASTATIN CALCIUM 5 MG/1
5 TABLET, COATED ORAL NIGHTLY
Qty: 90 TABLET | Refills: 0 | Status: SHIPPED | OUTPATIENT
Start: 2024-08-08

## 2024-08-08 NOTE — TELEPHONE ENCOUNTER
The urine culture that was done in the hospital was positive.  Did they treat her with any antibiotics.  Does she have any current UTI symptoms?   If not would not treat.

## 2024-08-08 NOTE — PROGRESS NOTES
DOCUMENTATION ONLY:  Started BACTRIM for Uti. Need INR ASAP. If C doesn't come we'll order  [facility] [phone] for tomorrow.  CALENDAR UPDATED: PT ADVISED TO HOLD WARFARIN TODAY.   Dahiana Ramirez  2/7/1929     A patient of your clinic was recently seen by the hospitalists at [facility], and will be following up with you. Admitted for progressive weakness and inability to ambulate on own. Discharged with [facility].     The patient's last INR values were, as follows:     Date INR Value Comments   8/6/24 2.82 Received coumadin 1.5mg   8/5/24 2.52 Received coumadin 1.5mg              The patient's discharge Coumadin dosing is as follows:  per cardiology note 1.5mg coumadin nightly.      Please contact us if you have any questions.     Hallie Choudhary RN  08/08/24

## 2024-08-08 NOTE — TELEPHONE ENCOUNTER
Sebastian calling from [facility] states evaluation will be moved to next week, based on consideration on patient's family due to moving to a different facility. Please call.

## 2024-08-08 NOTE — DISCHARGE PLANNING
Patient was provided with discharge instructions, education, and follow up information. Patient's daughter present for discharge instructions with patient's consent. Prescriptions were already sent electronically to patient's pharmacy. Patient and daughter verbalize understanding of follow up information, specifically PCP and cardiology. Patient and daughter have no questions after reviewing all instructions and will be going home. List of private pay facilities that was provided by social work was given to patient's daughter at time of discharge. Home medications returned to patient.     Katey MILTON, Discharge Leader [phone]

## 2024-08-08 NOTE — TELEPHONE ENCOUNTER
Dr Narvaez noted from [facility] pt was discharged yesterday with Bactrim DS bid for 5 days.

## 2024-08-08 NOTE — TELEPHONE ENCOUNTER
Dr Narvaez please see note below ,patient was discharged yesterday 8/7/24    LOV -2/23/24.  Please sign pending order .

## 2024-08-08 NOTE — TELEPHONE ENCOUNTER
Daughter Lizbet states patient needs refill torsemide 10 mg and rosuvastatin 5 mg sent to [id number] - [place], [place] - [address] AT [facility] OF [address], [phone], [phone]  please follow up

## 2024-08-08 NOTE — TELEPHONE ENCOUNTER
Daughter Lizbet states she dropped off paperwork to be filled out by Dr. Narvaez it was put under the door it is two pages please follow up

## 2024-08-09 ENCOUNTER — TELEPHONE (OUTPATIENT)
Dept: CARDIOLOGY | Age: 89
End: 2024-08-09

## 2024-08-09 ENCOUNTER — TELEPHONE (OUTPATIENT)
Dept: ANTICOAGULATION | Facility: CLINIC | Age: 89
End: 2024-08-09

## 2024-08-09 ENCOUNTER — TELEPHONE (OUTPATIENT)
Dept: NEPHROLOGY | Facility: CLINIC | Age: 89
End: 2024-08-09

## 2024-08-09 ENCOUNTER — ANTI-COAG VISIT (OUTPATIENT)
Dept: ANTICOAGULATION | Facility: CLINIC | Age: 89
End: 2024-08-09

## 2024-08-09 DIAGNOSIS — I27.20 PULMONARY HYPERTENSION (HCC): Primary | ICD-10-CM

## 2024-08-09 DIAGNOSIS — Z51.81 MONITORING FOR LONG-TERM ANTICOAGULANT USE: ICD-10-CM

## 2024-08-09 DIAGNOSIS — Z79.01 LONG TERM (CURRENT) USE OF ANTICOAGULANTS: ICD-10-CM

## 2024-08-09 DIAGNOSIS — Z79.01 MONITORING FOR LONG-TERM ANTICOAGULANT USE: ICD-10-CM

## 2024-08-09 DIAGNOSIS — I48.20 CHRONIC ATRIAL FIBRILLATION (HCC): ICD-10-CM

## 2024-08-09 LAB — INR: 5.1 (ref 0.8–1.2)

## 2024-08-09 NOTE — PROGRESS NOTES
Initial Post Discharge Follow Up   Discharge Date: 8/7/24  Contact Date: 8/8/2024    Consent Verification:  Assessment Completed With: Caregiver: Daughter Permission received per patient?  written  HIPAA Verified?  Yes    Discharge Dx:   Weakness  Musculoskeletal deconditioning  Severe pulmonary artery hypertension  Sacral decubitus ulcer, stage 2  Back pain with underlying lumbar radiculopathy.    General:   How have you been since your discharge from the hospital? The daughter reported the patient has had continued weakness and increase need for care/assistance at home. The patient is ambulating at the daughter's home with the use of a walker with family assist. The daughter denies any falls since returning home. The daughter did admit the patient has been experiencing dysuria. The daughter denies hematuria or fever. The daughter denies any chest pain, cyanosis (lips/fingernails), or edema. The daughter reported continued shortness of breath with exertion. The daughter also reported the patient was experiencing dizziness yesterday. The daughter reported plans to move the patient to an assisted living facility, [facility], for further support and care at home. The patient reported sacral wound redness has improved. The daughter did report area causes the patient discomfort. Nurse care manager did review wound care with the daughter. The daughter reported [facility] wound nurse will be starting care at home. The daughter reported the patient's back/left knee pains have continued.   Do you have any pain since discharge?  Yes  Where: back, leg and left knee pain  Is the pain manageable at home? Yes; The daughter reported the patient would like the left knee assessed by the primary care physician.   How well was your pain managed while in the hospital?   On a scale of 1-5   1- Very Poor and 5- Very well   3  When you were leaving the hospital were your discharge instructions reviewed with you? Yes  How  well were your discharge instructions explained to you?   On a scale of 1-5   1- Very Poor and 5- Very well   Very Well  Do you have any questions about your discharge instructions?  No  Are you able to perform normal daily activities of living as you have prior to your hospital stay (dressing, bathing, ambulating to the bathroom, etc)? The patient is ambulating at home with the use of a walker and family assist. NCM did review/stress the importance of fall precautions.   (NCM) Was patient given a different diet per AVS? no  Nurse care manager did review swallow precautions with the daughter when taking medications.       Medications: NCM did confirm the patient has discontinued the following as directed:  cephalexin 250 MG Caps (Keflex)  Current Outpatient Medications   Medication Sig Dispense Refill    rosuvastatin (CRESTOR) 5 MG Oral Tab Take 1 tablet (5 mg total) by mouth nightly. 90 tablet 0    torsemide 10 MG Oral Tab Take 1 tablet (10 mg total) by mouth daily. 90 tablet 0    torsemide 10 MG Oral Tab Take 1 tablet (10 mg total) by mouth daily. 30 tablet     sulfamethoxazole-trimethoprim -160 MG Oral Tab per tablet Take 1 tablet by mouth 2 (two) times daily for 5 days. 10 tablet 0    warfarin 2.5 MG Oral Tab Take 1 tablet (2.5 mg total) by mouth nightly.      warfarin 1 MG Oral Tab Take 1.5 tablets (1.5 mg total) by mouth nightly. Based of on INR levels      HYDROcodone-acetaminophen 5-325 MG Oral Tab Take 1 tablet by mouth every 8 (eight) hours as needed for Pain. 10 tablet 0    HYDROcodone-acetaminophen 5-325 MG Oral Tab Take 1 tablet by mouth every 8 (eight) hours as needed for Pain. 10 tablet 0    rosuvastatin 5 MG Oral Tab Take 1 tablet (5 mg total) by mouth nightly. 90 tablet 1    acetaminophen 325 MG Oral Tab Take 2 tablets (650 mg total) by mouth every 4 (four) hours as needed.      sildenafil 20 MG Oral Tab Take 0.5 tablets (10 mg total) by mouth 3 (three) times daily. 90 tablet 0     Cholecalciferol (VITAMIN D) 50 MCG (2000 UT) Oral Cap Take by mouth.      LOPERAMIDE HCL OR Take 30 mL by mouth.      UPTRAVI 1600 MCG Oral Tab Take 1,600 mcg by mouth in the morning and 1,600 mcg before bedtime.       Were there any changes to your current medication(s) noted on the AVS? Yes  If so, were these medication changes discussed with you prior to leaving the hospital? Yes  If a new medication was prescribed:    Was the new medication's purpose & side effects reviewed? Yes  Do you have any questions about your new medication? No  Did you  your discharge medications when you left the hospital? Yes  Let's go over your medications together to make sure we are not missing anything. Medications Reviewed  Are there any reasons that keep you from taking your medication as prescribed? No  Are you having any concerns with constipation? No      Discharge medications reviewed/discussed/and reconciled against outpatient medications with patient.  Any changes or updates to medications sent to PCP.  Patient Acknowledged     Referrals/orders at D/C:  Referrals/orders placed at D/C? yes The daughter was also provided information for hired Care Givers.   What services:   Home health   (If HH was ordered) Has HH been set up?  Yes    If Yes: With Whom: [facility]   Except for Home Health Services mentioned above, have you scheduled these other services? No    DME ordered at D/C? No      The patient has the following at home:     Standard Walker;Rollator Walker;Shower Chair;Grab Bars         Discharge orders, AVS reviewed and discussed with patient. Any changes or updates to orders sent to PCP.  Patient Acknowledged      SDOH:   Transportation:   Transportation Needs: No Transportation Needs (8/6/2024)    Transportation Needs     Lack of Transportation: No     Car Seat: Not on file     Financial Strain:    Financial Resource Strain: Low Risk  (8/1/2024)    Financial Resource Strain     Difficulty of Paying  Living Expenses: Not on file     Med Affordability: No       Diagnosis specifics:   Re-Admit:  Tell me what led up to your readmission: The patient returned to the emergency department with shortness of breath, weakness and pain.   Did you call your PCP office first? yes  Did you attempt to get in with your PCP?  no  Do you feel this could have been prevented? yes      If Yes, how? The daughter reported the patient was discharged too soon previously and the daughter would have preferred a subacute rehabilitation placement.     Warfarin/Coumadin:   Next PT/INR Date: 8/9/2024   Current Warfarin Dose: The patient held warfarin yesterday   Does patient know who to follow-up with Warfarin/Coumadin management?  yes     Who manages War[facility]in[facility]Coumadin? [facility]    Who is monitoring PT/INR?  ([facility], HH, PCP, cardiology, etc) [facility]   NCM Reviewed next PT/INR appointment with pt:  Yes    Follow up appointments:          TCC  Was TCC ordered: No      PCP (If no TCC appointment)  Does patient already have a PCP appointment scheduled? No; A telephone encounter was sent to the office for an appointment request.       Specialist    Does the patient have any other follow up appointment(s) needing to be scheduled? Yes  If yes: NCM reviewed upcoming specialist appointment with patient: Yes  The daughter reported the patient is scheduled with Dr. Higuera on 8/22/2024.   Does the patient need assistance scheduling appointment(s): No    Is there any reason as to why you cannot make your appointment(s)?  No     Needs post D/C:   Now that you are home, are there any needs or concerns you need addressed before your next visit with your PCP?  (DME, meds, questions, etc.): No    Interventions by NCM:    Reviewed all discharge instructions with the patient's daughter with a focus on fall precautions, pain management, wound care and the hospital experience. All medications were reviewed and educated on the importance of  taking the medications as directed.  Patient symptoms were assessed, education was completed for signs/symptoms to monitor, and reviewed when to contact providers versus go to the emergency department/call 911. Appointments were reviewed and stressed the importance of a close follow up with providers.  A telephone encounter was sent to the primary care provider's office for an appointment request/review.   The patient's daughter verbalized understanding and will contact the office with any further questions or concerns.       Overall Rating:   How would you rate the care you received while in the hospital? fair    CCM referral placed:    Yes    BOOK BY DATE: 8/14/2024

## 2024-08-09 NOTE — TELEPHONE ENCOUNTER
Left voice message to daughter's cell number to call to schedule appointment with Dr. Narvaez for hospital follow up

## 2024-08-09 NOTE — TELEPHONE ENCOUNTER
Spoke to the patient's daughter today for a Transitional Care Management hospital follow up call. The patient does not have a hospital follow up appointment scheduled at this time. A Transitional Care Management/hospital follow up appointment is recommended by 8/14/2024.   Please advise.    BOOK BY DATE (last date for Transitional Care Management): 8/21/2024      Please follow up with the patient and assist with scheduling a Transitional Care Management/hospital follow up appointment.  Thank you!

## 2024-08-09 NOTE — TELEPHONE ENCOUNTER
Sebastian from [facility] states he spoke with the family and declining a social work visit but the family is requesting for caregiving information to be sent via email instead  resource was sent yesterday please note

## 2024-08-09 NOTE — TELEPHONE ENCOUNTER
Tuesday INR= 2.8  Wednesday, started Bactrim BID for 5 days  Today's INR= 5.1   Goal = 2.0-3.0    INR result received from the mobile lab- [facility]. Blood draw was peripheral (NOT a finger stick).     Recent hospitalization for fall at home/UTI and then a second admission for shortness of breath and pain r/t fall.     Spoke with patient's daughter Lizbet.     Discharged to home for now- daughter actively looking for placement in an assisted living facility as it is not safe for [place] to live on her own.     Reports that patient started taking Bactrim on 8/7 and will take for 5 days. Per Micromedex, there is a major interaction risk for increased bleeding when taken together with warfarin. She spoke with Dahiana MILTON yesterday and held warfarin on 8/8.     Denies any blood in the urine/stool, no unusual bleeding/bruising. Bleeding precautions reviewed. Advised ED evaluation if patient was to fall/or bump her head and to contact PCP for any changes.     Per protocol, hold 3 doses of warfarin, no change to weekly dose as supratherapeutic reading likely due to antibiotics. Recheck INR on Monday 8/12.     Spoke with Cristian MILTON- [facility]- states she will see [place] on Monday and will check an INR.

## 2024-08-09 NOTE — TELEPHONE ENCOUNTER
Dr. Narvaez- this note came from the coumadin clinic RN, we are managing. Per our protocol, all critical lab results are to be reported to the primary care provider and Dr. Nunez for review.

## 2024-08-09 NOTE — PROGRESS NOTES
TTR 79.6%    [facility]. Blood draw was peripheral (NOT a finger stick).     Recent hospitalization for fall at home/UTI and then a second admission for shortness of breath and pain r/t fall.     Spoke with patient's daughter Lizbet.     Discharged to home for now- daughter actively looking for placement in an assisted living facility as it is not safe for [place] to live on her own.     Reports that patient started taking Bactrim on 8/7 and will take for 5 days. Per Micromedex, there is a major interaction risk for increased bleeding when taken together with warfarin. She spoke with Dahiana MILTON yesterday and held warfarin on 8/8.     Denies any blood in the urine/stool, no unusual bleeding/bruising. Bleeding precautions reviewed. Advised ED evaluation if patient was to fall/or bump her head and to contact PCP for any changes.     Per protocol, hold 3 doses of warfarin, no change to weekly dose as supratherapeutic reading likely due to antibiotics. Recheck INR on Monday 8/12.     Spoke with Cristian MILTON- [facility] [place] she will see [place] on Monday and will check an INR.     8/9: Hold; 8/10: Hold; 8/11: Hold; Otherwise 3.75 mg every Mon, Wed, Fri; 2.5 mg all other days

## 2024-08-12 ENCOUNTER — ANTI-COAG VISIT (OUTPATIENT)
Dept: ANTICOAGULATION | Facility: CLINIC | Age: 89
End: 2024-08-12

## 2024-08-12 DIAGNOSIS — I27.20 PULMONARY HYPERTENSION (HCC): Primary | ICD-10-CM

## 2024-08-12 DIAGNOSIS — Z79.01 MONITORING FOR LONG-TERM ANTICOAGULANT USE: ICD-10-CM

## 2024-08-12 DIAGNOSIS — I48.20 CHRONIC ATRIAL FIBRILLATION (HCC): ICD-10-CM

## 2024-08-12 DIAGNOSIS — Z79.01 LONG TERM (CURRENT) USE OF ANTICOAGULANTS: ICD-10-CM

## 2024-08-12 DIAGNOSIS — Z51.81 MONITORING FOR LONG-TERM ANTICOAGULANT USE: ICD-10-CM

## 2024-08-12 LAB — INR: 4.8 (ref 2–3)

## 2024-08-12 NOTE — TELEPHONE ENCOUNTER
Daughter asking for physical exam form be completed for home health nursing care due now.    Instructed to schedule appointment for hospital follow up and have form completed. Medication list updated, need TB Quantiferon blood test prior to nursing care    BOOK BY DATE (last date for Transitional Care Management): 8/21/2024     Dr. Narvaez is primary doctor- currently not in clinic all week.  Appointment scheduled: 8/27/24

## 2024-08-12 NOTE — PROGRESS NOTES
[facility]HC / INR 4.8, supra therapeutic. (Goal 2.5 ) TTR 79.5 %     Etiology: [place] was on Bactrim 5 days. She should have finished yesterday.     PLAN: HOLD warfarin 2 more days.    Recheck INR Wednesday via [facility] RN     Pt reports:    No sign of unusual bruising or bleeding.  Any missed doses: Yes. All weekend.   Medications changes: Bactrim    Contacted  Yvette MILTON [facility] [phone]   who verbalized understanding and agreement.    WARFARIN Plan per protocol: 8/12: Hold; 8/13: Hold; Otherwise 3.75 mg every Mon, Wed, Fri; 2.5 mg all other days

## 2024-08-13 ENCOUNTER — TELEPHONE (OUTPATIENT)
Facility: CLINIC | Age: 89
End: 2024-08-13

## 2024-08-13 ENCOUNTER — TELEPHONE (OUTPATIENT)
Dept: NEPHROLOGY | Facility: CLINIC | Age: 89
End: 2024-08-13

## 2024-08-13 NOTE — TELEPHONE ENCOUNTER
Rn faxed forms and clinicals to Mandeep Attlilli to Whit #[phone],spoke to Whit advised to call for any questions or need any information.Patient daughter Lizbet informed.

## 2024-08-13 NOTE — TELEPHONE ENCOUNTER
Dr Narvaez please see note below ,per [facility] nurse that wound nurse recommended to use hydrocolloid dressing to sacral wound and need order ,advised ok to do the treatment .

## 2024-08-13 NOTE — TELEPHONE ENCOUNTER
Patient daughter is returning RN's call. She states that she spoke to RN about 10 mins ago regarding some forms.   She states that she will need to forms faxed to [phone] and [phone]

## 2024-08-13 NOTE — TELEPHONE ENCOUNTER
RN with [facility] is requesting a verbal order for wound care.  Patient has a wound in scral area.  Please call

## 2024-08-13 NOTE — TELEPHONE ENCOUNTER
Rn informed Lizbet pt daughter MATILDA verified that form is already filled out by Dr Narvaez and verbalized understanding,will let  office know on when   the form .Rn called [facility] left a message to Whit .

## 2024-08-14 ENCOUNTER — TELEPHONE (OUTPATIENT)
Dept: NEPHROLOGY | Facility: CLINIC | Age: 89
End: 2024-08-14

## 2024-08-14 ENCOUNTER — ANTI-COAG VISIT (OUTPATIENT)
Dept: ANTICOAGULATION | Facility: CLINIC | Age: 89
End: 2024-08-14

## 2024-08-14 DIAGNOSIS — Z79.01 LONG TERM (CURRENT) USE OF ANTICOAGULANTS: ICD-10-CM

## 2024-08-14 DIAGNOSIS — Z51.81 MONITORING FOR LONG-TERM ANTICOAGULANT USE: ICD-10-CM

## 2024-08-14 DIAGNOSIS — Z79.01 MONITORING FOR LONG-TERM ANTICOAGULANT USE: ICD-10-CM

## 2024-08-14 DIAGNOSIS — R52 PAIN: ICD-10-CM

## 2024-08-14 DIAGNOSIS — I48.20 CHRONIC ATRIAL FIBRILLATION (HCC): ICD-10-CM

## 2024-08-14 DIAGNOSIS — R53.1 WEAKNESS: Primary | ICD-10-CM

## 2024-08-14 DIAGNOSIS — N18.32 STAGE 3B CHRONIC KIDNEY DISEASE (HCC): ICD-10-CM

## 2024-08-14 DIAGNOSIS — I27.20 PULMONARY HYPERTENSION (HCC): Primary | ICD-10-CM

## 2024-08-14 LAB — INR: 3 (ref 2–3)

## 2024-08-14 NOTE — PROGRESS NOTES
Cristian RN / INR 3.0,  therapeutic. (Goal 2.5 ) TTR 79.5 %     Etiology: finished bactrim yesterday. No other changes.    PLAN: reduce to 2.5mg daily for this week.    Recheck INR Monday. 5 days.    Pt reports:    No sign of unusual bruising or bleeding.  Any missed doses: No   Medications changes: Yes    Contacted  Cristian MILTON [facility] by phone  who verbalized understanding and agreement.    WARFARIN Plan per protocol: 8/14: 2.5 mg; 8/16: 2.5 mg; Otherwise 3.75 mg every Mon, Wed, Fri; 2.5 mg all other days      Cristian MILTON- [facility] [phone]

## 2024-08-14 NOTE — TELEPHONE ENCOUNTER
Patient calling needs order/script for semi electric twin hospital bed with no hand rails that medicare will pay for. States needs to be sent via fax to [facility] [phone]   Roberta [place]   [address]  [place], [place], [address]

## 2024-08-15 NOTE — TELEPHONE ENCOUNTER
Rn generated DME order and faxed to #[phone].patient daughter Lizbet informed and verbalized understanding.

## 2024-08-16 NOTE — TELEPHONE ENCOUNTER
Can see on Wednesday, August 21 at 11:15 AM.  Why was this not set up when she was in the hospital?

## 2024-08-16 NOTE — TELEPHONE ENCOUNTER
Dr Narvaez [facility] services asking a face to face note stating the reason that patient need a hospital bed.pt daughter informed asking if can see pt on Monday so that pt  can be transfer to the facility.Pt is scheduled not until 8/27/24.

## 2024-08-19 ENCOUNTER — TELEPHONE (OUTPATIENT)
Dept: CARDIOLOGY | Age: 89
End: 2024-08-19

## 2024-08-19 ENCOUNTER — ANTI-COAG VISIT (OUTPATIENT)
Dept: ANTICOAGULATION | Facility: CLINIC | Age: 89
End: 2024-08-19

## 2024-08-19 DIAGNOSIS — Z51.81 MONITORING FOR LONG-TERM ANTICOAGULANT USE: ICD-10-CM

## 2024-08-19 DIAGNOSIS — Z79.01 LONG TERM (CURRENT) USE OF ANTICOAGULANTS: ICD-10-CM

## 2024-08-19 DIAGNOSIS — Z79.01 MONITORING FOR LONG-TERM ANTICOAGULANT USE: ICD-10-CM

## 2024-08-19 DIAGNOSIS — I48.20 CHRONIC ATRIAL FIBRILLATION (HCC): ICD-10-CM

## 2024-08-19 DIAGNOSIS — I27.20 PULMONARY HYPERTENSION (HCC): Primary | ICD-10-CM

## 2024-08-19 LAB — INR: 2.3 (ref 2–3)

## 2024-08-19 PROCEDURE — 93793 ANTICOAG MGMT PT WARFARIN: CPT | Performed by: FAMILY MEDICINE

## 2024-08-19 NOTE — PROGRESS NOTES
[facility] Cristian MILTON / INR 2.3,  therapeutic. (Goal 2.5 ) TTR 79.5 %     Etiology: down a bit from last week. She did finish the antibiotics. Eating well. No other changes.    PLAN: try 3.75mg Mon/Fri 2.5mg all other days.    Recheck INR one week.    Pt reports:    No sign of unusual bruising or bleeding.  Any missed doses: No- but her dose was decreased last week.   Medications changes: No    Contacted  Cristian MILTON by phone  who verbalized understanding and agreement.    WARFARIN Plan per protocol: 3.75 mg every Mon, Fri; 2.5 mg all other days

## 2024-08-20 ENCOUNTER — TELEPHONE (OUTPATIENT)
Dept: NEPHROLOGY | Facility: CLINIC | Age: 89
End: 2024-08-20

## 2024-08-20 NOTE — TELEPHONE ENCOUNTER
Torri from [facility] needs additional information on patient and sent a fax that needs to be filled out and sent back please follow up

## 2024-08-21 ENCOUNTER — TELEPHONE (OUTPATIENT)
Dept: NEPHROLOGY | Facility: CLINIC | Age: 89
End: 2024-08-21

## 2024-08-21 ENCOUNTER — HOSPITAL ENCOUNTER (OUTPATIENT)
Dept: GENERAL RADIOLOGY | Facility: HOSPITAL | Age: 89
Discharge: HOME | End: 2024-08-21
Attending: INTERNAL MEDICINE
Payer: MEDICARE

## 2024-08-21 ENCOUNTER — OFFICE VISIT (OUTPATIENT)
Dept: NEPHROLOGY | Facility: CLINIC | Age: 89
End: 2024-08-21

## 2024-08-21 ENCOUNTER — MED REC SCAN ONLY (OUTPATIENT)
Dept: NEPHROLOGY | Facility: CLINIC | Age: 89
End: 2024-08-21

## 2024-08-21 ENCOUNTER — LAB ENCOUNTER (OUTPATIENT)
Dept: LAB | Facility: HOSPITAL | Age: 89
End: 2024-08-21
Attending: INTERNAL MEDICINE
Payer: MEDICARE

## 2024-08-21 ENCOUNTER — EXTERNAL LAB (OUTPATIENT)
Dept: HEALTH INFORMATION MANAGEMENT | Facility: OTHER | Age: 89
End: 2024-08-21

## 2024-08-21 VITALS
HEART RATE: 60 BPM | SYSTOLIC BLOOD PRESSURE: 135 MMHG | BODY MASS INDEX: 18 KG/M2 | WEIGHT: 111 LBS | DIASTOLIC BLOOD PRESSURE: 54 MMHG

## 2024-08-21 DIAGNOSIS — N18.30 STAGE 3 CHRONIC KIDNEY DISEASE, UNSPECIFIED WHETHER STAGE 3A OR 3B CKD (HCC): ICD-10-CM

## 2024-08-21 DIAGNOSIS — K76.6 PORTOPULMONARY HYPERTENSION (HCC): Primary | ICD-10-CM

## 2024-08-21 DIAGNOSIS — I50.812 CHRONIC RIGHT-SIDED HEART FAILURE (HCC): ICD-10-CM

## 2024-08-21 DIAGNOSIS — M25.562 ARTHRALGIA OF LEFT KNEE: ICD-10-CM

## 2024-08-21 DIAGNOSIS — M25.562 ARTHRALGIA OF LEFT KNEE: Primary | ICD-10-CM

## 2024-08-21 DIAGNOSIS — I27.20 PULMONARY HYPERTENSION (HCC): ICD-10-CM

## 2024-08-21 DIAGNOSIS — N18.32 STAGE 3B CHRONIC KIDNEY DISEASE (HCC): Primary | ICD-10-CM

## 2024-08-21 DIAGNOSIS — E55.9 AVITAMINOSIS D: ICD-10-CM

## 2024-08-21 DIAGNOSIS — I27.20 PORTOPULMONARY HYPERTENSION (HCC): Primary | ICD-10-CM

## 2024-08-21 DIAGNOSIS — N18.32 STAGE 3B CHRONIC KIDNEY DISEASE (CKD) (HCC): ICD-10-CM

## 2024-08-21 DIAGNOSIS — D64.9 ANEMIA, UNSPECIFIED TYPE: ICD-10-CM

## 2024-08-21 DIAGNOSIS — I50.9 CHRONIC CONGESTIVE HEART FAILURE, UNSPECIFIED HEART FAILURE TYPE (HCC): ICD-10-CM

## 2024-08-21 LAB
ALBUMIN SERPL-MCNC: 3.9 G/DL (ref 3.2–4.8)
ALBUMIN/GLOB SERPL: 1.3 {RATIO} (ref 1–2)
ALP LIVER SERPL-CCNC: 57 U/L
ALT SERPL-CCNC: <7 U/L
ANION GAP SERPL CALC-SCNC: 6 MMOL/L (ref 0–18)
AST SERPL-CCNC: 16 U/L (ref ?–34)
BASOPHILS # BLD AUTO: 0.05 X10(3) UL (ref 0–0.2)
BASOPHILS NFR BLD AUTO: 0.8 %
BILIRUB SERPL-MCNC: 0.5 MG/DL (ref 0.2–0.9)
BNP SERPL-MCNC: 234 PG/ML
BNP SERPL-MCNC: 234 PG/ML
BUN BLD-MCNC: 22 MG/DL (ref 9–23)
BUN/CREAT SERPL: 19.1 (ref 10–20)
CALCIUM BLD-MCNC: 8.9 MG/DL (ref 8.7–10.4)
CHLORIDE SERPL-SCNC: 107 MMOL/L (ref 98–112)
CHOLEST SERPL-MCNC: 124 MG/DL (ref ?–200)
CO2 SERPL-SCNC: 28 MMOL/L (ref 21–32)
CREAT BLD-MCNC: 1.15 MG/DL
DEPRECATED RDW RBC AUTO: 55.3 FL (ref 35.1–46.3)
EGFRCR SERPLBLD CKD-EPI 2021: 44 ML/MIN/1.73M2 (ref 60–?)
EOSINOPHIL # BLD AUTO: 0.06 X10(3) UL (ref 0–0.7)
EOSINOPHIL NFR BLD AUTO: 1 %
ERYTHROCYTE [DISTWIDTH] IN BLOOD BY AUTOMATED COUNT: 15.9 % (ref 11–15)
FASTING PATIENT LIPID ANSWER: NO
FASTING STATUS PATIENT QL REPORTED: NO
GLOBULIN PLAS-MCNC: 2.9 G/DL (ref 2–3.5)
GLUCOSE BLD-MCNC: 90 MG/DL (ref 70–99)
HCT VFR BLD AUTO: 31.9 %
HDLC SERPL-MCNC: 47 MG/DL (ref 40–59)
HGB BLD-MCNC: 10.3 G/DL
IMM GRANULOCYTES # BLD AUTO: 0.03 X10(3) UL (ref 0–1)
IMM GRANULOCYTES NFR BLD: 0.5 %
LDLC SERPL CALC-MCNC: 64 MG/DL (ref ?–100)
LYMPHOCYTES # BLD AUTO: 2.32 X10(3) UL (ref 1–4)
LYMPHOCYTES NFR BLD AUTO: 36.9 %
MCH RBC QN AUTO: 30.8 PG (ref 26–34)
MCHC RBC AUTO-ENTMCNC: 32.3 G/DL (ref 31–37)
MCV RBC AUTO: 95.5 FL
MONOCYTES # BLD AUTO: 0.33 X10(3) UL (ref 0.1–1)
MONOCYTES NFR BLD AUTO: 5.3 %
NEUTROPHILS # BLD AUTO: 3.49 X10 (3) UL (ref 1.5–7.7)
NEUTROPHILS # BLD AUTO: 3.49 X10(3) UL (ref 1.5–7.7)
NEUTROPHILS NFR BLD AUTO: 55.5 %
NONHDLC SERPL-MCNC: 77 MG/DL (ref ?–130)
OSMOLALITY SERPL CALC.SUM OF ELEC: 295 MOSM/KG (ref 275–295)
PHOSPHATE SERPL-MCNC: 3.3 MG/DL (ref 2.4–5.1)
PLATELET # BLD AUTO: 255 10(3)UL (ref 150–450)
POTASSIUM SERPL-SCNC: 3.8 MMOL/L (ref 3.5–5.1)
PROT SERPL-MCNC: 6.8 G/DL (ref 5.7–8.2)
RBC # BLD AUTO: 3.34 X10(6)UL
SODIUM SERPL-SCNC: 141 MMOL/L (ref 136–145)
TRIGL SERPL-MCNC: 58 MG/DL (ref 30–149)
TSI SER-ACNC: 1.12 MIU/ML (ref 0.55–4.78)
VIT D+METAB SERPL-MCNC: 51.2 NG/ML (ref 30–100)
VLDLC SERPL CALC-MCNC: 9 MG/DL (ref 0–30)
WBC # BLD AUTO: 6.3 X10(3) UL (ref 4–11)

## 2024-08-21 PROCEDURE — 83880 ASSAY OF NATRIURETIC PEPTIDE: CPT

## 2024-08-21 PROCEDURE — 73562 X-RAY EXAM OF KNEE 3: CPT | Performed by: INTERNAL MEDICINE

## 2024-08-21 PROCEDURE — 84443 ASSAY THYROID STIM HORMONE: CPT

## 2024-08-21 PROCEDURE — 84100 ASSAY OF PHOSPHORUS: CPT

## 2024-08-21 PROCEDURE — 80061 LIPID PANEL: CPT

## 2024-08-21 PROCEDURE — 36415 COLL VENOUS BLD VENIPUNCTURE: CPT

## 2024-08-21 PROCEDURE — 80053 COMPREHEN METABOLIC PANEL: CPT

## 2024-08-21 PROCEDURE — 82306 VITAMIN D 25 HYDROXY: CPT

## 2024-08-21 PROCEDURE — 85025 COMPLETE CBC W/AUTO DIFF WBC: CPT

## 2024-08-21 PROCEDURE — 99214 OFFICE O/P EST MOD 30 MIN: CPT | Performed by: INTERNAL MEDICINE

## 2024-08-21 NOTE — PROGRESS NOTES
[facility]  Nephrology Daily Progress Note    Juli Ramirez  [id number]  89 year old  Chief Complaint   Patient presents with    Hospital F/U     Patient here for a Hospital follow up.       HPI:   Juli Ramirez is a 89 year old female.  89-year-old female with a history of chronic atrial fibrillation, congestive heart failure, moderate to severe mitral regurgitation, severe pulmonary hypertension followed by Dr. Higuera, history of hypertension, borderline blood sugars, hypercholesterolemia, history of left breast cancer and chronic kidney disease stage III who is now here for follow-up.  She was recently hospitalized in August 5, 2024 for shortness of breath and generalized weakness.  Hospital course also complicated by a urinary tract infection.  Received IV Lasix and symptoms improved.  It was however determined that is really no longer safe for her to live independently.  Daughter is here today and agrees.  They are getting her set up for an assisted living situation at [facility] in [place].  She is scheduled to move in there on September 1, 2024.  In the meantime she is living with her daughter.  Seems to be eating okay.  Ambulating with a walker.  No UTI symptoms.  Last echocardiogram was done in August 6, 2024 which revealed again moderate mitral regurgitation and severe pulmonary hypertension.      ROS:     Constitutional:  Negative for decreased activity, fever, irritability and lethargy  Endocrine:  Negative for abnormal sleep patterns, increased activity, polydipsia and polyphagia  Cardiovascular:  Negative for cool extremity and irregular heartbeat/palpitations  Gastrointestinal:  Negative for abdominal pain, constipation, decreased appetite, diarrhea and vomiting  Genitourinary:  Negative for dysuria and hematuria  Hema/Lymph:  Negative for easy bleeding and easy bruising  Integumentary:  Negative for pruritus and rash  Musculoskeletal:  Negative for bone/joint  symptoms  Neurological:  Negative for gait disturbance  Psychiatric:  Negative for inappropriate interaction and psychiatric symptoms  Respiratory:  Negative for cough, dyspnea and wheezing      PHYSICAL EXAM:         8/7/2024    11:15 AM 8/7/2024     2:58 PM 8/21/2024    11:35 AM   Vitals History   /64 139/89 135/54   BP Location Left arm Left arm    Pulse 74 60 60   Weight   111 lbs   BMI   18.47 kg/m2           8/6/2024     3:26 AM 8/7/2024     4:00 AM 8/21/2024    11:35 AM   VITALS: WEIGHT ONLY   Weight 114 lbs 108 lbs 13 oz 111 lbs       Constitutional: appears well hydrated alert and responsive no acute distress noted  Neck/Thyroid: neck is supple without adenopathy  Lymphatic: no abnormal cervical, supraclavicular or axillary adenopathy is noted  Respiratory: normal to inspection lungs are clear to auscultation bilaterally normal respiratory effort  Cardiovascular: regular rate and rhythm no murmurs, gallups, or rubs  Abdomen: soft non-tender non-distended no organomegaly noted no masses  Musculoskeletal: full ROM all extremities good strength  no deformities  Extremities: no edema, cyanosis, or clubbing  Neurological: exam appropriate for age reflexes and motor skills appropriate for age    Labs:  CBC results over the last 6 months:  Recent Labs     07/29/24  0644 08/05/24  1532 08/21/24  1307   WBC 5.7   < > 6.3   RBC 3.50*   < > 3.34*   HGB 10.8*   < > 10.3*   HCT 32.2*   < > 31.9*   .0   < > 255.0   MCV 92.0   < > 95.5   MCH 30.9   < > 30.8   MCHC 33.5   < > 32.3   RDW 15.1*   < > 15.9*   NEPRELIM 4.34   < > 3.49   NEUTABS 4.90  --   --    LYMPHABS 0.68*  --   --    EOSABS 0.00  --   --    BASABS 0.06  --   --    NEUT 85  --   --    LYMPH 12  --   --    MON 1  --   --    EOS 0  --   --    BASO 1  --   --    NEPERCENT  --    < > 55.5   LYPERCENT  --    < > 36.9   MOPERCENT  --    < > 5.3   EOPERCENT  --    < > 1.0   BAPERCENT  --    < > 0.8   NE  --    < > 3.49   LYMABS  --    < > 2.32    MOABSO  --    < > 0.33   EOABSO  --    < > 0.06   BAABSO  --    < > 0.05    < > = values in this interval not displayed.       Renal Function Panel results over the last 6 months:  Recent Labs     08/21/24  1307   GLU 90      K 3.8      CO2 28.0   BUN 22   CREATSERUM 1.15*   CA 8.9   ALB 3.9   PHOS 3.3   BUNCREA 19.1   ANIONGAP 6   OSMOCALC 295       Imaging  XR KNEE (3 VIEWS), LEFT (CPT=73562)    Result Date: 8/21/2024  CONCLUSION:  Left knee joint osteoarthritis with greatest/moderate to severe involvement of the lateral compartment.    Dictated by (CST): Angel Morse MD on 8/21/2024 at 2:33 PM     Finalized by (CST): Angel Morse MD on 8/21/2024 at 2:34 PM           Medications:    Current Outpatient Medications:     rosuvastatin (CRESTOR) 5 MG Oral Tab, Take 1 tablet (5 mg total) by mouth nightly., Disp: 90 tablet, Rfl: 0    torsemide 10 MG Oral Tab, Take 1 tablet (10 mg total) by mouth daily., Disp: 90 tablet, Rfl: 0    warfarin 2.5 MG Oral Tab, Take 1 tablet (2.5 mg total) by mouth nightly., Disp: , Rfl:     warfarin 1 MG Oral Tab, Take 1.5 tablets (1.5 mg total) by mouth nightly. Based of on INR levels, Disp: , Rfl:     sildenafil 20 MG Oral Tab, Take 0.5 tablets (10 mg total) by mouth 3 (three) times daily., Disp: 90 tablet, Rfl: 0    UPTRAVI 1600 MCG Oral Tab, Take 1,600 mcg by mouth in the morning and 1,600 mcg before bedtime., Disp: , Rfl:     LOPERAMIDE HCL OR, Take 30 mL by mouth., Disp: , Rfl:     Allergies:  Allergies   Allergen Reactions    Aldactone [Spironolactone] HYPERKALEMIA    Ambrisentan NAUSEA AND VOMITING     Pulmonary edema            ASSESSMENT/PLAN:   Assessment     ICD-10-CM    1. Arthralgia of left knee  M25.562 XR KNEE (3 VIEWS), LEFT (CPT=73562)      2. Chronic congestive heart failure, unspecified heart failure type (HCC)  I50.9       3. Pulmonary hypertension (HCC)  I27.20       4. Stage 3 chronic kidney disease, unspecified whether stage 3a or 3b CKD (HCC)   N18.30               Patient Active Problem List   Diagnosis    Atrial fibrillation (HCC)    Afib (HCC)    Malignant neoplasm of left breast in female, estrogen receptor positive (HCC)    Breast lump    Benign hypertensive heart disease    Hypercholesterolemia    Pseudophakia of both eyes    Vitreous floaters of both eyes    Dyspnea    After cataract of right eye not obscuring vision    Screening mammogram, encounter for    CKD (chronic kidney disease) stage 3, GFR 30-59 ml/min (HCC)    Dry eye    Essential hypertension    Pulmonary hypertension (HCC)    Long term (current) use of anticoagulants    Encounter for therapeutic drug monitoring    Closed fracture of neck of right femur, initial encounter ([facility])    Anticoagulated    Closed right hip fracture, initial encounter (HCC)    Fracture of unspecified part of neck of right femur, initial encounter for closed fracture (HCC)    Acute on chronic heart failure with preserved ejection fraction (HFpEF) (HCC)    Biventricular heart failure (HCC)    PAH (pulmonary artery hypertension) (HCC)    Bradycardia    Orthopedic aftercare    Bilateral leg edema    Chronic atrial fibrillation (HCC)    Atrial fibrillation, unspecified type (HCC)    Encounter for follow-up surveillance of breast cancer    History of left breast cancer    Monitoring for long-term anticoagulant use    Dehydration    Renal insufficiency    Acute kidney injury (HCC)    Shortness of breath on exertion    Weakness       Overall the patient appears to be stable.  Volume status acceptable.  Reviewed recent laboratory studies during this recent hospitalization.  She also did follow-up laboratory studies today.  Creatinine stable at 1.15 with an estimated GFR of 44.  Electrolytes okay.  .  Hemoglobin 10.3.  Therefore for now we will continue current medications.  I agree that it is no longer safe for her to live alone.  I agree with their decision to move her to assisted living.  They are also requesting  an electric bed.  I believe this is indicated secondary to her generalized weakness and her cardiac conditions which includes moderate mitral regurgitation and severe pulmonary hypertension.  This will help to improve her respiratory status.    [place]ia will benefit from a hospital bed with elevation of more than 30 degrees and is needing frequent changes in body position and/or immediate need for changes in body position of bed which can be elevated more than 30 degrees compared to an ordinary bed.     She will be seeing her cardiologist shortly.  Repeat CBC and renal panel in 3 months.                       No orders of the defined types were placed in this encounter.      8/21/2024  Robert Narvaez MD

## 2024-08-21 NOTE — TELEPHONE ENCOUNTER
Dr. Narvaez, daughter Lizbet agreed for referral to Orthopedics for evaluation.    Related test result message; agreed to have labs done in 3 months    
Labs overall look stable.  Kidney function mildly abnormal but stable.  Cholesterol readings are good.  Repeat CBC week, renal panel, iron and TIBC in 3 months.  X-ray of her left knee shows moderate to severe arthritis especially along the lateral portion of her knee.  Can refer to orthopedics to see if they think a knee splint would be helpful or whether or not she would benefit by any type of injections.  
PRINCIPAL DISCHARGE DIAGNOSIS  Diagnosis: S/P CABG x 2  Assessment and Plan of Treatment: AVR  ASA statin betablocker CAD

## 2024-08-21 NOTE — TELEPHONE ENCOUNTER
Okay to send my note from today to see if she qualifies for an electric bed.  Is there anything else I need to do?

## 2024-08-22 ENCOUNTER — APPOINTMENT (OUTPATIENT)
Dept: CARDIOLOGY | Age: 89
End: 2024-08-22

## 2024-08-22 NOTE — TELEPHONE ENCOUNTER
Form was faxed to [email or web address] [facility] [employer],[facility]  [phone]  with last visit notes on 8/21/24.  Patient daughter  Lizbet notified .

## 2024-08-22 NOTE — TELEPHONE ENCOUNTER
Dr. Narvaez, hospital bed is for use in assisted living.    Office note has to have documentation of need of a bed with head elevation higher than 30 degrees and document benefits.    [place]ia needs a bed with head elevation of more than 30 degrees most of the time due to: her generalized weakness, cardiac conditions which includes Biventricular heart failure, moderate mitral regurgitation, severe pulmonary hypertension. This will help to improve her respiratory status.    [facility] documentation on 8/21/24 office notes does not have adequate documentation [place] benefiting hospital bed with elevation of more than 30 degrees and needing frequent changes in body position and/or immediate need for change in body position of bed be elevated more than 30 degrees compared to an ordinary bed.

## 2024-08-22 NOTE — TELEPHONE ENCOUNTER
[facility] states that they need documentation of need for hospital bed.  The instructions are on the fax cover sheet.  Please call.

## 2024-08-22 NOTE — TELEPHONE ENCOUNTER
Jessica from [facility] states that she did not get this morning fax, so she is requesting to get order for the hospital bed faxed again to another fax # [phone]. She states that she also will need to the patients height and weight and medical necessity.

## 2024-08-23 NOTE — TELEPHONE ENCOUNTER
Mariana ,Rn called [facility] spoke to Torri and confirmed all clinicals needed only need the visit note,and faxed notes to #[phone].

## 2024-08-26 ENCOUNTER — TELEPHONE (OUTPATIENT)
Dept: NEPHROLOGY | Facility: CLINIC | Age: 89
End: 2024-08-26

## 2024-08-26 ENCOUNTER — ANTI-COAG VISIT (OUTPATIENT)
Dept: ANTICOAGULATION | Facility: CLINIC | Age: 89
End: 2024-08-26

## 2024-08-26 DIAGNOSIS — Z51.81 MONITORING FOR LONG-TERM ANTICOAGULANT USE: ICD-10-CM

## 2024-08-26 DIAGNOSIS — I27.20 PULMONARY HYPERTENSION (HCC): Primary | ICD-10-CM

## 2024-08-26 DIAGNOSIS — I48.20 CHRONIC ATRIAL FIBRILLATION (HCC): ICD-10-CM

## 2024-08-26 DIAGNOSIS — Z79.01 MONITORING FOR LONG-TERM ANTICOAGULANT USE: ICD-10-CM

## 2024-08-26 DIAGNOSIS — Z79.01 LONG TERM (CURRENT) USE OF ANTICOAGULANTS: ICD-10-CM

## 2024-08-26 DIAGNOSIS — N39.0 URINARY TRACT INFECTION WITHOUT HEMATURIA, SITE UNSPECIFIED: Primary | ICD-10-CM

## 2024-08-26 LAB — INR: 2.1 (ref 0.8–1.2)

## 2024-08-26 NOTE — PROGRESS NOTES
TTR 79.5%    INR result received from [facility].     Spoke with Cristian MILTON- [facility].     Per protocol, continue current dose and recheck INR in 2 weeks.     3.75 mg every Mon, Fri; 2.5 mg all other days

## 2024-08-26 NOTE — TELEPHONE ENCOUNTER
Nurse is at the patient home requesting to get a verbal order for a Urinalysis, as the patient is stating that she is having burning when she urinates. Nurse states that if she is not able to answer, she would like for the nurse to leave her a voicemail.

## 2024-08-28 ENCOUNTER — TELEPHONE (OUTPATIENT)
Dept: NEPHROLOGY | Facility: CLINIC | Age: 89
End: 2024-08-28

## 2024-08-28 ENCOUNTER — LAB ENCOUNTER (OUTPATIENT)
Dept: LAB | Age: 89
End: 2024-08-28
Attending: INTERNAL MEDICINE
Payer: MEDICARE

## 2024-08-28 DIAGNOSIS — Z11.1 SCREENING FOR TUBERCULOSIS: Primary | ICD-10-CM

## 2024-08-28 DIAGNOSIS — N39.0 URINARY TRACT INFECTION WITHOUT HEMATURIA, SITE UNSPECIFIED: ICD-10-CM

## 2024-08-28 DIAGNOSIS — Z11.1 SCREENING FOR TUBERCULOSIS: ICD-10-CM

## 2024-08-28 LAB
BILIRUB UR QL: NEGATIVE
GLUCOSE UR-MCNC: NORMAL MG/DL
KETONES UR-MCNC: NEGATIVE MG/DL
LEUKOCYTE ESTERASE UR QL STRIP.AUTO: 500
NITRITE UR QL STRIP.AUTO: NEGATIVE
PH UR: 5.5 [PH] (ref 5–8)
RBC #/AREA URNS AUTO: >10 /HPF
SP GR UR STRIP: 1.01 (ref 1–1.03)
UROBILINOGEN UR STRIP-ACNC: NORMAL
WBC #/AREA URNS AUTO: >50 /HPF
WBC CLUMPS UR QL AUTO: PRESENT /HPF

## 2024-08-28 PROCEDURE — 87077 CULTURE AEROBIC IDENTIFY: CPT

## 2024-08-28 PROCEDURE — 86480 TB TEST CELL IMMUN MEASURE: CPT

## 2024-08-28 PROCEDURE — 36415 COLL VENOUS BLD VENIPUNCTURE: CPT

## 2024-08-28 PROCEDURE — 87186 SC STD MICRODIL/AGAR DIL: CPT

## 2024-08-28 PROCEDURE — 87184 SC STD DISK METHOD PER PLATE: CPT

## 2024-08-28 PROCEDURE — 81001 URINALYSIS AUTO W/SCOPE: CPT

## 2024-08-28 PROCEDURE — 87086 URINE CULTURE/COLONY COUNT: CPT

## 2024-08-28 RX ORDER — CIPROFLOXACIN 250 MG/1
250 TABLET, FILM COATED ORAL 2 TIMES DAILY
Qty: 6 TABLET | Refills: 0 | Status: SHIPPED | OUTPATIENT
Start: 2024-08-28

## 2024-08-28 NOTE — TELEPHONE ENCOUNTER
Roberta Sherman, [facility] returning nurse call to discuss plan of care. Please call at [phone], extension [phone], thanks.

## 2024-08-28 NOTE — TELEPHONE ENCOUNTER
They can start Cipro 250 mg twice daily x 3 days but they must turn in the urine culture before starting the antibiotics.

## 2024-08-28 NOTE — TELEPHONE ENCOUNTER
Informed patient  daughter of note  below and verbalized understanding stated is on their way to the lab.

## 2024-08-28 NOTE — TELEPHONE ENCOUNTER
Dr Narvaez spoke to patient HH nurse Lizbeth that she received a verbal result from the lab that urine is positive but  not enough specimen to  the culture and sensitivity,patient daughter  reported that pt still has urgency ,frequency ,pain /discomfort when urinating ,no fever ,asking if can start antibiotic ,Advised still need to get another sample for culture ,stated will work on it and need a copy of result.

## 2024-08-28 NOTE — TELEPHONE ENCOUNTER
[facility] health nurse states that the patients daughter keeps calling her about the urine results, which looks like the patient does have an infection and daughter states that the patient is having discomfort and painful urination, so she wants to know if Dr Narvaez will order any antibiotic if possible without the cultural and sensitivity test since there was not enough urine specimen for these tests?

## 2024-08-29 ENCOUNTER — TELEPHONE (OUTPATIENT)
Dept: NEPHROLOGY | Facility: CLINIC | Age: 89
End: 2024-08-29

## 2024-08-30 ENCOUNTER — TELEPHONE (OUTPATIENT)
Dept: CARDIOLOGY | Age: 89
End: 2024-08-30

## 2024-08-30 LAB
M TB IFN-G CD4+ T-CELLS BLD-ACNC: 0.02 IU/ML
M TB TUBERC IFN-G BLD QL: NEGATIVE
M TB TUBERC IGNF/MITOGEN IGNF CONTROL: >10 IU/ML
QFT TB1 AG MINUS NIL: 0 IU/ML
QFT TB2 AG MINUS NIL: 0 IU/ML

## 2024-08-30 NOTE — TELEPHONE ENCOUNTER
Rn spoke to patient daughter Lizbet and updated that test for TB still pending for the form to be completed For the urine test will let pt know once reviewed by Dr Narvaez and verbalized understanding.

## 2024-08-30 NOTE — TELEPHONE ENCOUNTER
Patient's daughter calling states returning call from 1:45pm yesterday, 8/28/24. Please call.   
Please see 8/28/24 encounter.  
none

## 2024-09-04 ENCOUNTER — TELEPHONE (OUTPATIENT)
Dept: NEPHROLOGY | Facility: CLINIC | Age: 89
End: 2024-09-04

## 2024-09-05 ENCOUNTER — TELEPHONE (OUTPATIENT)
Dept: NEPHROLOGY | Facility: CLINIC | Age: 89
End: 2024-09-05

## 2024-09-05 ENCOUNTER — APPOINTMENT (OUTPATIENT)
Dept: CARDIOLOGY | Age: 89
End: 2024-09-05
Attending: INTERNAL MEDICINE

## 2024-09-05 DIAGNOSIS — I50.812 CHRONIC RIGHT-SIDED CONGESTIVE HEART FAILURE  (CMD): ICD-10-CM

## 2024-09-05 DIAGNOSIS — I27.20 PULMONARY HYPERTENSION  (CMD): ICD-10-CM

## 2024-09-05 DIAGNOSIS — I48.20 CHRONIC ATRIAL FIBRILLATION  (CMD): ICD-10-CM

## 2024-09-05 NOTE — TELEPHONE ENCOUNTER
Per daughter Lizbet Bustos will be residing in  [facility]'s.  Antico clinic should be able to continue dosing the INR's. Dr. Narvaez has already filled the admission forms. Call if problems.

## 2024-09-05 NOTE — TELEPHONE ENCOUNTER
Daughter, states that the patient will be going to assisted living. Daughter, states that they would not be communicating with the coumadin clinic. Patient would like to speak with the coumadin nurse about this.

## 2024-09-09 ENCOUNTER — ANTI-COAG VISIT (OUTPATIENT)
Dept: ANTICOAGULATION | Facility: CLINIC | Age: 89
End: 2024-09-09

## 2024-09-09 DIAGNOSIS — Z79.01 MONITORING FOR LONG-TERM ANTICOAGULANT USE: ICD-10-CM

## 2024-09-09 DIAGNOSIS — Z79.01 LONG TERM (CURRENT) USE OF ANTICOAGULANTS: ICD-10-CM

## 2024-09-09 DIAGNOSIS — I27.20 PULMONARY HYPERTENSION (HCC): Primary | ICD-10-CM

## 2024-09-09 DIAGNOSIS — I48.20 CHRONIC ATRIAL FIBRILLATION (HCC): ICD-10-CM

## 2024-09-09 DIAGNOSIS — Z51.81 MONITORING FOR LONG-TERM ANTICOAGULANT USE: ICD-10-CM

## 2024-09-09 LAB — INR: 2.7 (ref 2–3)

## 2024-09-09 NOTE — PROGRESS NOTES
Cristian RN- [facility] [phone]- discharging today 9/9/2024 - [facility] will check her next. 10/09     [facility] RN / INR 2.7,  therapeutic. (Goal 2.5 ) TTR 79.6 %     Etiology: Virg will be moving to [facility] from her home. They will check INR there and call us with number. (This is what the family would like)    PLAN: continue the current dose.    Recheck INR 2 weeks. Her diet may change when food is prepared.     Pt reports:    No sign of unusual bruising or bleeding.  Any missed doses: No   Medications changes: No    Contacted  Cristian RN by phone  who verbalized understanding and agreement.    WARFARIN Plan per protocol: 3.75 mg every Mon, Fri; 2.5 mg all other days

## 2024-09-10 ENCOUNTER — OFFICE VISIT (OUTPATIENT)
Dept: CARDIOLOGY | Age: 89
End: 2024-09-10

## 2024-09-10 VITALS
HEART RATE: 58 BPM | DIASTOLIC BLOOD PRESSURE: 47 MMHG | OXYGEN SATURATION: 97 % | WEIGHT: 116.18 LBS | BODY MASS INDEX: 19.33 KG/M2 | SYSTOLIC BLOOD PRESSURE: 127 MMHG

## 2024-09-10 DIAGNOSIS — E78.00 HYPERCHOLESTEREMIA: ICD-10-CM

## 2024-09-10 DIAGNOSIS — R06.09 DYSPNEA ON EXERTION: ICD-10-CM

## 2024-09-10 DIAGNOSIS — E55.9 VITAMIN D DEFICIENCY: ICD-10-CM

## 2024-09-10 DIAGNOSIS — I27.0 PRIMARY PULMONARY HYPERTENSION  (CMD): Primary | ICD-10-CM

## 2024-09-10 RX ORDER — TORSEMIDE 20 MG/1
20 TABLET ORAL EVERY OTHER DAY
Qty: 90 TABLET | Refills: 3 | Status: SHIPPED | OUTPATIENT
Start: 2024-09-10

## 2024-09-10 RX ORDER — TORSEMIDE 10 MG/1
10 TABLET ORAL EVERY OTHER DAY
Qty: 90 TABLET | Refills: 3 | Status: SHIPPED | OUTPATIENT
Start: 2024-09-10

## 2024-09-10 RX ORDER — SILDENAFIL CITRATE 20 MG/1
10 TABLET ORAL 3 TIMES DAILY
Qty: 45 TABLET | Refills: 3 | Status: SHIPPED | OUTPATIENT
Start: 2024-09-10

## 2024-09-10 RX ORDER — SILDENAFIL CITRATE 20 MG/1
10 TABLET ORAL 3 TIMES DAILY
Qty: 45 TABLET | Refills: 3 | Status: SHIPPED | OUTPATIENT
Start: 2024-09-10 | End: 2024-09-10 | Stop reason: SDUPTHER

## 2024-09-10 SDOH — HEALTH STABILITY: PHYSICAL HEALTH: ON AVERAGE, HOW MANY DAYS PER WEEK DO YOU ENGAGE IN MODERATE TO STRENUOUS EXERCISE (LIKE A BRISK WALK)?: 0 DAYS

## 2024-09-10 SDOH — HEALTH STABILITY: PHYSICAL HEALTH: ON AVERAGE, HOW MANY MINUTES DO YOU ENGAGE IN EXERCISE AT THIS LEVEL?: 0 MIN

## 2024-09-10 ASSESSMENT — 6 MINUTE WALK TEST (6MWT)
NUMBER OF RESTS: 1
BLOOD PRESSURE: 158/58
HEART RATE: 50
TOTAL DISTANCE WALKED (M): 311.9
SA02: 90
BLOOD PRESSURE: 110/58
HEART RATE: 63
DID PATIENT DO A SHUTTLE WALK: YES
BORG FATIGUE SCALE SCORE: 2 SLIGHT
SA02: 97
GENERAL FATIGUE: VERY SLIGHT
GENERAL DYSPNEA: VERY SLIGHT
SAO2: 98
TOTAL DISTANCE WALKED (M): 95.1
HEART RATE: 58

## 2024-09-10 ASSESSMENT — ENCOUNTER SYMPTOMS
LIGHT-HEADEDNESS: 0
BLOATING: 0
ALTERED MENTAL STATUS: 0
CHILLS: 0
HEADACHES: 0
WEIGHT LOSS: 0
SHORTNESS OF BREATH: 0
COUGH: 0
CONSTIPATION: 0
ALLERGIC/IMMUNOLOGIC COMMENTS: NO NEW FOOD ALLERGIES
WEIGHT GAIN: 0
BACK PAIN: 0
SUSPICIOUS LESIONS: 0
LOSS OF BALANCE: 0
DIARRHEA: 0
ABDOMINAL PAIN: 0
HEMOPTYSIS: 0
HEMATOCHEZIA: 0
DIZZINESS: 0
INSOMNIA: 0
FEVER: 0
DEPRESSION: 0
BRUISES/BLEEDS EASILY: 0

## 2024-09-10 ASSESSMENT — PATIENT HEALTH QUESTIONNAIRE - PHQ9
1. LITTLE INTEREST OR PLEASURE IN DOING THINGS: NOT AT ALL
SUM OF ALL RESPONSES TO PHQ9 QUESTIONS 1 AND 2: 0
SUM OF ALL RESPONSES TO PHQ9 QUESTIONS 1 AND 2: 0
2. FEELING DOWN, DEPRESSED OR HOPELESS: NOT AT ALL
CLINICAL INTERPRETATION OF PHQ2 SCORE: NO FURTHER SCREENING NEEDED

## 2024-09-12 PROCEDURE — 93227 XTRNL ECG REC<48 HR R&I: CPT | Performed by: INTERNAL MEDICINE

## 2024-09-24 ENCOUNTER — TELEPHONE (OUTPATIENT)
Dept: NEPHROLOGY | Facility: CLINIC | Age: 89
End: 2024-09-24

## 2024-09-24 NOTE — TELEPHONE ENCOUNTER
-Resolved after continuous bladder irrigation   Lisa requesting signature on Home Health orders - this was sent through portal.  For additional questions please call.  Thank you.

## 2024-09-25 ENCOUNTER — TELEPHONE (OUTPATIENT)
Dept: CARDIOLOGY | Age: 89
End: 2024-09-25

## 2024-10-02 DIAGNOSIS — I27.0 PRIMARY PULMONARY HYPERTENSION  (CMD): ICD-10-CM

## 2024-10-02 DIAGNOSIS — I27.20 PULMONARY HYPERTENSION  (CMD): Primary | ICD-10-CM

## 2024-10-03 ENCOUNTER — TELEPHONE (OUTPATIENT)
Dept: CARDIOLOGY | Age: 89
End: 2024-10-03

## 2024-10-25 ENCOUNTER — TELEPHONE (OUTPATIENT)
Dept: ANTICOAGULATION | Facility: CLINIC | Age: 89
End: 2024-10-25

## 2024-10-25 DIAGNOSIS — Z79.01 MONITORING FOR LONG-TERM ANTICOAGULANT USE: ICD-10-CM

## 2024-10-25 DIAGNOSIS — Z51.81 MONITORING FOR LONG-TERM ANTICOAGULANT USE: ICD-10-CM

## 2024-10-25 DIAGNOSIS — I48.20 CHRONIC ATRIAL FIBRILLATION (HCC): Primary | ICD-10-CM

## 2024-10-25 NOTE — TELEPHONE ENCOUNTER
Anticoag referral expires soon. Order pended. Please sign, thank you.      Dx: Chronic atrial fibrillation (HCC) (I48.20)  Monitoring for long-term anticoagulant use (Z51.81,Z79.01)

## 2024-11-01 ENCOUNTER — EXTERNAL LAB (OUTPATIENT)
Dept: HEALTH INFORMATION MANAGEMENT | Facility: OTHER | Age: 89
End: 2024-11-01

## 2024-11-01 LAB
BUN SERPL-MCNC: 39 MG/DL (ref 7–28)
CALCIUM SERPL-MCNC: 9 MG/DL (ref 8.7–10.5)
CHLORIDE SERPL-SCNC: 106 MEQ/L (ref 99–110)
CO2 SERPL-SCNC: 27 MMOL/L (ref 18–30)
CREAT SERPL-MCNC: 1.75 MG/DL (ref 0.44–1.32)
EGFRCR SERPLBLD CKD-EPI 2021: 27 ML/M/1.73M2
EGFRCR SERPLBLD CKD-EPI 2021: 33 ML/M/1.73M2
GLUCOSE SERPL-MCNC: 133 MG/DL (ref 70–110)
LENGTH OF FAST TIME PATIENT: ABNORMAL H
POTASSIUM SERPL-SCNC: 4.2 MEQ/L (ref 3.6–5)
SODIUM SERPL-SCNC: 142 MEQ/L (ref 138–147)

## 2024-11-05 ENCOUNTER — PATIENT OUTREACH (OUTPATIENT)
Dept: CASE MANAGEMENT | Age: 89
End: 2024-11-05

## 2024-11-15 ENCOUNTER — ANTI-COAG VISIT (OUTPATIENT)
Dept: ANTICOAGULATION | Facility: CLINIC | Age: 89
End: 2024-11-15

## 2024-11-15 DIAGNOSIS — I48.20 CHRONIC ATRIAL FIBRILLATION (HCC): ICD-10-CM

## 2024-11-15 DIAGNOSIS — I27.20 PULMONARY HYPERTENSION (HCC): Primary | ICD-10-CM

## 2024-11-15 DIAGNOSIS — Z79.01 MONITORING FOR LONG-TERM ANTICOAGULANT USE: ICD-10-CM

## 2024-11-15 DIAGNOSIS — Z51.81 MONITORING FOR LONG-TERM ANTICOAGULANT USE: ICD-10-CM

## 2024-11-15 DIAGNOSIS — Z79.01 LONG TERM (CURRENT) USE OF ANTICOAGULANTS: ICD-10-CM

## 2024-11-15 NOTE — PROGRESS NOTES
This RN called Lizbet, patients local daughter~ she said Mom's INR is being managed at the [facility] where she lives and no longer needs our services.   This Anticoagulation order will be closed at this time.

## 2024-11-21 ENCOUNTER — TELEPHONE (OUTPATIENT)
Dept: CARDIOLOGY | Age: 89
End: 2024-11-21

## 2024-11-26 ENCOUNTER — EXTERNAL LAB (OUTPATIENT)
Dept: HEALTH INFORMATION MANAGEMENT | Facility: OTHER | Age: 89
End: 2024-11-26

## 2024-11-26 LAB — LAB RESULT: NORMAL

## 2024-11-27 ENCOUNTER — TELEPHONE (OUTPATIENT)
Dept: CARDIOLOGY | Age: 89
End: 2024-11-27

## 2024-12-06 NOTE — TELEPHONE ENCOUNTER
----- Message from Ana sent at 12/6/2024  1:21 PM CST -----  Type:  RX Refill Request    Who Called:  pt   Refill or New Rx:  refill   RX Name and Strength:  NOVOLOG FLEXPEN U-100 INSULIN 100 unit/mL (3 mL) InPn pen , MOUNJARO 2.5 mg/0.5 mL PnIj , TRESIBA FLEXTOUCH U-100 100 unit/mL (3 mL) insulin pen   How is the patient currently taking it? (ex. 1XDay):  as direct   Is this a 30 day or 90 day RX:  90  Preferred Pharmacy with phone number:      [facility]  [address] [facility] [place] [address]  Phone: [phone] Fax: [phone]      Local or Mail Order:  local   Ordering Provider:  angelina Santiago Call Back Number:  [phone] (home)     Additional Information:  please advise   Informed Yvette pt  nurse of note below ,verbalized understanding.

## 2024-12-10 ENCOUNTER — EXTERNAL LAB (OUTPATIENT)
Dept: HEALTH INFORMATION MANAGEMENT | Facility: OTHER | Age: 89
End: 2024-12-10

## 2024-12-10 LAB
BUN SERPL-MCNC: 35 MG/DL (ref 7–28)
CALCIUM SERPL-MCNC: 8.3 MG/DL (ref 8.7–10.5)
CHLORIDE SERPL-SCNC: 105 MEQ/L (ref 99–110)
CO2 SERPL-SCNC: 30 MMOL/L (ref 18–30)
CREAT SERPL-MCNC: 1.6 MG/DL (ref 0.44–1.32)
EGFRCR SERPLBLD CKD-EPI 2021: 30 ML/M/1.73M2
EGFRCR SERPLBLD CKD-EPI 2021: 36 ML/M/1.73M2
GLUCOSE SERPL-MCNC: 76 MG/DL (ref 70–110)
INR PPP: 2.69 RATIO (ref 2–3)
LENGTH OF FAST TIME PATIENT: ABNORMAL H
NT-PROBNP SERPL-MCNC: 4846 PG/ML
POTASSIUM SERPL-SCNC: 3.6 MEQ/L (ref 3.6–5)
PROTHROMBIN TIME: 26 SEC (ref 19.7–28.8)
SODIUM SERPL-SCNC: 144 MEQ/L (ref 138–147)

## 2024-12-11 ENCOUNTER — E-ADVICE (OUTPATIENT)
Dept: CARDIOLOGY | Age: 89
End: 2024-12-11

## 2024-12-11 RX ORDER — SILDENAFIL CITRATE 20 MG/1
TABLET ORAL
Qty: 45 TABLET | Refills: 5 | Status: SHIPPED | OUTPATIENT
Start: 2024-12-11

## 2024-12-18 ENCOUNTER — TELEPHONE (OUTPATIENT)
Dept: CARDIOLOGY | Age: 89
End: 2024-12-18

## 2025-01-22 ENCOUNTER — TELEPHONE (OUTPATIENT)
Dept: CARDIOLOGY | Age: OVER 89
End: 2025-01-22

## 2025-01-23 ENCOUNTER — TELEPHONE (OUTPATIENT)
Dept: CARDIOLOGY | Age: OVER 89
End: 2025-01-23

## 2025-01-31 ENCOUNTER — TELEPHONE (OUTPATIENT)
Dept: CARDIOLOGY | Age: OVER 89
End: 2025-01-31

## 2025-02-10 ENCOUNTER — TELEPHONE (OUTPATIENT)
Dept: CARDIOLOGY | Age: OVER 89
End: 2025-02-10

## 2025-03-10 ENCOUNTER — OFFICE VISIT (OUTPATIENT)
Dept: CARDIOLOGY | Age: OVER 89
End: 2025-03-10

## 2025-03-10 ENCOUNTER — APPOINTMENT (OUTPATIENT)
Dept: CARDIOLOGY | Age: OVER 89
End: 2025-03-10
Attending: INTERNAL MEDICINE

## 2025-03-10 VITALS
SYSTOLIC BLOOD PRESSURE: 121 MMHG | DIASTOLIC BLOOD PRESSURE: 56 MMHG | HEART RATE: 61 BPM | BODY MASS INDEX: 18.64 KG/M2 | OXYGEN SATURATION: 98 % | WEIGHT: 111.99 LBS

## 2025-03-10 DIAGNOSIS — I27.0 PRIMARY PULMONARY HYPERTENSION  (CMD): ICD-10-CM

## 2025-03-10 DIAGNOSIS — E78.00 HYPERCHOLESTEREMIA: ICD-10-CM

## 2025-03-10 DIAGNOSIS — E55.9 VITAMIN D DEFICIENCY: ICD-10-CM

## 2025-03-10 DIAGNOSIS — I50.812 CHRONIC RIGHT-SIDED CONGESTIVE HEART FAILURE  (CMD): ICD-10-CM

## 2025-03-10 DIAGNOSIS — R06.09 DYSPNEA ON EXERTION: ICD-10-CM

## 2025-03-10 DIAGNOSIS — I27.20 PULMONARY HYPERTENSION  (CMD): Primary | ICD-10-CM

## 2025-03-10 LAB
AORTIC VALVE AREA (AVA): 1.29
ASCENDING AORTA (AAD): 3
AV PEAK GRADIENT (AVPG): 10
AV PEAK VELOCITY (AVPV): 1.59
AV STENOSIS SEVERITY TEXT: NORMAL
AVI LVOT PEAK GRADIENT (LVOTMG): 0.9
E WAVE DECELARATION TIME (MDT): 18.86
INTERVENTRICULAR SEPTUM IN END DIASTOLE (IVSD): 1.78
LEFT INTERNAL DIMENSION IN SYSTOLE (LVSD): 0.9
LEFT VENTRICULAR INTERNAL DIMENSION IN DIASTOLE (LVDD): 3.1
LEFT VENTRICULAR POSTERIOR WALL IN END DIASTOLE (LVPW): 4.5
LV EF: NORMAL %
LVOT 2D (LVOTD): 25.5
MV E TISSUE VEL MED (MESV): 9.32
MV E WAVE VEL/E TISSUE VEL MED(MSR): 6.84
MV PEAK A VELOCITY (MVPAV): 119
RV END SYSTOLIC LONGITUDINAL STRAIN FREE WALL (RVGS): 2.2
TRICUSPID VALVE ANNULAR PEAK VELOCITY (TVAPV): 63
TRICUSPID VALVE PEAK REGURGITATION VELOCITY (TRPV): 3.2
TV ESTIMATED RIGHT ARTERIAL PRESSURE (RAP): 11.7

## 2025-03-10 PROCEDURE — 99215 OFFICE O/P EST HI 40 MIN: CPT | Performed by: INTERNAL MEDICINE

## 2025-03-10 PROCEDURE — 93306 TTE W/DOPPLER COMPLETE: CPT | Performed by: INTERNAL MEDICINE

## 2025-03-10 PROCEDURE — 93356 MYOCRD STRAIN IMG SPCKL TRCK: CPT | Performed by: INTERNAL MEDICINE

## 2025-03-10 SDOH — HEALTH STABILITY: PHYSICAL HEALTH: ON AVERAGE, HOW MANY MINUTES DO YOU ENGAGE IN EXERCISE AT THIS LEVEL?: 0 MIN

## 2025-03-10 SDOH — HEALTH STABILITY: PHYSICAL HEALTH: ON AVERAGE, HOW MANY DAYS PER WEEK DO YOU ENGAGE IN MODERATE TO STRENUOUS EXERCISE (LIKE A BRISK WALK)?: 0 DAYS

## 2025-03-10 ASSESSMENT — PATIENT HEALTH QUESTIONNAIRE - PHQ9
SUM OF ALL RESPONSES TO PHQ9 QUESTIONS 1 AND 2: 0
SUM OF ALL RESPONSES TO PHQ9 QUESTIONS 1 AND 2: 0
1. LITTLE INTEREST OR PLEASURE IN DOING THINGS: NOT AT ALL
CLINICAL INTERPRETATION OF PHQ2 SCORE: NO FURTHER SCREENING NEEDED
2. FEELING DOWN, DEPRESSED OR HOPELESS: NOT AT ALL

## 2025-03-10 ASSESSMENT — ENCOUNTER SYMPTOMS
HEMOPTYSIS: 0
ABDOMINAL PAIN: 0
FEVER: 0
SUSPICIOUS LESIONS: 0
BACK PAIN: 0
WEIGHT LOSS: 1
BRUISES/BLEEDS EASILY: 0
CHILLS: 0
DEPRESSION: 0
BLOATING: 0
ALTERED MENTAL STATUS: 0
LIGHT-HEADEDNESS: 0
DIARRHEA: 0
COUGH: 0
CONSTIPATION: 0
WEIGHT GAIN: 0
SHORTNESS OF BREATH: 0
LOSS OF BALANCE: 0
INSOMNIA: 0
HEMATOCHEZIA: 0
DIZZINESS: 0
HEADACHES: 0
ALLERGIC/IMMUNOLOGIC COMMENTS: NO NEW FOOD ALLERGIES

## 2025-04-13 NOTE — TELEPHONE ENCOUNTER
Called patient back, informed patient on erroneous letter sent by following script. Patient verbalized understanding. PCP correction made. Prophylactic measure

## 2025-09-16 ENCOUNTER — APPOINTMENT (OUTPATIENT)
Dept: CARDIOLOGY | Age: OVER 89
End: 2025-09-16

## (undated) DEVICE — PREVENA PEEL & PLACE SYSTEM KIT- 13 CM: Brand: PREVENA™ PEEL & PLACE™

## (undated) DEVICE — KIT DRN 3/16IN PVC DRN 3 SPRG

## (undated) DEVICE — T5 HOOD WITH PEEL AWAY FACE SHIELD

## (undated) DEVICE — GOWN SURG XL L4 IMPRV REINFORCE SET IN SLV STRL LF DISP BLUE

## (undated) DEVICE — GAMMEX® NON-LATEX PI ORTHO SIZE 9, STERILE POLYISOPRENE POWDER-FREE SURGICAL GLOVE: Brand: GAMMEX

## (undated) DEVICE — SHEATH 7FR 10CM 2.5CM .035IN INTRO SNAP ON DIL LOCK KINK RST

## (undated) DEVICE — GAMMEX® NON-LATEX PI ORTHO SIZE 8.5, STERILE POLYISOPRENE POWDER-FREE SURGICAL GLOVE: Brand: GAMMEX

## (undated) DEVICE — STOPCOCK IV DARK BLUE .082IN MARQUIS 1050 PSI PLYCRB 3W HPRS

## (undated) DEVICE — GAUZE SPONGES,12 PLY: Brand: CURITY

## (undated) DEVICE — 2DE14 2-0 PDO 24 X 24: Brand: 2DE14 2-0 PDO 24 X 24

## (undated) DEVICE — GLOVE SURG 7.5 PROTEXIS LF CRM PF SMTH BEAD CUFF STRL

## (undated) DEVICE — 3M™ COBAN™ NL STERILE NON-LATEX SELF-ADHERENT WRAP, 2084S, 4 IN X 5 YD (10 CM X 4,5 M), 18 ROLLS/CASE: Brand: 3M™ COBAN™

## (undated) DEVICE — SUTURE VICRYL 2-0 FS-1

## (undated) DEVICE — SOL  .9 1000ML BTL

## (undated) DEVICE — DECANTER FLUID DSPNSR BAG BAJ DISP STRL LF ASEPTIC TRNSF

## (undated) DEVICE — SHEET,DRAPE,70X100,STERILE: Brand: MEDLINE

## (undated) DEVICE — Device

## (undated) DEVICE — FEMORAL CANAL BRUSH, IRRIGATION/SUCTION

## (undated) DEVICE — DRESSING TRANS 4.75X4IN ADH HPOAL WTPRF TEGADERM PU STD STRL

## (undated) DEVICE — PACK CDS TOTAL HIP

## (undated) DEVICE — SUCTION CANISTER, 3000CC,SAFELINER: Brand: DEROYAL

## (undated) DEVICE — SOL  .9 3000ML

## (undated) DEVICE — SUTURE VICRYL 0 J340H

## (undated) DEVICE — NEEDLE FLTR 18GA 1.5IN 5UM REG WALL BLUNT BVL LL HUB DEHP-FR

## (undated) DEVICE — CEMENT MIXING SYSTEM WITH FEMORAL BREAKWAY NOZZLE: Brand: REVOLUTION

## (undated) DEVICE — MEDI-VAC NON-CONDUCTIVE SUCTION TUBING: Brand: CARDINAL HEALTH

## (undated) DEVICE — CHLORAPREP 26ML APPLICATOR

## (undated) DEVICE — BLADE SW SGTL

## (undated) DEVICE — DRAPE L4 APRTR BRR PRTC 42X29IN 4.5X3.5IN SURG ACTI-GARD

## (undated) DEVICE — KIT MICROINTRODUCER 4FR 21GA 40CM 4CM .018IN SMTH NDL MNDRL

## (undated) DEVICE — COVER PROBE FLX-FEEL 58X6IN OR 4 FLAG 2 SHT 24 PRINT STRL LF

## (undated) DEVICE — BATTERY

## (undated) DEVICE — GAMMEX® PI HYBRID SIZE 9, STERILE POWDER-FREE SURGICAL GLOVE, POLYISOPRENE AND NEOPRENE BLEND: Brand: GAMMEX

## (undated) DEVICE — OCCLUSIVE GAUZE STRIP,3% BISMUTH TRIBROMOPHENATE IN PETROLATUM BLEND: Brand: XEROFORM

## (undated) DEVICE — DRESSING GAUZE 1.5X1.5IN HMST QUIKCLOT

## (undated) DEVICE — PROXIMATE SKIN STAPLERS (35 WIDE) CONTAINS 35 STAINLESS STEEL STAPLES (FIXED HEAD): Brand: PROXIMATE

## (undated) DEVICE — VIOLET BRAIDED (POLYGLACTIN 910), SYNTHETIC ABSORBABLE SUTURE: Brand: COATED VICRYL

## (undated) DEVICE — KIT SYRINGE 3ML ABG LL TIP LN DRAW FLTRPRO DRY LIHEP DISP

## (undated) NOTE — LETTER
[address] [place] [address]  Authorization for Invasive Procedures  1.  I hereby authorize  Dr. Lennox Grow , my physician and whomever may be designated as the doctor's assistant, to perform the following operation and/or procedure: Right Hea performed for the purposes of advancing medicine, science, and/or education, provided my identity is not revealed. If the procedure has been videotaped, the physician/surgeon will obtain the original videotape.  The hospital will not be responsible for stor My signature below affirms that prior to the time of the procedure, I have explained to the patient and/or her legal representative, the risks and benefits involved in the proposed treatment and any reasonable alternative to the proposed treatment.  I have

## (undated) NOTE — IP AVS SNAPSHOT
Patient Demographics     Address  Janice Mackay Phone  [phone] [facility]) *Preferred*  [phone] (Work)  [phone] [facility]) E-mail Address  [email or web address]      Emergency Contact(s)     Name Relation Home Work Mo Commonly known as:  CRESTOR  Next dose due:  Bedtime      TAKE 1 TABLET BY MOUTH  NIGHTLY   Ismael Suarez MD         Sildenafil Citrate 20 MG Tabs  Commonly known as:  REVATIO  Next dose due:  LAST DOSE: 4PM    NEXT DOSE: 9PM      Take 20 mg by mouth 3 ( [id number] Sildenafil Citrate (REVATIO) tab 20 mg 06/16/20 0938 Given      [id number] Warfarin Sodium (COUMADIN) tab 1 mg 06/15/20 2127 Given      [id number] acetaminophen (TYLENOL) tab 650 mg (Or Linked Group #1) 06/16/20 7745 Given  \"it's sore\"    [id number] PROTHROMBIN TIME (PT) [[id number]] (Abnormal)  Resulted: 06/16/20 0622, Result status: Final result   Ordering provider:  Brian Valentin MD  06/15/20 2300 Resulting lab:  [facility])    Specimen Information    Type [facility] Order Status:  Completed Lab Status:  Final result Updated:  06/11/20 1556    Specimen:  Other from [place]      Rapid SARS-CoV-2 by PCR Not Detected         H&P - H&P Note      H&P signed by Ernst Figueroa MD at 6/12/2020  8:47 PM   Version 1 of 1    Aut MEDICATIONS:  Please see medication reconciliation list.     ALLERGIES:  No known drug allergies. FAMILY HISTORY:  Mother had valvular heart disease. Father had prostate cancer. SOCIAL HISTORY:  No tobacco, alcohol, or drug use. Lives by herself. The patient will be admitted to general medical floor. Remote telemetry. Pain control. Obtain Cardiology consult for preoperative clearance.   The patient is moderate risk for general anesthesia at least.  Also obtain Orthopedic Surgery, Dr. Christian matthews got up from her sofa today and was in a hurry. Her feet get caught in a blanket and she fell to the ground injuring her right hip. She was brought to the emergency room where x-rays were obtained and I was consulted. Past medical history includes pulmonar • Breast Cancer Self 82   • Diabetes Neg    • Glaucoma Neg    • Macular degeneration Neg[JM.2]        Social History[JM.1]  Social History    Tobacco Use      Smoking status: Never Smoker      Smokeless tobacco: Never Used    Alcohol use: Yes    Drug use: Result Date: 6/11/2020  CONCLUSION:  1. Mildly displaced slightly impacted subcapital fracture right femoral neck as discussed above.     Dictated by (CST): Nanda Gamboa MD on 6/11/2020 at 3:23 PM     Finalized by (CST): Nanda Gamboa MD on 6/11/2020 at 29-58 Medium Risk  0-28   Low Risk. TCM Follow-Up Recommendation:  LACE > 58:  High Risk of readmission after discharge from the hospital.          Problem List: Patient Active Problem List:     Atrial fibrillation (HCC)     Afib (HCC)     Malignant neop impacted subcapital fracture, right femur neck. CBC, chemistry, PT, INR still pending. EKG showed atrial fibrillation which is chronic, rate controlled.     Hospital Course:     Right femur fracture  - seen by ortho  - cleared by cards for surgery  - s/p Take as directed. If you are unsure how to take this medication, talk to your nurse or doctor.   Original instructions:  TAKE ONE-HALF TABLET BY  MOUTH ON TUESDAYS AND 1  TABLET 6 DAYS PER WEEK AS  DIRECTED   Quantity:  90 tablet  Refills:  3        ASK yo E: [id number] Study date: May 19 2020 11:29AM              Room: Accession: [id number] HT:(64in) WT:(127.7lb)                       BP: ------------------------------------------------------------------- Indications:      PHT ------------------------------------------------------------------- Findings Left ventricle: The cavity size was normal. Wall thickness was normal. Systolic function was normal. The estimated ejection fraction was 55-60%, by biplane method of disks.  The s 5.2  LV ID, ES, PLAX                2.2   cm     2.4        2.2 - 3.5  LV fx shortening, PLAX         45    %      43         27 - 45  LV ID, major axis, ES,         6.0   cm     4.7        -----------  A4C  LV PW thickness, ED            0.9   cm     0.8 ml/m^2 64         16 - 34   Mitral valve                   Value        11/14/2019 Reference  Mitral E-wave peak             154   cm/sec 138        -----------  velocity  Mitral A-wave peak             64.2  cm/sec 61.4       -----------  velocity  Mitral Author:  Nichole Kaur PTA Service:  Rehab Author Type:  Physical Therapy Assistant    Filed:  6/15/2020  3:11 PM Date of Service:  6/15/2020 11:00 AM Status:  Signed    :  Nichole Kaur PTA (Physical Therapy Assistant)       PHYSICAL THER DISCHARGE RECOMMENDATIONS[RM.1]  PT Discharge Recommendations: Sub-acute rehabilitation[RM.2]    PLAN[RM. 1]  PT Treatment Plan: Bed mobility; Body mechanics; Patient education;Gait training;Strengthening;Transfer training;Balance training[RM. 2]    SUBJECTIVE Comment : Pt AMb with chair follow for safety. Patient End of Session: Up in chair;Needs met;Call light within reach;RN aware of session/findings; All patient questions and concerns addressed; Alarm set[RM. 2]    CURRENT GOALS     Goals to be met by: Anticoagulated    Closed right hip fracture, initial encounter ([facility] [address]    Fracture of unspecified part of neck of right femur, initial encounter for closed fracture ([facility] [address]      PHYSICAL THERAPY ASSESSMENT   Pt is received in the bed and was cleared for ther O2 WALK                  AM-PAC '6-Clicks' INPATIENT SHORT FORM - BASIC MOBILITY  How much difficulty does the patient currently have. ..  -   Turning over in bed (including adjusting bedclothes, sheets and blankets)?: A Lot   -   Sit Current Status IN PROGRESS   Goal #6 Patient independently performs home exercise program for ROM/strengthening per the instructions provided in preparation for discharge. Goal #6  Current Status IN PROGRESS[RM.1]        Attribution Key    RM. 1 - Manas it difficult and pt will require re-education. Pt was cooperative, alert. .    The patient's[LISY.1] Approx Degree of Impairment: 57. 7%[LISY.2] has been calculated based on documentation in the [facility] '6 clicks' Inpatient Basic Mobility Short Form.   Research enrique • Ovarian cyst 1997    Per NG: AH/BSO   • Screening     Per Eller Collet '02, '06 normal   • Tonsillitis 1934    Per NG: T&A[LISY.2]       Past Surgical History[LISY.1]  Past Surgical History:   Procedure Laterality Date   • CATARACT EXTRACTION W/  INTRAOCULAR right hip limited by post hip precautions.      Lower extremity strength is within functional limits except for the following:  right hip 3-/5    BALANCE[LISY.1]  Static Sitting: Fair -  Dynamic Sitting: Poor +  Static Standing: Poor +  Dynamic Standing: Poor of session/findings; All patient questions and concerns addressed;SCDs in place; Alarm set(abd splint in place, heels off bed)[LISY.2]    CURRENT GOALS    Goals to be met by: 6/20/20  Patient Goal Patient's self-stated goal is: I want to go to my dtrs home   G Fracture of unspecified part of neck of right femur, initial encounter for closed fracture (HCC)[AA.2]      OCCUPATIONAL THERAPY ASSESSMENT    The patient's[AA.1] Approx Degree of Impairment: 56.46%[AA.2] has been calculated based on documentation in the How much help from another person does the patient currently need…[AA.1]  -   Putting on and taking off regular lower body clothing?: A Lot  -   Bathing (including washing, rinsing, drying)?: A Lot  -   Toileting, which includes using toilet, bedpan or uri Presenting Problem: (s/p R hip hemiarthroplasty due to fall )    Physician Order: IP Consult to Occupational Therapy  Reason for Therapy: ADL/IADL Dysfunction and Discharge Planning    OCCUPATIONAL THERAPY ASSESSMENT     Patient is a 80year old female adm OT Treatment Plan: Balance activities; Energy conservation/work simplification techniques;ADL training;Functional transfer training;UE strengthening/ROM; Endurance training;Patient/Family education;Patient/Family training;Equipment eval/education; Compensator Type of Home: House  Home Layout: One level  Lives With: Alone    Toilet and Equipment: Standard height toilet;Grab bar  Shower/Tub and Equipment: Grab bar;Walk-in shower             Drives: No       Stairs in Home: 1STE  Assistive Device(s) Used: None; ha -   Eating meals?: None    AM-PAC Score:  Score: 16  Approx Degree of Impairment: 53.32%  Standardized Score (AM-PAC Scale): 35.96  CMS Modifier (G-Code): CK    FUNCTIONAL TRANSFER ASSESSMENT  Supine to Sit : Maximum assistance(x2)  Sit to Stand: Maximum a

## (undated) NOTE — LETTER
Juli Ramirez  2/7/1929    A patient of your clinic was recently seen by the hospitalists at [facility], and will be following up with you. Admitted for progressive weakness and inability to ambulate on own. Discharged with [facility].    The patient's last INR values were, as follows:    Date INR Value Comments   8/6/24 2.82 Received coumadin 1.5mg   8/5/24 2.52 Received coumadin 1.5mg          The patient's discharge Coumadin dosing is as follows:  per cardiology note 1.5mg coumadin nightly.     Please contact us if you have any questions.    Hallie Choudhary RN  08/08/24

## (undated) NOTE — Clinical Note
FYI, TCM call made, see [facility] notes. [facility] Sent a message to PCP office to request assistance scheduling TCM appointment with patient.

## (undated) NOTE — MR AVS SNAPSHOT
[address]  [phone]               Thank you for choosing us for your health care visit with Ramone Spring MD.  We are glad to serve you and happy to provide you with this summary of your visit. failure, unspecified congestive heart failure type [facility] [I50.9], Acute cystitis without hematuria [N30.00]           Urinalysis, Routine    Complete by:  Jun 12, 2017    Assoc Dx:  Hypercholesterolemia [E78.00], Chronic congestive heart failure, unspecifi Take 1 tablet (40 mg total) by mouth daily. Commonly known as:  PRINIVIL,ZESTRIL           Metoprolol Succinate  MG Tb24   Take 1 tablet by mouth  every day   Commonly known as:  TOPROL-XL           spironolactone 25 MG Tabs   Take 0.5 tablets (12.

## (undated) NOTE — IP AVS SNAPSHOT
[facility]            (For Outpatient Use Only) Initial Admit Date: 6/11/2020   Inpt/Obs Admit Date: Inpt: 6/11/20 / Obs: N/A   Discharge Date:    [place]:  [de-identified]   MRN: [de-identified]   CSN: [id number]   CEID: [id number]        [facility] Group Number:  Insurance Type:    Subscriber Name:  Subscriber :    Subscriber ID:  Pt Rel to Subscriber:    Hospital Account Financial Class: Medicare    2020

## (undated) NOTE — LETTER
Meir Hutchins  2/7/1929    A patient of your clinic was recently seen by the hospitalists at [facility], and will be following up with you on 7/17/19 (ok to keep apt as is per Dr Tiffanie Denis.)    The patient's last INR values were, as follo